# Patient Record
Sex: FEMALE | Race: WHITE | NOT HISPANIC OR LATINO | Employment: OTHER | URBAN - METROPOLITAN AREA
[De-identification: names, ages, dates, MRNs, and addresses within clinical notes are randomized per-mention and may not be internally consistent; named-entity substitution may affect disease eponyms.]

---

## 2021-01-26 ENCOUNTER — OFFICE VISIT - HEALTHEAST (OUTPATIENT)
Dept: FAMILY MEDICINE | Facility: CLINIC | Age: 86
End: 2021-01-26

## 2021-01-26 DIAGNOSIS — G89.29 CHRONIC RIGHT-SIDED LOW BACK PAIN WITH RIGHT-SIDED SCIATICA: ICD-10-CM

## 2021-01-26 DIAGNOSIS — M54.41 CHRONIC RIGHT-SIDED LOW BACK PAIN WITH RIGHT-SIDED SCIATICA: ICD-10-CM

## 2021-01-26 DIAGNOSIS — Z76.89 ENCOUNTER TO ESTABLISH CARE: ICD-10-CM

## 2021-01-26 RX ORDER — BETAMETHASONE DIPROPIONATE 0.05 %
OINTMENT (GRAM) TOPICAL
Status: SHIPPED | COMMUNITY
Start: 2019-03-27 | End: 2023-03-26

## 2021-01-26 RX ORDER — MULTIPLE VITAMINS W/ MINERALS TAB 9MG-400MCG
1 TAB ORAL EVERY MORNING
Status: SHIPPED | COMMUNITY
Start: 2021-01-26

## 2021-01-26 RX ORDER — CALCIUM CARBONATE 260MG(650)
100 TABLET,CHEWABLE ORAL 3 TIMES DAILY PRN
Status: SHIPPED | COMMUNITY
Start: 2021-01-26

## 2021-01-26 ASSESSMENT — MIFFLIN-ST. JEOR: SCORE: 1062.22

## 2021-02-08 ENCOUNTER — AMBULATORY - HEALTHEAST (OUTPATIENT)
Dept: NURSING | Facility: CLINIC | Age: 86
End: 2021-02-08

## 2021-03-01 ENCOUNTER — AMBULATORY - HEALTHEAST (OUTPATIENT)
Dept: NURSING | Facility: CLINIC | Age: 86
End: 2021-03-01

## 2021-06-05 VITALS
SYSTOLIC BLOOD PRESSURE: 112 MMHG | BODY MASS INDEX: 26.37 KG/M2 | HEIGHT: 62 IN | WEIGHT: 143.3 LBS | HEART RATE: 70 BPM | DIASTOLIC BLOOD PRESSURE: 74 MMHG

## 2021-06-14 NOTE — PROGRESS NOTES
Assessment/Plan:     1. Encounter to establish care  Healthy female patient.  Recommend follow up this fall for her Annual Wellness Physical.     2. Chronic right-sided low back pain with right-sided sciatica  Exacerbated by recent move.  Patient is managing her symptoms well at this time.  If worsening, consider PT and/or Spine referral.         Subjective:     Ramila Liao is a 85 y.o. female who presents to establish care.  Patient recently moved to [place] from [place], [place].  She is  since 2013; moved to be closer to family.  Patient is living in an independent senior living facility.  She is completely independent and continues to drive.  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  I was able to access and review her previous records through [facility] Everywhere.     Patient denies any chronic health conditions.  She utilizes a steroid cream intermittently for atrophic vaginitis symptoms, but is otherwise not on any prescription medications.      History of total hysterectomy due to an ovarian cyst.    Patient does have intermittent right low back pain with radiating pain down her right leg.  Symptoms have been exacerbated by her recent move.  She tries to walk frequently and stay active.  Denies any numbness/tingling in the foot.  Previously has done PT.  An MRI in 2016 showed some lumbar spinal stenosis.    Up to date on her mammogram.  She has stopped getting colonoscopies.  No history of DEXA scan.  Immunizations are up to date.  She is looking forward to hopefully getting the COVID vaccination soon.       The following portions of the patient's history were reviewed and updated as appropriate: allergies, current medications, past family history, past medical history, past social history, past surgical history and problem list.    Review of Systems  A comprehensive review of systems was performed and was otherwise negative    Objective:     BP  "112/74   Pulse 70   Ht 5' 2.25\" (1.581 m)   Wt 143 lb 4.8 oz (65 kg)   BMI 26.00 kg/m      General Appearance: Alert, cooperative, no distress, appears stated age  Head: Normocephalic, without obvious abnormality, atraumatic    Susan Sutton, LONA    "

## 2021-06-29 ENCOUNTER — OFFICE VISIT - HEALTHEAST (OUTPATIENT)
Dept: FAMILY MEDICINE | Facility: CLINIC | Age: 86
End: 2021-06-29

## 2021-06-29 DIAGNOSIS — Z91.038 ALLERGY TO INSECT BITES: ICD-10-CM

## 2021-07-06 VITALS
DIASTOLIC BLOOD PRESSURE: 80 MMHG | SYSTOLIC BLOOD PRESSURE: 118 MMHG | WEIGHT: 138.6 LBS | HEART RATE: 74 BPM | BODY MASS INDEX: 25.15 KG/M2

## 2021-07-07 NOTE — PATIENT INSTRUCTIONS - HE
I placed a referral to the allergy specialist today.    Please call and make an appointment with Dr. Ramirez.  [phone]

## 2021-07-07 NOTE — PROGRESS NOTES
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  Told her that she could have a discussion with her allergist to better determine if she would benefit from carrying an EpiPen.  - Ambulatory referral to [facility]    Patient Instructions   I placed a referral to the allergy specialist today.    Please call and make an appointment with Dr. Ramirez.  [phone]        Prescription drug management  14 minutes spent on the date of the encounter doing chart review, history and exam, documentation and further activities per the note       BMI:   Estimated body mass index is 25.15 kg/m  as calculated from the following:    Height as of 1/26/21: 5' 2.25\" (1.581 m).    Weight as of this encounter: 138 lb 9.6 oz (62.9 kg).       Return in about 3 months (around 9/29/2021).    Audi Dave CNP  [facility]   Ramila Liao is 86 y.o. and presents today for the following health issues   HPI   Patient comes the clinic today for some insect bites that she experienced this past weekend, approximately 3 days ago.    She was on a pontoon boat when she thinks she was bitten by some horse flies on her bilateral elbows and right ankle.  She says immediately afterward, she developed some localized redness and swelling.  She applied some 1% hydrocortisone and took some diphenhydramine and says that her symptoms got better.  However, she had family members tell her that she may need an EpiPen due to her reaction to the insect bite.    Today, patient states that the swelling near the insect bites has gotten much better.    She does not recall any type of throat swelling, lip swelling, tongue swelling, or difficulty breathing since getting bit by the insects.    She has never had to use an EpiPen in the past.    She cannot remember the last " time she got stung by a bee.      Review of Systems  Negative      Objective    /80   Pulse 74   Wt 138 lb 9.6 oz (62.9 kg)   BMI 25.15 kg/m    Body mass index is 25.15 kg/m .  Physical Exam  Perhaps some very mild swelling the patient's right ankle region but no evidence of cellulitis.

## 2021-08-10 ENCOUNTER — OFFICE VISIT (OUTPATIENT)
Dept: ALLERGY | Facility: CLINIC | Age: 86
End: 2021-08-10
Payer: COMMERCIAL

## 2021-08-10 VITALS — HEIGHT: 62 IN | WEIGHT: 138.62 LBS | HEART RATE: 78 BPM | BODY MASS INDEX: 25.51 KG/M2 | OXYGEN SATURATION: 99 %
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
## 2021-08-10 DIAGNOSIS — S90.861D INSECT BITE OF RIGHT FOOT, SUBSEQUENT ENCOUNTER: ICD-10-CM

## 2021-08-10 DIAGNOSIS — L50.3 DERMATOGRAPHIA: Primary | ICD-10-CM

## 2021-08-10 PROCEDURE — 99203 OFFICE O/P NEW LOW 30 MIN: CPT | Performed by: ALLERGY & IMMUNOLOGY

## 2021-08-10 ASSESSMENT — MIFFLIN-ST. JEOR: SCORE: 1035.99

## 2021-08-10 NOTE — PATIENT INSTRUCTIONS
Dermatographia    Zyrtec (cetirizine) 10 mg prior to bug bite    Systemic reaction---more than skin, swelling, GI symptoms, breathing issues

## 2021-08-10 NOTE — LETTER
"    8/10/2021         RE: Ramila Liao  [address]  [facility] [address]        Dear Colleague,    Thank you for referring your patient, Ramila Liao, to the [facility]. Please see a copy of my visit note below.            Subjective       HPI chief complaint: Bug bite reaction    History of present illness: This is a pleasant 86-year-old woman who is here today to discuss a bug bite reaction.  [place]  She states she was bitten on her foot by either a fly or stung by a bee.  She is not sure.  She developed a large local swelling with redness around the swelling.  She then felt itchy all over her body.  She states she had no rash systemically.  No breathing difficulty.  No nasal congestion or gastrointestinal complaints.  No swelling of her lips or eyes.  She states that she used some antiitch cream and Benadryl which seems to help.  She states that she will become itchy systemically from other things.  She states that the child this would happen after she would go swimming.  It happened once when she was in [place] and he was some hand .  She states she seems to swell quite a bit with bug bites previously as well.  She is never had a systemic reaction to insect bites.  She does not take the allergy medication regularly.    Past medical history: Cholecystectomy, hysterectomy    Social history: She lives in an apartment, she is retired, non-smoker    Family history: Noncontributory, mother had what sounds like dermatographia        Review of Systems   Constitutional, HEENT, cardiovascular, pulmonary, gi and gu systems are negative, except as otherwise noted.      Objective    Pulse 78   Ht 1.581 m (5' 2.25\")   Wt 62.9 kg (138 lb 9.9 oz)   SpO2 99%   BMI 25.15 kg/m    Body mass index is 25.15 kg/m .  Physical Exam      Gen: Pleasant female not in acute distress  HEENT: Eyes no erythema of the bulbar or " palpebral conjunctiva, no edema.   Skin: No rashes or lesions  Psych: Alert and oriented times 3    Impression report and plan:  1.  Large local reaction to insect bite  2.  Dermatographia    Reaction does not sound like a systemic reaction.  She had no systemic rash, gastrointestinal complaints, swelling or breathing difficulty.  She does have what sounds like dermatographia which could have been triggered by her insect bite.  Recommended her trying Zyrtec 10 mg prior to insect exposure.  No testing is necessary for this sort of reaction.  Follow as needed.            Again, thank you for allowing me to participate in the care of your patient.        Sincerely,        Tamara TREVIZO MD

## 2021-08-10 NOTE — PROGRESS NOTES
"        Subjective       HPI chief complaint: Bug bite reaction    History of present illness: This is a pleasant 86-year-old woman who is here today to discuss a bug bite reaction.  [place]  She states she was bitten on her foot by either a fly or stung by a bee.  She is not sure.  She developed a large local swelling with redness around the swelling.  She then felt itchy all over her body.  She states she had no rash systemically.  No breathing difficulty.  No nasal congestion or gastrointestinal complaints.  No swelling of her lips or eyes.  She states that she used some antiitch cream and Benadryl which seems to help.  She states that she will become itchy systemically from other things.  She states that the child this would happen after she would go swimming.  It happened once when she was in [place] and he was some hand .  She states she seems to swell quite a bit with bug bites previously as well.  She is never had a systemic reaction to insect bites.  She does not take the allergy medication regularly.    Past medical history: Cholecystectomy, hysterectomy    Social history: She lives in an apartment, she is retired, non-smoker    Family history: Noncontributory, mother had what sounds like dermatographia        Review of Systems   Constitutional, HEENT, cardiovascular, pulmonary, gi and gu systems are negative, except as otherwise noted.      Objective    Pulse 78   Ht 1.581 m (5' 2.25\")   Wt 62.9 kg (138 lb 9.9 oz)   SpO2 99%   BMI 25.15 kg/m    Body mass index is 25.15 kg/m .  Physical Exam      Gen: Pleasant female not in acute distress  HEENT: Eyes no erythema of the bulbar or palpebral conjunctiva, no edema.   Skin: No rashes or lesions  Psych: Alert and oriented times 3    Impression report and plan:  1.  Large local reaction to insect bite  2.  Dermatographia    Reaction does not sound like a systemic reaction.  She had no systemic " rash, gastrointestinal complaints, swelling or breathing difficulty.  She does have what sounds like dermatographia which could have been triggered by her insect bite.  Recommended her trying Zyrtec 10 mg prior to insect exposure.  No testing is necessary for this sort of reaction.  Follow as needed.

## 2021-09-15 ENCOUNTER — LAB (OUTPATIENT)
Dept: LAB | Facility: CLINIC | Age: 86
End: 2021-09-15

## 2021-09-15 ENCOUNTER — VIRTUAL VISIT (OUTPATIENT)
Dept: FAMILY MEDICINE | Facility: CLINIC | Age: 86
End: 2021-09-15
Payer: COMMERCIAL

## 2021-09-15 DIAGNOSIS — R05.9 COUGH: ICD-10-CM

## 2021-09-15 DIAGNOSIS — R05.9 COUGH: Primary | ICD-10-CM

## 2021-09-15 PROCEDURE — 99213 OFFICE O/P EST LOW 20 MIN: CPT | Mod: 95 | Performed by: FAMILY MEDICINE

## 2021-09-15 PROCEDURE — U0005 INFEC AGEN DETEC AMPLI PROBE: HCPCS

## 2021-09-15 PROCEDURE — U0003 INFECTIOUS AGENT DETECTION BY NUCLEIC ACID (DNA OR RNA); SEVERE ACUTE RESPIRATORY SYNDROME CORONAVIRUS 2 (SARS-COV-2) (CORONAVIRUS DISEASE [COVID-19]), AMPLIFIED PROBE TECHNIQUE, MAKING USE OF HIGH THROUGHPUT TECHNOLOGIES AS DESCRIBED BY CMS-2020-01-R: HCPCS

## 2021-09-15 NOTE — PROGRESS NOTES
Kanwal is a 86 year old who is being evaluated via a billable telephone visit.      What phone number would you like to be contacted at? [phone]  How would you like to obtain your AVS? Mail a copy    Assessment & Plan     Cough  For now, supportive cares.  Practice self-observation and remain alert for feelings of feverish, cough, shortness of breath.  Take your temperature daily.  Self isolate in the home until result is made available and your symptoms are better.     - Symptomatic COVID-19 Virus (Coronavirus) by PCR    Phan Palomares MD  [facility]   Kanwal is a 86 year old who presents for the following health issues     HPI     Scratchy throat, excessive clearing of throat noted 4 days ago  runny nose and cough noted today  No fever. No sore throat  Fully vaccinated for covid19   No close contact with COVID19 but there were several residents in her residence who tested positive for COVID19    Review of Systems         Objective    Vitals - Patient Reported  Weight (Patient Reported): 64.9 kg (143 lb)  Temperature (Patient Reported): 98.4  F (36.9  C)      Vitals:  No vitals were obtained today due to virtual visit.    Physical Exam   Alert, oriented, NAD    Phone call duration: 11 minutes

## 2021-09-16 ENCOUNTER — NURSE TRIAGE (OUTPATIENT)
Dept: NURSING | Facility: CLINIC | Age: 86
End: 2021-09-16

## 2021-09-16 LAB — SARS-COV-2 RNA RESP QL NAA+PROBE: NEGATIVE

## 2021-09-16 NOTE — TELEPHONE ENCOUNTER

## 2022-01-03 ENCOUNTER — NURSE TRIAGE (OUTPATIENT)
Dept: NURSING | Facility: CLINIC | Age: 87
End: 2022-01-03
Payer: COMMERCIAL

## 2022-01-03 NOTE — TELEPHONE ENCOUNTER
Triage call:     Patient calling to report low back pain that has been going on for the past 6 weeks. Right buttock to right leg pain and thinks that could be related to sciatica, and rates pain at 4-5/10, interfering with sleep.  Has used ibuprofen, tylenol, heat, cold and reports little relief--especially worse when standing. She reports that she occasionally has a sharp pain (7/10) in the hip area that causes her to lose her balance. She denies falls. She reports that she attends an exercise class and will attend this afternoon to see if stretching will help improve.     According to the protocol, patient should be seen .  Care advice given. Patient verbalizes understanding and agrees with plan of care. Transferred to scheduling for appointment tomorrow with Susan Sutton.     Maria Isabel Burton RN   01/03/22 11:16 AM  [facility] Nurse Advisor    COVID 19 Nurse Triage Plan/Patient Instructions    Please be aware that novel coronavirus (COVID-19) may be circulating in the community. If you develop symptoms such as fever, cough, or SOB or if you have concerns about the presence of another infection including coronavirus (COVID-19), please contact your health care provider or visit [email or web address].     Disposition/Instructions    In-Person Visit with provider recommended. Reference Visit Selection Guide.    Thank you for taking steps to prevent the spread of this virus.  o Limit your contact with others.  o Wear a simple mask to cover your cough.  o Wash your hands well and often.    Resources    [facility]: About COVID-19: [email or web address]    CDC: What to Do If You're Sick: www.cdc.gov/coronavirus/2019-ncov/about/steps-when-sick.html    CDC: Ending Home Isolation: www.cdc.gov/coronavirus/2019-ncov/hcp/disposition-in-home-patients.html     CDC: Caring for Someone: www.cdc.gov/coronavirus/2019-ncov/if-you-are-sick/care-for-someone.html     [facility]: Interim Guidance for [facility]  Discharge to Home: [email or web address]/[email or web address]/hcp/hospdischarge.pdf    [facility] clinical trials (COVID-19 research studies): [email or web address].[facility]/umn-clinical-trials     Below are the COVID-19 hotlines at the [place] Department of Health ([facility]). Interpreters are available.   o For health questions: Call 382-073-2495 or 1-650.512.2869 (7 a.m. to 7 p.m.)  o For questions about schools and childcare: Call 097-129-0418 or 1-745.616.8558 (7 a.m. to 7 p.m.)                      Reason for Disposition    MODERATE back pain (e.g., interferes with normal activities) and present > 3 days    Additional Information    Negative: Passed out (i.e., fainted, collapsed and was not responding)    Negative: Shock suspected (e.g., cold/pale/clammy skin, too weak to stand, low BP, rapid pulse)    Negative: Sounds like a life-threatening emergency to the triager    Negative: Major injury to the back (e.g., MVA, fall > 10 feet or 3 meters, penetrating injury, etc.)    Negative: Pain in the upper back over the ribs (rib cage) that radiates (travels) into the chest    Negative: Pain in the upper back over the ribs (rib cage) and worsened by coughing (or clearly increases with breathing)    Negative: SEVERE back pain of sudden onset and age > 60    Negative: SEVERE abdominal pain (e.g., excruciating)    Negative: Abdominal pain and age > 60    Negative: Unable to urinate (or only a few drops) and bladder feels very full    Negative: Loss of bladder or bowel control (urine or bowel incontinence; wetting self, leaking stool) of new onset    Negative: Numbness (loss of sensation) in groin or rectal area    Negative: Pain radiates into groin, scrotum    Negative: Blood in urine (red, pink, or tea-colored)    Negative: Vomiting and pain over lower ribs of back (i.e., flank - kidney area)    Negative: Weakness of a leg or foot (e.g., unable to bear weight, dragging foot)    Negative: Patient sounds very sick or  weak to the triager    Negative: Fever > 100.4 F (38.0 C) and flank pain    Negative: Pain or burning with passing urine (urination)    Negative: SEVERE back pain (e.g., excruciating, unable to do any normal activities) and not improved after pain medicine and CARE ADVICE    Negative: Numbness in an arm or hand (i.e., loss of sensation) and upper back pain    Negative: Numbness in a leg or foot (i.e., loss of sensation)    Negative: High-risk adult (e.g., history of cancer, history of HIV, or history of IV drug abuse)    Negative: Painful rash with multiple small blisters grouped together (i.e., dermatomal distribution or 'band' or 'stripe')    Negative: Pain radiates into the thigh or further down the leg, and in both legs    Negative: Age > 50 and no history of prior similar back pain    Protocols used: BACK PAIN-A-[place]

## 2022-01-04 ENCOUNTER — OFFICE VISIT (OUTPATIENT)
Dept: FAMILY MEDICINE | Facility: CLINIC | Age: 87
End: 2022-01-04
Payer: COMMERCIAL

## 2022-01-04 ENCOUNTER — TELEPHONE (OUTPATIENT)
Dept: FAMILY MEDICINE | Facility: CLINIC | Age: 87
End: 2022-01-04

## 2022-01-04 VITALS
SYSTOLIC BLOOD PRESSURE: 110 MMHG | DIASTOLIC BLOOD PRESSURE: 62 MMHG | WEIGHT: 137.3 LBS | BODY MASS INDEX: 24.91 KG/M2 | HEART RATE: 76 BPM

## 2022-01-04 DIAGNOSIS — M54.41 CHRONIC RIGHT-SIDED LOW BACK PAIN WITH RIGHT-SIDED SCIATICA: Primary | ICD-10-CM

## 2022-01-04 DIAGNOSIS — G89.29 CHRONIC RIGHT-SIDED LOW BACK PAIN WITH RIGHT-SIDED SCIATICA: Primary | ICD-10-CM

## 2022-01-04 DIAGNOSIS — H61.23 BILATERAL IMPACTED CERUMEN: ICD-10-CM

## 2022-01-04 PROCEDURE — 69209 REMOVE IMPACTED EAR WAX UNI: CPT | Mod: 50 | Performed by: NURSE PRACTITIONER

## 2022-01-04 PROCEDURE — 99214 OFFICE O/P EST MOD 30 MIN: CPT | Mod: 25 | Performed by: NURSE PRACTITIONER

## 2022-01-04 NOTE — PROGRESS NOTES
"  Assessment & Plan     Chronic right-sided low back pain with right-sided sciatica  Symptoms improved with ibuprofen.  Due to chronicity of symptoms, we did do some x-rays today including her lumbar spine and bilateral hips.  I personally reviewed the x-rays, and vertebral spacing seems to be well-maintained.  I do not see any significant arthritis of the hips.  Final radiology report pending.  At this point, will refer patient for physical therapy.  She may continue ibuprofen twice a day for the next couple of days, but I would not use this long-term.  If symptoms become more persistent, consider spine referral.  - Physical Therapy Referral  - XR Lumbar Spine 2/3 Views  - XR Hip Bilateral 2 Views Each    Bilateral impacted cerumen  Bilateral ear canals irrigated per provider assistant.  Patient tolerated well and cerumen was completely cleared.   - CA REMOVAL IMPACTED CERUMEN IRRIGATION/LVG UNILAT           BMI:   Estimated body mass index is 24.91 kg/m  as calculated from the following:    Height as of 8/10/21: 1.581 m (5' 2.25\").    Weight as of this encounter: 62.3 kg (137 lb 4.8 oz).       Return in about 2 months (around 3/4/2022) for Routine preventive, with me, in person.    Susan Sutton NP  [facility]    Erickson Schofield is a 86 year old who presents with complaints of back pain.  This is not new.  Symptoms restarted over the weekend.  There was no fall or specific trigger.  She reports pain to her right low back/buttocks area that radiates down her right leg.  Denies any new numbness/tingling in her feet or lower extremity weakness.  No episodes of incontinence.  She took 400 mg of ibuprofen last evening, which seems to have helped quite a bit.  She is feeling much better today.  A chart review reveals that she had a lumbar x-ray in 2016, which showed some mild spinal stenosis.    Patient is also requesting her ears be flushed out today.    Review of Systems "   Pertinent items in HPI      Objective    /62   Pulse 76   Wt 62.3 kg (137 lb 4.8 oz)   BMI 24.91 kg/m    Body mass index is 24.91 kg/m .  Physical Exam   GENERAL: healthy, alert and no distress  HENT: unable to visualize bilateral TMs secondary to cerumen  MS: Lumbar spine is straight and nontender.  Tenderness palpated to the right SI joint and buttocks area.  Gait and coordination normal.  Lower extremity strength is grossly normal bilaterally.  SLR negative bilaterally.

## 2022-01-04 NOTE — PATIENT INSTRUCTIONS
Continue Ibuprofen twice daily with food.    I will call you with your xray results.    I placed a referral for physical therapy - you will receive a phone call to get this scheduled.

## 2022-01-04 NOTE — TELEPHONE ENCOUNTER
----- Message from Susan Sutton NP sent at 1/4/2022  5:09 PM CST -----  Please call patient.    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  No other significant findings.  Continue with the plan as discuss in clinic.    Susan Sutton NP

## 2022-04-18 RX ORDER — BETAMETHASONE VALERATE 1.2 MG/G
1 CREAM TOPICAL
COMMUNITY
End: 2022-04-19

## 2022-04-19 ENCOUNTER — OFFICE VISIT (OUTPATIENT)
Dept: FAMILY MEDICINE | Facility: CLINIC | Age: 87
End: 2022-04-19
Payer: COMMERCIAL

## 2022-04-19 VITALS
SYSTOLIC BLOOD PRESSURE: 116 MMHG | BODY MASS INDEX: 25.19 KG/M2 | WEIGHT: 136.9 LBS | HEIGHT: 62 IN | HEART RATE: 80 BPM | DIASTOLIC BLOOD PRESSURE: 76 MMHG

## 2022-04-19 DIAGNOSIS — R21 RASH: ICD-10-CM

## 2022-04-19 DIAGNOSIS — M77.41 METATARSALGIA OF RIGHT FOOT: ICD-10-CM

## 2022-04-19 DIAGNOSIS — Z00.00 ENCOUNTER FOR MEDICARE ANNUAL WELLNESS EXAM: Primary | ICD-10-CM

## 2022-04-19 DIAGNOSIS — Z13.220 SCREENING FOR LIPID DISORDERS: ICD-10-CM

## 2022-04-19 DIAGNOSIS — M54.41 CHRONIC RIGHT-SIDED LOW BACK PAIN WITH RIGHT-SIDED SCIATICA: ICD-10-CM

## 2022-04-19 DIAGNOSIS — G89.29 CHRONIC RIGHT-SIDED LOW BACK PAIN WITH RIGHT-SIDED SCIATICA: ICD-10-CM

## 2022-04-19 DIAGNOSIS — Z13.1 SCREENING FOR DIABETES MELLITUS: ICD-10-CM

## 2022-04-19 DIAGNOSIS — Z23 HIGH PRIORITY FOR 2019-NCOV VACCINE: ICD-10-CM

## 2022-04-19 PROCEDURE — 0054A COVID-19,PF,PFIZER (12+ YRS): CPT | Performed by: NURSE PRACTITIONER

## 2022-04-19 PROCEDURE — 99397 PER PM REEVAL EST PAT 65+ YR: CPT | Mod: 25 | Performed by: NURSE PRACTITIONER

## 2022-04-19 PROCEDURE — 91305 COVID-19,PF,PFIZER (12+ YRS): CPT | Performed by: NURSE PRACTITIONER

## 2022-04-19 ASSESSMENT — ACTIVITIES OF DAILY LIVING (ADL): CURRENT_FUNCTION: NO ASSISTANCE NEEDED

## 2022-04-19 NOTE — PROGRESS NOTES
"SUBJECTIVE:   Ramila Liao is a 86 year old female who presents for Preventive Visit.    Patient is doing well in her senior living facility.  Has been able to be more active with less COVID restrictions.  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    Continues to have right low back pain.  Better with walking and heat application.    Has pain on the bottom of her right foot, just proximal to the 3rd toe.  She has a metatarsal pad that was given to her years ago that is helpful.  She would like a new one.    Patient declines a DEXA scan.  Does not wish to continue mammogram screenings.     Gets a red, blistery, and itchy rash on different areas of her body.  Recently had one on her buttocks.  They are not painful.     Patient has been advised of split billing requirements and indicates understanding: Yes  Are you in the first 12 months of your Medicare coverage?  No    Healthy Habits:     In general, how would you rate your overall health?  Good    Frequency of exercise:  4-5 days/week    Do you usually eat at least 4 servings of fruit and vegetables a day, include whole grains    & fiber and avoid regularly eating high fat or \"junk\" foods?  No    Taking medications regularly:  Yes    Medication side effects:  Not applicable    Ability to successfully perform activities of daily living:  No assistance needed    Home Safety:  No safety concerns identified    Hearing Impairment:  Difficulty following a conversation in a noisy restaurant or crowded room, difficult to understand a speaker at a public meeting or [employer] service and difficulty understanding soft or whispered speech    In the past 6 months, have you been bothered by leaking of urine? Yes    In general, how would you rate your overall mental or emotional health?  Good      PHQ-2 Total Score: 0    Additional concerns today:  Yes    Do you feel safe in your environment? Yes    Have you ever done Advance Care Planning? (For example, a Health " "Directive, POLST, or a discussion with a medical provider or your loved ones about your wishes): Yes, advance care planning is on file.       Fall risk  Fallen 2 or more times in the past year?: No  Any fall with injury in the past year?: No    Cognitive Screening   1) Repeat 3 items (Leader, Season, Table)    2) Clock draw: NORMAL  3) 3 item recall: Recalls 2 objects   Results: NORMAL clock, 1-2 items recalled: COGNITIVE IMPAIRMENT LESS LIKELY        Reviewed and updated as needed this visit by clinical staff   Tobacco  Allergies  Meds  Problems  Med Hx  Surg Hx  Fam Hx            Reviewed and updated as needed this visit by Provider   Tobacco  Allergies  Meds  Problems  Med Hx  Surg Hx  Fam Hx           Social History     Tobacco Use     Smoking status: Never Smoker     Smokeless tobacco: Never Used   Substance Use Topics     Alcohol use: Yes       Alcohol Use 4/19/2022   Prescreen: >3 drinks/day or >7 drinks/week? Not Applicable       Current providers sharing in care for this patient include:  Patient Care Team:  Susan Sutton NP as PCP - General (Family Practice)  Susan Sutton NP as Assigned PCP  Tamara Ramirez MD as Assigned Allergy Provider    The following health maintenance items are reviewed in Epic and correct as of today:  Health Maintenance Due   Topic Date Due     DEXA  Never done       Mammogram Screening - Patient over age 75, has elected to discontinue screenings. Mammogram Screening - Mammography discussed and declined  Pertinent mammograms are reviewed under the imaging tab.    Review of Systems  Pertinent items in HPI    OBJECTIVE:   /76   Pulse 80   Ht 1.575 m (5' 2\")   Wt 62.1 kg (136 lb 14.4 oz)   BMI 25.04 kg/m   Estimated body mass index is 25.04 kg/m  as calculated from the following:    Height as of this encounter: 1.575 m (5' 2\").    Weight as of this encounter: 62.1 kg (136 lb 14.4 oz).  Physical Exam  GENERAL: healthy, alert and no distress  EYES: Eyes " "grossly normal to inspection, PERRL and conjunctivae and sclerae normal  HENT: ear canals and TM's normal, nose and mouth without ulcers or lesions  NECK: no adenopathy, no asymmetry, masses, or scars and thyroid normal to palpation  RESP: lungs clear to auscultation - no rales, rhonchi or wheezes  CV: regular rate and rhythm, normal S1 S2, no S3 or S4, no murmur, click or rub, no peripheral edema  ABDOMEN: soft, nontender, no hepatosplenomegaly, no masses and bowel sounds normal  MS: no gross musculoskeletal defects noted, no edema  SKIN: no suspicious lesions or rashes  NEURO: Normal strength and tone, mentation intact and speech normal  PSYCH: mentation appears normal, affect normal/bright      ASSESSMENT / PLAN:   Ramila was seen today for physical and imm/inj.    Diagnoses and all orders for this visit:    Encounter for Medicare annual wellness exam    High priority for 2019-nCoV vaccine  -     COVID-19,PF,PFIZER (12+ Yrs GRAY LABEL)    Screening for diabetes mellitus  -     Comprehensive metabolic panel (BMP + Alb, Alk Phos, ALT, AST, Total. Bili, TP); Future    Screening for lipid disorders  -     Lipid Profile (Chol, Trig, HDL, LDL calc); Future    Rash  Intermittent blistery rash.  Appears like Shingles, but I would expect more pain.  Continue to monitor.  She can trial her steroid cream on this next time, as it is pruritic.     Chronic right-sided low back pain with right-sided sciatica  Patient will continue to stay active.  She declines a PT or spine referral at this time.    Metatarsalgia of right foot  Recommend getting a new metatarsal pad off of [employer].  She will have a family member help with this.  Declines a podiatry referral.        COUNSELING:  Reviewed preventive health counseling, as reflected in patient instructions    Estimated body mass index is 25.04 kg/m  as calculated from the following:    Height as of this encounter: 1.575 m (5' 2\").    Weight as of this encounter: 62.1 kg (136 lb " 14.4 oz).        She reports that she has never smoked. She has never used smokeless tobacco.      Appropriate preventive services were discussed with this patient, including applicable screening as appropriate for cardiovascular disease, diabetes, osteopenia/osteoporosis, and glaucoma.  As appropriate for age/gender, discussed screening for colorectal cancer, prostate cancer, breast cancer, and cervical cancer. Checklist reviewing preventive services available has been given to the patient.    Reviewed patients plan of care and provided an AVS. The Basic Care Plan (routine screening as documented in Health Maintenance) for Ramila meets the Care Plan requirement. This Care Plan has been established and reviewed with the Patient.      Susan Sutton NP  [facility]    Identified Health Risks:

## 2022-04-19 NOTE — PATIENT INSTRUCTIONS
Can order a metatarsal pad off of [employer]    Corn pad between big toe on the left foot    Patient Education   Personalized Prevention Plan  You are due for the preventive services outlined below.  Your care team is available to assist you in scheduling these services.  If you have already completed any of these items, please share that information with your care team to update in your medical record.  Health Maintenance Due   Topic Date Due    Osteoporosis Screening  Never done    FALL RISK ASSESSMENT  Never done       Understanding USDA MyPlate  The USDA has guidelines to help you make healthy food choices. These are called MyPlate. MyPlate shows the food groups that make up healthy meals using the image of a place setting. Before you eat, think about the healthiest choices for what to put on your plate or in your cup or bowl. To learn more about building a healthy plate, visit www.choosemyplate.gov.    The food groups  Fruits. Any fruit or 100% fruit juice counts as part of the Fruit Group. Fruits may be fresh, canned, frozen, or dried, and may be whole, cut-up, or pureed. Make 1/2 of your plate fruits and vegetables.  Vegetables. Any vegetable or 100% vegetable juice counts as a member of the Vegetable Group. Vegetables may be fresh, frozen, canned, or dried. They can be served raw or cooked and may be whole, cut-up, or mashed. Make 1/2 of your plate fruits and vegetables.  Grains. All foods made from grains are part of the Grains Group. These include wheat, rice, oats, cornmeal, and barley. Grains are often used to make foods such as bread, pasta, oatmeal, cereal, tortillas, and grits. Grains should be no more than 1/4 of your plate. At least half of your grains should be whole grains.  Protein. This group includes meat, poultry, seafood, beans and peas, eggs, processed soy products (such as tofu), nuts (including nut butters), and seeds. Make protein choices no more than 1/4 of your plate. Meat and poultry  choices should be lean or low fat.  Dairy. The Dairy Group includes all fluid milk products and foods made from milk that contain calcium, such as yogurt and cheese. (Foods that have little calcium, such as cream, butter, and cream cheese, are not part of this group.) Most dairy choices should be low-fat or fat-free.  Oils. Oils aren't a food group, but they do contain essential nutrients. However it's important to watch your intake of oils. These are fats that are liquid at room temperature. They include canola, corn, olive, soybean, vegetable, and sunflower oil. Foods that are mainly oil include mayonnaise, certain salad dressings, and soft margarines. You likely already get your daily oil allowance from the foods you eat.  Things to limit  Eating healthy also means limiting these things in your diet:     Salt (sodium). Many processed foods have a lot of sodium. To keep sodium intake down, eat fresh vegetables, meats, poultry, and seafood when possible. Purchase low-sodium, reduced-sodium, or no-salt-added food products at the store. And don't add salt to your meals at home. Instead, season them with herbs and spices such as dill, oregano, cumin, and paprika. Or try adding flavor with lemon or lime zest and juice.  Saturated fat. Saturated fats are most often found in animal products such as beef, pork, and chicken. They are often solid at room temperature, such as butter. To reduce your saturated fat intake, choose leaner cuts of meat and poultry. And try healthier cooking methods such as grilling, broiling, roasting, or baking. For a simple lower-fat swap, use plain nonfat yogurt instead of mayonnaise when making potato salad or macaroni salad.  Added sugars. These are sugars added to foods. They are in foods such as ice cream, candy, soda, fruit drinks, sports drinks, energy drinks, cookies, pastries, jams, and syrups. Cut down on added sugars by sharing sweet treats with a family member or friend. You can also  choose fruit for dessert, and drink water or other unsweetened beverages.     [employer] last reviewed this educational content on 6/1/2020 2000-2021 [employer]. All rights reserved. This information is not intended as a substitute for professional medical care. Always follow your healthcare professional's instructions.          Signs of Hearing Loss      Hearing much better with one ear can be a sign of hearing loss.   Hearing loss is a problem shared by many people. In fact, it is one of the most common health problems, particularly as people age. Most people age 65 and older have some hearing loss. By age 80, almost everyone does. Hearing loss often occurs slowly over the years. So you may not realize your hearing has gotten worse.  Have your hearing checked  Call your healthcare provider if you:  Have to strain to hear normal conversation  Have to watch other people s faces very carefully to follow what they re saying  Need to ask people to repeat what they ve said  Often misunderstand what people are saying  Turn the volume of the television or radio up so high that others complain  Feel that people are mumbling when they re talking to you  Find that the effort to hear leaves you feeling tired and irritated  Notice, when using the phone, that you hear better with one ear than the other  [place] last reviewed this educational content on 1/1/2020 2000-2021 [employer]. All rights reserved. This information is not intended as a substitute for professional medical care. Always follow your healthcare professional's instructions.          Urinary Incontinence, Female (Adult)   Urinary incontinence means loss of bladder control. This problem affects many women, especially as they get older. If you have incontinence, you may be embarrassed to ask for help. But know that this problem can be treated.   Types of Incontinence  There are different types of incontinence. Two of the main  types are described here. You can have more than one type.   Stress incontinence. With this type, urine leaks when pressure (stress) is put on the bladder. This may happen when you cough, sneeze, or laugh. Stress incontinence most often occurs because the pelvic floor muscles that support the bladder and urethra are weak. This can happen after pregnancy and vaginal childbirth or a hysterectomy. It can also be due to excess body weight or hormone changes.  Urge incontinence (also called overactive bladder). With this type, a sudden urge to urinate is felt often. This may happen even though there may not be much urine in the bladder. The need to urinate often during the night is common. Urge incontinence most often occurs because of bladder spasms. This may be due to bladder irritation or infection. Damage to bladder nerves or pelvic muscles, constipation, and certain medicines can also lead to urge incontinence.  Treatment depends on the cause. Further evaluation is needed to find the type you have. This will likely include an exam and certain tests. Based on the results, you and your healthcare provider can then plan treatment. Until a diagnosis is made, the home care tips below can help ease symptoms.   Home care  Do pelvic floor muscle exercises, if they are prescribed. The pelvic floor muscles help support the bladder and urethra. Many women find that their symptoms improve when doing special exercises that strengthen these muscles. To do the exercises, contract the muscles you would use to stop your stream of urine. But do this when you re not urinating. Hold for 10 seconds, then relax. Repeat 10 to 20 times in a row, at least 3 times a day. Your healthcare provider may give you other instructions for how to do the exercises and how often.  Keep a bladder diary. This helps track how often and how much you urinate over a set period of time. Bring this diary with you to your next visit with the provider. The  information can help your provider learn more about your bladder problem.  Lose weight, if advised to by your provider. Extra weight puts pressure on the bladder. Your provider can help you create a weight-loss plan that s right for you. This may include exercising more and making certain diet changes.  Don't have foods and drinks that may irritate the bladder. These can include alcohol and caffeinated drinks.  Quit smoking. Smoking and other tobacco use can lead to a long-term (chronic) cough that strains the pelvic floor muscles. Smoking may also damage the bladder and urethra. Talk with your provider about treatments or methods you can use to quit smoking.  If drinking large amounts of fluid makes you have symptoms, you may be advised to limit your fluid intake. You may also be advised to drink most of your fluids during the day and to limit fluids at night.  If you re worried about urine leakage or accidents, you may wear absorbent pads to catch urine. Change the pads often. This helps reduce discomfort. It may also reduce the risk of skin or bladder infections.    Follow-up care  Follow up with your healthcare provider, or as directed. It may take some to find the right treatment for your problem. But healthy lifestyle changes can be made right away. These include such things as exercising on a regular basis, eating a healthy diet, losing weight (if needed), and quitting smoking. Your treatment plan may include special therapies or medicines. Certain procedures or surgery may also be options. Talk about any questions you have with your provider.   When to seek medical advice  Call the healthcare provider right away if any of these occur:  Fever of 100.4 F (38 C) or higher, or as directed by your provider  Bladder pain or fullness  Belly swelling  Nausea or vomiting  Back pain  Weakness, dizziness, or fainting  [place] last reviewed this educational content on 1/1/2020 2000-2021 [employer].  All rights reserved. This information is not intended as a substitute for professional medical care. Always follow your healthcare professional's instructions.

## 2022-04-21 ENCOUNTER — LAB (OUTPATIENT)
Dept: LAB | Facility: CLINIC | Age: 87
End: 2022-04-21
Payer: COMMERCIAL

## 2022-04-21 DIAGNOSIS — Z13.1 SCREENING FOR DIABETES MELLITUS: ICD-10-CM

## 2022-04-21 DIAGNOSIS — Z13.220 SCREENING FOR LIPID DISORDERS: ICD-10-CM

## 2022-04-21 LAB
ALBUMIN SERPL-MCNC: 3.6 G/DL (ref 3.5–5)
ALP SERPL-CCNC: 77 U/L (ref 45–120)
ALT SERPL W P-5'-P-CCNC: 12 U/L (ref 0–45)
ANION GAP SERPL CALCULATED.3IONS-SCNC: 10 MMOL/L (ref 5–18)
AST SERPL W P-5'-P-CCNC: 19 U/L (ref 0–40)
BILIRUB SERPL-MCNC: 0.6 MG/DL (ref 0–1)
BUN SERPL-MCNC: 15 MG/DL (ref 8–28)
CALCIUM SERPL-MCNC: 9.1 MG/DL (ref 8.5–10.5)
CHLORIDE BLD-SCNC: 106 MMOL/L (ref 98–107)
CHOLEST SERPL-MCNC: 218 MG/DL
CO2 SERPL-SCNC: 25 MMOL/L (ref 22–31)
CREAT SERPL-MCNC: 0.83 MG/DL (ref 0.6–1.1)
FASTING STATUS PATIENT QL REPORTED: YES
GFR SERPL CREATININE-BSD FRML MDRD: 69 ML/MIN/1.73M2
GLUCOSE BLD-MCNC: 104 MG/DL (ref 70–125)
HDLC SERPL-MCNC: 69 MG/DL
LDLC SERPL CALC-MCNC: 127 MG/DL
POTASSIUM BLD-SCNC: 4.3 MMOL/L (ref 3.5–5)
PROT SERPL-MCNC: 7.2 G/DL (ref 6–8)
SODIUM SERPL-SCNC: 141 MMOL/L (ref 136–145)
TRIGL SERPL-MCNC: 112 MG/DL

## 2022-04-21 PROCEDURE — 80053 COMPREHEN METABOLIC PANEL: CPT

## 2022-04-21 PROCEDURE — 36415 COLL VENOUS BLD VENIPUNCTURE: CPT

## 2022-04-21 PROCEDURE — 80061 LIPID PANEL: CPT

## 2022-05-23 ENCOUNTER — TRANSFERRED RECORDS (OUTPATIENT)
Dept: HEALTH INFORMATION MANAGEMENT | Facility: CLINIC | Age: 87
End: 2022-05-23
Payer: COMMERCIAL

## 2022-05-24 ENCOUNTER — TELEPHONE (OUTPATIENT)
Dept: FAMILY MEDICINE | Facility: CLINIC | Age: 87
End: 2022-05-24

## 2022-05-24 NOTE — TELEPHONE ENCOUNTER
Symptoms    Describe your symptoms: Has back pain-seeing PT for this and now has soreness in the upper back on the left side. Physical therapist told patient that there is spot on the upper left back that needs work on. Patient states that she didn't sleep last night due to pain.     Any pain: Yes    Have you been seen for this:  Yes for back pain, but not for upper back     Home remedies tried: Tylenol    Requested Pharmacy: [facility]    Please advise, patient would like a call back.     Okay to leave a detailed message? Yes at Cell number on file:    Telephone Information:   Mobile [phone]

## 2022-05-24 NOTE — TELEPHONE ENCOUNTER
Please call patient.    I would recommend trialing some Tylenol for arthritis or Aleve for the pain. She can get this over-the-counter.  Can also use some hot or cool compresses.    Susan Sutton NP

## 2022-05-25 NOTE — TELEPHONE ENCOUNTER
SPOKE WITH PATIENT, SHE STATES USING COOL AND HOT COMPRESSES AND REGULAR TYLENOL, SHE  ALSO STATES BEING ALLERGIC TO ADVIL. PATIENT WILL TRY TYLENOL FOR ARTHRITIS AND CONTINUE THE COMPRESSES AND PHYSICAL THERAPY. SHE ALSO FEELS THE PT IS WORKING AND TODAY WOKE UP FEELING BETTER THAN YESTERDAY. NO FURTHER QUESTIONS AT THE MOMENT.

## 2022-10-24 ENCOUNTER — HOSPITAL ENCOUNTER (EMERGENCY)
Facility: CLINIC | Age: 87
Discharge: HOME | End: 2022-10-24
Attending: FAMILY MEDICINE | Admitting: FAMILY MEDICINE
Payer: COMMERCIAL

## 2022-10-24 VITALS
HEART RATE: 97 BPM | RESPIRATION RATE: 18 BRPM | DIASTOLIC BLOOD PRESSURE: 91 MMHG | OXYGEN SATURATION: 100 % | WEIGHT: 140 LBS | SYSTOLIC BLOOD PRESSURE: 197 MMHG | BODY MASS INDEX: 25.61 KG/M2 | TEMPERATURE: 96.9 F

## 2022-10-24 DIAGNOSIS — M54.16 LUMBAR RADICULOPATHY: ICD-10-CM

## 2022-10-24 PROCEDURE — 96372 THER/PROPH/DIAG INJ SC/IM: CPT | Performed by: FAMILY MEDICINE

## 2022-10-24 PROCEDURE — 99284 EMERGENCY DEPT VISIT MOD MDM: CPT

## 2022-10-24 PROCEDURE — 250N000011 HC RX IP 250 OP 636: Performed by: FAMILY MEDICINE

## 2022-10-24 RX ORDER — KETOROLAC TROMETHAMINE 15 MG/ML
15 INJECTION, SOLUTION INTRAMUSCULAR; INTRAVENOUS ONCE
Status: COMPLETED | OUTPATIENT
Start: 2022-10-24 | End: 2022-10-24

## 2022-10-24 RX ORDER — DEXAMETHASONE SODIUM PHOSPHATE 10 MG/ML
6 INJECTION, SOLUTION INTRAMUSCULAR; INTRAVENOUS ONCE
Status: COMPLETED | OUTPATIENT
Start: 2022-10-24 | End: 2022-10-24

## 2022-10-24 RX ORDER — HYDROCODONE BITARTRATE AND ACETAMINOPHEN 5; 325 MG/1; MG/1
1 TABLET ORAL EVERY 6 HOURS PRN
Qty: 8 TABLET | Refills: 0 | Status: SHIPPED | OUTPATIENT
Start: 2022-10-24 | End: 2022-10-27

## 2022-10-24 RX ADMIN — DEXAMETHASONE SODIUM PHOSPHATE 6 MG: 10 INJECTION, SOLUTION INTRAMUSCULAR; INTRAVENOUS at 19:50

## 2022-10-24 RX ADMIN — KETOROLAC TROMETHAMINE 15 MG: 15 INJECTION, SOLUTION INTRAMUSCULAR; INTRAVENOUS at 19:54

## 2022-10-24 ASSESSMENT — ENCOUNTER SYMPTOMS
BACK PAIN: 1
MYALGIAS: 1
NUMBNESS: 0

## 2022-10-24 NOTE — ED TRIAGE NOTES
Chronic back pain that has been worsening. Has been going to PT. Would possibly like an increase in pain meds.     Triage Assessment     Row Name 10/24/22 [id number]       Triage Assessment (Adult)    Airway WDL WDL       Respiratory WDL    Respiratory WDL WDL       Skin Circulation/Temperature WDL    Skin Circulation/Temperature WDL WDL       Cardiac WDL    Cardiac WDL WDL       Peripheral/Neurovascular WDL    Peripheral Neurovascular WDL WDL       Cognitive/Neuro/Behavioral WDL    Cognitive/Neuro/Behavioral WDL WDL

## 2022-10-25 NOTE — ED PROVIDER NOTES
EMERGENCY DEPARTMENT ENCOUNTER      NAME: Ramila Liao  AGE: 87 year old female  YOB: 1935  MRN: [id number]  EVALUATION DATE & TIME: 10/24/2022  7:16 PM    PCP: Susan Sutton    ED PROVIDER: Faustino Lugo M.D.    Chief Complaint   Patient presents with     Back Pain       FINAL IMPRESSION:  1. Lumbar radiculopathy        ED COURSE & MEDICAL DECISION MAKING:    Pertinent Labs & Imaging studies personally reviewed and interpreted by me. (See chart for details)  7:26 PM Patient seen and examined, prior records reviewed.  Differential diagnosis includes but not limited to lumbar strain, lumbar sprain, vertebral fracture, compression fracture, cauda equina syndrome, epidural abscess, spondylolisthesis, discitis, osteomyelitis, aortic dissection, aortic aneurysm.  Patient presents with right-sided low back pain rating of the right leg, no weakness, numbness of the right leg.  Has been taking only small doses of Tylenol and ibuprofen for this.  Discussed adequate dosing of Tylenol and ibuprofen, but will be given Decadron in the emergency department and Norco to use sparingly at home.  She is stable for discharge.  No bowel or bladder dysfunction, no lower extremity weakness to suggest acute emergent neurologic condition.  We discussed the plan for discharge and the patient is agreeable. Reviewed supportive cares, symptomatic treatment, outpatient follow up, and reasons to return to the Emergency Department. Patient to be discharged by ED RN.     At the conclusion of the encounter I discussed the results of all of the tests and the disposition. The questions were answered. The patient or family acknowledged understanding and was agreeable with the care plan.     Medical Decision Making    Supplemental history from: Family Member    External Record(s) Reviewed: Inpatient Record, Outpatient Record and Outside ED Record    Differential Diagnosis: See MDM charting for differential considered.     I  performed an independent interpretation of the: N/A    Discussed with radiology regarding test interpretation: N/A    Discussion of management with another provider: N/A    The following testing was considered but ultimately not selected: CT Imaging and MRI Imaging    I considered prescription management with: Symptomatic Management    The patient's care impacted: None    Consideration of Admission/Observation: Did not consider admission    Care significantly affected by Social Determinants of Health including: N/A    PROCEDURES:   Procedures    MEDICATIONS GIVEN IN THE EMERGENCY:  Medications   ketorolac (TORADOL) injection 15 mg (15 mg Intramuscular Given 10/24/22 1954)   dexamethasone PF (DECADRON) injection 6 mg (6 mg Intramuscular Given 10/24/22 1950)       NEW PRESCRIPTIONS STARTED AT TODAY'S ER VISIT  New Prescriptions    HYDROCODONE-ACETAMINOPHEN (NORCO) 5-325 MG TABLET    Take 1 tablet by mouth every 6 hours as needed for severe pain       =================================================================    HPI    Patient information was obtained from: Patient       Ramila Liao is a 87 year old female with a pertinent history of chronic lower back pain who presents to this ED via walk in for evaluation of lower back pain.    The patient has been having chronic lower back pain that has been worsening for the past few days. She says the pain moves down from her right lower back to her right buttock and down her right leg. She says the pain isn't bad during the day and only gets worse at night. She says the pain is so bad at night she is unable to sleep and has been only getting 2-3 hours of sleep for the past few days. She also notes the pain is worse when laying flat. She's been seeing a physical therapist who informed her to present to the ED for further evaluation. She took 2 tylenol (500 MG) yesterday at 7 PM and took 2 tylenol (500 MG) along with 2 ibuprofen (200 MG) at 2 AM for the pain. She  currently lives at a senior living in [facility].     Patient denies any type of numbness or urinary symptoms. Patient also denies any falls or injuries. Patient does not take any daily medications. 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    REVIEW OF SYSTEMS   Review of Systems   Genitourinary: Negative.    Musculoskeletal: Positive for back pain (Chronic lower back) and myalgias (Right buttock and leg).   Neurological: Negative for numbness.   All other systems reviewed and are negative.     All other systems reviewed and negative    PAST MEDICAL HISTORY:  History reviewed. No pertinent past medical history.    PAST SURGICAL HISTORY:  Past Surgical History:   Procedure Laterality Date     CATARACT EXTRACTION, BILATERAL       CHOLECYSTECTOMY       HYSTERECTOMY         CURRENT MEDICATIONS:    No current facility-administered medications for this encounter.     Current Outpatient Medications   Medication     HYDROcodone-acetaminophen (NORCO) 5-325 MG tablet     aspirin 81 MG EC tablet     betamethasone dipropionate (DIPROLENE) 0.05 % ointment     magnesium citrate 100 mg Tab     multivitamin with minerals (THERA-M) 9 mg iron-400 mcg Tab tablet       ALLERGIES:  Allergies   Allergen Reactions     Naproxen Hives     Tolerates ibuprofen       FAMILY HISTORY:  Family History   Problem Relation Age of Onset     Coronary Artery Disease Mother      Coronary Artery Disease Father        SOCIAL HISTORY:   Social History     Socioeconomic History     Marital status:    Tobacco Use     Smoking status: Never     Smokeless tobacco: Never   Substance and Sexual Activity     Alcohol use: Yes     Drug use: Never     Sexual activity: Not Currently     Partners: Male       VITALS:  BP (!) 197/91   Pulse 97   Temp 96.9  F (36.1  C)   Resp 18   Wt 63.5 kg (140 lb)   SpO2 100%   BMI 25.61 kg/m      PHYSICAL  EXAM:  Physical Exam  Vitals and nursing note reviewed.   Constitutional:       Appearance: Normal appearance.   HENT:      Head: Normocephalic and atraumatic.      Right Ear: External ear normal.      Left Ear: External ear normal.      Nose: Nose normal.      Mouth/Throat:      Mouth: Mucous membranes are moist.   Eyes:      Extraocular Movements: Extraocular movements intact.      Conjunctiva/sclera: Conjunctivae normal.      Pupils: Pupils are equal, round, and reactive to light.   Cardiovascular:      Rate and Rhythm: Normal rate and regular rhythm.   Pulmonary:      Effort: Pulmonary effort is normal.      Breath sounds: Normal breath sounds. No wheezing or rales.   Abdominal:      General: Abdomen is flat. There is no distension.      Palpations: Abdomen is soft.      Tenderness: There is no abdominal tenderness. There is no guarding.   Musculoskeletal:         General: Normal range of motion.      Cervical back: Normal range of motion and neck supple.      Right lower leg: No edema.      Left lower leg: No edema.      Comments: Right buttock tenderness.    Lymphadenopathy:      Cervical: No cervical adenopathy.   Skin:     General: Skin is warm and dry.   Neurological:      General: No focal deficit present.      Mental Status: She is alert and oriented to person, place, and time. Mental status is at baseline.      Comments: No gross focal neurologic deficits   Psychiatric:         Mood and Affect: Mood normal.         Behavior: Behavior normal.         Thought Content: Thought content normal.          I, Mariia Bailey, am serving as a scribe to document services personally performed by Dr. Lugo based on my observation and the provider's statements to me. I, Faustino Lugo MD attest that Mariia Bailey is acting in a scribe capacity, has observed my performance of the services and has documented them in accordance with my direction.    Faustino Lugo M.D.  Emergency  Medicine  [facility] EMERGENCY ROOM  [address] [id number] [phone]  Dept: [phone]     Faustino Lugo MD  10/25/22 0045

## 2022-10-25 NOTE — ED NOTES
Here for lower back pain, in the buttocks area that radiates down the right leg, happens more frequently at night around 0200. Has been trying aleve at home and tylenol with no relief. Would like a stronger pain medication if possible   Right now pain is minimal according to patient   Able to ambulate with no difficulties, denies loss of bowel and bladder function

## 2022-10-25 NOTE — DISCHARGE INSTRUCTIONS
You may take Tylenol 500 mg 2 tablets every 6 hours and may alternate with or take ibuprofen 200 mg 2 tablets every 6 hours take Norco as needed for more severe pain

## 2022-10-27 ENCOUNTER — OFFICE VISIT (OUTPATIENT)
Dept: INTERNAL MEDICINE | Facility: CLINIC | Age: 87
End: 2022-10-27
Payer: COMMERCIAL

## 2022-10-27 VITALS
SYSTOLIC BLOOD PRESSURE: 146 MMHG | WEIGHT: 136 LBS | DIASTOLIC BLOOD PRESSURE: 86 MMHG | OXYGEN SATURATION: 98 % | BODY MASS INDEX: 25.03 KG/M2 | HEIGHT: 62 IN | HEART RATE: 81 BPM

## 2022-10-27 DIAGNOSIS — M54.41 ACUTE RIGHT-SIDED LOW BACK PAIN WITH RIGHT-SIDED SCIATICA: Primary | ICD-10-CM

## 2022-10-27 PROCEDURE — 99204 OFFICE O/P NEW MOD 45 MIN: CPT | Performed by: INTERNAL MEDICINE

## 2022-10-27 RX ORDER — METHYLPREDNISOLONE 4 MG
TABLET, DOSE PACK ORAL
Qty: 21 TABLET | Refills: 0 | Status: SHIPPED | OUTPATIENT
Start: 2022-10-27 | End: 2022-11-03

## 2022-10-27 RX ORDER — GABAPENTIN 100 MG/1
100 CAPSULE ORAL 3 TIMES DAILY
Qty: 90 CAPSULE | Refills: 0 | Status: SHIPPED | OUTPATIENT
Start: 2022-10-27 | End: 2022-11-01

## 2022-10-27 RX ORDER — TIZANIDINE 2 MG/1
2 TABLET ORAL 3 TIMES DAILY
Qty: 30 TABLET | Refills: 1 | Status: SHIPPED | OUTPATIENT
Start: 2022-10-27 | End: 2022-11-03

## 2022-10-27 ASSESSMENT — ENCOUNTER SYMPTOMS: BACK PAIN: 1

## 2022-10-27 NOTE — PROGRESS NOTES
Assessment & Plan     Acute right-sided low back pain with right-sided sciatica  86 yo female with right sided low back pain for about 2 months, that initially improved with the PT, but now worse again. Pain radiates to right leg, to the ankle level. Pain during the day is not that bad, but during the night is unbearable. She was in the ER few days ago and Ketorolac and Decadron helped a lot for 24h. They gave her Vicodin and she took 3 pills yesterday, but the pain during the night was severe.She says the pain is so bad at night she is unable to sleep and has been only getting 2-3 hours of sleep for the past few days. She also notes the pain is worse when laying flat. She tried heat, cold, OTC creams , gels, Tyleno, Advil.She currently lives at a senior living in [facility].     Xray lumbar spine 1/4/22:  IMPRESSION: Exaggerated lumbar lordosis measures 60 degrees. Lumbar vertebral body heights are maintained. Grade 1 anterolisthesis of L5 on S1. Mild degenerative facet changes at L4-L5 and L5-S1. Minor lumbar spondylitic changes. Mild rightward convex   curvature of the thoracolumbar junction. Surgical clips in the right upper quadrant of the abdomen. Mild degenerative changes of the bilateral sacroiliac and hip joints.    She will schedule MRI and visit with Spine clinic. She had follow-up with her PCP next week.  Patient Instructions   For back pain:  - to schedule MRI spine and visit with Spine clinic    1. Medrolpak per instructions.  2. Gabapentin 100 mg 3x/day  3. Ibuprofen 400 mg every 8h as needed  4. Tylenol 500 mg every 8 h as needed  5.  You can take hydrocodone and muscle relaxant tizanidine at the bedtime      - MR Lumbar Spine w/o Contrast; Future  - Spine  Referral; Future  - methylPREDNISolone (MEDROL DOSEPAK) 4 MG tablet therapy pack; Follow Package Directions  - gabapentin (NEURONTIN) 100 MG capsule; Take 1 capsule (100 mg) by mouth 3 times daily  - tiZANidine (ZANAFLEX) 2 MG tablet;  "Take 1 tablet (2 mg) by mouth 3 times daily    956}      Return in about 1 week (around 11/3/2022) for Follow up.    Iliana Mccray MD  [facility]VA Schofield is a 87 year old, presenting for the following health issues:  Back Pain (lower)      Back Pain     History of Present Illness       Reason for visit:  Pain    She eats 4 or more servings of fruits and vegetables daily.She consumes 0 sweetened beverage(s) daily.She exercises with enough effort to increase her heart rate 9 or less minutes per day.  She exercises with enough effort to increase her heart rate 3 or less days per week.   She is taking medications regularly.             Review of Systems   Musculoskeletal: Positive for back pain.            Objective    BP (!) 146/86 (BP Location: Right arm, Patient Position: Sitting, Cuff Size: Adult Regular)   Pulse 81   Ht 1.575 m (5' 2\")   Wt 61.7 kg (136 lb)   SpO2 98%   BMI 24.87 kg/m    Body mass index is 24.87 kg/m .  Physical Exam   Constitutional:  oriented to person, place, and time, appears well-nourished. No distress.   HENT:   Head: Normocephalic.   Eyes: Conjunctivae are normal.   Neck: Normal range of motion. Neck supple.   Pulmonary/Chest: Effort normal    Abdominal: Soft.   Musculoskeletal: Decreased range of motion and muscle spasm in the lower back. Straight leg test negative. Gait normal. DTRs normal.  Neurological: alert and oriented to person, place, and time.   Skin: Skin is warm.   Psychiatric: normal mood and affect.                    "

## 2022-10-27 NOTE — PATIENT INSTRUCTIONS
For back pain:  - to schedule MRI spine and visit with Spine clinic    Shanna per instructions.  Gabapentin 100 mg 3x/day  Ibuprofen 400 mg every 8h as needed  Tylenol 500 mg every 8 h as needed   You can take hydrocodone and muscle relaxant tizanidine at the bedtime

## 2022-10-31 NOTE — PROGRESS NOTES
ASSESSMENT: Ramila Liao is a 87 year old female with past medical history significant for hyperlipidemia who presents today for new patient evaluation of chronic low back pain with a 2-month history of worsening pain radiating into the right lower extremity.  Patient is scheduled for an MRI lumbar spine November 7, 2022.  An x-ray of the lumbar spine from January 2022 showed grade 1 spondylolisthesis L5-S1 she also had an x-ray of both hips January 2022 which showed degenerative change of both hips and mild degenerative change of both SI joints.  Pain is most consistent with right L5 radiculitis.  Suspect that there right L5 nerve root impingement.  Pain is been refractory to conservative treatment including physical therapy and medical management including NSAIDs, Tylenol, gabapentin.  She is requiring opiates.  She is neurologically intact on exam.    PLAN:  A shared decision making model was used.  The patient's values and choices were respected.  The following represents what was discussed and decided upon by the physician assistant and the patient.      1.  DIAGNOSTIC TESTS:    -Reviewed the x-ray lumbar spine.  - I reviewed the x-ray of bilateral hips.  - We will await the results of the MRI lumbar spine which is scheduled for November 7, 2022.    2.  PHYSICAL THERAPY: Patient is currently in physical therapy at [facility].  She first underwent physical therapy last winter for this pain.  She attended 3-4 sessions.  She continue doing home exercises.  She returned to physical therapy 6 weeks ago.  She continues to do her home exercises but since her pain has been getting progressively worse she does not tolerate some.    3.  MEDICATIONS:  - I refilled the patient's tramadol 50 mg every 8 hours as needed, #15 with no refills.  She was given 10 tabs on the first.  She has 7 tabs remaining.  I will not provide this medication long-term.  I will not provide telephone refills.  I checked the  [place] prescription monitoring database.  She is deemed to be low risk.  Risks reviewed.  - I recommended that she take Tylenol 500 g along with the tramadol.  - I refilled the patient's tizanidine 2 mg 3 times daily as needed.  - Patient will continue gabapentin 200 mg 3 times daily.  This is helpful.  We could titrate her dose higher if needed.    4.  INTERVENTIONS: We will await the results of the patient's MRI lumbar spine, but I anticipate offering a right L5-S1 transforaminal epidural steroid injection.  Her pain follows a right L5 pattern.  She has tried and failed conservative treatment including physical therapy and medical management.    5.  PATIENT EDUCATION: Patient is in agreement the above plan.  All questions were answered.      6.  FOLLOW-UP:   A nurse to call the patient with the results of her MRI lumbar spine.  At that time I will likely offer a right L5-S1 transforaminal epidural steroid injection.  If she has questions or concerns in the meantime, she should not hesitate to call.       SUBJECTIVE:  Ramila Liao  Is a 87 year old female who presents today in consultation at the quest of Dr. Mccray For new patient evaluation of low back pain  With radiation into the right lower extremity.  Patient reports that she was told that she had degeneration in her back 12 to 15 years ago.  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  She thinks this contributed to developing her low back pain.  She started having more significant back pain almost 1 year ago.  She is are going to physical therapy.  Unfortunately, pain became even more severe about 2 months ago with new pain radiating into the right lower extremity.  Pain has been so severe that she went to the emergency department October 24, 2022.  She saw her primary care provider and an MRI lumbar spine is scheduled for November 7.  She was referred to our clinic for further evaluation and treatment.      Patient  complains of Right-sided low back pain.  Pain radiates into the right buttock, on the right lateral thigh, into the lateral calf to the ankle.  She has intermittent tingling in the same distribution as her pain.  Denies weakness.  Pain is worse at night.  She cannot sleep because of the pain.  It usually wakes her after about 2 hours.  Bending and reaching and gray chi makes her pain worse.  Lumbar extension exercises also make her pain worse.  Applying heat and topical pain medications helped to alleviate her pain.  She reports some urinary leaking at night when she cannot make it to the bathroom in time.  Denies loss of bowel control.  Denies recent fevers, chills, sweats.    As mentioned above, patient has been going to physical therapy.  She goes to [facility].  She went to 3-4 sessions last winter.  She continue doing her home exercises.  She started physical therapy again 6 weeks ago when her pain got worse.  Since her pain has been more severe she cannot tolerate some of her home exercises then she put physical therapy on hold.  She does not go to chiropractor.  She had 1 injection in 2016 but it sounds like this may have just been morphine in the emergency department.  She is never had spine surgery.  She takes gabapentin 200 mg 3 times daily which is helpful.  She takes tizanidine 2 mg 3 times daily.  She is not sure if this helps.  She takes tramadol 50 mg twice daily but she states this wears off after only about 2 hours.  She also uses ibuprofen as needed which is somewhat helpful.    Current Outpatient Medications   Medication     aspirin 81 MG EC tablet     betamethasone dipropionate (DIPROLENE) 0.05 % ointment     gabapentin (NEURONTIN) 100 MG capsule     magnesium citrate 100 mg Tab     methylPREDNISolone (MEDROL DOSEPAK) 4 MG tablet therapy pack     multivitamin with minerals (THERA-M) 9 mg iron-400 mcg Tab tablet     tiZANidine (ZANAFLEX) 2 MG tablet         Allergies   Allergen Reactions      Naproxen Hives     Tolerates ibuprofen           Patient Active Problem List   Diagnosis     Allergic rhinitis     Contact dermatitis and other eczema, due to unspecified cause     Low back pain     Mixed hyperlipidemia       Past Surgical History:   Procedure Laterality Date     CATARACT EXTRACTION, BILATERAL       CHOLECYSTECTOMY       HYSTERECTOMY         Family History   Problem Relation Age of Onset     Coronary Artery Disease Mother      Coronary Artery Disease Father        Social history: Patient is .  She lives alone in a senior apartment.  She denies tobacco, alcohol, illicit drug use.      ROS: Positive for headache, changes in vision, feet/leg swelling, constipation, muscle pain, sciatica, imbalance, dizziness, excessive tiredness.  Specifically negative for bowel/bladder dysfunction, fevers,chills, appetite changes, unexplained weight loss.   Otherwise 13 systems reviewed are negative.  Please see the patient's intake questionnaire from today for details.      OBJECTIVE:  PHYSICAL EXAMINATION:    CONSTITUTIONAL:  Vital signs as above.  No acute distress.  The patient is well nourished and well groomed.  PSYCHIATRIC:  The patient is awake, alert, oriented to person, place, time and answering questions appropriately with clear speech.    HEENT: Normocephalic, atraumatic.  Sclera clear.  Neck is supple.  SKIN:  Skin over the face, bilateral lower extremities, and posterior torso is clean, dry, intact without rashes.    GAIT:  Gait is mildly antalgic, favoring the right.  The patient is able to heel and toe walk without significant difficulty.    STANDING EXAMINATION: Tender to palpation right sacroiliac joint.  Lumbar flexion intact.  Lumbar extension mildly restricted with increased pain.  MUSCLE STRENGTH:  The patient has 5/5 strength for the bilateral hip flexors, knee flexors/extensors, ankle dorsiflexors/plantar flexors, great toe extensors, ankle evertors/invertors.  NEUROLOGICAL:  2/4  patellar, and achilles reflexes bilaterally.  Negative Babinski's bilaterally.  No ankle clonus bilaterally.  Subjective diminished sensation toes of both feet.  VASCULAR:  2/4 dorsalis pedis pulses bilaterally.  Bilateral lower extremities are warm.  There is no pitting edema of the bilateral lower extremities.  ABDOMINAL:  Soft, non-distended, non-tender throughout all quadrants.  No pulsatile mass palpated in the left lower quadrant.  LYMPH NODES:  No palpable or tender inguinal lymph nodes.  MUSCULOSKELETAL: Straight leg raise is positive on the right, negative on the left.    RESULTS:    I reviewed the x-ray lumbar spine from [facility] dated January 4, 2022.  This shows grade 1 spondylolisthesis L5-S1.  There is mild facet arthropathy L4-5 and L5-S1.  Mild rightward convex curvature thoracolumbar junction.    I reviewed the x-ray bilateral hips dated January 4, 2022.  This shows degenerative change of both hips and mild degenerative change of both SI joints.

## 2022-11-01 ENCOUNTER — OFFICE VISIT (OUTPATIENT)
Dept: FAMILY MEDICINE | Facility: CLINIC | Age: 87
End: 2022-11-01
Payer: COMMERCIAL

## 2022-11-01 VITALS
HEART RATE: 80 BPM | BODY MASS INDEX: 25.06 KG/M2 | SYSTOLIC BLOOD PRESSURE: 124 MMHG | WEIGHT: 137 LBS | DIASTOLIC BLOOD PRESSURE: 80 MMHG

## 2022-11-01 DIAGNOSIS — M54.41 ACUTE RIGHT-SIDED LOW BACK PAIN WITH RIGHT-SIDED SCIATICA: Primary | ICD-10-CM

## 2022-11-01 PROCEDURE — 99213 OFFICE O/P EST LOW 20 MIN: CPT | Performed by: NURSE PRACTITIONER

## 2022-11-01 RX ORDER — GABAPENTIN 100 MG/1
200 CAPSULE ORAL 3 TIMES DAILY
Qty: 90 CAPSULE | Refills: 0 | COMMUNITY
Start: 2022-11-01 | End: 2022-11-17

## 2022-11-01 RX ORDER — TRAMADOL HYDROCHLORIDE 50 MG/1
50 TABLET ORAL EVERY 8 HOURS PRN
Qty: 10 TABLET | Refills: 0 | Status: SHIPPED | OUTPATIENT
Start: 2022-11-01 | End: 2022-11-04

## 2022-11-01 RX ORDER — HYDROCODONE BITARTRATE AND ACETAMINOPHEN 5; 325 MG/1; MG/1
1 TABLET ORAL EVERY 6 HOURS PRN
COMMUNITY
End: 2022-11-03

## 2022-11-01 ASSESSMENT — ENCOUNTER SYMPTOMS: BACK PAIN: 1

## 2022-11-01 NOTE — PROGRESS NOTES
Assessment & Plan     Acute right-sided low back pain with right-sided sciatica  No new red flag symptoms.  I agree with MRI and spine referral.  Continue Gabapentin and Tizanidine.  Can advance the dose of Gabapentin.  I did prescribe a small amount of Tramadol to use for severe pain.  - gabapentin (NEURONTIN) 100 MG capsule  Dispense: 90 capsule; Refill: 0  - traMADol (ULTRAM) 50 MG tablet  Dispense: 10 tablet; Refill: 0        Return in about 1 day (around 11/2/2022), or spine.    Susan Sutton NP  [facility]    Erickson Schofield is a 87 year old who presents for follow-up.  Patient was initially seen in the ER on 10/24 with low back pain.  She followed up.  She primarily has right low back pain that radiates and into her foot.  Are much more severe at night and she is getting very little sleep.  She is most uncomfortable with laying down, and tends to walk around a lot with the pain.  She has been doing physical therapy exercises as she had previously been seeing a physical therapist.  At her follow-up visit, patient was started on gabapentin, tizanidine, and a Medrol Dosepak.  The Medrol Dosepak and gabapentin does seem to be helpful.  She is scheduled for an MRI and a follow-up with spine tomorrow.    Review of Systems   Musculoskeletal: Positive for back pain.            Objective    /80   Pulse 80   Wt 62.1 kg (137 lb)   BMI 25.06 kg/m    Body mass index is 25.06 kg/m .  Physical Exam   GENERAL: alert and no distress

## 2022-11-03 ENCOUNTER — OFFICE VISIT (OUTPATIENT)
Dept: PHYSICAL MEDICINE AND REHAB | Facility: CLINIC | Age: 87
End: 2022-11-03
Payer: COMMERCIAL

## 2022-11-03 VITALS
WEIGHT: 136.4 LBS | SYSTOLIC BLOOD PRESSURE: 132 MMHG | HEART RATE: 85 BPM | BODY MASS INDEX: 25.1 KG/M2 | DIASTOLIC BLOOD PRESSURE: 59 MMHG | HEIGHT: 62 IN

## 2022-11-03 DIAGNOSIS — M43.16 SPONDYLOLISTHESIS OF LUMBAR REGION: ICD-10-CM

## 2022-11-03 DIAGNOSIS — M54.16 LUMBAR RADICULAR PAIN: Primary | ICD-10-CM

## 2022-11-03 PROCEDURE — 99204 OFFICE O/P NEW MOD 45 MIN: CPT | Performed by: PHYSICIAN ASSISTANT

## 2022-11-03 RX ORDER — ACETAMINOPHEN 500 MG
500-1000 TABLET ORAL PRN
COMMUNITY
End: 2023-03-26

## 2022-11-03 RX ORDER — TRAMADOL HYDROCHLORIDE 50 MG/1
50 TABLET ORAL EVERY 8 HOURS PRN
Qty: 15 TABLET | Refills: 0 | Status: SHIPPED | OUTPATIENT
Start: 2022-11-03 | End: 2022-11-08

## 2022-11-03 RX ORDER — TIZANIDINE 2 MG/1
2 TABLET ORAL 3 TIMES DAILY
Qty: 30 TABLET | Refills: 1 | Status: SHIPPED | OUTPATIENT
Start: 2022-11-03 | End: 2022-11-17

## 2022-11-03 RX ORDER — IBUPROFEN 200 MG
400-600 TABLET ORAL EVERY 6 HOURS PRN
Status: ON HOLD | COMMUNITY
End: 2023-03-28

## 2022-11-03 ASSESSMENT — PAIN SCALES - GENERAL: PAINLEVEL: MODERATE PAIN (4)

## 2022-11-03 NOTE — LETTER
11/3/2022         RE: Ramila Liao  [address]  [facility] [address]        Dear Colleague,    Thank you for referring your patient, Ramila Liao, to the [facility]. Please see a copy of my visit note below.    ASSESSMENT: Ramila Liao is a 87 year old female with past medical history significant for hyperlipidemia who presents today for new patient evaluation of chronic low back pain with a 2-month history of worsening pain radiating into the right lower extremity.  Patient is scheduled for an MRI lumbar spine November 7, 2022.  An x-ray of the lumbar spine from January 2022 showed grade 1 spondylolisthesis L5-S1 she also had an x-ray of both hips January 2022 which showed degenerative change of both hips and mild degenerative change of both SI joints.  Pain is most consistent with right L5 radiculitis.  Suspect that there right L5 nerve root impingement.  Pain is been refractory to conservative treatment including physical therapy and medical management including NSAIDs, Tylenol, gabapentin.  She is requiring opiates.  She is neurologically intact on exam.    PLAN:  A shared decision making model was used.  The patient's values and choices were respected.  The following represents what was discussed and decided upon by the physician assistant and the patient.      1.  DIAGNOSTIC TESTS:    -Reviewed the x-ray lumbar spine.  - I reviewed the x-ray of bilateral hips.  - We will await the results of the MRI lumbar spine which is scheduled for November 7, 2022.    2.  PHYSICAL THERAPY: Patient is currently in physical therapy at [facility].  She first underwent physical therapy last winter for this pain.  She attended 3-4 sessions.  She continue doing home exercises.  She returned to physical therapy 6 weeks ago.  She continues to do her home exercises but since her pain has been getting progressively worse she does not tolerate some.    3.   MEDICATIONS:  - I refilled the patient's tramadol 50 mg every 8 hours as needed, #15 with no refills.  She was given 10 tabs on the first.  She has 7 tabs remaining.  I will not provide this medication long-term.  I will not provide telephone refills.  I checked the [place] prescription monitoring database.  She is deemed to be low risk.  Risks reviewed.  - I recommended that she take Tylenol 500 g along with the tramadol.  - I refilled the patient's tizanidine 2 mg 3 times daily as needed.  - Patient will continue gabapentin 200 mg 3 times daily.  This is helpful.  We could titrate her dose higher if needed.    4.  INTERVENTIONS: We will await the results of the patient's MRI lumbar spine, but I anticipate offering a right L5-S1 transforaminal epidural steroid injection.  Her pain follows a right L5 pattern.  She has tried and failed conservative treatment including physical therapy and medical management.    5.  PATIENT EDUCATION: Patient is in agreement the above plan.  All questions were answered.      6.  FOLLOW-UP:   A nurse to call the patient with the results of her MRI lumbar spine.  At that time I will likely offer a right L5-S1 transforaminal epidural steroid injection.  If she has questions or concerns in the meantime, she should not hesitate to call.       SUBJECTIVE:  Ramila Liao  Is a 87 year old female who presents today in consultation at the quest of Dr. Mccray For new patient evaluation of low back pain  With radiation into the right lower extremity.  Patient reports that she was told that she had degeneration in her back 12 to 15 years ago.  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  She thinks this contributed to developing her low back pain.  She started having more significant back pain almost 1 year ago.  She is are going to physical therapy.  Unfortunately, pain became even more severe about 2 months ago with new pain radiating into the right lower  extremity.  Pain has been so severe that she went to the emergency department October 24, 2022.  She saw her primary care provider and an MRI lumbar spine is scheduled for November 7.  She was referred to our clinic for further evaluation and treatment.      Patient complains of Right-sided low back pain.  Pain radiates into the right buttock, on the right lateral thigh, into the lateral calf to the ankle.  She has intermittent tingling in the same distribution as her pain.  Denies weakness.  Pain is worse at night.  She cannot sleep because of the pain.  It usually wakes her after about 2 hours.  Bending and reaching and gray chi makes her pain worse.  Lumbar extension exercises also make her pain worse.  Applying heat and topical pain medications helped to alleviate her pain.  She reports some urinary leaking at night when she cannot make it to the bathroom in time.  Denies loss of bowel control.  Denies recent fevers, chills, sweats.    As mentioned above, patient has been going to physical therapy.  She goes to [facility].  She went to 3-4 sessions last winter.  She continue doing her home exercises.  She started physical therapy again 6 weeks ago when her pain got worse.  Since her pain has been more severe she cannot tolerate some of her home exercises then she put physical therapy on hold.  She does not go to chiropractor.  She had 1 injection in 2016 but it sounds like this may have just been morphine in the emergency department.  She is never had spine surgery.  She takes gabapentin 200 mg 3 times daily which is helpful.  She takes tizanidine 2 mg 3 times daily.  She is not sure if this helps.  She takes tramadol 50 mg twice daily but she states this wears off after only about 2 hours.  She also uses ibuprofen as needed which is somewhat helpful.    Current Outpatient Medications   Medication     aspirin 81 MG EC tablet     betamethasone dipropionate (DIPROLENE) 0.05 % ointment     gabapentin  (NEURONTIN) 100 MG capsule     magnesium citrate 100 mg Tab     methylPREDNISolone (MEDROL DOSEPAK) 4 MG tablet therapy pack     multivitamin with minerals (THERA-M) 9 mg iron-400 mcg Tab tablet     tiZANidine (ZANAFLEX) 2 MG tablet         Allergies   Allergen Reactions     Naproxen Hives     Tolerates ibuprofen           Patient Active Problem List   Diagnosis     Allergic rhinitis     Contact dermatitis and other eczema, due to unspecified cause     Low back pain     Mixed hyperlipidemia       Past Surgical History:   Procedure Laterality Date     CATARACT EXTRACTION, BILATERAL       CHOLECYSTECTOMY       HYSTERECTOMY         Family History   Problem Relation Age of Onset     Coronary Artery Disease Mother      Coronary Artery Disease Father        Social history: Patient is .  She lives alone in a senior apartment.  She denies tobacco, alcohol, illicit drug use.      ROS: Positive for headache, changes in vision, feet/leg swelling, constipation, muscle pain, sciatica, imbalance, dizziness, excessive tiredness.  Specifically negative for bowel/bladder dysfunction, fevers,chills, appetite changes, unexplained weight loss.   Otherwise 13 systems reviewed are negative.  Please see the patient's intake questionnaire from today for details.      OBJECTIVE:  PHYSICAL EXAMINATION:    CONSTITUTIONAL:  Vital signs as above.  No acute distress.  The patient is well nourished and well groomed.  PSYCHIATRIC:  The patient is awake, alert, oriented to person, place, time and answering questions appropriately with clear speech.    HEENT: Normocephalic, atraumatic.  Sclera clear.  Neck is supple.  SKIN:  Skin over the face, bilateral lower extremities, and posterior torso is clean, dry, intact without rashes.    GAIT:  Gait is mildly antalgic, favoring the right.  The patient is able to heel and toe walk without significant difficulty.    STANDING EXAMINATION: Tender to palpation right sacroiliac joint.  Lumbar flexion  intact.  Lumbar extension mildly restricted with increased pain.  MUSCLE STRENGTH:  The patient has 5/5 strength for the bilateral hip flexors, knee flexors/extensors, ankle dorsiflexors/plantar flexors, great toe extensors, ankle evertors/invertors.  NEUROLOGICAL:  2/4 patellar, and achilles reflexes bilaterally.  Negative Babinski's bilaterally.  No ankle clonus bilaterally.  Subjective diminished sensation toes of both feet.  VASCULAR:  2/4 dorsalis pedis pulses bilaterally.  Bilateral lower extremities are warm.  There is no pitting edema of the bilateral lower extremities.  ABDOMINAL:  Soft, non-distended, non-tender throughout all quadrants.  No pulsatile mass palpated in the left lower quadrant.  LYMPH NODES:  No palpable or tender inguinal lymph nodes.  MUSCULOSKELETAL: Straight leg raise is positive on the right, negative on the left.    RESULTS:    I reviewed the x-ray lumbar spine from [facility] dated January 4, 2022.  This shows grade 1 spondylolisthesis L5-S1.  There is mild facet arthropathy L4-5 and L5-S1.  Mild rightward convex curvature thoracolumbar junction.    I reviewed the x-ray bilateral hips dated January 4, 2022.  This shows degenerative change of both hips and mild degenerative change of both SI joints.        Again, thank you for allowing me to participate in the care of your patient.        Sincerely,        Tiffanie Quan PA-C

## 2022-11-03 NOTE — PATIENT INSTRUCTIONS
I believe you have a pinched nerve in your lower back causing the pain that goes down your leg.  We will be able to see this on your MRI scan.  Lets tentatively plan on moving forward with a steroid injection, pending those MRI results.  My nurse will call you as soon as I have reviewed the MRI.    Tramadol was prescribed today. Please lock this medication up when you are not taking it. Do not share this medication with other people. Do not increase the dose without permission from your physician. Do not drink alcohol while you take this medication as this can lead to death. Do not take other pain medications without approval from your physician or this can also lead to death. If you need a refill of this medication, you must come in to clinic by appointment. Please call if you have any questions on how to take this medication.    Try taking tylenol (acetaminophen) 500 mg at the same time as the tramadol to see if that is more effective.

## 2022-11-07 ENCOUNTER — HOSPITAL ENCOUNTER (OUTPATIENT)
Dept: MRI IMAGING | Facility: CLINIC | Age: 87
Discharge: HOME | End: 2022-11-07
Attending: INTERNAL MEDICINE | Admitting: INTERNAL MEDICINE
Payer: COMMERCIAL

## 2022-11-07 DIAGNOSIS — M54.41 ACUTE RIGHT-SIDED LOW BACK PAIN WITH RIGHT-SIDED SCIATICA: ICD-10-CM

## 2022-11-07 PROCEDURE — 72148 MRI LUMBAR SPINE W/O DYE: CPT

## 2022-11-09 ENCOUNTER — TELEPHONE (OUTPATIENT)
Dept: FAMILY MEDICINE | Facility: CLINIC | Age: 87
End: 2022-11-09

## 2022-11-09 NOTE — TELEPHONE ENCOUNTER
11-9-22  Test Results    Who ordered the test:  lakesha    Type of test: MRI    Date of test:  11-7-22    Where was the test performed:  [facility]    What are your questions/concerns?:  Pt called stated she had a MRI on 11-7-22 & would like her results     Could we send this information to you in [facility] or would you prefer to receive a phone call?:   Patient would prefer a phone call   Okay to leave a detailed message?: Yes at Home number on file [phone] (home)

## 2022-11-10 ENCOUNTER — RADIOLOGY INJECTION OFFICE VISIT (OUTPATIENT)
Dept: PHYSICAL MEDICINE AND REHAB | Facility: CLINIC | Age: 87
End: 2022-11-10
Payer: COMMERCIAL

## 2022-11-10 VITALS
SYSTOLIC BLOOD PRESSURE: 152 MMHG | OXYGEN SATURATION: 97 % | RESPIRATION RATE: 16 BRPM | HEART RATE: 87 BPM | DIASTOLIC BLOOD PRESSURE: 78 MMHG | TEMPERATURE: 98.6 F

## 2022-11-10 DIAGNOSIS — M54.16 LUMBAR RADICULAR PAIN: ICD-10-CM

## 2022-11-10 PROCEDURE — 64483 NJX AA&/STRD TFRM EPI L/S 1: CPT | Mod: RT | Performed by: PHYSICAL MEDICINE & REHABILITATION

## 2022-11-10 RX ORDER — METHYLPREDNISOLONE ACETATE 80 MG/ML
INJECTION, SUSPENSION INTRA-ARTICULAR; INTRALESIONAL; INTRAMUSCULAR; SOFT TISSUE
Status: COMPLETED | OUTPATIENT
Start: 2022-11-10 | End: 2022-11-10

## 2022-11-10 RX ORDER — LIDOCAINE HYDROCHLORIDE 10 MG/ML
INJECTION, SOLUTION EPIDURAL; INFILTRATION; INTRACAUDAL; PERINEURAL
Status: COMPLETED | OUTPATIENT
Start: 2022-11-10 | End: 2022-11-10

## 2022-11-10 RX ADMIN — LIDOCAINE HYDROCHLORIDE 2 ML: 10 INJECTION, SOLUTION EPIDURAL; INFILTRATION; INTRACAUDAL; PERINEURAL at 13:36

## 2022-11-10 RX ADMIN — METHYLPREDNISOLONE ACETATE 80 MG: 80 INJECTION, SUSPENSION INTRA-ARTICULAR; INTRALESIONAL; INTRAMUSCULAR; SOFT TISSUE at 13:36

## 2022-11-10 ASSESSMENT — PAIN SCALES - GENERAL
PAINLEVEL: SEVERE PAIN (7)
PAINLEVEL: MODERATE PAIN (4)

## 2022-11-10 NOTE — PATIENT INSTRUCTIONS
Follow-up visit with PRIMITIVO Roper in 2 weeks to discuss injection outcome and determine care plan going forward.       DISCHARGE INSTRUCTIONS    During office hours (8:00 a.m.- 4:00 p.m.) questions or concerns may be answered  by calling Spine Center Navigation Nurses at  [phone].  Messages received after hours will be returned the following business day.      In the case of an emergency, please dial 911 or seek assistance at the nearest Emergency Room/Urgent Care facility.     All Patients:    You may experience an increase in your symptoms for the first 2 days (It may take anywhere between 2 days- 2 weeks for the steroid to have maximum effect).    You may use ice on the injection site, as frequently as 20 minutes each hour if needed.    You may take your pain medicine.    You may continue taking your regular medication after your injection. If you have had a Medial Branch Block you may resume pain medication once your pain diary is completed.    You may shower. No swimming, tub bath or hot tub for 48 hours.  You may remove your bandaid/bandage as soon as you are home.    You may resume light activities, as tolerated.    Resume your usual diet as tolerated.    It is strongly advised that you do not drive for 1-3 hours post injection.    If you have had oral sedation:  Do not drive for 8 hours post injection.      If you have had IV sedation:  Do not drive for 24 hours post injection.  Do not operate hazardous machinery or make important personal/business decisions for 24 hours.      POSSIBLE STEROID SIDE EFFECTS (If steroid/cortisone was used for your procedure)    -If you experience these symptoms, it should only last for a short period    Swelling of the legs              Skin redness (flushing)     Mouth (oral) irritation   Blood sugar (glucose) levels            Sweats                    Mood changes  Headache  Sleeplessness  Weakened immune system for up to 14 days, which could increase the risk of  jeffry the COVID-19 virus and/or experiencing more severe symptoms of the disease, if exposed.  Decreased effectiveness of the flu vaccine if given within 2 weeks of the steroid.         POSSIBLE PROCEDURE SIDE EFFECTS  -Call the Spine Center if you are concerned  Increased Pain           Increased numbness/tingling      Nausea/Vomiting          Bruising/bleeding at site      Redness or swelling                                              Difficulty walking      Weakness           Fever greater than 100.5    *In the event of a severe headache after an epidural steroid injection that is relieved by lying down, please call the [facility] to speak with a clinical staff member*

## 2022-11-11 NOTE — TELEPHONE ENCOUNTER
Call to patient.    She had an injection yesterday and pain has improved.  Not having the radicular pain anymore.    Reviewed her MRI with her.  She will also review with follow up appointment with spine.    Susan Sutton NP

## 2022-11-14 ENCOUNTER — NURSE TRIAGE (OUTPATIENT)
Dept: NURSING | Facility: CLINIC | Age: 87
End: 2022-11-14

## 2022-11-14 DIAGNOSIS — M54.16 LUMBAR RADICULAR PAIN: Primary | ICD-10-CM

## 2022-11-14 DIAGNOSIS — M54.41 ACUTE RIGHT-SIDED LOW BACK PAIN WITH RIGHT-SIDED SCIATICA: ICD-10-CM

## 2022-11-14 RX ORDER — GABAPENTIN 300 MG/1
300 CAPSULE ORAL 3 TIMES DAILY
Qty: 90 CAPSULE | Refills: 1 | Status: SHIPPED | OUTPATIENT
Start: 2022-11-14 | End: 2023-01-12

## 2022-11-14 RX ORDER — GABAPENTIN 100 MG/1
200 CAPSULE ORAL 3 TIMES DAILY
Qty: 180 CAPSULE | Refills: 1 | Status: CANCELLED | OUTPATIENT
Start: 2022-11-14

## 2022-11-14 NOTE — PROGRESS NOTES
Assessment:   Ramila Liao is a 87 year old y.o. female with past medical history significant for hyperlipidemia who presents today for follow-up regarding chronic low back pain with a 2.5-month history of worsening pain radiating to the right lower extremity.  Pain is severe at night.  My review of an MRI lumbar spine shows grade 1 spondylolisthesis at L5-S1 where there is moderate bilateral foraminal stenosis.  There is also some lateral recess stenosis on the right at L4-5 and a broad-based right paracentral disc bulge at L3-4 which contacts the right L4 nerve root in the lateral recess.Patient is status post a right L5-S1 transforaminal epidural steroid injection November 10, 2022 (7 days ago) which has not provided any relief of her pain thus far.  Pain follows a right L5 pattern.  She has weakness in the right great toe extensors.  - Patient may also need an EMG right lower extremity.       Plan:     A shared decision making plan was used.  The patient's values and choices were respected.  The following represents what was discussed and decided upon by the physician assistant and the patient.      1.  DIAGNOSTIC TESTS:    -I reviewed the MRI lumbar spine.  - If pain persist I would recommend lumbar spine flexion-extension x-rays.    2.  PHYSICAL THERAPY: Patient has had physical therapy through [facility] for this pain.  She will continue doing home exercises.    3.  MEDICATIONS:    -Patient has been taking tramadol 50 mg during the night but it is not providing adequate relief of her pain.  - Patient previously tried hydrocodone and it did not provide adequate relief of her pain.  - I prescribed oxycodone/acetaminophen 5/325 mg 1 tab twice daily as needed for pain, #16 with no refills.  I checked the [place] prescription monitoring database.  She is deemed to be low risk.  Risks reviewed.  I would not provide this medication long-term.  I will not provide telephone refills.  -Patient will  continue gabapentin 300 mg 3 times daily.  I did not titrate her dose higher because she is experiencing some lower extremity edema, worse than her baseline edema.  -I refilled the patient's tizanidine 2 mg 3 times daily.  -Patient can continue Tylenol as needed.    4.  INTERVENTIONS:    -No additional interventions were ordered.  Hopefully the right L5-S1 transforaminal epidural steroid injection will kick in and start giving her some relief.  - If it does not, we could try a right L4-5 transforaminal epidural steroid injection.    5.  PATIENT EDUCATION:    - If she fails to improve with conservative treatment I recommend a referral to neurosurgery.    6.  FOLLOW-UP: Patient will follow up with me in 8 days.  If he has questions or concerns in the meantime, she should not hesitate to call.    Subjective:     Ramila Liao is a 87 year old female who presents today for follow-up regarding chronic low back pain and a 2.5-month history of right leg pain.  Patient is following up after a right L5-S1 transforaminal epidural steroid injection November 10, 2022 (7 days ago).  Patient reports no improvement in her pain.  She continues to have severe pain at night.  She feels that she needs a stronger pain reliever medication than tramadol.  This is not providing adequate pain relief.      Patient complains of pain which begins in the right buttock.  Pain radiates down the lateral thigh, into the lateral calf, into the dorsal foot.  She has chronic numbness and tingling in both feet for years but feels that the numbness and tingling is worse in the right foot.  She has some loss of bladder control at night when she cannot make it to the bathroom in time and starts leaking on the way.  Denies loss of bowel control.  Denies fevers.  She rates her pain today as a 2 out of 10.  At its best it is a 1 out of 10.  At its worst it is a 10 out of 10.  Pain is worse at night.  She is much better during the day.  Denies groin  pain.    Patient to physical therapy through [facility] for this pain.  She does her home exercises.  She takes gabapentin 300 mg 3 times daily, tizanidine 2 mg 3 times daily as needed (she recently ran out).  She takes Tylenol and ibuprofen as needed.  She takes tramadol at night.  She does not feel that her pain is under adequate control.    Review of Systems:  Positive for numbness/tingling, loss of bladder control, pain much worse at night.  Negative for weakness, loss of bowel control, inability to urinate, headache, trip/stumble/falls, difficulty swallowing, difficulty with hand skills, fevers, unintentional weight loss.     Objective:   CONSTITUTIONAL:  Vital signs as above.  No acute distress.  The patient is well nourished and well groomed.    PSYCHIATRIC:  The patient is awake, alert, oriented to person, place and time.  The patient is answering questions appropriately with clear speech.  Normal affect.  HEENT: Normocephalic, atraumatic.  Sclera clear.    SKIN:  Skin over the face, posterior torso, bilateral upper and lower extremities is clean, dry, intact without rashes.  VASCULAR: Bilateral lower extremity edema, slightly worse on the right than the left.  MUSCULOSKELETAL:  Gait is mildly antalgic, favoring the right.   The patient has 4/5 strength right EHL, otherwise 5/5 strength for the bilateral hip flexors, knee flexors/extensors, ankle dorsiflexors/plantar flexors.  Straight leg raise positive on the right, negative on the left.  No tenderness palpation bilateral groin.  Internal and external rotation of both hips is mildly restricted.  Patient does have some increased buttock pain at end right hip external rotation.  NEUROLOGICAL: 1-2+ patellar, achilles reflexes which are symmetric bilaterally.  No ankle clonus bilaterally.  Sensation to light touch is intact in the bilateral L4, L5, and S1 dermatomes.       RESULTS: I reviewed the MRI lumbar spine from [facility] dated November 7, 2022.   At L5-S1 there is degenerative spondylolisthesis related to severe facet arthropathy.  There is moderate bilateral foraminal stenosis at this level.  At L4-5 there is mild to moderate right foraminal stenosis.  At L3-4 there is mild lateral recess stenosis related to a broad-based right paracentral disc protrusion which contacts the right L4 nerve root in the lateral recess.  There is also mild foraminal stenosis.

## 2022-11-14 NOTE — TELEPHONE ENCOUNTER
Phone call to patient to discuss increase in dosing for patient on her Gabapentin. She will finish her 100 mg capsules today and begin the Gabapentin 300 mg tomorrow, taking 1 TID. Again encouraged pt to alternate 2 Extra Strength Tylenol tablets with 400-600 mg Ibuprofen with food every 8 hours. She will take the Tramadol at HS as this is when her pain is the worst.   Offered her a follow up later this week for [facility] to re-evaluate and discuss pain management further. Patient amenable. Transferred to scheduling to make appointment.

## 2022-11-14 NOTE — TELEPHONE ENCOUNTER
Recommend increasing gabapentin to 300 mg tid. New prescription sent to pharmacy.  I am happy to see the patient back any time to re-evaluate.

## 2022-11-14 NOTE — TELEPHONE ENCOUNTER
Nurse Triage SBAR    Is this a 2nd Level Triage? YES, LICENSED PRACTITIONER REVIEW IS REQUIRED    Situation: severe pain radiating from buttocks and shoots down to right leg    Background: Pt states this has been going on for 4-5 weeks,  Had a steroid injection on 11/10.  Pt states pain improved on Friday 11/11.  Severe pain started last night 11/13    Assessment:   Pain radiating from bottom, radiates down to her right leg  Right foot is swollen  Slept for only 2 hours last night due to severe pain  Both feet are numb  Has taken her pain medications - gabapentin, Tramadol and ibuprofen, which seems to slightly help for 20 mins-1 hr; enough to let her nap    Moving around a lot helps with the pain, using a cane to walk. Pt uses heat and cold packs      Protocol Recommended Disposition:   See in Office Today, See More Appropriate Protocol    Recommendation:       Routed to provider and transferred call to [facility] - (Yady SANCHEZ)    Does the patient meet one of the following criteria for ADS visit consideration? 16+ years old, with an [facility] PCP     TIP  Providers, please consider if this condition is appropriate for management at one of our Acute and Diagnostic Services sites.     If patient is a good candidate, please use dotphrase <dot>triageresponse and select Refer to ADS to document.      Wendy Mares RN/[facility] Nurse Advisor        Reason for Disposition    Back pain radiating (shooting) into leg(s)    SEVERE back pain (e.g., excruciating, unable to do any normal activities) and not improved after pain medicine and CARE ADVICE    Pain radiates into the thigh or further down the leg, and in both legs    Additional Information    Negative: Looks like a broken bone or dislocated joint (e.g., crooked or deformed)    Negative: Sounds like a life-threatening emergency to the triager    Negative: Passed out (i.e., fainted, collapsed and was not responding)    Negative: Shock suspected (e.g., cold/pale/clammy  skin, too weak to stand, low BP, rapid pulse)    Negative: Sounds like a life-threatening emergency to the triager    Negative: Major injury to the back (e.g., MVA, fall > 10 feet or 3 meters, penetrating injury, etc.)    Negative: Pain in the upper back over the ribs (rib cage) that radiates (travels) into the chest    Negative: Pain in the upper back over the ribs (rib cage) and worsened by coughing (or clearly increases with breathing)    Negative: Back pain during pregnancy    Negative: SEVERE back pain of sudden onset and age > 60 years    Negative: SEVERE abdominal pain (e.g., excruciating)    Negative: Abdominal pain and age > 60 years    Negative: Unable to urinate (or only a few drops) and bladder feels very full    Negative: Loss of bladder or bowel control (urine or bowel incontinence; wetting self, leaking stool) of new-onset    Negative: Numbness (loss of sensation) in groin or rectal area    Negative: Pain radiates into groin, scrotum    Negative: Blood in urine (red, pink, or tea-colored)    Negative: Vomiting and pain over lower ribs of back (i.e., flank - kidney area)    Negative: Weakness of a leg or foot (e.g., unable to bear weight, dragging foot)    Negative: Patient sounds very sick or weak to the triager    Negative: Fever > 100.4 F (38.0 C) and flank pain    Negative: Pain or burning with passing urine (urination)    Protocols used: LEG PAIN-A-OH, BACK PAIN-A-OH

## 2022-11-14 NOTE — TELEPHONE ENCOUNTER
[facility] Call Center    Phone Message    May a detailed message be left on voicemail: yes     Reason for Call: Other: Kanwal had an injection last Thursday 11/10 and now her right has been in a lot of pain that she has not slept well all weekend. She would talk to someone on the team please call her [phone].     Action Taken: Other: [facility] Spine    Travel Screening: Not Applicable

## 2022-11-14 NOTE — TELEPHONE ENCOUNTER
"Call transferred in from triage nurse Wendy. Patient reports she is not noting back pain; \"It has never really been back pain. It is the sciatica. The first 2 nights after injection it was OK, but the last 2 nights have been horrible. Each night it gets worse. It is to the point it is almost intolerable.\" Rates pain 8/10 at worst (night time) and may get to 5/10 during day when up and moving around.\"     Does not feel the medications are working. Taking Gabapentin 200 mg TID; needs refill on this as she has 2 doses left. Reports she is taking 2 ES Tylenol with 2 IBU and then will take 1 ES Tylenol when she does take a Tramadol. Discussed with her alternating the ES Tylenol with the IBU doses. Stated understanding. She is taking the Tizanidine TID as prescribed.     Explained PSP will be notified of the update/concern. Patient will be called if any further recommendations. Stated understanding.    "

## 2022-11-17 ENCOUNTER — OFFICE VISIT (OUTPATIENT)
Dept: PHYSICAL MEDICINE AND REHAB | Facility: CLINIC | Age: 87
End: 2022-11-17
Payer: COMMERCIAL

## 2022-11-17 VITALS
HEIGHT: 62 IN | DIASTOLIC BLOOD PRESSURE: 88 MMHG | WEIGHT: 136 LBS | HEART RATE: 84 BPM | SYSTOLIC BLOOD PRESSURE: 187 MMHG | BODY MASS INDEX: 25.03 KG/M2

## 2022-11-17 DIAGNOSIS — M54.16 LUMBAR RADICULAR PAIN: ICD-10-CM

## 2022-11-17 DIAGNOSIS — M43.16 SPONDYLOLISTHESIS OF LUMBAR REGION: Primary | ICD-10-CM

## 2022-11-17 PROCEDURE — 99214 OFFICE O/P EST MOD 30 MIN: CPT | Performed by: PHYSICIAN ASSISTANT

## 2022-11-17 RX ORDER — OXYCODONE AND ACETAMINOPHEN 5; 325 MG/1; MG/1
1 TABLET ORAL 2 TIMES DAILY PRN
Qty: 16 TABLET | Refills: 0 | Status: SHIPPED | OUTPATIENT
Start: 2022-11-17 | End: 2022-11-20

## 2022-11-17 RX ORDER — TIZANIDINE 2 MG/1
2 TABLET ORAL 3 TIMES DAILY
Qty: 30 TABLET | Refills: 1 | Status: SHIPPED | OUTPATIENT
Start: 2022-11-17 | End: 2022-12-07

## 2022-11-17 ASSESSMENT — PAIN SCALES - GENERAL: PAINLEVEL: MILD PAIN (2)

## 2022-11-17 NOTE — PATIENT INSTRUCTIONS
Oxycodone/acetaminophen was prescribed today. Please lock this medication up when you are not taking it. Do not share this medication with other people. Do not increase the dose without permission from your physician. Do not drink alcohol while you take this medication as this can lead to death. Do not take other pain medications without approval from your physician or this can also lead to death. If you need a refill of this medication, you must come in to clinic by appointment. Please call if you have any questions on how to take this medication.

## 2022-11-17 NOTE — LETTER
11/17/2022         RE: Ramila Liao  [address]  [place] [facility] [address]        Dear Colleague,    Thank you for referring your patient, Ramila Liao, to the [facility]. Please see a copy of my visit note below.    Assessment:   Ramila Liao is a 87 year old y.o. female with past medical history significant for hyperlipidemia who presents today for follow-up regarding chronic low back pain with a 2.5-month history of worsening pain radiating to the right lower extremity.  Pain is severe at night.  My review of an MRI lumbar spine shows grade 1 spondylolisthesis at L5-S1 where there is moderate bilateral foraminal stenosis.  There is also some lateral recess stenosis on the right at L4-5 and a broad-based right paracentral disc bulge at L3-4 which contacts the right L4 nerve root in the lateral recess.Patient is status post a right L5-S1 transforaminal epidural steroid injection November 10, 2022 (7 days ago) which has not provided any relief of her pain thus far.  Pain follows a right L5 pattern.  She has weakness in the right great toe extensors.  - Patient may also need an EMG right lower extremity.       Plan:     A shared decision making plan was used.  The patient's values and choices were respected.  The following represents what was discussed and decided upon by the physician assistant and the patient.      1.  DIAGNOSTIC TESTS:    -I reviewed the MRI lumbar spine.  - If pain persist I would recommend lumbar spine flexion-extension x-rays.    2.  PHYSICAL THERAPY: Patient has had physical therapy through [facility] for this pain.  She will continue doing home exercises.    3.  MEDICATIONS:    -Patient has been taking tramadol 50 mg during the night but it is not providing adequate relief of her pain.  - Patient previously tried hydrocodone and it did not provide adequate relief of her pain.  - I prescribed oxycodone/acetaminophen 5/325 mg 1 tab  twice daily as needed for pain, #16 with no refills.  I checked the [place] prescription monitoring database.  She is deemed to be low risk.  Risks reviewed.  I would not provide this medication long-term.  I will not provide telephone refills.  -Patient will continue gabapentin 300 mg 3 times daily.  I did not titrate her dose higher because she is experiencing some lower extremity edema, worse than her baseline edema.  -I refilled the patient's tizanidine 2 mg 3 times daily.  -Patient can continue Tylenol as needed.    4.  INTERVENTIONS:    -No additional interventions were ordered.  Hopefully the right L5-S1 transforaminal epidural steroid injection will kick in and start giving her some relief.  - If it does not, we could try a right L4-5 transforaminal epidural steroid injection.    5.  PATIENT EDUCATION:    - If she fails to improve with conservative treatment I recommend a referral to neurosurgery.    6.  FOLLOW-UP: Patient will follow up with me in 8 days.  If he has questions or concerns in the meantime, she should not hesitate to call.    Subjective:     Ramila Liao is a 87 year old female who presents today for follow-up regarding chronic low back pain and a 2.5-month history of right leg pain.  Patient is following up after a right L5-S1 transforaminal epidural steroid injection November 10, 2022 (7 days ago).  Patient reports no improvement in her pain.  She continues to have severe pain at night.  She feels that she needs a stronger pain reliever medication than tramadol.  This is not providing adequate pain relief.      Patient complains of pain which begins in the right buttock.  Pain radiates down the lateral thigh, into the lateral calf, into the dorsal foot.  She has chronic numbness and tingling in both feet for years but feels that the numbness and tingling is worse in the right foot.  She has some loss of bladder control at night when she cannot make it to the bathroom in time and  starts leaking on the way.  Denies loss of bowel control.  Denies fevers.  She rates her pain today as a 2 out of 10.  At its best it is a 1 out of 10.  At its worst it is a 10 out of 10.  Pain is worse at night.  She is much better during the day.  Denies groin pain.    Patient to physical therapy through [facility] for this pain.  She does her home exercises.  She takes gabapentin 300 mg 3 times daily, tizanidine 2 mg 3 times daily as needed (she recently ran out).  She takes Tylenol and ibuprofen as needed.  She takes tramadol at night.  She does not feel that her pain is under adequate control.    Review of Systems:  Positive for numbness/tingling, loss of bladder control, pain much worse at night.  Negative for weakness, loss of bowel control, inability to urinate, headache, trip/stumble/falls, difficulty swallowing, difficulty with hand skills, fevers, unintentional weight loss.     Objective:   CONSTITUTIONAL:  Vital signs as above.  No acute distress.  The patient is well nourished and well groomed.    PSYCHIATRIC:  The patient is awake, alert, oriented to person, place and time.  The patient is answering questions appropriately with clear speech.  Normal affect.  HEENT: Normocephalic, atraumatic.  Sclera clear.    SKIN:  Skin over the face, posterior torso, bilateral upper and lower extremities is clean, dry, intact without rashes.  VASCULAR: Bilateral lower extremity edema, slightly worse on the right than the left.  MUSCULOSKELETAL:  Gait is mildly antalgic, favoring the right.   The patient has 4/5 strength right EHL, otherwise 5/5 strength for the bilateral hip flexors, knee flexors/extensors, ankle dorsiflexors/plantar flexors.  Straight leg raise positive on the right, negative on the left.  No tenderness palpation bilateral groin.  Internal and external rotation of both hips is mildly restricted.  Patient does have some increased buttock pain at end right hip external rotation.  NEUROLOGICAL:  1-2+ patellar, achilles reflexes which are symmetric bilaterally.  No ankle clonus bilaterally.  Sensation to light touch is intact in the bilateral L4, L5, and S1 dermatomes.       RESULTS: I reviewed the MRI lumbar spine from [facility] dated November 7, 2022.  At L5-S1 there is degenerative spondylolisthesis related to severe facet arthropathy.  There is moderate bilateral foraminal stenosis at this level.  At L4-5 there is mild to moderate right foraminal stenosis.  At L3-4 there is mild lateral recess stenosis related to a broad-based right paracentral disc protrusion which contacts the right L4 nerve root in the lateral recess.  There is also mild foraminal stenosis.          Again, thank you for allowing me to participate in the care of your patient.        Sincerely,        Tiffanie Quan PA-C

## 2022-11-18 ENCOUNTER — TELEPHONE (OUTPATIENT)
Dept: PHYSICAL MEDICINE AND REHAB | Facility: CLINIC | Age: 87
End: 2022-11-18

## 2022-11-18 NOTE — TELEPHONE ENCOUNTER
Patient was seen by Tiffanie Quan on 11/17/22, and was prescribed oxycodone (per patient). Patient is asking for a call from the care team regarding not getting relief from her pain with her prescription, and is hoping someone can call her before the end of the day today because of the weekend, to discuss options. Please call patient at [phone].    Thank you!

## 2022-11-18 NOTE — TELEPHONE ENCOUNTER
Call placed to pt to discuss. Pt reports she took 1 tablet of Percocet before bed at 9:40pm. She slept for 4 hours and then when she woke up her pain was 8/10 again. She then took a second tablet of Percocet and slept for another 1 1/2 hours, waking up at 8/10 pain again.   Pt took gabapentin 300 mg at 3:25am and  Tizanidine 2mg at 5:30am.     Pt discouraged that the medication isn't helping her through the night. She notes she does does really well during the day besides some daytime drowsiness, it is the nighttime that is causing her all of her issues.     Pt inquiring if Tiffanie has any other suggestions for her. Will update Tiffanie and will call pt back.     Pt scheduled for 2 week post-inj follow-up appt next week.

## 2022-11-18 NOTE — TELEPHONE ENCOUNTER
Call placed to pt with provider's response. Pt will try taking 600 mg gabapentin at bedtime. She will continue to utilize ibuprofen, tizanidine, Percocet. Pt satisfied with this plan. She will call if any questions/concerns.

## 2022-11-18 NOTE — TELEPHONE ENCOUNTER
I would recommend taking gabapentin 600 mg at bedtime and 300 mg during the day instead of 300 tid to see if this helps more with nighttime pain.  Continue percocet, tizanidine, ibuprofen, tylenol as well.

## 2022-11-18 NOTE — TELEPHONE ENCOUNTER
Can she take ibuprofen?  She could try adding 600 mg to her pain regimen at bedtime to see if that helps her sleep longer/better.

## 2022-11-23 NOTE — PROGRESS NOTES
Assessment:   Ramila Liao is a 87 year old y.o. female with past medical history significant for hyperlipidemia who presents today for follow-up regarding chronic low back pain and right lower extremity pain.  My review of an MRI lumbar spine shows grade 1 spondylolisthesis at L5-S1 where there is moderate bilateral foraminal stenosis.  There is also some lateral recess stenosis on the right at L4-5 and a broad-based right paracentral disc bulge at L3-4 which contacts the right L4 nerve root in the lateral recess.Patient is status post a right L5-S1 transforaminal epidural steroid injection November 10, 2022 which provided 60 to 70% relief of her pain.         Plan:     A shared decision making plan was used.  The patient's values and choices were respected.  The following represents what was discussed and decided upon by the physician assistant and the patient.      1.  DIAGNOSTIC TESTS:    -I reviewed the MRI lumbar spine.  - If pain persist I would recommend lumbar spine flexion-extension x-rays.  - Patient may also need an EMG right lower extremity.    2.  PHYSICAL THERAPY: Patient has had physical therapy through [facility] for this pain.  I entered a referral for additional physical therapy specifically to work on strengthening.  She will go to [facility].    3.  MEDICATIONS:    -Tramadol and hydrocodone did not provide adequate relief of her pain.  -Patient stopped taking oxycodone/acetaminophen 3 nights ago since pain improved.  She still has several tabs remaining.  No additional opiates were prescribed.  -Patient will continue gabapentin 300 g in the morning, 300 mg in the afternoon, and 600 mg at bedtime.  -Patient can continue tizanidine 2 mg 3 times daily.  -Patient can continue ibuprofen and Tylenol as needed.    4.  INTERVENTIONS:    -No additional interventions were ordered.  If pain returns, we can repeat the right L5-S1 transforaminal epidural steroid injection.      5.  PATIENT  EDUCATION: Patient is in agreement the above plan.  All questions were answered.    6.  FOLLOW-UP: I offered to follow-up with the patient in 1 to 2 months to monitor progress with physical therapy.  She declined.  She prefers to follow-up as needed.  If she has questions or concerns, she should not hesitate to call.    Subjective:     Ramila Liao is a 87 year old female who presents today for follow-up regarding chronic low back pain and a nearly 3 -month history of right leg pain.  Patient is following up after a right L5-S1 transforaminal epidural steroid injection November 10, 2022.  Patient reports 60 to 70% improvement in her pain.    Patient complains of pain which begins in the right buttock.  Pain radiates down the right lateral thigh and about MCC down the right lateral calf.  She states it is not going as far down as it did previously.  She denies any pain in the back itself.  Denies numbness or tingling (other than chronic intermittent numbness and tingling in the toes of both feet that she has had for years and attributes to neuropathy).  She still feels a weak sensation in the right leg and she uses a cane at times for stability.  She rates her pain today as a 3 out of 10.  At its worst it is a 4 out of 10.  At its best it is a 1-10.  Pain is worse with sleeping at night but she states that she has been sleeping much better the past few nights.  Pain is alleviated with taking ibuprofen.  She denies any new symptoms since she was last seen.  Patient previous had some right lower extremity swelling which is improving.    Patient to physical therapy through [facility] for this pain.  She does her home exercises.  She takes gabapentin 300 mg in the morning, 300 mg in the afternoon, and 6 and a milligrams at bedtime.  She takes tizanidine as needed.  She takes ibuprofen and Tylenol as needed.  These medications are all helpful.  She stopped using oxycodone several nights ago since pain  has improved.  She still has several tabs remaining.    Review of Systems:  Positive for weakness, wetting pants, pain much worse at night, trip/stumble/falls.  Negative for numbness/tingling, loss of bowel/bladder control, inability to urinate, headache, difficulty swallowing, difficulty with hand skills, fevers, unintentional weight loss.     Objective:   CONSTITUTIONAL:  Vital signs as above.  No acute distress.  The patient is well nourished and well groomed.    PSYCHIATRIC:  The patient is awake, alert, oriented to person, place and time.  The patient is answering questions appropriately with clear speech.  Normal affect.  HEENT: Normocephalic, atraumatic.  Sclera clear.    SKIN:  Skin over the face, posterior torso, bilateral upper and lower extremities is clean, dry, intact without rashes.  VASCULAR: Bilateral lower extremity edema, slightly worse on the right than the left.  MUSCULOSKELETAL:  Gait is mildly antalgic, favoring the right.   The patient has 5/5 strength bilateral hip flexors, knee flexors/extensors, ankle dorsi/plantar flexors, EHL.    NEUROLOGICAL:   No ankle clonus bilaterally.  Subjective diminished sensation right L5 dermatome.       RESULTS: I reviewed the MRI lumbar spine from [facility] dated November 7, 2022.  At L5-S1 there is degenerative spondylolisthesis related to severe facet arthropathy.  There is moderate bilateral foraminal stenosis at this level.  At L4-5 there is mild to moderate right foraminal stenosis.  At L3-4 there is mild lateral recess stenosis related to a broad-based right paracentral disc protrusion which contacts the right L4 nerve root in the lateral recess.  There is also mild foraminal stenosis.

## 2022-11-25 ENCOUNTER — OFFICE VISIT (OUTPATIENT)
Dept: PHYSICAL MEDICINE AND REHAB | Facility: CLINIC | Age: 87
End: 2022-11-25
Payer: COMMERCIAL

## 2022-11-25 VITALS
SYSTOLIC BLOOD PRESSURE: 152 MMHG | WEIGHT: 136 LBS | HEART RATE: 104 BPM | HEIGHT: 62 IN | DIASTOLIC BLOOD PRESSURE: 70 MMHG | BODY MASS INDEX: 25.03 KG/M2

## 2022-11-25 DIAGNOSIS — M43.16 SPONDYLOLISTHESIS OF LUMBAR REGION: Primary | ICD-10-CM

## 2022-11-25 DIAGNOSIS — M54.16 LUMBAR RADICULAR PAIN: ICD-10-CM

## 2022-11-25 PROCEDURE — 99213 OFFICE O/P EST LOW 20 MIN: CPT | Performed by: PHYSICIAN ASSISTANT

## 2022-11-25 ASSESSMENT — PAIN SCALES - GENERAL: PAINLEVEL: MILD PAIN (3)

## 2022-11-25 NOTE — PATIENT INSTRUCTIONS
An order for physicaltherapy has been provided today.  Someone will call you to schedule physical therapy or you can call [phone] to schedule physical therapy.  It will be very important for you to do your physical therapy exercises on aregular basis to decrease your pain and prevent future flares of pain.      I am so happy that you are feeling better!  If your pain returns we can repeat the injection(s).  You can have injections up to 4 times per year.    If your pain returns or if you have any other questions or concerns please send me a [employer] message or call our nurse line at [phone].

## 2022-11-25 NOTE — LETTER
11/25/2022         RE: Ramila Liao  [address]  [facility] [address]        Dear Colleague,    Thank you for referring your patient, Ramila Liao, to the [facility]. Please see a copy of my visit note below.    Assessment:   Ramila Liao is a 87 year old y.o. female with past medical history significant for hyperlipidemia who presents today for follow-up regarding chronic low back pain and right lower extremity pain.  My review of an MRI lumbar spine shows grade 1 spondylolisthesis at L5-S1 where there is moderate bilateral foraminal stenosis.  There is also some lateral recess stenosis on the right at L4-5 and a broad-based right paracentral disc bulge at L3-4 which contacts the right L4 nerve root in the lateral recess.Patient is status post a right L5-S1 transforaminal epidural steroid injection November 10, 2022 which provided 60 to 70% relief of her pain.         Plan:     A shared decision making plan was used.  The patient's values and choices were respected.  The following represents what was discussed and decided upon by the physician assistant and the patient.      1.  DIAGNOSTIC TESTS:    -I reviewed the MRI lumbar spine.  - If pain persist I would recommend lumbar spine flexion-extension x-rays.  - Patient may also need an EMG right lower extremity.    2.  PHYSICAL THERAPY: Patient has had physical therapy through [facility] for this pain.  I entered a referral for additional physical therapy specifically to work on strengthening.  She will go to [facility].    3.  MEDICATIONS:    -Tramadol and hydrocodone did not provide adequate relief of her pain.  -Patient stopped taking oxycodone/acetaminophen 3 nights ago since pain improved.  She still has several tabs remaining.  No additional opiates were prescribed.  -Patient will continue gabapentin 300 g in the morning, 300 mg in the afternoon, and 600 mg at bedtime.  -Patient can continue  tizanidine 2 mg 3 times daily.  -Patient can continue ibuprofen and Tylenol as needed.    4.  INTERVENTIONS:    -No additional interventions were ordered.  If pain returns, we can repeat the right L5-S1 transforaminal epidural steroid injection.      5.  PATIENT EDUCATION: Patient is in agreement the above plan.  All questions were answered.    6.  FOLLOW-UP: I offered to follow-up with the patient in 1 to 2 months to monitor progress with physical therapy.  She declined.  She prefers to follow-up as needed.  If she has questions or concerns, she should not hesitate to call.    Subjective:     Ramila Liao is a 87 year old female who presents today for follow-up regarding chronic low back pain and a nearly 3 -month history of right leg pain.  Patient is following up after a right L5-S1 transforaminal epidural steroid injection November 10, 2022.  Patient reports 60 to 70% improvement in her pain.    Patient complains of pain which begins in the right buttock.  Pain radiates down the right lateral thigh and about correction down the right lateral calf.  She states it is not going as far down as it did previously.  She denies any pain in the back itself.  Denies numbness or tingling (other than chronic intermittent numbness and tingling in the toes of both feet that she has had for years and attributes to neuropathy).  She still feels a weak sensation in the right leg and she uses a cane at times for stability.  She rates her pain today as a 3 out of 10.  At its worst it is a 4 out of 10.  At its best it is a 1-10.  Pain is worse with sleeping at night but she states that she has been sleeping much better the past few nights.  Pain is alleviated with taking ibuprofen.  She denies any new symptoms since she was last seen.  Patient previous had some right lower extremity swelling which is improving.    Patient to physical therapy through [facility] for this pain.  She does her home exercises.  She takes  gabapentin 300 mg in the morning, 300 mg in the afternoon, and 6 and a milligrams at bedtime.  She takes tizanidine as needed.  She takes ibuprofen and Tylenol as needed.  These medications are all helpful.  She stopped using oxycodone several nights ago since pain has improved.  She still has several tabs remaining.    Review of Systems:  Positive for weakness, wetting pants, pain much worse at night, trip/stumble/falls.  Negative for numbness/tingling, loss of bowel/bladder control, inability to urinate, headache, difficulty swallowing, difficulty with hand skills, fevers, unintentional weight loss.     Objective:   CONSTITUTIONAL:  Vital signs as above.  No acute distress.  The patient is well nourished and well groomed.    PSYCHIATRIC:  The patient is awake, alert, oriented to person, place and time.  The patient is answering questions appropriately with clear speech.  Normal affect.  HEENT: Normocephalic, atraumatic.  Sclera clear.    SKIN:  Skin over the face, posterior torso, bilateral upper and lower extremities is clean, dry, intact without rashes.  VASCULAR: Bilateral lower extremity edema, slightly worse on the right than the left.  MUSCULOSKELETAL:  Gait is mildly antalgic, favoring the right.   The patient has 5/5 strength bilateral hip flexors, knee flexors/extensors, ankle dorsi/plantar flexors, EHL.    NEUROLOGICAL:   No ankle clonus bilaterally.  Subjective diminished sensation right L5 dermatome.       RESULTS: I reviewed the MRI lumbar spine from [facility] dated November 7, 2022.  At L5-S1 there is degenerative spondylolisthesis related to severe facet arthropathy.  There is moderate bilateral foraminal stenosis at this level.  At L4-5 there is mild to moderate right foraminal stenosis.  At L3-4 there is mild lateral recess stenosis related to a broad-based right paracentral disc protrusion which contacts the right L4 nerve root in the lateral recess.  There is also mild foraminal  stenosis.          Again, thank you for allowing me to participate in the care of your patient.        Sincerely,        Tiffanie Quan PA-C

## 2022-11-28 ENCOUNTER — HOSPITAL ENCOUNTER (OUTPATIENT)
Dept: PHYSICAL THERAPY | Facility: REHABILITATION | Age: 87
Discharge: HOME | End: 2022-11-28
Attending: PHYSICIAN ASSISTANT
Payer: COMMERCIAL

## 2022-11-28 DIAGNOSIS — M54.16 LUMBAR RADICULAR PAIN: ICD-10-CM

## 2022-11-28 DIAGNOSIS — M43.16 SPONDYLOLISTHESIS OF LUMBAR REGION: ICD-10-CM

## 2022-11-28 PROCEDURE — 97161 PT EVAL LOW COMPLEX 20 MIN: CPT | Mod: GP

## 2022-11-28 PROCEDURE — 97110 THERAPEUTIC EXERCISES: CPT | Mod: GP

## 2022-11-28 NOTE — ADDENDUM NOTE
Encounter addended by: Grady Ramirez, PT on: 11/28/2022 11:29 AM   Actions taken: Flowsheet accepted

## 2022-11-28 NOTE — PROGRESS NOTES
[facility]    OUTPATIENT PHYSICAL THERAPY ORTHOPEDIC EVALUATION  PLAN OF TREATMENT FOR OUTPATIENT REHABILITATION  (COMPLETE FOR INITIAL CLAIMS ONLY)  Patient's Last Name, First Name, M.I.  YOB: 1935  Ramila Liao    Provider s Name:  [facility]   Medical Record No.  [id number]   Start of Care Date:  11/28/22   Onset Date:  08/17/22   Type:     _X__PT   ___OT   ___SLP Medical Diagnosis:  Lumbar radicular pain  Spondylolisthesis of lumbar region     PT Diagnosis:  Right-sided lumbar radicular pain with subsequent balance and gait impairments   Visits from SOC:  1      _________________________________________________________________________________  Plan of Treatment/Functional Goals:  balance training, manual therapy, neuromuscular re-education, ROM, strengthening, stretching, gait training     Cryotherapy, Electrical stimulation, Hot packs, TENS     Goals  Goal Identifier: RODRICK - Short Term  Goal Description: Kanwal will demonstrate improved function as evidenced by an improved (decreased) RODRICK score of less than 21%.  Target Date: 12/26/22    Goal Identifier: RODRICK - Long Term  Goal Description: Kanwal will demonstrate improved function as evidenced by an improved (decreased) RODRICK score of less than 5%.  Target Date: 02/06/23    Goal Identifier: Walking  Goal Description: Kanwal will demonstrate improved tolerance to functional mobility as evidenced by her ability to walk for up to 30 minutes with self-report pain no higher than 2/10.  Target Date: 02/06/23    Goal Identifier: Sleep  Goal Description: Kanwal will be able to sleep through the night with less than 1 hour of disturbance due to back/leg pain on at least 5/7 nights a week.  Target Date: 02/06/23    Goal Identifier: TUG  Goal Description: Will assess next visit.  Target Date: 02/06/23    Goal  "Identifier: [facility] [address] [facility]  Goal Description: Will assess next visit.  Target Date: 02/06/23                          Therapy Frequency:   (1 time/week to 1 time every other week)  Predicted Duration of Therapy Intervention:  10 weeks (about 8 visits total)    Grady Ramirez, PT                 I CERTIFY THE NEED FOR THESE SERVICES FURNISHED UNDER        THIS PLAN OF TREATMENT AND WHILE UNDER MY CARE     (Physician co-signature of this document indicates review and certification of the therapy plan).                     Certification Date From:  11/28/22   Certification Date To:  02/06/23    Referring Provider:  Tiffanie Quan PA-C    Initial Assessment        See Epic Evaluation Start of Care Date: 11/28/22 11/28/22 0800   General Information   Type of Visit Initial OP Ortho PT Evaluation   Start of Care Date 11/28/22   Referring Physician Tiffanie Quan PA-C   Orders Evaluate and Treat   Orders Comment prefers woodwinds - please add more strengthening exercises   Date of Order 11/25/22   Certification Required? Yes   Medical Diagnosis Lumbar radicular pain  Spondylolisthesis of lumbar region   Surgical/Medical history reviewed Yes   Presentation and Etiology   Pertinent history of current problem (include personal factors and/or comorbidities that impact the POC) Kanwal reports chronic low back pain due to \"deterioration\" reported by her previous doctor. About a year ago, she told her primary care provider that her back was bothering her a little more, and she was referred to PT at [facility]. They \"realigned\" her hips, and she felt better. She also did a few more visits to work on exercises, which she continued to do at home. A few months ago her back started hurt worse again, and she started to get right buttocks and leg pain. Her current symptoms consist of right buttocks pain with lateral thigh and lateral/anterior lower leg " pain into the foot. She eventually went to the ER due to high pain. She then followed up with the spine clinic and received an injection about two weeks ago. She reports that her symptoms have improved since then, but she still requires prescription pain medication to help. She also said that she thinks a 10+ year history of pain between the base of the first and second toes (sounds like Means's neuroma) and subsequent limping have contributed to her current symptoms. She reports bilateral numbness and tingling in both feet that she says has been there for many years. She reports minor bladder leakage at night since she can't get to the bathroom as quickly now. She denies any bowel changes. She currently lives in a senior living facility. There are no stairs into the building, and she takes the elevator up to her unit. She has a cane that she uses at night and more frequently when she leaves home.   Impairments A. Pain;D. Decreased ROM;E. Decreased flexibility;F. Decreased strength and endurance;G. Impaired balance;H. Impaired gait;K. Numbness;L. Tingling   Functional Limitations perform activities of daily living;perform desired leisure / sports activities   Symptom Location See history   How/Where did it occur   (See history)   Onset date of current episode/exacerbation 08/17/22   Chronicity Recurrent   Pain rating (0-10 point scale) Best (/10);Worst (/10);Other   Best (/10) 10+/10   Worst (/10) 0-1/10   Pain rating comment Current: 1-2/10   Pain quality A. Sharp   Frequency of pain/symptoms B. Intermittent   Pain/symptoms are: Worse during the night   Pain/symptoms exacerbated by G. Certain positions;C. Lifting;D. Carrying;I. Bending;B. Walking;J. ADL;K. Home tasks   Pain/symptoms eased by A. Sitting;C. Rest;E. Changing positions;K. Other;G. Heat;H. Cold;I. OTC medication(s)   Pain eased by comment Prescription medications, injection   Progression of symptoms since onset: Improved   Current / Previous  Interventions   Diagnostic Tests: MRI   MRI Results Results   MRI results IMPRESSION:  1.  Moderate spondylosis with interval development of degenerative anterior spondylolisthesis at L5-S1 with type I endplate change on the right at L5-S1.  2.  Mild right lateral recess stenosis at L5-S1 with moderate bilateral foraminal stenoses.  3.  Mild right lateral recess stenosis at L4-L5 with mild to moderate right foraminal stenosis.  4.  Mild lateral recess stenoses at L3-L4 with shallow disc protrusion contacting the right L4 nerve root within the lateral recess.   Current Level of Function   Living environment Other   Current equipment-Gait/Locomotion Standard cane   Fall Risk Screen   Fall screen completed by PT   Have you fallen 2 or more times in the past year? Yes   Have you fallen and had an injury in the past year? No   Is patient a fall risk? Yes   Fall screen comments Patient reports that about a week ago she was squatting to put her shoe on, started to lose her balance, and sat a few inches onto the floor without injury. Also about a week ago she got up during the night, stood up too quickly and her momentum took her one way, while her cane went the other way, and she fell from stooping level without any injury. She immediately got up and was fine without any issues.   Abuse Screen (yes response referral indicated)   Feels Unsafe at Home or Work/School no   Feels Threatened by Someone no   Does Anyone Try to Keep You From Having Contact with Others or Doing Things Outside Your Home? no   Physical Signs of Abuse Present no   Patient needs abuse support services and resources No   System Outcome Measures   Outcome Measures   (RODRICK: 50%)   Lumbar Spine/SI Objective Findings   Observation Slouched posture, forward head,, and rounded shoulders.   Integumentary Slight bruise behind right ear that she thinks might have come from one of her falls bumping her neck on the way down.   Gait/Locomotion Antalgic (favors  right)   Balance/Proprioception (Single Leg Stance) Bilateral: less than 1 second   Flexion ROM WNL   Extension ROM 75% to 99% (painful)   Right Side Bending ROM Distal thigh   Left Side Bending ROM Distal thigh (painful on right)   Repeated Extension-Standing ROM Increased pain   Repeated Flexion-Standing ROM No change in symptoms   Lumbar ROM Comment Bilateral rotation: 75% to 99% (painful to right)   Hip Flexion (L2) Strength Right: slightly limited compared to left. Left: 5/5.   Hip Abduction Strength Right: slightly limited compared to left. Left: 5/5.   Hip Adduction Strength Bilateral: 5/5   Knee Flexion Strength Right: slightly limited compared to left. Left: 5/5.   Knee Extension (L3) Strength Bilateral: 5/5   Ankle Dorsiflexion (L4) Strength Bilateral: 5/5   Great Toe Extension (L5) Strength Bilateral: 5/5   Ankle Plantar Flexion (S1) Strength Bilateral: 5/5   Slump Test Positive on right   Sensation Testing Bilateral lower extremity dermatomes intact to light touch   Patellar Tendon Reflexes  Right: 2+. Left: 1+.   Achilles Tendon Reflexes Bilateral: 0.   Neurological Testing Comments Ankle clonus and Tabor's: negative bilaterally.   Planned Therapy Interventions   Planned Therapy Interventions balance training;manual therapy;neuromuscular re-education;ROM;strengthening;stretching;gait training   Planned Modality Interventions   Planned Modality Interventions Cryotherapy;Electrical stimulation;Hot packs;TENS   Clinical Impression   Criteria for Skilled Therapeutic Interventions Met yes, treatment indicated   PT Diagnosis Right-sided lumbar radicular pain with subsequent balance and gait impairments   Influenced by the following impairments Right hip and leg pain with occasional low back pain, impaired balance, and decreased strength and flexibility   Functional limitations due to impairments Walk, sleep, bend, lift, carry, and other ADLs   Clinical Presentation Stable/Uncomplicated   Clinical Decision  Making (Complexity) Low complexity   Therapy Frequency   (1 time/week to 1 time every other week)   Predicted Duration of Therapy Intervention (days/wks) 10 weeks (about 8 visits total)   Risk & Benefits of therapy have been explained Yes   Patient, Family & other staff in agreement with plan of care Yes   Clinical Impression Comments Kanwal is an 87-year-old female who presents to physical therapy with signs and symptoms consistent with referring diagnosis of lumbar radiculopathy, including right hip/leg and occasional low back pain, impaired balance, and decreased strength and flexibility. She also demonstrates positive neural tension testing. These impairments limit her ability to walk, sleep, bend, lift, carry, and perform other ADLs without pain or limitation. She will benefit from skilled therapy to address the listed impairments and limitations and improve her function.   Ortho Goal 1   Goal Identifier RODRICK - Short Term   Goal Description Kanwal will demonstrate improved function as evidenced by an improved (decreased) RODRICK score of less than 21%.   Goal Progress 50%   Target Date 12/26/22   Ortho Goal 2   Goal Identifier RODRICK - Long Term   Goal Description Kanwal will demonstrate improved function as evidenced by an improved (decreased) RODRICK score of less than 5%.   Goal Progress 50%   Target Date 02/06/23   Ortho Goal 3   Goal Identifier Walking   Goal Description Kanwal will demonstrate improved tolerance to functional mobility as evidenced by her ability to walk for up to 30 minutes with self-report pain no higher than 2/10.   Goal Progress Unable   Target Date 02/06/23   Ortho Goal 4   Goal Identifier Sleep   Goal Description Kanwal will be able to sleep through the night with less than 1 hour of disturbance due to back/leg pain on at least 5/7 nights a week.   Goal Progress Unable   Target Date 02/06/23   Ortho Goal 5   Goal Identifier TUG   Goal Description Will assess next visit.   Goal Progress Will  assess next visit.   Target Date 02/06/23   Ortho Goal 6   Goal Identifier 30 Second [facility]   Goal Description Will assess next visit.   Goal Progress Will assess next visit.   Target Date 02/06/23   Total Evaluation Time   PT Eval, Low Complexity Minutes (94388) 39   Therapy Certification   Certification date from 11/28/22   Certification date to 02/06/23   Medical Diagnosis Lumbar radicular pain  Spondylolisthesis of lumbar region

## 2022-12-06 ENCOUNTER — TELEPHONE (OUTPATIENT)
Dept: PHYSICAL MEDICINE AND REHAB | Facility: CLINIC | Age: 87
End: 2022-12-06

## 2022-12-06 DIAGNOSIS — M54.16 LUMBAR RADICULAR PAIN: ICD-10-CM

## 2022-12-06 NOTE — TELEPHONE ENCOUNTER
Hello,  I'm with ortho con.  Pt requesting a call back, sees Tiffanie Quan.  Pt would like to discuss option of another injection and get Gabriela opinion.  Thank you.

## 2022-12-07 ENCOUNTER — TELEPHONE (OUTPATIENT)
Dept: PHYSICAL THERAPY | Facility: REHABILITATION | Age: 87
End: 2022-12-07

## 2022-12-07 RX ORDER — TIZANIDINE 2 MG/1
TABLET ORAL
Qty: 90 TABLET | Refills: 1 | Status: SHIPPED | OUTPATIENT
Start: 2022-12-07 | End: 2023-02-06

## 2022-12-07 NOTE — TELEPHONE ENCOUNTER
Phone call to patient to inform her PSP would like an appointment with her to review everything that is occurring and talk about next steps. Stated understanding.     Unable to connect with  staff at this time. Message sent for them to call patient to arrange appointment. Patient given their direct number as well.

## 2022-12-07 NOTE — TELEPHONE ENCOUNTER
Tizanidine refilled.  Does patient feel like the relief that she had after her injection has already worn off?

## 2022-12-07 NOTE — TELEPHONE ENCOUNTER
"Phone call to patient to inform refill of Tizanidine has been sent in. She can take it up to 3 times a day as needed for her pain/spasms; she does not have to take it scheduled. Stated understanding and appreciation for follow up.     She does not feel the injection is wearing off, just that she has not seen any more improvement from it. Had reports 60-70% at her follow up appointment. \"After the shot I had increased pain for a good 5-6 days and the sheet said it could be a couple days. There are times where it can get to 5-7/10. During the day I hardly have any pain at all.\"     Does now report she has noted swelling of the right ankle and foot since the injection.   "

## 2022-12-07 NOTE — TELEPHONE ENCOUNTER
Phone call to patient to discuss. Symptoms she is experiencing. Patient reports she continues to have pain in her right buttock and down right leg that is bothersome to her during the night. Taking Tizanidine 2 mg at 830 PM and then her gabapentin at 930 PM. She takes 2 IBU and 2 ES Tylenol at  PM. She reports she is able to sleep until 2-3 AM when she is awakened by the pain. She will repeat her Tizanidine and Gabapentin doses then (1st ones of her day). Wondering if she should continue this and if another injection might be in the plan. ~Needs refill of Tizanidine as she is using 3 a day and one Rx last her 10 days.     Chart reviewed. Explained PSP had mentioned flex/ext x-rays and EMG if pain persisted. PSP is out of clinic today and back in the AM. She will get this update. Patient will be called with how PSP would like to proceed.     Reports she has started PT and is walking with a cane as her gait has been off forr 6-8 weeks now.

## 2022-12-08 ENCOUNTER — OFFICE VISIT (OUTPATIENT)
Dept: PHYSICAL MEDICINE AND REHAB | Facility: CLINIC | Age: 87
End: 2022-12-08
Payer: COMMERCIAL

## 2022-12-08 VITALS
OXYGEN SATURATION: 98 % | SYSTOLIC BLOOD PRESSURE: 136 MMHG | HEART RATE: 99 BPM | WEIGHT: 136 LBS | BODY MASS INDEX: 25.03 KG/M2 | HEIGHT: 62 IN | DIASTOLIC BLOOD PRESSURE: 76 MMHG

## 2022-12-08 DIAGNOSIS — M79.89 RIGHT LEG SWELLING: ICD-10-CM

## 2022-12-08 DIAGNOSIS — M54.16 LUMBAR RADICULAR PAIN: Primary | ICD-10-CM

## 2022-12-08 DIAGNOSIS — M43.16 SPONDYLOLISTHESIS OF LUMBAR REGION: ICD-10-CM

## 2022-12-08 PROCEDURE — 99214 OFFICE O/P EST MOD 30 MIN: CPT | Performed by: PHYSICIAN ASSISTANT

## 2022-12-08 ASSESSMENT — PAIN SCALES - GENERAL: PAINLEVEL: MILD PAIN (3)

## 2022-12-08 NOTE — PATIENT INSTRUCTIONS
I ordered an ultrasound of your right leg to see if there is a problem with the blood flow causing your swelling.  You will get a phone call to schedule or you can call them at the number below.  My nurse will call you with the results.    [facility] Scheduling    Please call [phone] to schedule your image(s) (select option#1).     Right L4/5, L5/S1 epidural steroid injections has been ordered today.      Please note that this injection uses cortisone.  The cortisone may somewhat weaken the immune system.  It is unknown how much the immune system is weakened.  It is unknown if it is weakened to the point that you may be more likely to get the COVID-19 virus, or if you do get the COVID-19 virus, if you would be sicker than you would have been if you had not had the cortisone injection.  If you do not wish to proceed with the injection, please let the nurse/physician know and do NOT schedule the injection.    Please note that since your immune system is weakened from the cortisone, having any vaccine/shot may be less effective if you have this vaccine within 2 weeks from your cortisone injection.  It is advised to wait 2 weeks after your cortisone injection to have any vaccine (or if you have a vaccine first, wait 2 weeks before you have the cortisone injection).    Please schedule this injection at least 1 week  from now to allow time for insurance prior authorization.  On the day of your injection, you cannot be sick or taking antibiotics.  If you become sick and are prescribed, please call the clinic so your injection can be rescheduled for once you have completed your antibiotics.  You will need to bring a  with you for your injection.   If you have any questions or concerns prior to your injection, please do not hesitate to call the nurse navigation line at [phone] or contact Tiffanie Quan through [employer].

## 2022-12-08 NOTE — PROGRESS NOTES
Assessment:   Ramila Liao is a 87 year old y.o. female with past medical history significant for hyperlipidemia who presents today for follow-up regarding chronic low back pain and right lower extremity pain.  My review of an MRI lumbar spine shows grade 1 spondylolisthesis at L5-S1 where there is moderate bilateral foraminal stenosis.  There is also some lateral recess stenosis on the right at L4-5 and a broad-based right paracentral disc bulge at L3-4 which contacts the right L4 nerve root in the lateral recess.Patient is status post a right L5-S1 transforaminal epidural steroid injection November 10, 2022 which provided 60 to 70% relief of her pain but that relief is already waning.  Symptoms follow a right L5 pattern.  -Patient also complains of increased right lower extremity swelling.  She has some lower extremity edema bilaterally but it is worse on the right.  We will check ultrasound.         Plan:     A shared decision making plan was used.  The patient's values and choices were respected.  The following represents what was discussed and decided upon by the physician assistant and the patient.      1.  DIAGNOSTIC TESTS:    -I reviewed the MRI lumbar spine.  - I ordered an ultrasound right lower extremity due to swelling.    2.  PHYSICAL THERAPY: Patient is currently in physical therapy.  She has had 1 session so far.  Encouraged to continue with physical therapy and the home exercises..    3.  MEDICATIONS:  - No changes are made to the patient's medications.  -Tramadol and hydrocodone did not provide adequate relief of her pain.  -Patient takes oxycodone rarely for severe pain.  -Patient will continue gabapentin 300 mg 3 times daily.  -Patient can continue tizanidine 2 mg 3 times daily as needed.  -Patient can continue ibuprofen and Tylenol as needed.    4.  INTERVENTIONS: I offer the patient a right L4-5, L5-S1 transforaminal epidural steroid injection.  I explained how this would be different than  the right L5-S1 transforaminal epidural steroid injection.  I am hoping by adding the L4-5 level she has more significant longer lasting relief of her pain.  She does have lateral recess stenosis on the right at L4-5 and foraminal stenosis on the right at L5-S1.  Patient indicated she would like to proceed and an order was placed.  - If this does not help, we could consider a piriformis muscle injection.  She does have some hypertonicity of the muscles in the right upper outer buttock.      5.  PATIENT EDUCATION: Patient is in agreement the above plan.  All questions were answered.    6.  FOLLOW-UP: A nurse to call the patient with the results of her ultrasound.  I will see the patient back in clinic 2 weeks after her right L4-5, L5-S1 transforaminal epidural steroid injections.  If she has questions or concerns in the meantime, she should not hesitate to call.    Subjective:     Ramila Liao is a 87 year old female who presents today for follow-up regarding chronic low back pain right lower extremity pain.  I last saw the patient November 25, 2022.  Patient reported 60 to 70% improvement in her pain at that visit following a right L5-S1 transforaminal epidural steroid injection performed November 10, 2022.  Unfortunate, patient feels that that relief is already waning.  She is having significant pain at night.  She is also having increased swelling in the right leg.    Patient complains of pain which begins in the right buttock.  Pain radiates down the right lateral thigh and down the right lateral calf to the ankle.  She states that with longer walking she also feels pain in the low back.  She has numbness and tingling in the toes of both feet which is chronic and stable.  She states her right leg feels weak.  She has to be very careful when she is turning so that she does not lose her balance.  She has to walk with a cane.  She states that her right foot/ankle swelling is getting worse.  She has swelling in  both legs at baseline but the right is much worse.  Her pain is worse at night.  She has difficulty sleeping because of the pain.  Patient reports a small amount of leaking urine when she first gets up to use the bathroom after sleeping.  She states that sometimes she cannot make it to the bathroom in time and has a small amount of urine leak so she wears a pad at night.  Denies any incontinence during the day.  Denies loss of bowel control.  She rates her pain today as a 3 out of 10.  At its worst it is a 6-7 out of 10.  At its best it is a 1 out of 10.  Pain is worse at night.  Pain is alleviated with walking, applying cold or warm pack.    Patient previously went to physical therapy at [place].  She is now in physical therapy through [facility].  She does her home exercises.  She takes gabapentin 300 mg 3 times daily.  She takes tizanidine as needed.  She takes ibuprofen and Tylenol as needed.  These medications are all helpful.  She uses oxycodone sparingly when pain is very severe.    Review of Systems:  Positive for numbness/tingling, weakness, wetting pants, pain much worse at night.  Negative for loss of bowel/bladder control, inability to urinate, headache, trip/stumble/falls, difficulty swallowing, difficulty with hand skills, fevers, unintentional weight loss.     Objective:   CONSTITUTIONAL:  Vital signs as above.  No acute distress.  The patient is well nourished and well groomed.    PSYCHIATRIC:  The patient is awake, alert, oriented to person, place and time.  The patient is answering questions appropriately with clear speech.  Normal affect.  HEENT: Normocephalic, atraumatic.  Sclera clear.    SKIN:  Skin over the face, posterior torso, bilateral upper and lower extremities is clean, dry, intact without rashes.  VASCULAR: Bilateral lower extremity edema, worse on the right than the left.  MUSCULOSKELETAL:  Gait is  antalgic, favoring the right.   The patient has 5/5 strength bilateral hip  flexors, knee flexors/extensors, ankle dorsi/plantar flexors, EHL.    NEUROLOGICAL: 2+ patellar and 1+ Achilles reflexes bilaterally.  Negative Babinski's bilaterally.  No ankle clonus bilaterally.  Diminished sensation toes of bilateral feet.     RESULTS: I reviewed the MRI lumbar spine from [facility] dated November 7, 2022.  At L5-S1 there is degenerative spondylolisthesis related to severe facet arthropathy.  There is moderate bilateral foraminal stenosis at this level.  At L4-5 there is mild to moderate right foraminal stenosis.  At L3-4 there is mild lateral recess stenosis related to a broad-based right paracentral disc protrusion which contacts the right L4 nerve root in the lateral recess.  There is also mild foraminal stenosis.

## 2022-12-08 NOTE — LETTER
12/8/2022         RE: Ramila Liao  [address]  [place] [id number]        Dear Colleague,    Thank you for referring your patient, Ramila Liao, to the [facility]. Please see a copy of my visit note below.    Assessment:   Ramila Liao is a 87 year old y.o. female with past medical history significant for hyperlipidemia who presents today for follow-up regarding chronic low back pain and right lower extremity pain.  My review of an MRI lumbar spine shows grade 1 spondylolisthesis at L5-S1 where there is moderate bilateral foraminal stenosis.  There is also some lateral recess stenosis on the right at L4-5 and a broad-based right paracentral disc bulge at L3-4 which contacts the right L4 nerve root in the lateral recess.Patient is status post a right L5-S1 transforaminal epidural steroid injection November 10, 2022 which provided 60 to 70% relief of her pain but that relief is already waning.  Symptoms follow a right L5 pattern.  -Patient also complains of increased right lower extremity swelling.  She has some lower extremity edema bilaterally but it is worse on the right.  We will check ultrasound.         Plan:     A shared decision making plan was used.  The patient's values and choices were respected.  The following represents what was discussed and decided upon by the physician assistant and the patient.      1.  DIAGNOSTIC TESTS:    -I reviewed the MRI lumbar spine.  - I ordered an ultrasound right lower extremity due to swelling.    2.  PHYSICAL THERAPY: Patient is currently in physical therapy.  She has had 1 session so far.  Encouraged to continue with physical therapy and the home exercises..    3.  MEDICATIONS:  - No changes are made to the patient's medications.  -Tramadol and hydrocodone did not provide adequate relief of her pain.  -Patient takes oxycodone rarely for severe pain.  -Patient will continue gabapentin 300 mg 3 times  daily.  -Patient can continue tizanidine 2 mg 3 times daily as needed.  -Patient can continue ibuprofen and Tylenol as needed.    4.  INTERVENTIONS: I offer the patient a right L4-5, L5-S1 transforaminal epidural steroid injection.  I explained how this would be different than the right L5-S1 transforaminal epidural steroid injection.  I am hoping by adding the L4-5 level she has more significant longer lasting relief of her pain.  She does have lateral recess stenosis on the right at L4-5 and foraminal stenosis on the right at L5-S1.  Patient indicated she would like to proceed and an order was placed.  - If this does not help, we could consider a piriformis muscle injection.  She does have some hypertonicity of the muscles in the right upper outer buttock.      5.  PATIENT EDUCATION: Patient is in agreement the above plan.  All questions were answered.    6.  FOLLOW-UP: A nurse to call the patient with the results of her ultrasound.  I will see the patient back in clinic 2 weeks after her right L4-5, L5-S1 transforaminal epidural steroid injections.  If she has questions or concerns in the meantime, she should not hesitate to call.    Subjective:     Ramila Liao is a 87 year old female who presents today for follow-up regarding chronic low back pain right lower extremity pain.  I last saw the patient November 25, 2022.  Patient reported 60 to 70% improvement in her pain at that visit following a right L5-S1 transforaminal epidural steroid injection performed November 10, 2022.  Unfortunate, patient feels that that relief is already waning.  She is having significant pain at night.  She is also having increased swelling in the right leg.    Patient complains of pain which begins in the right buttock.  Pain radiates down the right lateral thigh and down the right lateral calf to the ankle.  She states that with longer walking she also feels pain in the low back.  She has numbness and tingling in the toes of  both feet which is chronic and stable.  She states her right leg feels weak.  She has to be very careful when she is turning so that she does not lose her balance.  She has to walk with a cane.  She states that her right foot/ankle swelling is getting worse.  She has swelling in both legs at baseline but the right is much worse.  Her pain is worse at night.  She has difficulty sleeping because of the pain.  Patient reports a small amount of leaking urine when she first gets up to use the bathroom after sleeping.  She states that sometimes she cannot make it to the bathroom in time and has a small amount of urine leak so she wears a pad at night.  Denies any incontinence during the day.  Denies loss of bowel control.  She rates her pain today as a 3 out of 10.  At its worst it is a 6-7 out of 10.  At its best it is a 1 out of 10.  Pain is worse at night.  Pain is alleviated with walking, applying cold or warm pack.    Patient previously went to physical therapy at [place].  She is now in physical therapy through [facility].  She does her home exercises.  She takes gabapentin 300 mg 3 times daily.  She takes tizanidine as needed.  She takes ibuprofen and Tylenol as needed.  These medications are all helpful.  She uses oxycodone sparingly when pain is very severe.    Review of Systems:  Positive for numbness/tingling, weakness, wetting pants, pain much worse at night.  Negative for loss of bowel/bladder control, inability to urinate, headache, trip/stumble/falls, difficulty swallowing, difficulty with hand skills, fevers, unintentional weight loss.     Objective:   CONSTITUTIONAL:  Vital signs as above.  No acute distress.  The patient is well nourished and well groomed.    PSYCHIATRIC:  The patient is awake, alert, oriented to person, place and time.  The patient is answering questions appropriately with clear speech.  Normal affect.  HEENT: Normocephalic, atraumatic.  Sclera clear.    SKIN:  Skin over the face,  posterior torso, bilateral upper and lower extremities is clean, dry, intact without rashes.  VASCULAR: Bilateral lower extremity edema, worse on the right than the left.  MUSCULOSKELETAL:  Gait is  antalgic, favoring the right.   The patient has 5/5 strength bilateral hip flexors, knee flexors/extensors, ankle dorsi/plantar flexors, EHL.    NEUROLOGICAL: 2+ patellar and 1+ Achilles reflexes bilaterally.  Negative Babinski's bilaterally.  No ankle clonus bilaterally.  Diminished sensation toes of bilateral feet.     RESULTS: I reviewed the MRI lumbar spine from [facility] dated November 7, 2022.  At L5-S1 there is degenerative spondylolisthesis related to severe facet arthropathy.  There is moderate bilateral foraminal stenosis at this level.  At L4-5 there is mild to moderate right foraminal stenosis.  At L3-4 there is mild lateral recess stenosis related to a broad-based right paracentral disc protrusion which contacts the right L4 nerve root in the lateral recess.  There is also mild foraminal stenosis.          Again, thank you for allowing me to participate in the care of your patient.        Sincerely,        Tiffanie Quan PA-C

## 2022-12-09 ENCOUNTER — TELEPHONE (OUTPATIENT)
Dept: PHYSICAL MEDICINE AND REHAB | Facility: CLINIC | Age: 87
End: 2022-12-09

## 2022-12-09 ENCOUNTER — HOSPITAL ENCOUNTER (OUTPATIENT)
Dept: ULTRASOUND IMAGING | Facility: HOSPITAL | Age: 87
Discharge: HOME | End: 2022-12-09
Attending: PHYSICIAN ASSISTANT | Admitting: PHYSICIAN ASSISTANT
Payer: COMMERCIAL

## 2022-12-09 DIAGNOSIS — M79.89 RIGHT LEG SWELLING: ICD-10-CM

## 2022-12-09 PROCEDURE — 93971 EXTREMITY STUDY: CPT | Mod: RT

## 2022-12-09 NOTE — TELEPHONE ENCOUNTER
----- Message from Tiffanie Quan PA-C sent at 12/9/2022  1:58 PM CST -----  Please call the patient and let her know that her ultrasound was negative for DVT (blood clot)

## 2022-12-12 ENCOUNTER — HOSPITAL ENCOUNTER (OUTPATIENT)
Dept: PHYSICAL THERAPY | Facility: REHABILITATION | Age: 87
Discharge: HOME | End: 2022-12-12
Payer: COMMERCIAL

## 2022-12-12 DIAGNOSIS — M43.16 SPONDYLOLISTHESIS OF LUMBAR REGION: ICD-10-CM

## 2022-12-12 DIAGNOSIS — M54.16 LUMBAR RADICULAR PAIN: Primary | ICD-10-CM

## 2022-12-12 PROCEDURE — 97750 PHYSICAL PERFORMANCE TEST: CPT | Mod: GP

## 2022-12-12 PROCEDURE — 97110 THERAPEUTIC EXERCISES: CPT | Mod: GP

## 2022-12-12 NOTE — ADDENDUM NOTE
Encounter addended by: Grady Ramirez, PT on: 12/12/2022 10:39 AM   Actions taken: Flowsheet accepted

## 2022-12-13 ENCOUNTER — RADIOLOGY INJECTION OFFICE VISIT (OUTPATIENT)
Dept: PHYSICAL MEDICINE AND REHAB | Facility: CLINIC | Age: 87
End: 2022-12-13
Attending: PHYSICIAN ASSISTANT
Payer: COMMERCIAL

## 2022-12-13 VITALS
TEMPERATURE: 97.7 F | DIASTOLIC BLOOD PRESSURE: 82 MMHG | HEART RATE: 112 BPM | OXYGEN SATURATION: 98 % | SYSTOLIC BLOOD PRESSURE: 142 MMHG | RESPIRATION RATE: 16 BRPM

## 2022-12-13 DIAGNOSIS — M54.16 LUMBAR RADICULAR PAIN: ICD-10-CM

## 2022-12-13 PROCEDURE — 64484 NJX AA&/STRD TFRM EPI L/S EA: CPT | Mod: RT | Performed by: PAIN MEDICINE

## 2022-12-13 PROCEDURE — 64483 NJX AA&/STRD TFRM EPI L/S 1: CPT | Mod: RT | Performed by: PAIN MEDICINE

## 2022-12-13 RX ORDER — DEXAMETHASONE SODIUM PHOSPHATE 10 MG/ML
INJECTION, SOLUTION INTRAMUSCULAR; INTRAVENOUS
Status: COMPLETED | OUTPATIENT
Start: 2022-12-13 | End: 2022-12-13

## 2022-12-13 RX ORDER — LIDOCAINE HYDROCHLORIDE 10 MG/ML
INJECTION, SOLUTION EPIDURAL; INFILTRATION; INTRACAUDAL; PERINEURAL
Status: COMPLETED | OUTPATIENT
Start: 2022-12-13 | End: 2022-12-13

## 2022-12-13 RX ADMIN — LIDOCAINE HYDROCHLORIDE 4 ML: 10 INJECTION, SOLUTION EPIDURAL; INFILTRATION; INTRACAUDAL; PERINEURAL at 14:26

## 2022-12-13 RX ADMIN — DEXAMETHASONE SODIUM PHOSPHATE 20 MG: 10 INJECTION, SOLUTION INTRAMUSCULAR; INTRAVENOUS at 14:27

## 2022-12-13 ASSESSMENT — PAIN SCALES - GENERAL
PAINLEVEL: MODERATE PAIN (5)
PAINLEVEL: MILD PAIN (3)

## 2022-12-13 NOTE — PATIENT INSTRUCTIONS
DISCHARGE INSTRUCTIONS    During office hours (8:00 a.m.- 4:00 p.m.) questions or concerns may be answered  by calling [facility] Navigation Nurses at  [phone].  Messages received after hours will be returned the following business day.      In the case of an emergency, please dial 911 or seek assistance at the nearest Emergency Room/Urgent Care facility.     All Patients:    You may experience an increase in your symptoms for the first 2 days (It may take anywhere between 2 days- 2 weeks for the steroid to have maximum effect).    You may use ice on the injection site, as frequently as 20 minutes each hour if needed.    You may take your pain medicine.    You may continue taking your regular medication after your injection. If you have had a Medial Branch Block you may resume pain medication once your pain diary is completed.    You may shower. No swimming, tub bath or hot tub for 48 hours.  You may remove your bandaid/bandage as soon as you are home.    You may resume light activities, as tolerated.    Resume your usual diet as tolerated.    It is strongly advised that you do not drive for 1-3 hours post injection.    If you have had oral sedation:  Do not drive for 8 hours post injection.      If you have had IV sedation:  Do not drive for 24 hours post injection.  Do not operate hazardous machinery or make important personal/business decisions for 24 hours.      POSSIBLE STEROID SIDE EFFECTS (If steroid/cortisone was used for your procedure)    -If you experience these symptoms, it should only last for a short period    Swelling of the legs              Skin redness (flushing)     Mouth (oral) irritation   Blood sugar (glucose) levels            Sweats                    Mood changes  Headache  Sleeplessness  Weakened immune system for up to 14 days, which could increase the risk of jeffry the COVID-19 virus and/or experiencing more severe symptoms of the disease, if exposed.  Decreased  effectiveness of the flu vaccine if given within 2 weeks of the steroid.         POSSIBLE PROCEDURE SIDE EFFECTS  -Call the Spine Center if you are concerned  Increased Pain           Increased numbness/tingling      Nausea/Vomiting          Bruising/bleeding at site      Redness or swelling                                              Difficulty walking      Weakness           Fever greater than 100.5    *In the event of a severe headache after an epidural steroid injection that is relieved by lying down, please call the [facility] to speak with a clinical staff member*

## 2022-12-16 ENCOUNTER — TELEPHONE (OUTPATIENT)
Dept: PHYSICAL MEDICINE AND REHAB | Facility: CLINIC | Age: 87
End: 2022-12-16

## 2022-12-16 NOTE — TELEPHONE ENCOUNTER
Call placed to pt with provider's response. Pt stated understanding and felt this was a good plan for her through the weekend. Confirmed appt on Tuesday for med refill. If she is doing significantly better on Monday and does not feel she needs appointment on Tuesday she will call to cancel and just keep her 1/2 appt.

## 2022-12-16 NOTE — TELEPHONE ENCOUNTER
"PSP:  Tiffanie LANGFORD  Last clinic visit:  12/8/22 OV; 12/13/22 Inj  Reason for call: Medication management  Clinical information:  Call received from pt. Pt inquiring if there is anything else she can try for medication management until she has her appointment with Tiffanie next week on 12/20. Pt had Right L4-5, L5-S1 TFESIs on 12/13. She reports the 1st day she was doing very well and then everything came back. She does note today she is \"slightly better\" but she is nervous about pain control going into the weekend. She has been trying to do her exercises as well  Pt states she has been taking 1/2 tablet Percocet for her severe pain, Tizanidine 2 mg 3 times daily, Tylenol, and gabapentin 300 mg 3 times daily.   She reports she has 1 1/2 tablets of Percocet left. She is aware that appointment is needed for refill of this. \"What about something like Tramadol that's not as strong?\" Explained that this would be a controlled substance as well and would need appt.   Advice given to patient: Informed pt that will update Tiffanie to see if any further medication recommendations at this time. She will be called with a response.   Provider to address: Medication management        "

## 2022-12-16 NOTE — TELEPHONE ENCOUNTER
We can try increasing gabapentin to 600 mg tid.  She  Can also add ibuprofen up to 600 mg tid prn.

## 2022-12-16 NOTE — PROGRESS NOTES
Ramila Liao is a 87 year old female who is being evaluated via a billable video visit.      How would you like to obtain your AVS? MyChart  Will anyone else be joining your video visit? No      Video Start Time: 9:50 AM  Assessment:   Ramila Liao is a 87 year old y.o. female with past medical history significant for hyperlipidemia who presents today for follow-up regarding chronic low back pain and right lower extremity pain.  My review of an MRI lumbar spine shows grade 1 spondylolisthesis at L5-S1 where there is moderate bilateral foraminal stenosis.  There is also some lateral recess stenosis on the right at L4-5 and a broad-based right paracentral disc bulge at L3-4 which contacts the right L4 nerve root in the lateral recess.Patient is status post right L4-5 and L5-S1 transforaminal epidural steroid injections December 13, 2022 (1 week ago) which has not provided any relief of her pain.  She continues to have severe pain at night that interrupts her sleep.  Think she may be more symptomatic from piriformis syndrome.  She did have hypertonicity right piriformis muscle on her last exam.  She states her physical therapist has been massaging her upper buttock muscles which has been somewhat helpful.           Plan:     A shared decision making plan was used.  The patient's values and choices were respected.  The following represents what was discussed and decided upon by the physician assistant and the patient.      1.  DIAGNOSTIC TESTS:    -I reviewed the MRI lumbar spine.  - I reviewed the ultrasound right lower extremity which was negative for DVT.    2.  PHYSICAL THERAPY: Patient is currently in physical therapy.  She has had 3 session so far.  Encouraged to continue with physical therapy and the home exercises..    3.  MEDICATIONS:  -Tramadol and hydrocodone did not provide adequate relief of her pain.  -I prescribed oxycodone/acetaminophen 5/325 mg 1 tab at bedtime as needed, #14 with no  refills.  Risks reviewed.  I reviewed the [place] prescription monitoring database.  I will not provide this medication long-term.  I will not provide telephone refills.  -Patient will continue gabapentin 600 mg 3 times daily.  -Patient can continue tizanidine 2 mg 3 times daily as needed.  -Patient can continue ibuprofen and Tylenol as needed.    4.  INTERVENTIONS: I recommended a right piriformis muscle trigger point injection under ultrasound guidance.  Patient indicated she would like to proceed and an order was placed.      5.  PATIENT EDUCATION: Patient is in agreement the above plan.  All questions were answered.  - If the piriformis muscle trigger point injection does not help, I recommended referral to neurosurgery.    6.  FOLLOW-UP: Patient can follow-up with me January 2 as scheduled in case she needs a medication refill.  Otherwise, she will follow-up with me 2 weeks after her right piriformis muscle trigger point injection.  If she has questions or concerns, she should not hesitate to call.    Subjective:     Ramila Liao is a 87 year old female who presents today for follow-up regarding chronic low back pain right lower extremity pain.  Patient is status post right L4-5, L5-S1 transforaminal epidural steroid injections December 13, 2022 (1 week ago).  Patient reports no improvement in her pain.  She continues to have severe pain at night.      Patient complains of of pain which begins in the right upper buttock.  Pain radiates down the lateral thigh into the lateral calf.  Pain is severe at night.  She only sleeps for about 3 hours before the pain wakes her up.  She has to sleep on the couch because of the pain.  She has numbness and tingling in her right foot.  She feels weak in the right leg.  She continues to have right lower extremity swelling.  Her physical therapist recommended compression stockings which she plans to purchase.  She rates her pain today as a 2 out of 10.  At night it is a  9 out of 10.  At its best it is a 2 out of 10.  She states during the day her pain is manageable but at night it is severe.  She denies any new symptoms since she was last seen.    Patient previously went to physical therapy at [place].  She is now in physical therapy through [facility].  She has had 3 session so far.  She does her home exercises.  She takes oxycodone/acetaminophen one half tab at bedtime as needed which is helpful.  She takes gabapentin 600 mg 3 times daily and tizanidine as needed which are somewhat helpful.    Review of Systems:  Positive for numbness/tingling, weakness, pain much worse at night.  Negative for loss of bowel/bladder control, inability to urinate, headache, trip/stumble/falls, difficulty swallowing, difficulty with hand skills, fevers, unintentional weight loss.     Objective:   CONSTITUTIONAL:  Vital signs as above.  No acute distress.  The patient is well nourished and well groomed.    PSYCHIATRIC:  The patient is awake, alert, oriented to person, place and time.  The patient is answering questions appropriately with clear speech.  Normal affect.  HEENT: Normocephalic, atraumatic.  Sclera clear.    SKIN: Exposed skin is clean, dry, intact without rashes.  MUSCULOSKELETAL: Patient is observed sitting comfortably.     RESULTS: I reviewed the MRI lumbar spine from [facility] dated November 7, 2022.  At L5-S1 there is degenerative spondylolisthesis related to severe facet arthropathy.  There is moderate bilateral foraminal stenosis at this level.  At L4-5 there is mild to moderate right foraminal stenosis.  At L3-4 there is mild lateral recess stenosis related to a broad-based right paracentral disc protrusion which contacts the right L4 nerve root in the lateral recess.  There is also mild foraminal stenosis.        Video-Visit Details    Type of service:  Video Visit    Video End Time (time video stopped): 9:50 AM  Originating Location (pt. Location): Home  Provider location:  [facility] [address] [place] [address]    Distant Location (provider location):  [facility] [place]     Platform used for Video Visit: Yohan

## 2022-12-19 ENCOUNTER — HOSPITAL ENCOUNTER (OUTPATIENT)
Dept: PHYSICAL THERAPY | Facility: REHABILITATION | Age: 87
Discharge: HOME | End: 2022-12-19
Payer: COMMERCIAL

## 2022-12-19 DIAGNOSIS — M54.16 LUMBAR RADICULAR PAIN: Primary | ICD-10-CM

## 2022-12-19 DIAGNOSIS — M43.16 SPONDYLOLISTHESIS OF LUMBAR REGION: ICD-10-CM

## 2022-12-19 PROCEDURE — 97140 MANUAL THERAPY 1/> REGIONS: CPT | Mod: GP

## 2022-12-19 PROCEDURE — 97110 THERAPEUTIC EXERCISES: CPT | Mod: GP

## 2022-12-20 ENCOUNTER — TELEPHONE (OUTPATIENT)
Dept: PHYSICAL MEDICINE AND REHAB | Facility: CLINIC | Age: 87
End: 2022-12-20

## 2022-12-20 ENCOUNTER — VIRTUAL VISIT (OUTPATIENT)
Dept: PHYSICAL MEDICINE AND REHAB | Facility: CLINIC | Age: 87
End: 2022-12-20
Payer: COMMERCIAL

## 2022-12-20 DIAGNOSIS — G57.01 PIRIFORMIS SYNDROME, RIGHT: Primary | ICD-10-CM

## 2022-12-20 DIAGNOSIS — M79.18 MYALGIA, OTHER SITE: ICD-10-CM

## 2022-12-20 DIAGNOSIS — M54.16 LUMBAR RADICULAR PAIN: ICD-10-CM

## 2022-12-20 DIAGNOSIS — M43.16 SPONDYLOLISTHESIS OF LUMBAR REGION: ICD-10-CM

## 2022-12-20 PROCEDURE — 99214 OFFICE O/P EST MOD 30 MIN: CPT | Mod: 95 | Performed by: PHYSICIAN ASSISTANT

## 2022-12-20 RX ORDER — OXYCODONE AND ACETAMINOPHEN 5; 325 MG/1; MG/1
1 TABLET ORAL
Qty: 14 TABLET | Refills: 0 | Status: SHIPPED | OUTPATIENT
Start: 2022-12-20 | End: 2023-01-02

## 2022-12-20 NOTE — LETTER
12/20/2022         RE: Ramila Liao  [address]  [facility] [address]        Dear Colleague,    Thank you for referring your patient, Ramila Liao, to the [facility]. Please see a copy of my visit note below.    Ramila Liao is a 87 year old female who is being evaluated via a billable video visit.      How would you like to obtain your AVS? MyChart  Will anyone else be joining your video visit? No      Video Start Time: 9:50 AM  Assessment:   Ramila Liao is a 87 year old y.o. female with past medical history significant for hyperlipidemia who presents today for follow-up regarding chronic low back pain and right lower extremity pain.  My review of an MRI lumbar spine shows grade 1 spondylolisthesis at L5-S1 where there is moderate bilateral foraminal stenosis.  There is also some lateral recess stenosis on the right at L4-5 and a broad-based right paracentral disc bulge at L3-4 which contacts the right L4 nerve root in the lateral recess.Patient is status post right L4-5 and L5-S1 transforaminal epidural steroid injections December 13, 2022 (1 week ago) which has not provided any relief of her pain.  She continues to have severe pain at night that interrupts her sleep.  Think she may be more symptomatic from piriformis syndrome.  She did have hypertonicity right piriformis muscle on her last exam.  She states her physical therapist has been massaging her upper buttock muscles which has been somewhat helpful.           Plan:     A shared decision making plan was used.  The patient's values and choices were respected.  The following represents what was discussed and decided upon by the physician assistant and the patient.      1.  DIAGNOSTIC TESTS:    -I reviewed the MRI lumbar spine.  - I reviewed the ultrasound right lower extremity which was negative for DVT.    2.  PHYSICAL THERAPY: Patient is currently in physical therapy.  She has had 3 session  so far.  Encouraged to continue with physical therapy and the home exercises..    3.  MEDICATIONS:  -Tramadol and hydrocodone did not provide adequate relief of her pain.  -I prescribed oxycodone/acetaminophen 5/325 mg 1 tab at bedtime as needed, #14 with no refills.  Risks reviewed.  I reviewed the [place] prescription monitoring database.  I will not provide this medication long-term.  I will not provide telephone refills.  -Patient will continue gabapentin 600 mg 3 times daily.  -Patient can continue tizanidine 2 mg 3 times daily as needed.  -Patient can continue ibuprofen and Tylenol as needed.    4.  INTERVENTIONS: I recommended a right piriformis muscle trigger point injection under ultrasound guidance.  Patient indicated she would like to proceed and an order was placed.      5.  PATIENT EDUCATION: Patient is in agreement the above plan.  All questions were answered.  - If the piriformis muscle trigger point injection does not help, I recommended referral to neurosurgery.    6.  FOLLOW-UP: Patient can follow-up with me January 2 as scheduled in case she needs a medication refill.  Otherwise, she will follow-up with me 2 weeks after her right piriformis muscle trigger point injection.  If she has questions or concerns, she should not hesitate to call.    Subjective:     Ramila Liao is a 87 year old female who presents today for follow-up regarding chronic low back pain right lower extremity pain.  Patient is status post right L4-5, L5-S1 transforaminal epidural steroid injections December 13, 2022 (1 week ago).  Patient reports no improvement in her pain.  She continues to have severe pain at night.      Patient complains of of pain which begins in the right upper buttock.  Pain radiates down the lateral thigh into the lateral calf.  Pain is severe at night.  She only sleeps for about 3 hours before the pain wakes her up.  She has to sleep on the couch because of the pain.  She has numbness and  tingling in her right foot.  She feels weak in the right leg.  She continues to have right lower extremity swelling.  Her physical therapist recommended compression stockings which she plans to purchase.  She rates her pain today as a 2 out of 10.  At night it is a 9 out of 10.  At its best it is a 2 out of 10.  She states during the day her pain is manageable but at night it is severe.  She denies any new symptoms since she was last seen.    Patient previously went to physical therapy at [place].  She is now in physical therapy through [facility].  She has had 3 session so far.  She does her home exercises.  She takes oxycodone/acetaminophen one half tab at bedtime as needed which is helpful.  She takes gabapentin 600 mg 3 times daily and tizanidine as needed which are somewhat helpful.    Review of Systems:  Positive for numbness/tingling, weakness, pain much worse at night.  Negative for loss of bowel/bladder control, inability to urinate, headache, trip/stumble/falls, difficulty swallowing, difficulty with hand skills, fevers, unintentional weight loss.     Objective:   CONSTITUTIONAL:  Vital signs as above.  No acute distress.  The patient is well nourished and well groomed.    PSYCHIATRIC:  The patient is awake, alert, oriented to person, place and time.  The patient is answering questions appropriately with clear speech.  Normal affect.  HEENT: Normocephalic, atraumatic.  Sclera clear.    SKIN: Exposed skin is clean, dry, intact without rashes.  MUSCULOSKELETAL: Patient is observed sitting comfortably.     RESULTS: I reviewed the MRI lumbar spine from [facility] dated November 7, 2022.  At L5-S1 there is degenerative spondylolisthesis related to severe facet arthropathy.  There is moderate bilateral foraminal stenosis at this level.  At L4-5 there is mild to moderate right foraminal stenosis.  At L3-4 there is mild lateral recess stenosis related to a broad-based right paracentral disc protrusion which  contacts the right L4 nerve root in the lateral recess.  There is also mild foraminal stenosis.        Video-Visit Details    Type of service:  Video Visit    Video End Time (time video stopped): 9:50 AM  Originating Location (pt. Location): Home  Provider location: [facility] [address] [facility] [address] [place] [address]    Distant Location (provider location):  [facility]     Platform used for Video Visit: Doximity        Again, thank you for allowing me to participate in the care of your patient.        Sincerely,        Tiffanie Quan PA-C

## 2022-12-20 NOTE — TELEPHONE ENCOUNTER
[facility] Call Center    Phone Message    May a detailed message be left on voicemail: no     Reason for Call: Other: Patient having trouble bringing up video appt-would like c/b to help with this

## 2022-12-20 NOTE — PATIENT INSTRUCTIONS
A right piriformis muscle trigger point injection been ordered today.      Please note that this injection uses cortisone.  The cortisone may somewhat weaken the immune system.  It is unknown how much the immune system is weakened.  It is unknown if it is weakened to the point that you may be more likely to get the COVID-19 virus, or if you do get the COVID-19 virus, if you would be sicker than you would have been if you had not had the cortisone injection.  If you do not wish to proceed with the injection, please let the nurse/physician know and do NOT schedule the injection.    Please note that since your immune system is weakened from the cortisone, having any vaccine/shot may be less effective if you have this vaccine within 2 weeks from your cortisone injection.  It is advised to wait 2 weeks after your cortisone injection to have any vaccine (or if you have a vaccine first, wait 2 weeks before you have the cortisone injection).    Please schedule this injection at least 1 week  from now to allow time for insurance prior authorization.  On the day of your injection, you cannot be sick or taking antibiotics.  If you become sick and are prescribed, please call the clinic so your injection can be rescheduled for once you have completed your antibiotics.  You will need to bring a  with you for your injection.   If you have any questions or concerns prior to your injection, please do not hesitate to call the nurse navigation line at [phone] or contact Tiffanie Quan through [employer].    Oxycodone/acetaminophen was prescribed today. Please lock this medication up when you are not taking it. Do not share this medication with other people. Do not increase the dose without permission from your physician. Do not drink alcohol while you take this medication as this can lead to death. Do not take other pain medications without approval from your physician or this can also lead to death. If you need a refill of this  medication, you must come in to clinic by appointment. Please call if you have any questions on how to take this medication.

## 2022-12-26 ENCOUNTER — HOSPITAL ENCOUNTER (OUTPATIENT)
Dept: PHYSICAL THERAPY | Facility: REHABILITATION | Age: 87
Discharge: HOME | End: 2022-12-26
Payer: COMMERCIAL

## 2022-12-26 DIAGNOSIS — M54.16 LUMBAR RADICULAR PAIN: Primary | ICD-10-CM

## 2022-12-26 PROCEDURE — 97110 THERAPEUTIC EXERCISES: CPT | Mod: GP

## 2022-12-26 PROCEDURE — 97116 GAIT TRAINING THERAPY: CPT | Mod: GP

## 2022-12-26 PROCEDURE — 97140 MANUAL THERAPY 1/> REGIONS: CPT | Mod: GP

## 2022-12-29 NOTE — PROGRESS NOTES
Assessment:   Ramila Liao is a 87 year old y.o. female with past medical history significant for hyperlipidemia who presents today for follow-up regarding chronic low back pain and right lower extremity pain.  My review of an MRI lumbar spine shows grade 1 spondylolisthesis at L5-S1 where there is moderate bilateral foraminal stenosis.  There is also some lateral recess stenosis on the right at L4-5 and a broad-based right paracentral disc bulge at L3-4 which contacts the right L4 nerve root in the lateral recess.  -Patient is status post right L4-5 and L5-S1 transforaminal epidural steroid injections December 13, 2022 did not not provided any relief of her pain.             Plan:     A shared decision making plan was used.  The patient's values and choices were respected.  The following represents what was discussed and decided upon by the physician assistant and the patient.      1.  DIAGNOSTIC TESTS:    -I reviewed the MRI lumbar spine.  - I reviewed the ultrasound right lower extremity which was negative for DVT.  - If the right piriformis muscle trigger point injection does not help, would recommend an MRI pelvis.    2.  PHYSICAL THERAPY: Patient is currently in physical therapy.  She has had 5 session so far.  Encouraged to continue with physical therapy and the home exercises.  -Physical therapy has recommended a 4 wheeled walker.  I entered the order for this today.    3.  MEDICATIONS:  -Tramadol and hydrocodone did not provide adequate relief of her pain.  -I refilled oxycodone/acetaminophen 5/325 mg 1 tab at bedtime as needed, #14 with no refills.  Risks reviewed.  I reviewed the [place] prescription monitoring database.  I will not provide this medication long-term.  I will not provide telephone refills.  -Patient has been taking gabapentin 300 mg 3 times daily.  She does not know if this is doing anything for her pain.  I am concerned it is contributing to worsening lower extremity edema.  I  recommended that for now she stopped taking this medication during the day.  Ideally she would also stop taking medication at night as well but that is when her pain is most severe so she is going to try continuing it at night for now.  If she has relief with a right piriformis muscle trigger point injection I recommend she discontinue the gabapentin completely.  -Patient can continue tizanidine 2 mg 3 times daily as needed.  -Patient could continue ibuprofen and Tylenol as needed.  She does not feel that the over-the-counter pain relievers have been very helpful.    4.  INTERVENTIONS: Patient is going to proceed with right piriformis muscle trigger point injection under ultrasound guidance at this afternoon.      5.  PATIENT EDUCATION: Patient is in agreement the above plan.  All questions were answered.  -I recommended that the patient follow-up with her primary care provider regarding her lower extremity edema as well, to rule out other potential causes for leg swelling.    6.  FOLLOW-UP: Patient will follow up with me in 2 weeks.  If she has questions or concerns in the meantime, she should not hesitate to call.  Subjective:     Ramila Liao is a 87 year old female who presents today for follow-up regarding chronic low back pain right lower extremity pain.  Patient is status post right L4-5, L5-S1 transforaminal epidural steroid injections December 13, 2022.  These injections did not provide any relief of her pain.    Patient complains of chronic bilateral low back pain.  She states her back is stiff when she first gets up.  However, the pain that she is most concerned about begins in the right buttock, radiates down the right lateral thigh, down the right lateral calf to the right lateral ankle.  She denies numbness, tingling, weakness.  She is concerned about edema in the bilateral lower extremities.  She has some edema at baseline but feels it is getting worse, especially on the right.  She tried  wearing compression socks but they made her toes feel uncomfortable so she stopped using them.  She rates her pain today as a 4 out of 10.  At its worst it is an 8 of 10.  At its best it is a 3 out of 10.  Pain is worse at night when she is laying down and trying to sleep.  She has to sleep on the couch with her buttock against the back of the couch for relief.  She states her pain is manageable during the day.    Patient previously went to physical therapy at [place].  She is now in physical therapy through [facility].  She has had 5 session so far.  She does her home exercises.  She takes oxycodone/acetaminophen one half to 1 tab at bedtime as needed which is helpful.  She takes gabapentin 300 mg 3 times daily but she is not sure if this is helping.  She takes tizanidine as needed which is helpful.    Review of Systems:  Negative for numbness/tingling, weakness, loss of bowel/bladder control, inability to urinate, headache, pain much worse at night, trip/middle/falls, difficulty swallowing, difficulty with hand skills, fevers, unintentional weight loss.     Objective:   CONSTITUTIONAL:  Vital signs as above.  No acute distress.  The patient is well nourished and well groomed.    PSYCHIATRIC:  The patient is awake, alert, oriented to person, place and time.  The patient is answering questions appropriately with clear speech.  Normal affect.  HEENT: Normocephalic, atraumatic.  Sclera clear.    SKIN:  Skin over the face, posterior torso, bilateral upper and lower extremities is clean, dry, intact without rashes.  VASCULAR: Bilateral lower extremity edema, worse on the right than the left.  MUSCULOSKELETAL:  Gait is  antalgic, favoring the right.    She uses a cane for assistance. The patient has 5/5 strength bilateral hip flexors, knee flexors/extensors, ankle dorsi/plantar flexors, EHL.    NEUROLOGICAL: 2+ patellar and 1+ Achilles reflexes bilaterally.  Negative Babinski's bilaterally.  No ankle clonus  bilaterally.  Diminished sensation toes of bilateral feet.     RESULTS: I reviewed the MRI lumbar spine from [facility] dated November 7, 2022.  At L5-S1 there is degenerative spondylolisthesis related to severe facet arthropathy.  There is moderate bilateral foraminal stenosis at this level.  At L4-5 there is mild to moderate right foraminal stenosis.  At L3-4 there is mild lateral recess stenosis related to a broad-based right paracentral disc protrusion which contacts the right L4 nerve root in the lateral recess.  There is also mild foraminal stenosis.

## 2023-01-02 ENCOUNTER — RADIOLOGY INJECTION OFFICE VISIT (OUTPATIENT)
Dept: PHYSICAL MEDICINE AND REHAB | Facility: CLINIC | Age: 88
End: 2023-01-02
Attending: PHYSICIAN ASSISTANT
Payer: COMMERCIAL

## 2023-01-02 ENCOUNTER — HOSPITAL ENCOUNTER (OUTPATIENT)
Dept: PHYSICAL THERAPY | Facility: REHABILITATION | Age: 88
Discharge: HOME | End: 2023-01-02
Payer: COMMERCIAL

## 2023-01-02 ENCOUNTER — OFFICE VISIT (OUTPATIENT)
Dept: PHYSICAL MEDICINE AND REHAB | Facility: CLINIC | Age: 88
End: 2023-01-02
Payer: COMMERCIAL

## 2023-01-02 VITALS — DIASTOLIC BLOOD PRESSURE: 89 MMHG | HEART RATE: 95 BPM | TEMPERATURE: 98 F | SYSTOLIC BLOOD PRESSURE: 180 MMHG

## 2023-01-02 VITALS — TEMPERATURE: 97.9 F

## 2023-01-02 DIAGNOSIS — M79.18 MYALGIA, OTHER SITE: ICD-10-CM

## 2023-01-02 DIAGNOSIS — R26.89 IMPAIRED GAIT AND MOBILITY: ICD-10-CM

## 2023-01-02 DIAGNOSIS — M43.16 SPONDYLOLISTHESIS OF LUMBAR REGION: ICD-10-CM

## 2023-01-02 DIAGNOSIS — M54.16 LUMBAR RADICULAR PAIN: Primary | ICD-10-CM

## 2023-01-02 DIAGNOSIS — G57.01 PIRIFORMIS SYNDROME, RIGHT: ICD-10-CM

## 2023-01-02 DIAGNOSIS — G57.01 PIRIFORMIS SYNDROME, RIGHT: Primary | ICD-10-CM

## 2023-01-02 PROCEDURE — 97535 SELF CARE MNGMENT TRAINING: CPT | Mod: GP

## 2023-01-02 PROCEDURE — 20552 NJX 1/MLT TRIGGER POINT 1/2: CPT | Performed by: PAIN MEDICINE

## 2023-01-02 PROCEDURE — 99214 OFFICE O/P EST MOD 30 MIN: CPT | Performed by: PHYSICIAN ASSISTANT

## 2023-01-02 PROCEDURE — 97110 THERAPEUTIC EXERCISES: CPT | Mod: GP

## 2023-01-02 PROCEDURE — 76942 ECHO GUIDE FOR BIOPSY: CPT | Performed by: PAIN MEDICINE

## 2023-01-02 RX ORDER — LIDOCAINE HYDROCHLORIDE 10 MG/ML
INJECTION, SOLUTION EPIDURAL; INFILTRATION; INTRACAUDAL; PERINEURAL
Status: COMPLETED | OUTPATIENT
Start: 2023-01-02 | End: 2023-01-02

## 2023-01-02 RX ORDER — METHYLPREDNISOLONE ACETATE 40 MG/ML
INJECTION, SUSPENSION INTRA-ARTICULAR; INTRALESIONAL; INTRAMUSCULAR; SOFT TISSUE
Status: COMPLETED | OUTPATIENT
Start: 2023-01-02 | End: 2023-01-02

## 2023-01-02 RX ORDER — OXYCODONE AND ACETAMINOPHEN 5; 325 MG/1; MG/1
1 TABLET ORAL
Qty: 14 TABLET | Refills: 0 | Status: SHIPPED | OUTPATIENT
Start: 2023-01-02 | End: 2023-02-14

## 2023-01-02 RX ADMIN — METHYLPREDNISOLONE ACETATE 20 MG: 40 INJECTION, SUSPENSION INTRA-ARTICULAR; INTRALESIONAL; INTRAMUSCULAR; SOFT TISSUE at 13:10

## 2023-01-02 RX ADMIN — LIDOCAINE HYDROCHLORIDE 2 ML: 10 INJECTION, SOLUTION EPIDURAL; INFILTRATION; INTRACAUDAL; PERINEURAL at 13:09

## 2023-01-02 ASSESSMENT — PAIN SCALES - GENERAL
PAINLEVEL: MODERATE PAIN (4)
PAINLEVEL: MILD PAIN (3)

## 2023-01-02 NOTE — PATIENT INSTRUCTIONS
Stop gabapentin during the day.    You can try stopping it at bedtime as well as long as pain is under control.    Oxycodone/acetaminophen was prescribed today. Please lock this medication up when you are not taking it. Do not share this medication with other people. Do not increase the dose without permission from your physician. Do not drink alcohol while you take this medication as this can lead to death. Do not take other pain medications without approval from your physician or this can also lead to death. If you need a refill of this medication, you must come in to clinic by appointment. Please call if you have any questions on how to take this medication.

## 2023-01-02 NOTE — LETTER
1/2/2023         RE: Ramila Liao  [address]  [facility] [address]        Dear Colleague,    Thank you for referring your patient, Ramila Liao, to the [facility]. Please see a copy of my visit note below.      Assessment:   Ramila Liao is a 87 year old y.o. female with past medical history significant for hyperlipidemia who presents today for follow-up regarding chronic low back pain and right lower extremity pain.  My review of an MRI lumbar spine shows grade 1 spondylolisthesis at L5-S1 where there is moderate bilateral foraminal stenosis.  There is also some lateral recess stenosis on the right at L4-5 and a broad-based right paracentral disc bulge at L3-4 which contacts the right L4 nerve root in the lateral recess.  -Patient is status post right L4-5 and L5-S1 transforaminal epidural steroid injections December 13, 2022 did not not provided any relief of her pain.             Plan:     A shared decision making plan was used.  The patient's values and choices were respected.  The following represents what was discussed and decided upon by the physician assistant and the patient.      1.  DIAGNOSTIC TESTS:    -I reviewed the MRI lumbar spine.  - I reviewed the ultrasound right lower extremity which was negative for DVT.  - If the right piriformis muscle trigger point injection does not help, would recommend an MRI pelvis.    2.  PHYSICAL THERAPY: Patient is currently in physical therapy.  She has had 5 session so far.  Encouraged to continue with physical therapy and the home exercises.  -Physical therapy has recommended a 4 wheeled walker.  I entered the order for this today.    3.  MEDICATIONS:  -Tramadol and hydrocodone did not provide adequate relief of her pain.  -I refilled oxycodone/acetaminophen 5/325 mg 1 tab at bedtime as needed, #14 with no refills.  Risks reviewed.  I reviewed the [place] prescription monitoring database.  I will not  provide this medication long-term.  I will not provide telephone refills.  -Patient has been taking gabapentin 300 mg 3 times daily.  She does not know if this is doing anything for her pain.  I am concerned it is contributing to worsening lower extremity edema.  I recommended that for now she stopped taking this medication during the day.  Ideally she would also stop taking medication at night as well but that is when her pain is most severe so she is going to try continuing it at night for now.  If she has relief with a right piriformis muscle trigger point injection I recommend she discontinue the gabapentin completely.  -Patient can continue tizanidine 2 mg 3 times daily as needed.  -Patient could continue ibuprofen and Tylenol as needed.  She does not feel that the over-the-counter pain relievers have been very helpful.    4.  INTERVENTIONS: Patient is going to proceed with right piriformis muscle trigger point injection under ultrasound guidance at this afternoon.      5.  PATIENT EDUCATION: Patient is in agreement the above plan.  All questions were answered.    6.  FOLLOW-UP: Patient will follow up with me in 2 weeks.  If she has questions or concerns in the meantime, she should not hesitate to call.  Subjective:     Ramila Liao is a 87 year old female who presents today for follow-up regarding chronic low back pain right lower extremity pain.  Patient is status post right L4-5, L5-S1 transforaminal epidural steroid injections December 13, 2022.  These injections did not provide any relief of her pain.    Patient complains of chronic bilateral low back pain.  She states her back is stiff when she first gets up.  However, the pain that she is most concerned about begins in the right buttock, radiates down the right lateral thigh, down the right lateral calf to the right lateral ankle.  She denies numbness, tingling, weakness.  She is concerned about edema in the bilateral lower extremities.  She has  some edema at baseline but feels it is getting worse, especially on the right.  She tried wearing compression socks but they made her toes feel uncomfortable so she stopped using them.  She rates her pain today as a 4 out of 10.  At its worst it is an 8 of 10.  At its best it is a 3 out of 10.  Pain is worse at night when she is laying down and trying to sleep.  She has to sleep on the couch with her buttock against the back of the couch for relief.  She states her pain is manageable during the day.    Patient previously went to physical therapy at [place].  She is now in physical therapy through [facility].  She has had 5 session so far.  She does her home exercises.  She takes oxycodone/acetaminophen one half to 1 tab at bedtime as needed which is helpful.  She takes gabapentin 300 mg 3 times daily but she is not sure if this is helping.  She takes tizanidine as needed which is helpful.    Review of Systems:  Negative for numbness/tingling, weakness, loss of bowel/bladder control, inability to urinate, headache, pain much worse at night, trip/middle/falls, difficulty swallowing, difficulty with hand skills, fevers, unintentional weight loss.     Objective:   CONSTITUTIONAL:  Vital signs as above.  No acute distress.  The patient is well nourished and well groomed.    PSYCHIATRIC:  The patient is awake, alert, oriented to person, place and time.  The patient is answering questions appropriately with clear speech.  Normal affect.  HEENT: Normocephalic, atraumatic.  Sclera clear.    SKIN:  Skin over the face, posterior torso, bilateral upper and lower extremities is clean, dry, intact without rashes.  VASCULAR: Bilateral lower extremity edema, worse on the right than the left.  MUSCULOSKELETAL:  Gait is  antalgic, favoring the right.    She uses a cane for assistance. The patient has 5/5 strength bilateral hip flexors, knee flexors/extensors, ankle dorsi/plantar flexors, EHL.    NEUROLOGICAL: 2+ patellar and 1+  Achilles reflexes bilaterally.  Negative Babinski's bilaterally.  No ankle clonus bilaterally.  Diminished sensation toes of bilateral feet.     RESULTS: I reviewed the MRI lumbar spine from [facility] dated November 7, 2022.  At L5-S1 there is degenerative spondylolisthesis related to severe facet arthropathy.  There is moderate bilateral foraminal stenosis at this level.  At L4-5 there is mild to moderate right foraminal stenosis.  At L3-4 there is mild lateral recess stenosis related to a broad-based right paracentral disc protrusion which contacts the right L4 nerve root in the lateral recess.  There is also mild foraminal stenosis.            Again, thank you for allowing me to participate in the care of your patient.        Sincerely,        Tiffanie Quan PA-C

## 2023-01-02 NOTE — PATIENT INSTRUCTIONS
DISCHARGE INSTRUCTIONS    During office hours (8:00 a.m.- 4:00 p.m.) questions or concerns may be answered  by calling [facility] Navigation Nurses at  [phone].  Messages received after hours will be returned the following business day.      In the case of an emergency, please dial 911 or seek assistance at the nearest Emergency Room/Urgent Care facility.     All Patients:    You may experience an increase in your symptoms for the first 2 days (It may take anywhere between 2 days- 2 weeks for the steroid to have maximum effect).    You may use ice on the injection site, as frequently as 20 minutes each hour if needed.    You may take your pain medicine.    You may continue taking your regular medication after your injection. If you have had a Medial Branch Block you may resume pain medication once your pain diary is completed.    You may shower. No swimming, tub bath or hot tub for 48 hours.  You may remove your bandaid/bandage as soon as you are home.    You may resume light activities, as tolerated.    Resume your usual diet as tolerated.    POSSIBLE STEROID SIDE EFFECTS (If steroid/cortisone was used for your procedure)    -If you experience these symptoms, it should only last for a short period    Swelling of the legs              Skin redness (flushing)     Mouth (oral) irritation   Blood sugar (glucose) levels            Sweats                    Mood changes  Headache  Sleeplessness  Weakened immune system for up to 14 days, which could increase the risk of jeffry the COVID-19 virus and/or experiencing more severe symptoms of the disease, if exposed.  Decreased effectiveness of the flu vaccine if given within 2 weeks of the steroid.         POSSIBLE PROCEDURE SIDE EFFECTS  -Call the Spine Center if you are concerned  Increased Pain           Increased numbness/tingling      Nausea/Vomiting          Bruising/bleeding at site      Redness or swelling                                               Difficulty walking      Weakness           Fever greater than 100.5    *In the event of a severe headache after an epidural steroid injection that is relieved by lying down, please call the [facility] to speak with a clinical staff member*

## 2023-01-09 ENCOUNTER — NURSE TRIAGE (OUTPATIENT)
Dept: NURSING | Facility: CLINIC | Age: 88
End: 2023-01-09

## 2023-01-09 DIAGNOSIS — M79.18 RIGHT BUTTOCK PAIN: Primary | ICD-10-CM

## 2023-01-09 NOTE — TELEPHONE ENCOUNTER
"Nurse Triage SBAR    Situation:   -Swollen foot    Background:   -Patient calling, It is okay to leave a detailed message at this number.   -She had hip injections last week    Assessment:   -She has right leg pain that starts in her hip pain since October (was seen in the ED)  -She has foot swelling for the past month  -Now has bilateral swelling, but the right is still worse (all swelling is below the knee)  -Both feet are the same color and same temp  -Hip dose not hurt as much during the day, the pain worse at night - this affects her sleeping  -The pain medications were not helping so she is not taking them - she thinks she has \"too much in he system\"  -She decreased how often she taking her gabapentin as she was told it increased the swelling(only has take a few time)  -She thinks her BP elevated due to the stress of calling in (her call got disconnected), she dose not check her BP at home and dose not have a cuff    -She feels like she cant wait for her appointment (in one week) due to the pain/swelling and she thinks she may need surgery    Recommendation:   Go To ED/UC Now (Or To Office With PCP Approval)    Care team: Please call patient back at [phone] and advise, are you able to see her in clinic or dose she need to go to UC? Do you have any other recommendations? If possible pleas provide patient the phone number to your specialty  Clinic.     ABDULLAHI SANTIAGO RN on 1/9/2023 at 11:50 AM    Reason for Disposition    Thigh or calf pain and only 1 side and present > 1 hour    Additional Information    Negative: Sounds like a life-threatening emergency to the triager    Negative: Chest pain    Negative: Small area of swelling and followed an insect bite to the area    Negative: Followed a knee injury    Negative: Ankle or foot injury    Negative: Pregnant with leg swelling or edema    Negative: Difficulty breathing at rest    Negative: Entire foot is cool or blue in comparison to other side    " Negative: SEVERE swelling (e.g., swelling extends above knee, entire leg is swollen, weeping fluid)    Protocols used: LEG SWELLING AND EDEMA-A-OH

## 2023-01-11 ENCOUNTER — HOSPITAL ENCOUNTER (OUTPATIENT)
Dept: MRI IMAGING | Facility: HOSPITAL | Age: 88
Discharge: HOME | End: 2023-01-11
Attending: PHYSICIAN ASSISTANT | Admitting: PHYSICIAN ASSISTANT
Payer: COMMERCIAL

## 2023-01-11 DIAGNOSIS — M79.18 RIGHT BUTTOCK PAIN: ICD-10-CM

## 2023-01-11 PROCEDURE — 72195 MRI PELVIS W/O DYE: CPT

## 2023-01-12 ENCOUNTER — TELEPHONE (OUTPATIENT)
Dept: PHYSICAL MEDICINE AND REHAB | Facility: CLINIC | Age: 88
End: 2023-01-12

## 2023-01-12 DIAGNOSIS — M54.16 LUMBAR RADICULAR PAIN: ICD-10-CM

## 2023-01-12 RX ORDER — GABAPENTIN 300 MG/1
300 CAPSULE ORAL AT BEDTIME
Qty: 30 CAPSULE | Refills: 1 | Status: SHIPPED | OUTPATIENT
Start: 2023-01-12 | End: 2023-02-06

## 2023-01-12 NOTE — TELEPHONE ENCOUNTER
Pt would like to know what Susan Sutton recommends for spine pain.  Pt reports to have no relief in R hip/buttocks.  Sharp pain when trying to reach for things above.  Wakes her during the night.    Please call pt back to advise.  Ph: [phone]

## 2023-01-12 NOTE — TELEPHONE ENCOUNTER
Phone call to patient to explain [facility] has filled the Gabapentin, but it is to only be used at [facility]. Stated understanding and appreciation for call back.

## 2023-01-12 NOTE — TELEPHONE ENCOUNTER
----- Message from Tiffanie Quan PA-C sent at 1/12/2023  9:32 AM CST -----  Please call this patient and let her know that the MRI pelvis was normal.  If she is still having significant pain I think she should see neurosurgery.  I also recommend she follow-up with her primary care provider to rule out additional potential causes for lower extremity swelling.

## 2023-01-12 NOTE — TELEPHONE ENCOUNTER
I would recommend using it at bedtime only since that is when pain is worse and I do want to be cautious not to cause more swelling.

## 2023-01-12 NOTE — TELEPHONE ENCOUNTER
Patient had returned call to [facility]. Normal pelvis MRI results given. Explained [facility] wanting her to be seen by [facility] for the continued issues she is experiencing. Contact information provided.      Also explained that [facility] would like her to follow up with her PCP to rule out additional potential causes for lower extremity swelling she continues to have. Stated understanding.

## 2023-01-12 NOTE — TELEPHONE ENCOUNTER
"PSP:  Tiffanie Quan PA-C   Last clinic visit:  1/9/23  Reason for call: Would like to go back on the Gabapentin; needs Rx sent in  Clinical information:  States she had stopped it as [facility] felt it was the cause of the leg/ankle swelling. \"It hasn't changed that at all since stopping. I took 1 the other day and another one last night. I am completely out now.\" States she had been taking 1 capsule TID up to when she was told to stop.   Advice given to patient: [facility] will be notified of refill request. Pharmacy verified  Provider to address: Gabapentin refill prepped    "

## 2023-01-13 ENCOUNTER — NURSE TRIAGE (OUTPATIENT)
Dept: FAMILY MEDICINE | Facility: CLINIC | Age: 88
End: 2023-01-13

## 2023-01-13 NOTE — TELEPHONE ENCOUNTER
S-(situation):   Pt reports they have bilateral ankle and anterior foot pain and swelling.     B-(background):   *Onset: 10/2022. Pt reports their swelling and pain was initially in the right anterior foot and ankle.  *More recently, pt now has swelling and pain in their left anterior foot. Pt is unable to determine exactly when their left foot symptoms started.    A-(assessment):   Swelling is more pronounced in the right foot versus the left foot  When pt is sleeping in bed and they get up, their feet hurt worse after being elevated in bed than during the day when BLE are dependent. Pt reports the overnight pain to be severe, 10/10.   Pt uses a walker to ambulate.   Pain is mild during the day  Tingling in bilateral toes  Anterior feet are pink. Pt reports they have a hard time finding socks that fit comfortably  Denies streaking redness or warmth on feet or ankles  Afebrile    R-(recommendations):   Pt should be seen within 2 weeks. Care advice given. Answered questions. Assisted in scheduling pt for appt with PCP at the [facility] location. Address provided.      Dee Mo RN    Reason for Disposition    Ankle swelling is a chronic symptom (recurrent or ongoing AND present > 4 weeks)    Additional Information    Negative: Chest pain    Negative: Followed an ankle injury    Negative: Followed a bee sting and has localized swelling (e.g., small area of puffy or swollen skin)    Negative: Followed an insect bite and has localized swelling (e.g., small area of puffy or swollen skin)    Negative: Ankle pain is main symptom    Negative: Swelling of both ankles (i.e., pedal edema)    Negative: Swelling of calf or leg is main symptom    Negative: Difficulty breathing    Negative: Entire foot is cool or blue in comparison to other side    Negative: Ankle pain and fever    Negative: Ankle redness and fever    Negative: Patient sounds very sick or weak to the triager    Negative: SEVERE pain (e.g.,  excruciating, unable to walk) and not improved after 2 hours of pain medicine    Negative: Redness and painful when touched and no fever    Negative: Red area or streak > 2 inches (or 5 cm)    Negative: Thigh or calf pain and only 1 side and present > 1 hour    Negative: Thigh, calf, or ankle swelling and only 1 side    Negative: Thigh, calf, or ankle swelling and bilateral and 1 side is more swollen    Negative: SEVERE swelling (e.g., can't move swollen ankle at all)    Negative: Looks like a boil, infected sore, deep ulcer or other infected rash (spreading redness, pus)    Negative: MILD to MODERATE ankle swelling (e.g., can't move joint normally, can't do usual activities) (Exceptions: Itchy, localized swelling; swelling is chronic.)    Negative: Patient wants to be seen    Protocols used: ANKLE SWELLING-A-OH

## 2023-01-13 NOTE — TELEPHONE ENCOUNTER
Symptoms    Describe your symptoms: swollen ankles/foot    Any pain: Yes    How long have you been having symptoms: Unknown     Have you been seen for this:  Yes    Appointment offered?: No, Pt is wondering if she can just talk to PCP about what is going on? Pt will schedule an appointment if PCP wants her to come in. Pt is just wondering what kind of cream she can put on her ankle/foot to help with swelling. Pt states she is waiting to get in to see neurosurgery.     Triage offered?: No, I transferred to Nurse pool to review.    Home remedies tried: Unknown    Preferred Pharmacy:     [id number] [address]  AT [address] [place] [address]  Phone: [phone] Fax: [phone]      Could we send this information to you in [employer] or would you prefer to receive a phone call?:   Patient would prefer a phone call   Okay to leave a detailed message?: Yes at Home number on file [phone] (home)    Fina Fontaine

## 2023-01-16 ENCOUNTER — HOSPITAL ENCOUNTER (OUTPATIENT)
Dept: PHYSICAL THERAPY | Facility: REHABILITATION | Age: 88
Discharge: HOME | End: 2023-01-16
Payer: COMMERCIAL

## 2023-01-16 DIAGNOSIS — M54.16 LUMBAR RADICULAR PAIN: Primary | ICD-10-CM

## 2023-01-16 PROCEDURE — 97116 GAIT TRAINING THERAPY: CPT | Mod: GP

## 2023-01-16 PROCEDURE — 97750 PHYSICAL PERFORMANCE TEST: CPT | Mod: GP

## 2023-01-16 NOTE — PROGRESS NOTES
Assessment:   Ramila Liao is a 87 year old y.o. female with past medical history significant for hyperlipidemia who presents today for follow-up regarding pain which begins in the right buttock and radiates down the right lower extremity.  My review of an MRI lumbar spine shows grade 1 spondylolisthesis at L5-S1 where there is moderate bilateral foraminal stenosis.  There is also some lateral recess stenosis on the right at L4-5 and a broad-based right paracentral disc bulge at L3-4 which contacts the right L4 nerve root in the lateral recess.  MRI pelvis was unremarkable.  - Patient is status post a right L5-S1 transforaminal epidural steroid injection November 10, 2022 which provided 60 to 70% relief of his pain but relief was already waning by 4 weeks after the procedure.  -Patient is status post right L4-5 and L5-S1 transforaminal epidural steroid injections December 13, 2022 did not not provide any relief of her pain.  - Patient then underwent a right piriformis muscle trigger point injection January 2, 2023 which did not provide any relief of her pain.  -Etiology of her pain is still somewhat unclear.  She did have short-term improvement in her pain following the right L5-S1 transforaminal epidural steroid injection.  Question if she may be symptomatic from right hip osteoarthritis.  An x-ray of the pelvis and bilateral hips shows some degenerative change of the hip.  It is unclear how her lower extremity edema is related to her ongoing pain.  She has a history of lower extremity edema at baseline but right lower extremity edema has been much worse since since pain began.  - On exam patient reported a sensory deficit toes of both feet.  Strength and reflexes were intact.              Plan:     A shared decision making plan was used.  The patient's values and choices were respected.  The following represents what was discussed and decided upon by the physician assistant and the patient.      1.   DIAGNOSTIC TESTS:    -I reviewed the MRI lumbar spine.  - I reviewed the ultrasound right lower extremity which was negative for DVT.  - I reviewed the MRI pelvis.  - I reviewed the x-ray bilateral hips.  - No further diagnostic tests were ordered.  - We could consider a right lower extremity EMG.    2.  PHYSICAL THERAPY: Patient is currently in physical therapy.  She has had 6 session so far.  Encouraged to continue with physical therapy and the home exercises.    3.  MEDICATIONS:  -Tramadol and hydrocodone did not provide adequate relief of her pain.  -Patient states that she has taken oxycodone occasionally but does not feel it helps with her pain.  I recommended she stop taking it.  -Patient is taking gabapentin 300 mg at bedtime.  I did tell the patient that I am concerned this could be contributing to her lower extremity swelling.  She states it is very helpful for her pain.  She will continue using it at bedtime for now.  -Patient can continue tizanidine 2 mg 3 times daily as needed.  -Patient could continue ibuprofen and Tylenol as needed.      4.  INTERVENTIONS: No additional interventions were ordered.  - If spine surgery is not recommended we could consider a diagnostic right hip joint injection.      5.  REFERRALS: Patient is scheduled to see neurosurgery tomorrow, January 18.    6.  FOLLOW-UP: We will await recommendations from neurosurgery.  Patient will follow up with me as needed.  Subjective:     Ramila Liao is a 87 year old female who presents today for follow-up regarding chronic low back pain right lower extremity pain.  Patient is following up after a right piriformis muscle trigger point injection January 2, 2023.  Patient reports no improvement in her pain.    Patient complains of pain which begins in the right buttock.  Pain radiates down the lateral thigh into the lateral calf.  She feels significant pain about the right ankle.  She has numbness and tingling in toes of both feet which  she attributes to neuropathy.  She states her right leg feels weaker than the left.  She is walking better since she got a 4 wheeled walker.  She continues to have right greater than left lower extremity edema.  She rates her pain today as a 3 out of 10.  At its worst it is an 8 out of 10.  At its best it is a 2 out of 10.  Pain is worse at night.  Pain is much better during the day.    Patient previously went to physical therapy at [facility].  She is now in physical therapy through [facility].  She has had 6 session so far.  She does her home exercises.  She takes gabapentin 300 mg at bedtime which is very helpful.  She takes Tylenol and ibuprofen as needed during the day which is somewhat helpful.  She takes tizanidine 2 mg 3 times daily which is helpful.  She has taken oxycodone occasionally but does not feel it gets helpful..    Review of Systems:  Positive for numbness/tingling, weakness, pain much worse at night.  Negative for loss of bowel/bladder control, inability to urinate, headache, trip/stumble/falls, difficulty swallowing, difficulty with hand skills, fevers, unintentional weight loss.     Objective:   CONSTITUTIONAL:  Vital signs as above.  No acute distress.  The patient is well nourished and well groomed.    PSYCHIATRIC:  The patient is awake, alert, oriented to person, place and time.  The patient is answering questions appropriately with clear speech.  Normal affect.  HEENT: Normocephalic, atraumatic.  Sclera clear.    SKIN:  Skin over the face, posterior torso, bilateral upper and lower extremities is clean, dry, intact without rashes.  VASCULAR: Bilateral lower extremity edema, worse on the right than the left.  MUSCULOSKELETAL:  Gait is  antalgic, favoring the right.   The patient has 5/5 strength bilateral hip flexors, knee flexors/extensors, ankle dorsi/plantar flexors, EHL.    Internal and external rotation of the right hip are within normal limits.  Patient does report increased right  buttock pain at end external rotation of the right hip.  Tender to palpation over the right piriformis muscle.  No significant tenderness palpation bilateral lower lumbar paraspinous muscle.  NEUROLOGICAL: 2+ patellar and 1+ Achilles reflexes bilaterally.  Negative Babinski's bilaterally.  No ankle clonus bilaterally.  Diminished sensation toes of bilateral feet.     RESULTS: I reviewed the MRI lumbar spine from [facility] dated November 7, 2022.  At L5-S1 there is degenerative spondylolisthesis related to severe facet arthropathy.  There is moderate bilateral foraminal stenosis at this level.  At L4-5 there is mild to moderate right foraminal stenosis.  At L3-4 there is mild lateral recess stenosis related to a broad-based right paracentral disc protrusion which contacts the right L4 nerve root in the lateral recess.  There is also mild foraminal stenosis.    EXAM: XR HIP BILATERAL 2 VIEWS EACH  LOCATION: [facility]  DATE/TIME: 1/4/2022 10:59 AM     INDICATION: Right low back/hip pain.  COMPARISON: None.                                                                      IMPRESSION:   Degenerative change of both hip joints. No evidence for fracture or dislocation. Mild degenerative change of both SI joints.     EXAM: MR PELVIS MUSCULAR TISSUE W/O CONTRAST  LOCATION: [facility]  DATE/TIME: 1/11/2023 8:08 PM     INDICATION: right buttock and leg pain with swelling  COMPARISON: 1/4/2022 radiographs.  TECHNIQUE: Unenhanced.     FINDINGS:      HIPS:   -No high-grade chondromalacia, joint effusions or para labral cysts.     MUSCLES AND TENDONS:  -Gluteal: No tendon tear or tendinopathy. No trochanteric bursitis.    -Proximal hamstring: No tendon tear or tendinopathy.   -Iliopsoas: No tendon tear or tendinopathy. No bursitis.     BONES:   -No fracture or contusion.   -No osseous lesion. No evidence for avascular necrosis.     SOFT TISSUES:   -Normal muscle bulk. No acute  muscular injury.     INTRAPELVIC CONTENTS:   -Visualized portions are normal.                                                                      IMPRESSION:  1.  Unremarkable MRI of the pelvis.

## 2023-01-16 NOTE — PROGRESS NOTES
Beginning/End Dates of Progress Note Reporting Period:  11/28/22 to 1/16/23    Progress Toward Goals:   Progress this reporting period: Kanwal demonstrates signficantly increased safety and independence during gait today with 4WW, however, she still benefits from frequent cueing for improved safety and technique. Therapy today focused on proper and safe transitions and gait with assistive device. She has improved (decreased) her RODRICK score to 42%. She also demonstrates improved lumbar AROM    Client Self (Subjective) Report for Progress Note Reporting Period: Kanwal reports that her pain has been okay some nights, but other nights she still gets significant pain. She said she is unsure if her recent piriformis injection has helped. She said that she feels much safer walking now that she has a walker.    Outcome Measures (Most Recent Score):    RODRICK: 42%    Objective Measurements:   Objective Measure: Lumbar Extension AROM  Details: 75% to 99% (increased pain)  Objective Measure: Lumbar Rotation AROM  Details: Bilateral: 75% to 99%.  Objective Measure: Hip Abduction/Flexion Strength  Details: Right hip flexion: slightly limited compared to left. Left hip flexion and bilateral hip abduction: 5/5.  Objective Measure: Knee Flexion Strength  Details: Right: slightly limited compared to left. Left: 5/5.  Objective Measure: Lower Extremity Swelling  Details: Not measured today  Objective Measure: TUG/30 Second STS  Details: 23 seconds with 4WW/9 reps

## 2023-01-17 ENCOUNTER — OFFICE VISIT (OUTPATIENT)
Dept: PHYSICAL MEDICINE AND REHAB | Facility: CLINIC | Age: 88
End: 2023-01-17
Payer: COMMERCIAL

## 2023-01-17 VITALS
BODY MASS INDEX: 24.87 KG/M2 | SYSTOLIC BLOOD PRESSURE: 151 MMHG | DIASTOLIC BLOOD PRESSURE: 68 MMHG | HEART RATE: 83 BPM | HEIGHT: 62 IN

## 2023-01-17 DIAGNOSIS — M54.16 LUMBAR RADICULAR PAIN: Primary | ICD-10-CM

## 2023-01-17 DIAGNOSIS — M43.16 SPONDYLOLISTHESIS OF LUMBAR REGION: ICD-10-CM

## 2023-01-17 PROCEDURE — 99214 OFFICE O/P EST MOD 30 MIN: CPT | Performed by: PHYSICIAN ASSISTANT

## 2023-01-17 ASSESSMENT — PAIN SCALES - GENERAL: PAINLEVEL: MILD PAIN (3)

## 2023-01-17 NOTE — LETTER
1/17/2023         RE: Ramila Liao  [address]  [place] [address]        Dear Colleague,    Thank you for referring your patient, Ramila Liao, to the [facility]UR[facility]ERY. Please see a copy of my visit note below.      Assessment:   Ramila Liao is a 87 year old y.o. female with past medical history significant for hyperlipidemia who presents today for follow-up regarding pain which begins in the right buttock and radiates down the right lower extremity.  My review of an MRI lumbar spine shows grade 1 spondylolisthesis at L5-S1 where there is moderate bilateral foraminal stenosis.  There is also some lateral recess stenosis on the right at L4-5 and a broad-based right paracentral disc bulge at L3-4 which contacts the right L4 nerve root in the lateral recess.  MRI pelvis was unremarkable.  - Patient is status post a right L5-S1 transforaminal epidural steroid injection November 10, 2022 which provided 60 to 70% relief of his pain but relief was already waning by 4 weeks after the procedure.  -Patient is status post right L4-5 and L5-S1 transforaminal epidural steroid injections December 13, 2022 did not not provide any relief of her pain.  - Patient then underwent a right piriformis muscle trigger point injection January 2, 2023 which did not provide any relief of her pain.  -Etiology of her pain is still somewhat unclear.  She did have short-term improvement in her pain following the right L5-S1 transforaminal epidural steroid injection.  Question if she may be symptomatic from right hip osteoarthritis.  An x-ray of the pelvis and bilateral hips shows some degenerative change of the hip.  It is unclear how her lower extremity edema is related to her ongoing pain.  She has a history of lower extremity edema at baseline but right lower extremity edema has been much worse since since pain began.  - On exam patient reported a sensory deficit toes of both feet.   Strength and reflexes were intact.              Plan:     A shared decision making plan was used.  The patient's values and choices were respected.  The following represents what was discussed and decided upon by the physician assistant and the patient.      1.  DIAGNOSTIC TESTS:    -I reviewed the MRI lumbar spine.  - I reviewed the ultrasound right lower extremity which was negative for DVT.  - I reviewed the MRI pelvis.  - I reviewed the x-ray bilateral hips.  - No further diagnostic tests were ordered.  - We could consider a right lower extremity EMG.    2.  PHYSICAL THERAPY: Patient is currently in physical therapy.  She has had 6 session so far.  Encouraged to continue with physical therapy and the home exercises.    3.  MEDICATIONS:  -Tramadol and hydrocodone did not provide adequate relief of her pain.  -Patient states that she has taken oxycodone occasionally but does not feel it helps with her pain.  I recommended she stop taking it.  -Patient is taking gabapentin 300 mg at bedtime.  I did tell the patient that I am concerned this could be contributing to her lower extremity swelling.  She states it is very helpful for her pain.  She will continue using it at bedtime for now.  -Patient can continue tizanidine 2 mg 3 times daily as needed.  -Patient could continue ibuprofen and Tylenol as needed.      4.  INTERVENTIONS: No additional interventions were ordered.  - If spine surgery is not recommended we could consider a diagnostic right hip joint injection.      5.  REFERRALS: Patient is scheduled to see neurosurgery tomorrow, January 18.    6.  FOLLOW-UP: We will await recommendations from neurosurgery.  Patient will follow up with me as needed.  Subjective:     Ramila Liao is a 87 year old female who presents today for follow-up regarding chronic low back pain right lower extremity pain.  Patient is following up after a right piriformis muscle trigger point injection January 2, 2023.  Patient  reports no improvement in her pain.    Patient complains of pain which begins in the right buttock.  Pain radiates down the lateral thigh into the lateral calf.  She feels significant pain about the right ankle.  She has numbness and tingling in toes of both feet which she attributes to neuropathy.  She states her right leg feels weaker than the left.  She is walking better since she got a 4 wheeled walker.  She continues to have right greater than left lower extremity edema.  She rates her pain today as a 3 out of 10.  At its worst it is an 8 out of 10.  At its best it is a 2 out of 10.  Pain is worse at night.  Pain is much better during the day.    Patient previously went to physical therapy at [facility].  She is now in physical therapy through [facility].  She has had 6 session so far.  She does her home exercises.  She takes gabapentin 300 mg at bedtime which is very helpful.  She takes Tylenol and ibuprofen as needed during the day which is somewhat helpful.  She takes tizanidine 2 mg 3 times daily which is helpful.  She has taken oxycodone occasionally but does not feel it gets helpful..    Review of Systems:  Positive for numbness/tingling, weakness, pain much worse at night.  Negative for loss of bowel/bladder control, inability to urinate, headache, trip/stumble/falls, difficulty swallowing, difficulty with hand skills, fevers, unintentional weight loss.     Objective:   CONSTITUTIONAL:  Vital signs as above.  No acute distress.  The patient is well nourished and well groomed.    PSYCHIATRIC:  The patient is awake, alert, oriented to person, place and time.  The patient is answering questions appropriately with clear speech.  Normal affect.  HEENT: Normocephalic, atraumatic.  Sclera clear.    SKIN:  Skin over the face, posterior torso, bilateral upper and lower extremities is clean, dry, intact without rashes.  VASCULAR: Bilateral lower extremity edema, worse on the right than the  left.  MUSCULOSKELETAL:  Gait is  antalgic, favoring the right.   The patient has 5/5 strength bilateral hip flexors, knee flexors/extensors, ankle dorsi/plantar flexors, EHL.    Internal and external rotation of the right hip are within normal limits.  Patient does report increased right buttock pain at end external rotation of the right hip.  Tender to palpation over the right piriformis muscle.  No significant tenderness palpation bilateral lower lumbar paraspinous muscle.  NEUROLOGICAL: 2+ patellar and 1+ Achilles reflexes bilaterally.  Negative Babinski's bilaterally.  No ankle clonus bilaterally.  Diminished sensation toes of bilateral feet.     RESULTS: I reviewed the MRI lumbar spine from [facility] dated November 7, 2022.  At L5-S1 there is degenerative spondylolisthesis related to severe facet arthropathy.  There is moderate bilateral foraminal stenosis at this level.  At L4-5 there is mild to moderate right foraminal stenosis.  At L3-4 there is mild lateral recess stenosis related to a broad-based right paracentral disc protrusion which contacts the right L4 nerve root in the lateral recess.  There is also mild foraminal stenosis.    EXAM: XR HIP BILATERAL 2 VIEWS EACH  LOCATION: [facility]  DATE/TIME: 1/4/2022 10:59 AM     INDICATION: Right low back/hip pain.  COMPARISON: None.                                                                      IMPRESSION:   Degenerative change of both hip joints. No evidence for fracture or dislocation. Mild degenerative change of both SI joints.     EXAM: MR PELVIS MUSCULAR TISSUE W/O CONTRAST  LOCATION: [facility]  DATE/TIME: 1/11/2023 8:08 PM     INDICATION: right buttock and leg pain with swelling  COMPARISON: 1/4/2022 radiographs.  TECHNIQUE: Unenhanced.     FINDINGS:      HIPS:   -No high-grade chondromalacia, joint effusions or para labral cysts.     MUSCLES AND TENDONS:  -Gluteal: No tendon tear or tendinopathy.  No trochanteric bursitis.    -Proximal hamstring: No tendon tear or tendinopathy.   -Iliopsoas: No tendon tear or tendinopathy. No bursitis.     BONES:   -No fracture or contusion.   -No osseous lesion. No evidence for avascular necrosis.     SOFT TISSUES:   -Normal muscle bulk. No acute muscular injury.     INTRAPELVIC CONTENTS:   -Visualized portions are normal.                                                                      IMPRESSION:  1.  Unremarkable MRI of the pelvis.        Again, thank you for allowing me to participate in the care of your patient.        Sincerely,        Tiffanie Quan PA-C

## 2023-01-17 NOTE — PATIENT INSTRUCTIONS
We will await recommendations from neurosurgery.    We will also await recommendations from your regular doctor about your swelling.    Stop oxycodone since it is not helping with your pain.    You can continue to use gabapentin at bedtime.  I am somewhat concerned that this could be contributing to your swelling but if it is helping a lot with your pain then you can continue taking it.    Continue tizanidine as needed.    Continue Tylenol as needed.

## 2023-01-18 ENCOUNTER — HOSPITAL ENCOUNTER (OUTPATIENT)
Dept: CT IMAGING | Facility: HOSPITAL | Age: 88
Discharge: HOME | End: 2023-01-18
Attending: SURGERY
Payer: COMMERCIAL

## 2023-01-18 ENCOUNTER — HOSPITAL ENCOUNTER (OUTPATIENT)
Dept: GENERAL RADIOLOGY | Facility: HOSPITAL | Age: 88
Discharge: HOME | End: 2023-01-18
Attending: SURGERY
Payer: COMMERCIAL

## 2023-01-18 ENCOUNTER — OFFICE VISIT (OUTPATIENT)
Dept: NEUROSURGERY | Facility: CLINIC | Age: 88
End: 2023-01-18
Payer: COMMERCIAL

## 2023-01-18 VITALS
SYSTOLIC BLOOD PRESSURE: 185 MMHG | BODY MASS INDEX: 25.03 KG/M2 | DIASTOLIC BLOOD PRESSURE: 102 MMHG | HEART RATE: 106 BPM | HEIGHT: 62 IN | WEIGHT: 136 LBS | OXYGEN SATURATION: 97 %

## 2023-01-18 DIAGNOSIS — R52 PAIN: ICD-10-CM

## 2023-01-18 DIAGNOSIS — R52 PAIN: Primary | ICD-10-CM

## 2023-01-18 DIAGNOSIS — M54.16 LUMBAR RADICULOPATHY: ICD-10-CM

## 2023-01-18 PROCEDURE — 99203 OFFICE O/P NEW LOW 30 MIN: CPT | Performed by: SURGERY

## 2023-01-18 PROCEDURE — 73610 X-RAY EXAM OF ANKLE: CPT | Mod: RT

## 2023-01-18 PROCEDURE — 72131 CT LUMBAR SPINE W/O DYE: CPT

## 2023-01-18 PROCEDURE — 72114 X-RAY EXAM L-S SPINE BENDING: CPT

## 2023-01-18 NOTE — PATIENT INSTRUCTIONS
EMG OF RIGHT LEG   X-RAY OF LUMBAR AND X-RAY OF ANKLE   CT OF LUMBAR   FOLLOW UP WITH DR VIVEROS TO DISCUSS RESULTS.

## 2023-01-18 NOTE — PROGRESS NOTES
The patient is an 87-year-old female.  She is here with her son.  She complains of right leg pain.  The pain starts in her right hip and goes down to the right ankle.  On MRI of the pelvis her right hip looks okay.  We are going to get an x-ray of her right ankle.  On MRI she has listhesis at L5 on S1 with a narrow foramen at L5-S1 on the right.  I would like to get a CT to look at the pars of L5.  I would like to get flexion-extension views.  I would like to get an EMG to look at the right L5 nerve root.  She and her son expressed interest in some minimally invasive foraminotomies.  I did show the pictures to Dr. Pena.  He said he would be willing to get involved.  Patient is going to come back for follow-up.  Total time 30 minutes, more than 50% spent counseling and/or coordinating care.

## 2023-01-18 NOTE — LETTER
1/18/2023         RE: Ramila Liao  [address]  [place] [facility] [address]        Dear Colleague,    Thank you for referring your patient, Ramila Liao, to the [facility] SPINE AND NEUROSURGERY. Please see a copy of my visit note below.    The patient is an 87-year-old female.  She is here with her son.  She complains of right leg pain.  The pain starts in her right hip and goes down to the right ankle.  On MRI of the pelvis her right hip looks okay.  We are going to get an x-ray of her right ankle.  On MRI she has listhesis at L5 on S1 with a narrow foramen at L5-S1 on the right.  I would like to get a CT to look at the pars of L5.  I would like to get flexion-extension views.  I would like to get an EMG to look at the right L5 nerve root.  She and her son expressed interest in some minimally invasive foraminotomies.  I did show the pictures to Dr. Pena.  He said he would be willing to get involved.  Patient is going to come back for follow-up.  Total time 30 minutes, more than 50% spent counseling and/or coordinating care.      Again, thank you for allowing me to participate in the care of your patient.        Sincerely,        Deni Velarde MD

## 2023-01-18 NOTE — NURSING NOTE
Neurosurgery consultation was requested by: Susan Sutton   Pain: minor back pain   Radicular Pain is present: right hip and pain down lateral side of right leg   Lhermitte sign: no   Motor complaints: weakness in both legs   Sensory complaints: denies numbness   Gait and balance issues: yes, used to use a cane but now using a walker   Bowel or bladder issues: no   Duration of SX is: few months   The symptoms are worse with: mornings   The symptoms are better with: interchangeable   Injury: denies   Severity is: mild - moderate   Patient has tried the following conservative measures: she has had a few lumbar injections and one hip injection that gave her short term mild relief.   RODRICK score is: 58%  MER Goodwin

## 2023-01-20 ENCOUNTER — OFFICE VISIT (OUTPATIENT)
Dept: FAMILY MEDICINE | Facility: CLINIC | Age: 88
End: 2023-01-20
Payer: COMMERCIAL

## 2023-01-20 VITALS
HEIGHT: 62 IN | WEIGHT: 134.9 LBS | OXYGEN SATURATION: 100 % | TEMPERATURE: 97.7 F | BODY MASS INDEX: 24.82 KG/M2 | HEART RATE: 85 BPM | SYSTOLIC BLOOD PRESSURE: 138 MMHG | DIASTOLIC BLOOD PRESSURE: 72 MMHG

## 2023-01-20 DIAGNOSIS — M79.89 PAIN AND SWELLING OF LOWER EXTREMITY, UNSPECIFIED LATERALITY: Primary | ICD-10-CM

## 2023-01-20 DIAGNOSIS — M79.606 PAIN AND SWELLING OF LOWER EXTREMITY, UNSPECIFIED LATERALITY: Primary | ICD-10-CM

## 2023-01-20 LAB
ALBUMIN SERPL BCG-MCNC: 4.3 G/DL (ref 3.5–5.2)
ALP SERPL-CCNC: 62 U/L (ref 35–104)
ALT SERPL W P-5'-P-CCNC: 24 U/L (ref 10–35)
ANION GAP SERPL CALCULATED.3IONS-SCNC: 10 MMOL/L (ref 7–15)
AST SERPL W P-5'-P-CCNC: 28 U/L (ref 10–35)
BILIRUB SERPL-MCNC: 0.2 MG/DL
BUN SERPL-MCNC: 17.9 MG/DL (ref 8–23)
CALCIUM SERPL-MCNC: 9.6 MG/DL (ref 8.8–10.2)
CHLORIDE SERPL-SCNC: 106 MMOL/L (ref 98–107)
CREAT SERPL-MCNC: 0.78 MG/DL (ref 0.51–0.95)
DEPRECATED HCO3 PLAS-SCNC: 26 MMOL/L (ref 22–29)
ERYTHROCYTE [DISTWIDTH] IN BLOOD BY AUTOMATED COUNT: 13.1 % (ref 10–15)
GFR SERPL CREATININE-BSD FRML MDRD: 74 ML/MIN/1.73M2
GLUCOSE SERPL-MCNC: 75 MG/DL (ref 70–99)
HCT VFR BLD AUTO: 39.4 % (ref 35–47)
HGB BLD-MCNC: 13 G/DL (ref 11.7–15.7)
MCH RBC QN AUTO: 31 PG (ref 26.5–33)
MCHC RBC AUTO-ENTMCNC: 33 G/DL (ref 31.5–36.5)
MCV RBC AUTO: 94 FL (ref 78–100)
NT-PROBNP SERPL-MCNC: 489 PG/ML (ref 0–1800)
PLATELET # BLD AUTO: 185 10E3/UL (ref 150–450)
POTASSIUM SERPL-SCNC: 4.3 MMOL/L (ref 3.4–5.3)
PROT SERPL-MCNC: 6.9 G/DL (ref 6.4–8.3)
RBC # BLD AUTO: 4.19 10E6/UL (ref 3.8–5.2)
SODIUM SERPL-SCNC: 142 MMOL/L (ref 136–145)
WBC # BLD AUTO: 6.7 10E3/UL (ref 4–11)

## 2023-01-20 PROCEDURE — 85027 COMPLETE CBC AUTOMATED: CPT | Performed by: NURSE PRACTITIONER

## 2023-01-20 PROCEDURE — 83880 ASSAY OF NATRIURETIC PEPTIDE: CPT | Performed by: NURSE PRACTITIONER

## 2023-01-20 PROCEDURE — 80053 COMPREHEN METABOLIC PANEL: CPT | Performed by: NURSE PRACTITIONER

## 2023-01-20 PROCEDURE — 36415 COLL VENOUS BLD VENIPUNCTURE: CPT | Performed by: NURSE PRACTITIONER

## 2023-01-20 PROCEDURE — 99213 OFFICE O/P EST LOW 20 MIN: CPT | Performed by: NURSE PRACTITIONER

## 2023-01-20 RX ORDER — HYDROCHLOROTHIAZIDE 12.5 MG/1
12.5 TABLET ORAL DAILY
Qty: 60 TABLET | Refills: 0 | Status: SHIPPED | OUTPATIENT
Start: 2023-01-20 | End: 2023-03-03

## 2023-01-20 NOTE — PROGRESS NOTES
Assessment & Plan     Pain and swelling of lower extremity, unspecified laterality  Patient with bilateral foot/ankle swelling, R>L.  Complicated by back injury with right sided radiculopathy.  Currently undergoing workup with spine and neurosurgery.  No systemic symptoms to suggest cardiac involvement, but will check a BNP today.  Order placed for compression stockings and will refer to vascular medicine for recommendations.  Discussed the use of a diuretic in possibly reducing the edema, and patient would like to try this.  Her recent Bps have been slightly elevated.  Gabapentin can cause swelling, but she has been on this prior to symptoms starting.  Patient and family agree with plan of care and will let me know if there are any issues prior to follow up.     - Compression Sleeve/Stocking Order for DME - ONLY FOR DME  - Vascular Medicine Referral  - BNP-N terminal pro  - hydrochlorothiazide (HYDRODIURIL) 12.5 MG tablet  Dispense: 60 tablet; Refill: 0  - CBC with platelets  - Comprehensive metabolic panel (BMP + Alb, Alk Phos, ALT, AST, Total. Bili, TP)        Return in about 3 weeks (around 2/10/2023).    Susan Sutton NP  [facility] SHELBIE Schofield is a 87 year old accompanied by her son and daughter-in-law who presents with complaints of lower extremity swelling.  Patient has been working closely with spine and neurosurgery due to some chronic low back pain with right-sided radiculopathy.  She had an injection, which seemed to help her pain for short period of time.  She develops swelling in her feet and ankles about 6 weeks ago.  It seems to be worse in her right foot and ankle.  History of chronic neuropathy, but this seems to be a little bit worse.  She recently had a consult with neurosurgery for her back pain and is scheduled for EMG testing to better direct her treatment.  Patient denies any weight gain, chest pain, or shortness of breath.  She has tried some  "compression stockings that she got over-the-counter, but these feel rather tight.  She uses anti-inflammatories, but very infrequently.  She is on gabapentin, but has been for a number of years.  Undergoing physical therapy for her low back and hip pain.  She had an x-ray of her foot and ankle completed earlier this week, which showed some soft tissue swelling.  Patient denies any injury.  Ultrasound of her right leg in December was negative for any thrombosis.      Review of Systems   Pertinent items in HPI      Objective    /72 (BP Location: Left arm, Patient Position: Sitting, Cuff Size: Adult Regular)   Pulse 85   Temp 97.7  F (36.5  C) (Oral)   Ht 1.575 m (5' 2\")   Wt 61.2 kg (134 lb 14.4 oz)   SpO2 100%   BMI 24.67 kg/m    Body mass index is 24.67 kg/m .  Physical Exam   GENERAL: healthy, alert and no distress  NECK: no adenopathy, no asymmetry, masses, or scars and thyroid normal to palpation  RESP: lungs clear to auscultation - no rales, rhonchi or wheezes  CV: regular rate and rhythm, normal S1 S2, no S3 or S4, no murmur, click or rub.  MS: 2+ nonpitting edema to right ankle and foot. No redness, open wound. Slightly tender. Edema does not extend to the calf. 1+ nonpitting edema to left ankle and foot. Pedal pulses diminished secondary to swelling.             "

## 2023-01-23 ENCOUNTER — HOSPITAL ENCOUNTER (OUTPATIENT)
Dept: PHYSICAL THERAPY | Facility: REHABILITATION | Age: 88
Discharge: HOME | End: 2023-01-23
Payer: COMMERCIAL

## 2023-01-23 DIAGNOSIS — M54.16 LUMBAR RADICULAR PAIN: Primary | ICD-10-CM

## 2023-01-23 DIAGNOSIS — M79.89 LEG SWELLING: Primary | ICD-10-CM

## 2023-01-23 PROCEDURE — 97116 GAIT TRAINING THERAPY: CPT | Mod: GP

## 2023-01-23 PROCEDURE — 97110 THERAPEUTIC EXERCISES: CPT | Mod: GP

## 2023-01-25 DIAGNOSIS — M54.16 LUMBAR RADICULOPATHY: Primary | ICD-10-CM

## 2023-01-30 ENCOUNTER — TELEPHONE (OUTPATIENT)
Dept: FAMILY MEDICINE | Facility: CLINIC | Age: 88
End: 2023-01-30
Payer: COMMERCIAL

## 2023-01-30 NOTE — TELEPHONE ENCOUNTER
General Call    Contacts       Type Contact Phone/Fax    01/30/2023 11:41 AM CST Phone (Incoming) Kanwal Liao (Self) [phone] (H)        Reason for Call:  Medication questions    What are your questions or concerns: Pt called and stated she started a new medication last week and it has been making her dizzy. Pt states she wanted PCP to know that she stopped taking the medication. Please call Pt to discuss her questions/concerns.     hydrochlorothiazide (HYDRODIURIL) 12.5 MG tablet    Date of last appointment with provider: 01/20/2023    Could we send this information to you in [facility] or would you prefer to receive a phone call?:   Patient would prefer a phone call   Okay to leave a detailed message?: Yes at Home number on file [phone] (home)    Fina Fontaine

## 2023-01-31 NOTE — TELEPHONE ENCOUNTER
Please call patient.    Thanks for letter me know. She can try and take 1/2 tablet if she would like, as it may help with her swelling.  However, if the half dose makes her dizzy, then I would stop.    Susan Sutton NP

## 2023-01-31 NOTE — TELEPHONE ENCOUNTER
Patient is wondering if there's any other suggestions for her swelling? States she is waiting for her compression socks that should be delivered any day now.

## 2023-01-31 NOTE — TELEPHONE ENCOUNTER
Nothing else I would recommend right now.  You can try 1/2 tab of the hydrochlorothiazide.  Otherwise, it looks like she is scheduled with vascular tomorrow and can discuss further with them.    Susan Sutton NP

## 2023-01-31 NOTE — PROGRESS NOTES
[facility] Consult Note    Date of Service: January 31, 2023     Requesting Provider: Susan Sutton CNP    Chief Complaint: BLE swelling    History of Present Illness: Ramila Liao is being seen at [facility] today regarding BLE swelling. They arrive to the clinic today alone. The patient reports that she developed swelling October 2022 when she developed sciatica; she has been working with neurosurgery on this and has received x3 injections which work for 3 days and then no longer help. Her sleep has been impacted by this right buttock/back pain which radiates down the right leg; she is only getting 3 hours per night; then she has to walk around all night; she tries to sleep on the couch; at times she falls asleep with the legs down dependent. No wounds; no weeping. Reports pain of 3/10 in the right leg; might be stemming from her back; currently using apap for pain. Has used compression stockings as compression in the past, is currently using nothing for compression; just ordered new stockings from [facility]. Denies any fevers, chills, or generalized ill feeling. Denies history of cancer. Sleeps in a bed/recliner with legs elevated. Pt no longer has their uterus and ovaries, they deny any abnormal vaginal bleeding. Denies history of DVT, Joint Replacement, Cellulitis and Vein Procedures.  I personally reviewed outside imaging, lab work, and progress noted through Care Everywhere and outside records. She had venous insufficiency ultrasound done today on both legs this demonstrated incompetence however the diameter of the vessels is too small to proceed with closure. Recently started on hydrochlorothiazide; however she stopped taking as this was making her dizzy. She was able to walk to the exam room with a walker. Never smoker. No diabetes.       Review of Systems:   Constitutional:  Negative   EENTM: positive for glasses;  negative [place]  GI:  negative for nausea/vomiting;    negative for constipation   negative diarrhea;   negative for fecal incontinence  negative weight loss  :   negative dysuria,  negative incontinence  MS: patient is ambulatory;  does use assistive devices  Cardiac: negative  Respiratory:  negative SOB  Endocrine:  negative  diabetes  Psych: negative  depression/anxiety    Past Medical History:   Past Medical History:   Diagnosis Date     Allergic rhinitis 8/2/2005     Contact dermatitis and other eczema, due to unspecified cause 7/30/2001     Low back pain 8/2/2016     Mixed hyperlipidemia 8/9/2006        Surgical History:   Past Surgical History:   Procedure Laterality Date     CATARACT EXTRACTION, BILATERAL       CHOLECYSTECTOMY       HYSTERECTOMY          Medications:   Current Outpatient Medications   Medication Sig     acetaminophen (TYLENOL) 500 MG tablet Take 500-1,000 mg by mouth as needed for mild pain     aspirin 81 MG EC tablet [ASPIRIN 81 MG EC TABLET] Take 81 mg by mouth.     betamethasone dipropionate (DIPROLENE) 0.05 % ointment [BETAMETHASONE DIPROPIONATE (DIPROLENE) 0.05 % OINTMENT] Apply topically.     gabapentin (NEURONTIN) 300 MG capsule Take 1 capsule (300 mg) by mouth At Bedtime     ibuprofen (ADVIL/MOTRIN) 200 MG tablet Take 200 mg by mouth as needed for pain     magnesium citrate 100 mg Tab Take 250 mg by mouth     multivitamin with minerals (THERA-M) 9 mg iron-400 mcg Tab tablet [MULTIVITAMIN WITH MINERALS (THERA-M) 9 MG IRON-400 MCG TAB TABLET] Take 1 tablet by mouth daily.     tiZANidine (ZANAFLEX) 2 MG tablet Take 1 tablet 3 times a day as needed for pain/spasms.     hydrochlorothiazide (HYDRODIURIL) 12.5 MG tablet Take 1 tablet (12.5 mg) by mouth daily (Patient not taking: Reported on 2/1/2023)     oxyCODONE-acetaminophen (PERCOCET) 5-325 MG tablet Take 1 tablet by mouth nightly as needed for pain (Patient not taking: Reported on 1/20/2023)     No current facility-administered medications for this visit.       Allergies:   Allergies    Allergen Reactions     Naproxen Hives     Tolerates ibuprofen       Family history:   Family History   Problem Relation Age of Onset     Coronary Artery Disease Mother      Coronary Artery Disease Father         Social History:   Social History     Socioeconomic History     Marital status:      Spouse name: Not on file     Number of children: Not on file     Years of education: Not on file     Highest education level: Not on file   Occupational History     Not on file   Tobacco Use     Smoking status: Never     Smokeless tobacco: Never   Substance and Sexual Activity     Alcohol use: Yes     Drug use: Never     Sexual activity: Not Currently     Partners: Male   Other Topics Concern     Not on file   Social History Narrative     Not on file     Social Determinants of Health     Financial Resource Strain: Not on file   Food Insecurity: Not on file   Transportation Needs: Not on file   Physical Activity: Not on file   Stress: Not on file   Social Connections: Not on file   Intimate Partner Violence: Not on file   Housing Stability: Not on file        Physical Exam  Vitals: BP (!) 142/80   Pulse 68   Temp 99  F (37.2  C)   Resp 16   Wt 138 lb (62.6 kg)   BMI 25.24 kg/m    Weight is 138 lbs 0 oz          Body mass index is 25.24 kg/m .  General: This is a 87 year old female who appears their reported age, not in acute distress  Head: normocephalic, Atraumatic; wearing glasses; non-icteric sclera; no exudate; mild hearing loss   Respiratory: Clear throughout all lung fields; unlabored breathing; no cough   Cardiac: Regular, Rate and Rhythm, no murmurs appreciated   Skin: Uniformly warm and dry  Psych: Alert and oriented x4; calm and cooperative throughout exam  Abdomen: Normal bowel sounds. Soft, symmetric, no guarding or rigidity, nontender with palpation.  No organomegaly or masses palpated.   Lymphatics: No palpable nodes to bilateral groin   Extremities: BLE with +2 pitting edema; hair growth to the  toes; nails well trimmed; webbing intact; skin well moisturized; skin intact; no erythema; no pain with palpation;  strength testing revealed 4/4 to BLEs.    Sensation: Intact to pinprick and light touch throughout lower extremities bilaterally     Peripheral Vascular: normal dorsalis pedis, posterior tibial pulses to bilateral , using a handheld doppler these were strong; easily found and biphasic in nature.  Good capillary refill. No unusual venous distention. Positive for spider veins, telangiectasias, hemosiderin deposition or hyperpigmentation and fibrosis or scarring       Circumferential volume measures:        Circumferential Measures 2/1/2023   Right just above MTP 24   Right Ankle 27   Right Widest Calf 35.5   Left - just above MTP 23.6   Left Ankle 26.3   Left Widest Calf 35.2   Left Knee to Ankle 32       Ulceration(s)/Wound(s):          Laboratory studies:     I personally reviewed the following lab results today and those on care everywhere, if indicated     No results found for: CRP   No results found for: SED   Last Renal Panel:  Sodium   Date Value Ref Range Status   01/20/2023 142 136 - 145 mmol/L Final     Potassium   Date Value Ref Range Status   01/20/2023 4.3 3.4 - 5.3 mmol/L Final   04/21/2022 4.3 3.5 - 5.0 mmol/L Final     Chloride   Date Value Ref Range Status   01/20/2023 106 98 - 107 mmol/L Final   04/21/2022 106 98 - 107 mmol/L Final     Carbon Dioxide (CO2)   Date Value Ref Range Status   01/20/2023 26 22 - 29 mmol/L Final   04/21/2022 25 22 - 31 mmol/L Final     Anion Gap   Date Value Ref Range Status   01/20/2023 10 7 - 15 mmol/L Final   04/21/2022 10 5 - 18 mmol/L Final     Glucose   Date Value Ref Range Status   01/20/2023 75 70 - 99 mg/dL Final   04/21/2022 104 70 - 125 mg/dL Final     Urea Nitrogen   Date Value Ref Range Status   01/20/2023 17.9 8.0 - 23.0 mg/dL Final   04/21/2022 15 8 - 28 mg/dL Final     Creatinine   Date Value Ref Range Status   01/20/2023 0.78 0.51 - 0.95 mg/dL  Final     GFR Estimate   Date Value Ref Range Status   01/20/2023 74 >60 mL/min/1.73m2 Final     Comment:     Effective December 21, 2021 eGFRcr in adults is calculated using the 2021 CKD-EPI creatinine equation which includes age and gender (Bull et al., NEJM, DOI: 10.1056/DDDWyg7098501)     Calcium   Date Value Ref Range Status   01/20/2023 9.6 8.8 - 10.2 mg/dL Final     Albumin   Date Value Ref Range Status   01/20/2023 4.3 3.5 - 5.2 g/dL Final   04/21/2022 3.6 3.5 - 5.0 g/dL Final      Lab Results   Component Value Date    WBC 6.7 01/20/2023     Lab Results   Component Value Date    RBC 4.19 01/20/2023     Lab Results   Component Value Date    HGB 13.0 01/20/2023     Lab Results   Component Value Date    HCT 39.4 01/20/2023     No components found for: MCT  Lab Results   Component Value Date    MCV 94 01/20/2023     Lab Results   Component Value Date    MCH 31.0 01/20/2023     Lab Results   Component Value Date    MCHC 33.0 01/20/2023     Lab Results   Component Value Date    RDW 13.1 01/20/2023     Lab Results   Component Value Date     01/20/2023      No results found for: A1C   No results found for: TSH   No results found for: VITDT       Impression:    Encounter Diagnoses   Name Primary?     Secondary lymphedema Yes     Pain and swelling of lower extremity, unspecified laterality      Venous (peripheral) insufficiency      Venous hypertension of both lower extremities      Varicose veins of both legs with edema      Acute right-sided low back pain with right-sided sciatica        Assessment/Plan:  1. Debridement: na    2. Treatment: wound treatment will include irrigation and dressings to promote autolytic debridement which will include: no wounds; no dressings needed.    If for some reason the patient is not able to get your dressing(s) changed as outlined above (due to illness, lack of supplies, lack of help) please do the following: remove old, soiled dressings; wash the wounds with saline; pat  dry; apply ABD pad or other absorbant pad and secure with rolled gauze; avoid tape directly on your skin; Patient instructed to call the clinic as soon as possible to let us know what the current issues are in receiving wound care.    3. Edema. We spoke at length regarding their swelling, potential causes, and treatment options. Answered all questions. Their swelling is likely multifactorial including but not limited to the following: right sided sciatica, dependency of the limbs for most hours of the day, reduced activity with reduced calf pump mechanism, venous hypertension and valvular incompetence. I would like to first reduce their swelling down using 2 layer compression wraps and then transition them into compression garments; such as compression stockings or velcro wraps. The patient should continue to elevate their legs periodically throughout the day. Continue to take their diuretics per their PMD recommendations.       If a 2 layer or 4 layer compression wrap is being used; these are safe to have on for ONLY 7 days. If for some reason the patient is not able to get the wrap(s) changed (due to illness; lack of supplies, lack of help, lack of transportation) please do the following: unwrap the old 2 or 4 layer compression wrap; avoid using scissors as you could cut your skin and cause wounds; use tubular compression when available. Call to reschedule your home care or clinic visit appointment as soon as possible. We will refer her to lymphedema therapy they are currently booking out until March. Her ultrasound demonstrated venous insufficency however the diameter of the vessels are too small to treat; could be from dehydration however due to age and no wounds I would still like to proceed conservatively with compression and therapy    4. Offloading: na    5. Nutrition: low sodium diet    6. Sciatica: continue to work with PT and NS    Patient to return to clinic in 1 week(s) for re-evaluation. They were  instructed to call the clinic sooner with any further questions or concerns. Answered all questions.          Roxanna Rodrigues NP   [facility]  [phone]      This note was electronically signed by Roxanna Rodrigues NP

## 2023-02-01 ENCOUNTER — OFFICE VISIT (OUTPATIENT)
Dept: VASCULAR SURGERY | Facility: CLINIC | Age: 88
End: 2023-02-01
Attending: NURSE PRACTITIONER
Payer: COMMERCIAL

## 2023-02-01 ENCOUNTER — ANCILLARY PROCEDURE (OUTPATIENT)
Dept: VASCULAR ULTRASOUND | Facility: CLINIC | Age: 88
End: 2023-02-01
Attending: NURSE PRACTITIONER
Payer: COMMERCIAL

## 2023-02-01 VITALS
TEMPERATURE: 99 F | BODY MASS INDEX: 25.24 KG/M2 | HEART RATE: 68 BPM | RESPIRATION RATE: 16 BRPM | SYSTOLIC BLOOD PRESSURE: 142 MMHG | WEIGHT: 138 LBS | DIASTOLIC BLOOD PRESSURE: 80 MMHG

## 2023-02-01 DIAGNOSIS — I87.303 VENOUS HYPERTENSION OF BOTH LOWER EXTREMITIES: ICD-10-CM

## 2023-02-01 DIAGNOSIS — I89.0 SECONDARY LYMPHEDEMA: Primary | ICD-10-CM

## 2023-02-01 DIAGNOSIS — M79.89 PAIN AND SWELLING OF LOWER EXTREMITY, UNSPECIFIED LATERALITY: ICD-10-CM

## 2023-02-01 DIAGNOSIS — M79.606 PAIN AND SWELLING OF LOWER EXTREMITY, UNSPECIFIED LATERALITY: ICD-10-CM

## 2023-02-01 DIAGNOSIS — M54.41 ACUTE RIGHT-SIDED LOW BACK PAIN WITH RIGHT-SIDED SCIATICA: ICD-10-CM

## 2023-02-01 DIAGNOSIS — I83.893 VARICOSE VEINS OF BOTH LEGS WITH EDEMA: ICD-10-CM

## 2023-02-01 DIAGNOSIS — M79.89 LEG SWELLING: ICD-10-CM

## 2023-02-01 DIAGNOSIS — I87.2 VENOUS (PERIPHERAL) INSUFFICIENCY: ICD-10-CM

## 2023-02-01 PROCEDURE — 99203 OFFICE O/P NEW LOW 30 MIN: CPT | Performed by: NURSE PRACTITIONER

## 2023-02-01 PROCEDURE — 93970 EXTREMITY STUDY: CPT | Mod: 26 | Performed by: SURGERY

## 2023-02-01 PROCEDURE — 93970 EXTREMITY STUDY: CPT

## 2023-02-01 PROCEDURE — G0463 HOSPITAL OUTPT CLINIC VISIT: HCPCS | Mod: 25 | Performed by: NURSE PRACTITIONER

## 2023-02-01 PROCEDURE — 99211 OFF/OP EST MAY X REQ PHY/QHP: CPT | Mod: 25 | Performed by: NURSE PRACTITIONER

## 2023-02-01 ASSESSMENT — PAIN SCALES - GENERAL: PAINLEVEL: MODERATE PAIN (4)

## 2023-02-01 NOTE — PATIENT INSTRUCTIONS
We will be referring you lymphedema therapy to work on the swelling    We will be wrapping the legs with 2 layer compression wraps today; change these 1-2 times per week at the clinic    When the swelling is down we will transition you into your compression stockings    Bring your stockings with to each appt and we will transition you when you are ready into your stockings      Elevate your legs 40 minutes 4 times a day    Low sodium diet 2.5-4 grams per day    Your ultrasound demonstrated valves in your veins are not working however the vein diameter is too small to treat with a procedure called an ablation    Continue to wear your compression stockings or velcro wraps every day; put them on first thing in the morning and remove at bedtime    Replace your compression stockings every 3-4 months; these garments will lose their elasticity and become ineffective    Replace velcro wraps every 1-2 years    Elevate your legs periodically throughout the day, 30-60 minutes 1-3 times per day    Apply lotion to your legs 1-2 times per day; some good name brands are Cetaphil, Sarna, Aveeno, VaniCream, CeraVe    Continue to walk and exercise    If you are taking a diuretic continue to do so at the direction of your primary care provider    Make an appointment at the vascular clinic again if you have worsening swelling, need a prescription for new compression garments; and/or develop new wounds

## 2023-02-03 DIAGNOSIS — M54.16 LUMBAR RADICULAR PAIN: ICD-10-CM

## 2023-02-06 ENCOUNTER — OFFICE VISIT (OUTPATIENT)
Dept: VASCULAR SURGERY | Facility: CLINIC | Age: 88
End: 2023-02-06
Attending: NURSE PRACTITIONER
Payer: COMMERCIAL

## 2023-02-06 VITALS
DIASTOLIC BLOOD PRESSURE: 68 MMHG | BODY MASS INDEX: 24.6 KG/M2 | HEART RATE: 91 BPM | RESPIRATION RATE: 16 BRPM | SYSTOLIC BLOOD PRESSURE: 116 MMHG | TEMPERATURE: 98.2 F | OXYGEN SATURATION: 97 % | WEIGHT: 134.5 LBS

## 2023-02-06 DIAGNOSIS — I87.303 VENOUS HYPERTENSION OF BOTH LOWER EXTREMITIES: ICD-10-CM

## 2023-02-06 DIAGNOSIS — I87.2 VENOUS (PERIPHERAL) INSUFFICIENCY: ICD-10-CM

## 2023-02-06 DIAGNOSIS — I83.893 VARICOSE VEINS OF BOTH LEGS WITH EDEMA: ICD-10-CM

## 2023-02-06 DIAGNOSIS — I89.0 SECONDARY LYMPHEDEMA: Primary | ICD-10-CM

## 2023-02-06 PROCEDURE — 99213 OFFICE O/P EST LOW 20 MIN: CPT | Performed by: NURSE PRACTITIONER

## 2023-02-06 PROCEDURE — G0463 HOSPITAL OUTPT CLINIC VISIT: HCPCS | Performed by: NURSE PRACTITIONER

## 2023-02-06 RX ORDER — GABAPENTIN 300 MG/1
CAPSULE ORAL
Qty: 60 CAPSULE | Refills: 1 | Status: SHIPPED | OUTPATIENT
Start: 2023-02-06 | End: 2023-02-24

## 2023-02-06 RX ORDER — TIZANIDINE 2 MG/1
TABLET ORAL
Qty: 90 TABLET | Refills: 1 | Status: ON HOLD | OUTPATIENT
Start: 2023-02-06 | End: 2023-03-28

## 2023-02-06 ASSESSMENT — PAIN SCALES - GENERAL: PAINLEVEL: NO PAIN (0)

## 2023-02-06 NOTE — PROGRESS NOTES
Follow up Vascular Visit       Date of Service:02/06/23      Chief Complaint: BLE swelling      Pt returns to [facility] with regards to their BLE swelling.  They arrive today with daughter in law. They are currently reporting no wounds. They are using 2 layers bilaterally for compression. They are feeling well today. Denies fevers, chills. No shortness of breath. She purchased compression stockings and brought these with today. Noted to be down 4 pounds today; did not note increase in urination. Continues to have issues with right sided sciatica; she is working with NS on this; scheduled for EMG next week; noted that she has to walk on her right forefoot due to pain. Sleep disturbance due to pain.     Allergies:   Allergies   Allergen Reactions     Naproxen Hives     Tolerates ibuprofen       Medications:   Current Outpatient Medications:      acetaminophen (TYLENOL) 500 MG tablet, Take 500-1,000 mg by mouth as needed for mild pain, Disp: , Rfl:      aspirin 81 MG EC tablet, [ASPIRIN 81 MG EC TABLET] Take 81 mg by mouth., Disp: , Rfl:      betamethasone dipropionate (DIPROLENE) 0.05 % ointment, [BETAMETHASONE DIPROPIONATE (DIPROLENE) 0.05 % OINTMENT] Apply topically., Disp: , Rfl:      gabapentin (NEURONTIN) 300 MG capsule, Take 1 capsule (300 mg) by mouth At Bedtime, Disp: 30 capsule, Rfl: 1     ibuprofen (ADVIL/MOTRIN) 200 MG tablet, Take 200 mg by mouth as needed for pain, Disp: , Rfl:      magnesium citrate 100 mg Tab, Take 250 mg by mouth, Disp: , Rfl:      multivitamin with minerals (THERA-M) 9 mg iron-400 mcg Tab tablet, [MULTIVITAMIN WITH MINERALS (THERA-M) 9 MG IRON-400 MCG TAB TABLET] Take 1 tablet by mouth daily., Disp: , Rfl:      tiZANidine (ZANAFLEX) 2 MG tablet, Take 1 tablet 3 times a day as needed for pain/spasms., Disp: 90 tablet, Rfl: 1     hydrochlorothiazide (HYDRODIURIL) 12.5 MG tablet, Take 1 tablet (12.5 mg) by mouth daily (Patient not taking: Reported on  2/1/2023), Disp: 60 tablet, Rfl: 0     oxyCODONE-acetaminophen (PERCOCET) 5-325 MG tablet, Take 1 tablet by mouth nightly as needed for pain (Patient not taking: Reported on 1/20/2023), Disp: 14 tablet, Rfl: 0    History:   Past Medical History:   Diagnosis Date     Allergic rhinitis 8/2/2005     Contact dermatitis and other eczema, due to unspecified cause 7/30/2001     Low back pain 8/2/2016     Mixed hyperlipidemia 8/9/2006       Physical Exam:    /68   Pulse 91   Temp 98.2  F (36.8  C)   Resp 16   Wt 134 lb 8 oz (61 kg)   SpO2 97%   BMI 24.60 kg/m      General:  Patient presents to clinic in no apparent distress.  Head: normocephalic atraumatic  Psychiatric:  Alert and oriented x3.   Respiratory: unlabored breathing; no cough  Integumentary:  Skin is uniformly warm, dry and pink.    Extremities: swelling is down; continues to have worsening swelling in the feet and ankles; right worse than left              Circumferential volume measures:      Circumferential Measures 2/1/2023 2/6/2023   Right just above MTP 24 22.2   Right Ankle 27 24.9   Right Widest Calf 35.5 35   Right Knee to Ankle 32 -   Left - just above MTP 23.6 22.1   Left Ankle 26.3 23.5   Left Widest Calf 35.2 33.2   Left Knee to Ankle 32 -       Labs:    I personally reviewed the following lab results today and those on care everywhere    No results found for: CRP   No results found for: SED   Last Renal Panel:  Sodium   Date Value Ref Range Status   01/20/2023 142 136 - 145 mmol/L Final     Potassium   Date Value Ref Range Status   01/20/2023 4.3 3.4 - 5.3 mmol/L Final   04/21/2022 4.3 3.5 - 5.0 mmol/L Final     Chloride   Date Value Ref Range Status   01/20/2023 106 98 - 107 mmol/L Final   04/21/2022 106 98 - 107 mmol/L Final     Carbon Dioxide (CO2)   Date Value Ref Range Status   01/20/2023 26 22 - 29 mmol/L Final   04/21/2022 25 22 - 31 mmol/L Final     Anion Gap   Date Value Ref Range Status   01/20/2023 10 7 - 15 mmol/L Final    04/21/2022 10 5 - 18 mmol/L Final     Glucose   Date Value Ref Range Status   01/20/2023 75 70 - 99 mg/dL Final   04/21/2022 104 70 - 125 mg/dL Final     Urea Nitrogen   Date Value Ref Range Status   01/20/2023 17.9 8.0 - 23.0 mg/dL Final   04/21/2022 15 8 - 28 mg/dL Final     Creatinine   Date Value Ref Range Status   01/20/2023 0.78 0.51 - 0.95 mg/dL Final     GFR Estimate   Date Value Ref Range Status   01/20/2023 74 >60 mL/min/1.73m2 Final     Comment:     Effective December 21, 2021 eGFRcr in adults is calculated using the 2021 CKD-EPI creatinine equation which includes age and gender (Bull et al., NEJ, DOI: 10.1056/VPRZfw0091754)     Calcium   Date Value Ref Range Status   01/20/2023 9.6 8.8 - 10.2 mg/dL Final     Albumin   Date Value Ref Range Status   01/20/2023 4.3 3.5 - 5.2 g/dL Final   04/21/2022 3.6 3.5 - 5.0 g/dL Final      Lab Results   Component Value Date    WBC 6.7 01/20/2023     Lab Results   Component Value Date    RBC 4.19 01/20/2023     Lab Results   Component Value Date    HGB 13.0 01/20/2023     Lab Results   Component Value Date    HCT 39.4 01/20/2023     No components found for: MCT  Lab Results   Component Value Date    MCV 94 01/20/2023     Lab Results   Component Value Date    MCH 31.0 01/20/2023     Lab Results   Component Value Date    MCHC 33.0 01/20/2023     Lab Results   Component Value Date    RDW 13.1 01/20/2023     Lab Results   Component Value Date     01/20/2023      No results found for: A1C   No results found for: TSH   No results found for: VITDT                Impression:  Encounter Diagnoses   Name Primary?     Secondary lymphedema Yes     Venous (peripheral) insufficiency      Venous hypertension of both lower extremities      Varicose veins of both legs with edema                        Are any of these wounds new today: no    Assessment/Plan:          1. Debridement: na     2.  Wound treatment: wound treatment will include irrigation and dressings to promote  autolytic debridement which will include:none          3. Edema: will continue to reduce the swelling down with the 2 layers bilaterally; will add high density foam to the right malleoli and bilateral tops of the feet. Pt bought 15-20mHG compression stockings explained that this most likely will not be adequate for her condition but she would most likely be able to get this on and off independently but her swelling would worsen again over time; we discussed velcro wraps instead and she will consider this option as well. . The compression wraps were applied today in clinic.     If a 2 layer or 4 layer compression wrap is being used; these are safe to have on for ONLY 7 days. If for some reason the patient is not able to get the wrap(s) changed (due to illness; lack of supplies, lack of help, lack of transportation) please do the following: unwrap the old 2 or 4 layer compression wrap; avoid using scissors as you could cut your skin and cause wounds; use tubular compression when available. Call to reschedule your home care or clinic visit appointment as soon as possible.  Stable            4. Nutrition: low sodium diet           5. Offloading: na     Patient will follow up with me in 1 weeks for reevaluation. They were instructed to call the clinic sooner with any signs or symptoms of infection or any further questions/concerns. Answered all questions.          Roxanna Rodrigues DNP, RN, CNP, CWOCN, CFCN, CLT  [facility]   [phone]        This note was electronically signed by Roxanna Rodrigues NP

## 2023-02-06 NOTE — TELEPHONE ENCOUNTER
"PSP: Tiffanie Quan PA-C   Last clinic visit: 1/17/23  Reason for call: Refills for Tizanidine and Gabapentin  Clinical information: Reports she has 2 capsules of Gabapentin left. Wondering about taking more often than just at HS. \"It is not the Gabapentin causing my feet swelling. It is because my veins are small. I have been getting my legs wrapped and it is helping. I was just there this morning. Want to try Gabapentin twice a day.\" Takes Tizanidine TID.   Advice given to patient: PSP will be informed of update and request to increase Gabapentin. Pharmacy verified.   Provider to address: Rx prepped for review; did do for BID    Patient scheduled for EMG on 2/13/23.         "

## 2023-02-06 NOTE — PATIENT INSTRUCTIONS
We will be wrapping the legs with 2 layer compression wraps today; with additional padding on the right malleolli (ankle bones) and tops of the feet using high density foam change these 1-2 times per week at the clinic    When the swelling is down we will transition you into your compression stockings or velcro wraps    Bring your stockings with to each appt and we will transition you when you are ready into your stockings or size you for velcro wraps      Elevate your legs 40 minutes 4 times a day    Low sodium diet 2.5-4 grams per day    Your ultrasound demonstrated valves in your veins are not working however the vein diameter is too small to treat with a procedure called an ablation    Continue to wear your compression stockings or velcro wraps every day; put them on first thing in the morning and remove at bedtime    Replace your compression stockings every 3-4 months; these garments will lose their elasticity and become ineffective    Replace velcro wraps every 1-2 years    Elevate your legs periodically throughout the day, 30-60 minutes 1-3 times per day    Apply lotion to your legs 1-2 times per day; some good name brands are Cetaphil, Sarna, Aveeno, VaniCream, CeraVe    Continue to walk and exercise    If you are taking a diuretic continue to do so at the direction of your primary care provider    Make an appointment at the vascular clinic again if you have worsening swelling, need a prescription for new compression garments; and/or develop new wounds

## 2023-02-09 ENCOUNTER — ALLIED HEALTH/NURSE VISIT (OUTPATIENT)
Dept: VASCULAR SURGERY | Facility: CLINIC | Age: 88
End: 2023-02-09
Attending: NURSE PRACTITIONER
Payer: COMMERCIAL

## 2023-02-09 VITALS
TEMPERATURE: 97.8 F | HEART RATE: 88 BPM | DIASTOLIC BLOOD PRESSURE: 78 MMHG | SYSTOLIC BLOOD PRESSURE: 124 MMHG | RESPIRATION RATE: 18 BRPM

## 2023-02-09 DIAGNOSIS — I89.0 SECONDARY LYMPHEDEMA: Primary | ICD-10-CM

## 2023-02-09 DIAGNOSIS — I87.2 VENOUS (PERIPHERAL) INSUFFICIENCY: ICD-10-CM

## 2023-02-09 PROCEDURE — 29581 APPL MULTLAYER CMPRN SYS LEG: CPT

## 2023-02-09 ASSESSMENT — PAIN SCALES - GENERAL: PAINLEVEL: MILD PAIN (2)

## 2023-02-09 NOTE — PROGRESS NOTES
"[facility]  -  Nurse Visit        Date of Service:  2023     Requesting Provider: SANDIE Rodrigues    Diagnosis:     ICD-10-CM    1. Secondary lymphedema  I89.0       2. Venous (peripheral) insufficiency  I87.2           Chief Complaint: Ramila is being seen today at [facility] [place] for her swelling dressing change. Reports pain of 2.  Denies any fevers, chills, or generalized ill feeling.     Dressing on Arrival: 2-layer C/D/I, Pt is currently using 2-layer for compression and did tolerate well. No open wounds noted. Patient thinks legs look \"better\" today but still has some swelling on dorsal foot and around right ankle area. Will apply high density foam per orders. ;Plan is to switch to compression stockings or velcro wraps once measurements stabilize. Follow up with Roxanna Rodrigues CNP on 23.             Vital Signs: /78   Pulse 88   Temp 97.8  F (36.6  C) (Temporal)   Resp 18     Assessment:      General:  Patient presents to clinic in no apparent distress.   Psychiatric:  Alert and oriented x3.   Lower extremity:  edema is present.    Integumentary:  Skin is WNL    Circumferential volume measures:  Circumferential Measures 2023   Right just above MTP 24 22.2 21.4   Right Ankle 27 24.9 22.6   Right Widest Calf 35.5 35 32.4   Right Knee to Ankle 32 - -   Left - just above MTP 23.6 22.1 21.8   Left Ankle 26.3 23.5 22.4   Left Widest Calf 35.2 33.2 32.4   Left Knee to Ankle 32 - -     No wounds present      Plan:         1. Patient will return on 23 for follow up with Roxanna Rodrigues CNP            2. As listed below, treatment provided included application of multi-layer compression therapy.             Cleansed with: soap and water    Protected skin with: Remedy skin repair lotion    No wounds  Foam applied to dorsal foot and around lateral and medial malleous    Compression Applied to the right le-Layer " Compression  Compression Applied to the left le-Layer Compression    2-Layer [place]: I Applied the inner foam layer with the foot dorsiflexed and started atthe base of the fifth metatarsal head. I left the bottom of the heel exposed, and proceed by winding the foam up the leg using minimal overlap to just below the fibular head. I then applied the compression layer with the foot dorsiflexed and startingat the base of the fifth metatarsal head. I applied at full stretch and proceeded up the leg using 50% overlap. The bottom of the heel is covered with the compression layer up to the end at the fibular head just below the back of the knee and levelwith the top edge of the foam layer.  I gently pressed and conformed the entire surface of the system to ensurethat the two layers are firmly bound together      Offloading used: Walker    Trial Products: No    Provider notified regarding concerns: No    Treatment Changes: No    Tolerated Dressing Change:  Yes    Taught Regarding: compression, layered compression wraps - maximum wear time of 7 days, elevation, offloading and compliance    Educational Barriers: No barriers      Charlotte Mcelroy RN, WTA-C, [facility]

## 2023-02-09 NOTE — PATIENT INSTRUCTIONS
- Please call us if your compression wraps fall more than 1-2 inches below the bend of the knee. Remove the wraps if you experience any shortness of breathe or notice your toes turning blue/purple and then give us a call. Call if they are too painful. Call if they get wet. If it is a weekend and the wraps fall down, are too painful, or get wet take the wraps off and put on another form of compression. Compression such as velcro wraps, compression stockings, short stretch bandages, or tubular compression. Apply one of these until you can be seen in clinic. Please call us if you have any questions [phone].    - Treatment:  Layered Compression Bandaging (2-layer)    What is it?  The layered compression bandaging has a layer of absorbent material that will soak up drainage.     Why we do it.   This is done to treat swelling, wounds, or both.  This will in turn help circulation and healing.    How to care for your bandages.  The wraps need to be kept dry. If  the wraps become wet, remove them and call the clinic to have another wrap applied.    What to expect.  It is common for the wraps to be uncomfortable at the beginning. The first two days are usually the hardest; then they will become more comfortable.       Elevating your legs will help the discomfort. Try to elevate your legs as much as possible.    If rest and elevation does not help your discomfort, call your provider.  If your provider is not available you can remove the wrap and leave a message for further instructions.    - Swelling and Compression Therapy    Swelling in the legs can be caused by many reasons. No matter what the reason, treatment usually includes some type of compression. This may be done with a support sock, dressing, ace wrap, or layered wraps.     It is important to treat the swelling for many reasons. If the swelling is not treated you may develop blisters that can lead to ulcerations. This is caused when extra fluid goes into tissue  causing damage and blocking blood flow to the tissue.     It is important that you wear your compression every day, including days that you will be seen in clinic.     Compression is often the most important part of treating leg wounds. Without controlling the swelling it is often not possible to heal wounds.     Going without compression for even brief periods of time can be damaging to your legs and your health.  Your compression should be put on first thing in the morning. Take the compression off at night only when instructed by your care provider to do so. Sometimes wearing compression 24 hours a day will be recommended.       If you are having difficulty wearing your compression it is important to notify your care provider so that other options may be reviewed.    Thank you for choosing [facility]. Please call us if you have any questions [phone].

## 2023-02-13 ENCOUNTER — OFFICE VISIT (OUTPATIENT)
Dept: PHYSICAL MEDICINE AND REHAB | Facility: CLINIC | Age: 88
End: 2023-02-13
Attending: SURGERY
Payer: COMMERCIAL

## 2023-02-13 ENCOUNTER — OFFICE VISIT (OUTPATIENT)
Dept: VASCULAR SURGERY | Facility: CLINIC | Age: 88
End: 2023-02-13
Attending: NURSE PRACTITIONER
Payer: COMMERCIAL

## 2023-02-13 VITALS
HEART RATE: 94 BPM | BODY MASS INDEX: 24.33 KG/M2 | WEIGHT: 133 LBS | RESPIRATION RATE: 12 BRPM | TEMPERATURE: 98.2 F | SYSTOLIC BLOOD PRESSURE: 118 MMHG | DIASTOLIC BLOOD PRESSURE: 74 MMHG | OXYGEN SATURATION: 97 %

## 2023-02-13 DIAGNOSIS — I87.2 VENOUS (PERIPHERAL) INSUFFICIENCY: ICD-10-CM

## 2023-02-13 DIAGNOSIS — M54.16 LUMBAR RADICULOPATHY: ICD-10-CM

## 2023-02-13 DIAGNOSIS — I83.893 VARICOSE VEINS OF BOTH LEGS WITH EDEMA: ICD-10-CM

## 2023-02-13 DIAGNOSIS — M79.606 PAIN AND SWELLING OF LOWER EXTREMITY, UNSPECIFIED LATERALITY: ICD-10-CM

## 2023-02-13 DIAGNOSIS — I87.303 VENOUS HYPERTENSION OF BOTH LOWER EXTREMITIES: ICD-10-CM

## 2023-02-13 DIAGNOSIS — I89.0 SECONDARY LYMPHEDEMA: Primary | ICD-10-CM

## 2023-02-13 DIAGNOSIS — M79.89 PAIN AND SWELLING OF LOWER EXTREMITY, UNSPECIFIED LATERALITY: ICD-10-CM

## 2023-02-13 PROCEDURE — 95886 MUSC TEST DONE W/N TEST COMP: CPT | Mod: RT | Performed by: PHYSICAL MEDICINE & REHABILITATION

## 2023-02-13 PROCEDURE — G0463 HOSPITAL OUTPT CLINIC VISIT: HCPCS | Performed by: NURSE PRACTITIONER

## 2023-02-13 PROCEDURE — 99213 OFFICE O/P EST LOW 20 MIN: CPT | Performed by: NURSE PRACTITIONER

## 2023-02-13 PROCEDURE — 95910 NRV CNDJ TEST 7-8 STUDIES: CPT | Performed by: PHYSICAL MEDICINE & REHABILITATION

## 2023-02-13 ASSESSMENT — PAIN SCALES - GENERAL: PAINLEVEL: NO PAIN (0)

## 2023-02-13 NOTE — PROGRESS NOTES
Follow up Vascular Visit       Date of Service:02/13/23      Chief Complaint: BLE swelling      Pt returns to [facility] with regards to their BLE swelling.  They arrive today with her son. They are currently reporting no wounds. They are using 2 layer bilaterally for compression. They are feeling well today. Denies fevers, chills. No shortness of breath.     Allergies:   Allergies   Allergen Reactions     Naproxen Hives     Tolerates ibuprofen       Medications:   Current Outpatient Medications:      acetaminophen (TYLENOL) 500 MG tablet, Take 500-1,000 mg by mouth as needed for mild pain, Disp: , Rfl:      aspirin 81 MG EC tablet, [ASPIRIN 81 MG EC TABLET] Take 81 mg by mouth., Disp: , Rfl:      betamethasone dipropionate (DIPROLENE) 0.05 % ointment, [BETAMETHASONE DIPROPIONATE (DIPROLENE) 0.05 % OINTMENT] Apply topically., Disp: , Rfl:      gabapentin (NEURONTIN) 300 MG capsule, Take 1 capsule (300 mg) by mouth twice a day., Disp: 60 capsule, Rfl: 1     ibuprofen (ADVIL/MOTRIN) 200 MG tablet, Take 200 mg by mouth as needed for pain, Disp: , Rfl:      magnesium citrate 100 mg Tab, Take 250 mg by mouth, Disp: , Rfl:      multivitamin with minerals (THERA-M) 9 mg iron-400 mcg Tab tablet, [MULTIVITAMIN WITH MINERALS (THERA-M) 9 MG IRON-400 MCG TAB TABLET] Take 1 tablet by mouth daily., Disp: , Rfl:      tiZANidine (ZANAFLEX) 2 MG tablet, TAKE 1 TABLET BY MOUTH THREE TIMES DAILY AS NEEDED FOR PAIN OR SPASMS, Disp: 90 tablet, Rfl: 1     hydrochlorothiazide (HYDRODIURIL) 12.5 MG tablet, Take 1 tablet (12.5 mg) by mouth daily (Patient not taking: Reported on 2/1/2023), Disp: 60 tablet, Rfl: 0     oxyCODONE-acetaminophen (PERCOCET) 5-325 MG tablet, Take 1 tablet by mouth nightly as needed for pain (Patient not taking: Reported on 1/20/2023), Disp: 14 tablet, Rfl: 0    History:   Past Medical History:   Diagnosis Date     Allergic rhinitis 8/2/2005     Contact dermatitis and other eczema, due to  unspecified cause 7/30/2001     Low back pain 8/2/2016     Mixed hyperlipidemia 8/9/2006       Physical Exam:    /74   Pulse 94   Temp 98.2  F (36.8  C)   Resp 12   Wt 133 lb (60.3 kg)   SpO2 97%   BMI 24.33 kg/m      General:  Patient presents to clinic in no apparent distress.  Head: normocephalic atraumatic  Psychiatric:  Alert and oriented x3.   Respiratory: unlabored breathing; no cough  Integumentary:  Skin is uniformly warm, dry and pink.    Extremities: swelling is down and stable; no wounds; no erythema; no pain with palpation              Circumferential volume measures:      Circumferential Measures 2/1/2023 2/6/2023 2/9/2023 2/13/2023   Right just above MTP 24 22.2 21.4 21.4   Right Ankle 27 24.9 22.6 21.7   Right Widest Calf 35.5 35 32.4 32.3   Right Knee to Ankle 32 - - -   Left - just above MTP 23.6 22.1 21.8 20.8   Left Ankle 26.3 23.5 22.4 21.8   Left Widest Calf 35.2 33.2 32.4 32.4   Left Knee to Ankle 32 - - -       Labs:    I personally reviewed the following lab results today and those on care everywhere    No results found for: CRP   No results found for: SED   Last Renal Panel:  Sodium   Date Value Ref Range Status   01/20/2023 142 136 - 145 mmol/L Final     Potassium   Date Value Ref Range Status   01/20/2023 4.3 3.4 - 5.3 mmol/L Final   04/21/2022 4.3 3.5 - 5.0 mmol/L Final     Chloride   Date Value Ref Range Status   01/20/2023 106 98 - 107 mmol/L Final   04/21/2022 106 98 - 107 mmol/L Final     Carbon Dioxide (CO2)   Date Value Ref Range Status   01/20/2023 26 22 - 29 mmol/L Final   04/21/2022 25 22 - 31 mmol/L Final     Anion Gap   Date Value Ref Range Status   01/20/2023 10 7 - 15 mmol/L Final   04/21/2022 10 5 - 18 mmol/L Final     Glucose   Date Value Ref Range Status   01/20/2023 75 70 - 99 mg/dL Final   04/21/2022 104 70 - 125 mg/dL Final     Urea Nitrogen   Date Value Ref Range Status   01/20/2023 17.9 8.0 - 23.0 mg/dL Final   04/21/2022 15 8 - 28 mg/dL Final      Creatinine   Date Value Ref Range Status   01/20/2023 0.78 0.51 - 0.95 mg/dL Final     GFR Estimate   Date Value Ref Range Status   01/20/2023 74 >60 mL/min/1.73m2 Final     Comment:     Effective December 21, 2021 eGFRcr in adults is calculated using the 2021 CKD-EPI creatinine equation which includes age and gender (Bull et al., NE, DOI: 10.1056/[id number])     Calcium   Date Value Ref Range Status   01/20/2023 9.6 8.8 - 10.2 mg/dL Final     Albumin   Date Value Ref Range Status   01/20/2023 4.3 3.5 - 5.2 g/dL Final   04/21/2022 3.6 3.5 - 5.0 g/dL Final      Lab Results   Component Value Date    WBC 6.7 01/20/2023     Lab Results   Component Value Date    RBC 4.19 01/20/2023     Lab Results   Component Value Date    HGB 13.0 01/20/2023     Lab Results   Component Value Date    HCT 39.4 01/20/2023     No components found for: MCT  Lab Results   Component Value Date    MCV 94 01/20/2023     Lab Results   Component Value Date    MCH 31.0 01/20/2023     Lab Results   Component Value Date    MCHC 33.0 01/20/2023     Lab Results   Component Value Date    RDW 13.1 01/20/2023     Lab Results   Component Value Date     01/20/2023      No results found for: A1C   No results found for: TSH   No results found for: VITDT                Impression:  Encounter Diagnoses   Name Primary?     Secondary lymphedema Yes     Venous (peripheral) insufficiency      Venous hypertension of both lower extremities      Varicose veins of both legs with edema      Pain and swelling of lower extremity, unspecified laterality                            Are any of these wounds new today: No; Location: na    Assessment/Plan:          1. Debridement: na       2.  Wound treatment: wound treatment will include irrigation and dressings to promote autolytic debridement which will include:no wounds; lotion daily.            3. Edema: her swelling is stable and down 11cm bilaterally overall; will transition her into compression  stockings; she does not tolerate closed toe; she purchased closed toe 15-20mmHG; provided information on donning devices; if she is not able to get these on/off independently; recommended that she make follow up for velcro fitting instead; encouraged elevation and low sodium diet. The compression wraps were applied today in clinic.     If a 2 layer or 4 layer compression wrap is being used; these are safe to have on for ONLY 7 days. If for some reason the patient is not able to get the wrap(s) changed (due to illness; lack of supplies, lack of help, lack of transportation) please do the following: unwrap the old 2 or 4 layer compression wrap; avoid using scissors as you could cut your skin and cause wounds; use tubular compression when available. Call to reschedule your home care or clinic visit appointment as soon as possible.  Stable            4. Nutrition: focus on low sodium diet and weight maintenance           5. Offloading: na     Patient will follow up with me PRN weeks for reevaluation. They were instructed to call the clinic sooner with any signs or symptoms of infection or any further questions/concerns. Answered all questions.          Roxanna Rodrigues DNP, RN, CNP, CWOCN, CFCN, CLT  [facility]   [phone]        This note was electronically signed by Roxanna Rodrigues NP

## 2023-02-13 NOTE — PATIENT INSTRUCTIONS
Compression Stocking Tricks for Application and Removal    Purchase ALPS Prosthetic Fitting Lotion  Apply pea-sized amount to each leg prior to donning stockings  [email or web address]          Donning Devices                      Garden Gloves               Continue to wear your compression stockings or velcro wraps every day; put them on first thing in the morning and remove at bedtime    Replace your compression stockings every 3-4 months; these garments will lose their elasticity and become ineffective    Replace velcro wraps every 1-2 years    Elevate your legs periodically throughout the day, 30-60 minutes 1-3 times per day    Apply lotion to your legs 1-2 times per day; some good name brands are Cetaphil, Sarna, Aveeno, VaniCream, CeraVe    Continue to walk and exercise    If you are taking a diuretic continue to do so at the direction of your primary care provider    Make an appointment at the vascular clinic again if you have worsening swelling, need a prescription for new compression garments; and/or develop new wounds

## 2023-02-13 NOTE — LETTER
2/13/2023         RE: Ramila Liao  [address]  [facility] [address]        Dear Colleague,    Thank you for referring your patient, Ramila Liao, to the [facility]. Please see a copy of my visit note below.    [facility]  [address] [place] [address] [address]  [place] [address]  Office: [phone] Fax: [phone]    Electromyography and Nerve Conduction Study Report        Indication: Patient presents at the request of Dr. Deni Velarde for a right lower extremity EMG.  She has low back pain with right lower extremity pain and paresthesias.  She does have paresthesias in the toes of both feet as well.  Symptoms are worse at night.  On exam she has normal sensation to light touch of the lower extremities, 1+ patellar and 0 Achilles reflexes bilaterally, normal muscle strength throughout the major muscle groups of the bilateral lower extremities.       Pt Exam Discussion (Communication Barriers):  Electromyography and nerve conduction testing, including associated discomfort, risks, benefits, and alternatives was discussed with the patient prior to the procedure.  No learning/ communication barriers; patient verbalized understanding of procedure.  Informed consent was obtained.           Pt Assessment:  Testing was successfully completed; patient tolerated testing well.       Blood Thinners: ASA Skin Temperature: Warmed 30.5                   EMG/NCS  results:     Nerve Conduction Studies  Motor Sites      Amplitude Segment CV Distal Latency F-Latency F-Estimate Temp Comment   Site (mV)  (m/s) (ms) ms ms  C    Right Fibular (EDB) Motor   Ankle 0.92 Ankle-EDB  3.4   24    Fib Head *NR Fib Head-Ankle *NR *NR   23.9    Knee *0.51 Knee-Ankle 45 11.9   23.9    Left Tibial (AH) Motor   Ankle 3.8 Ankle-AH  4.0   24    Knee *2.5 Knee-Ankle 47 12.8   24    Right Tibial (AH) Motor   Ankle 2.7 Ankle-AH  3.3   23.8    Knee *2.3 Knee-Ankle 48  11.6   23.8    Left Ulnar (ADM) Motor   Wrist 9.2   2.5   24.1    Bel Elbow 9.0 Bel Elbow-Wrist 57 5.9   24    Ab Elbow 9.3 Ab Elbow-Wrist 60 7.5   24      Sensory Sites      Amplitude CV Onset Lat Peak Lat Temp Comment   Site ( V) (m/s) (ms) (ms)  C    Left Radial Sensory   Forearm-Wrist 20 61 1.65 2.2 24.1    Left Sural Sensory   B-Ankle *NR *NR *NR *NR 24    Right Sural Sensory   B-Ankle *NR *NR *NR *NR 24.6        NCS Waveforms:    Motor                Sensory             Electromyography     Side Muscle Nerve Root Ins Act Fibs Psw Amp Dur Poly Recrt Int Pat Comment   Right AntTibialis Dp Br Fibular L4-5 *Incr *2+ *2+ Nml Nml 0 Nml Nml    Right Gastroc Tibial S1-2 Nml Nml Nml Nml Nml 0 Nml Nml    Right Fibularis Long Sup Br Fibular L5-S1 *Incr *1+ *1+ Nml Nml 0 Nml Nml    Right VastusLat Femoral L2-4 Nml Nml Nml Nml Nml 0 Nml Nml    Right TensorFascLat SupGluteal L4-5, S1 *Incr *1+ *1+ Nml Nml 0 Nml Nml    Right PostTibialis Tibial L5, S1 *Incr *1+ *1+ Nml Nml 0 Nml Nml    Right GluteusMax InfGluteal L5-S2 Nml Nml Nml Nml Nml 0 Nml Nml    Right Lumbo Parasp Low Rami L5-S1 Nml Nml Nml         Left AntTibialis Dp Br Fibular L4-5 Nml Nml Nml Nml Nml 0 Nml Nml          Comment NCS: Abnormal study  1.  Absent bilateral sural SNAPs  2.  Low amplitude bilateral tibial CMAP's and right peroneal CMAP with normal conduction velocities.  3.  Normal right radial SNAP and ulnar CMAP    Comment EMG: Abnormal study  1.  Increased insertional activity with positive waves and fibrillation potentials of the right tibialis anterior, tibialis posterior, peroneus longus and TFL.  Remainder right lower extremity needle EMG normal.  2.  Normal left tibialis anterior    Interpretation: Abnormal study: There is electrodiagnostic evidence of:    1.   Right L5 radiculopathy, active.      2.  There is electrodiagnostic evidence consistent with a length-dependent sensorimotor polyneuropathy, predominantly axonal.      3.  There is no  convincing electrodiagnostic evidence of  focal neuropathy in the right lower extremity.    The testing was completed in its entirety by the physician.      It was our pleasure caring for your patient today, if there any questions or concerns please do not hesitate to contact us.            Again, thank you for allowing me to participate in the care of your patient.        Sincerely,        Acosta Alvarado, DO

## 2023-02-13 NOTE — PROGRESS NOTES
[facility]  [address] [place] [address] [address]  [place] [address]  Office: [phone] Fax: [phone]    Electromyography and Nerve Conduction Study Report        Indication: Patient presents at the request of Dr. Deni Velarde for a right lower extremity EMG.  She has low back pain with right lower extremity pain and paresthesias.  She does have paresthesias in the toes of both feet as well.  Symptoms are worse at night.  On exam she has normal sensation to light touch of the lower extremities, 1+ patellar and 0 Achilles reflexes bilaterally, normal muscle strength throughout the major muscle groups of the bilateral lower extremities.       Pt Exam Discussion (Communication Barriers):  Electromyography and nerve conduction testing, including associated discomfort, risks, benefits, and alternatives was discussed with the patient prior to the procedure.  No learning/ communication barriers; patient verbalized understanding of procedure.  Informed consent was obtained.           Pt Assessment:  Testing was successfully completed; patient tolerated testing well.       Blood Thinners: ASA Skin Temperature: Warmed 30.5                   EMG/NCS  results:     Nerve Conduction Studies  Motor Sites      Amplitude Segment CV Distal Latency F-Latency F-Estimate Temp Comment   Site (mV)  (m/s) (ms) ms ms  C    Right Fibular (EDB) Motor   Ankle 0.92 Ankle-EDB  3.4   24    Fib Head *NR Fib Head-Ankle *NR *NR   23.9    Knee *0.51 Knee-Ankle 45 11.9   23.9    Left Tibial (AH) Motor   Ankle 3.8 Ankle-AH  4.0   24    Knee *2.5 Knee-Ankle 47 12.8   24    Right Tibial (AH) Motor   Ankle 2.7 Ankle-AH  3.3   23.8    Knee *2.3 Knee-Ankle 48 11.6   23.8    Left Ulnar (ADM) Motor   Wrist 9.2   2.5   24.1    Bel Elbow 9.0 Bel Elbow-Wrist 57 5.9   24    Ab Elbow 9.3 Ab Elbow-Wrist 60 7.5   24      Sensory Sites      Amplitude CV Onset Lat Peak Lat Temp Comment   Site ( V) (m/s) (ms) (ms)  C    Left Radial  Sensory   Forearm-Wrist 20 61 1.65 2.2 24.1    Left Sural Sensory   B-Ankle *NR *NR *NR *NR 24    Right Sural Sensory   B-Ankle *NR *NR *NR *NR 24.6        NCS Waveforms:    Motor                Sensory             Electromyography     Side Muscle Nerve Root Ins Act Fibs Psw Amp Dur Poly Recrt Int Pat Comment   Right AntTibialis Dp Br Fibular L4-5 *Incr *2+ *2+ Nml Nml 0 Nml Nml    Right Gastroc Tibial S1-2 Nml Nml Nml Nml Nml 0 Nml Nml    Right Fibularis Long Sup Br Fibular L5-S1 *Incr *1+ *1+ Nml Nml 0 Nml Nml    Right VastusLat Femoral L2-4 Nml Nml Nml Nml Nml 0 Nml Nml    Right TensorFascLat SupGluteal L4-5, S1 *Incr *1+ *1+ Nml Nml 0 Nml Nml    Right PostTibialis Tibial L5, S1 *Incr *1+ *1+ Nml Nml 0 Nml Nml    Right GluteusMax InfGluteal L5-S2 Nml Nml Nml Nml Nml 0 Nml Nml    Right Lumbo Parasp Low Rami L5-S1 Nml Nml Nml         Left AntTibialis Dp Br Fibular L4-5 Nml Nml Nml Nml Nml 0 Nml Nml          Comment NCS: Abnormal study  1.  Absent bilateral sural SNAPs  2.  Low amplitude bilateral tibial CMAP's and right peroneal CMAP with normal conduction velocities.  3.  Normal right radial SNAP and ulnar CMAP    Comment EMG: Abnormal study  1.  Increased insertional activity with positive waves and fibrillation potentials of the right tibialis anterior, tibialis posterior, peroneus longus and TFL.  Remainder right lower extremity needle EMG normal.  2.  Normal left tibialis anterior    Interpretation: Abnormal study: There is electrodiagnostic evidence of:    1.   Right L5 radiculopathy, active.      2.  There is electrodiagnostic evidence consistent with a length-dependent sensorimotor polyneuropathy, predominantly axonal.      3.  There is no convincing electrodiagnostic evidence of  focal neuropathy in the right lower extremity.    The testing was completed in its entirety by the physician.      It was our pleasure caring for your patient today, if there any questions or concerns please do not hesitate to  contact us.

## 2023-02-13 NOTE — PATIENT INSTRUCTIONS
Thank you for choosing the [facility] for your EMG testing.     The ordering provider will receive your final EMG results within the next few days.  Please follow up with your provider for the results and further treatment recommendations.  You may make your follow up appointment on your way out today. Please allow one week for results to be completed.

## 2023-02-14 ENCOUNTER — OFFICE VISIT (OUTPATIENT)
Dept: PHYSICAL MEDICINE AND REHAB | Facility: CLINIC | Age: 88
End: 2023-02-14
Payer: COMMERCIAL

## 2023-02-14 VITALS
SYSTOLIC BLOOD PRESSURE: 173 MMHG | WEIGHT: 133 LBS | BODY MASS INDEX: 24.48 KG/M2 | HEART RATE: 97 BPM | DIASTOLIC BLOOD PRESSURE: 84 MMHG | HEIGHT: 62 IN

## 2023-02-14 DIAGNOSIS — M54.16 LUMBAR RADICULOPATHY: Primary | ICD-10-CM

## 2023-02-14 DIAGNOSIS — M43.16 SPONDYLOLISTHESIS OF LUMBAR REGION: ICD-10-CM

## 2023-02-14 PROCEDURE — 99214 OFFICE O/P EST MOD 30 MIN: CPT | Performed by: PHYSICIAN ASSISTANT

## 2023-02-14 RX ORDER — OXYCODONE AND ACETAMINOPHEN 5; 325 MG/1; MG/1
.5-1 TABLET ORAL
Qty: 20 TABLET | Refills: 0 | Status: SHIPPED | OUTPATIENT
Start: 2023-02-14 | End: 2023-03-26

## 2023-02-14 ASSESSMENT — PAIN SCALES - GENERAL: PAINLEVEL: MILD PAIN (3)

## 2023-02-14 NOTE — PROGRESS NOTES
Assessment:   Ramila Liao is a 87 year old y.o. female with past medical history significant for hyperlipidemia who presents today for follow-up regarding pain which begins in the right buttock and radiates down the right lower extremity.  My review of an MRI lumbar spine shows grade 1 spondylolisthesis at L5-S1 where there is moderate bilateral foraminal stenosis.  There is also some lateral recess stenosis on the right at L4-5 and a broad-based right paracentral disc bulge at L3-4 which contacts the right L4 nerve root in the lateral recess.  MRI pelvis was unremarkable.  EMG right lower extremity shows active right L5 radiculopathy.  There is also peripheral polyneuropathy.  She returns today for refill of oxycodone/acetaminophen.  She takes 1/2-1 tab at night as needed which is helpful for sleep.  She does not take it daily.             Plan:     A shared decision making plan was used.  The patient's values and choices were respected.  The following represents what was discussed and decided upon by the physician assistant and the patient.      1.  DIAGNOSTIC TESTS:    -I reviewed the MRI lumbar spine.  - I reviewed the ultrasound right lower extremity which was negative for DVT.  - I reviewed the MRI pelvis.  - I reviewed the x-ray bilateral hips.  - I reviewed the EMG right lower extremity.  - I reviewed the flexion-extension lumbar spine x-rays which show 6 mm grade 1 spondylolisthesis L5-S1 without change in flexion or extension  - I reviewed the CT lumbar spine which shows 6.6 mm spondylolisthesis L5 on S1 with no pars defects.    2.  PHYSICAL THERAPY: Patient is currently in physical therapy.  She has had 7 session so far.  Encouraged to continue with physical therapy and the home exercises.    3.  MEDICATIONS:  -Tramadol and hydrocodone did not provide adequate relief of her pain.  -I refilled the patient's oxycodone/acetaminophen 5/325 mg 0.5-1 tab at bedtime as needed, #20 with no refills.  I  checked the [place] prescription monitoring database.  Patient is deemed to be low risk.  Risks reviewed.  I will not provide this medication long-term.  I will not provide telephone refills.  I did tell the patient that if she is not going to pursue surgery I would recommend a referral to a chronic pain clinic to create a plan for long-term medical management of her pain.    -Patient can continue gabapentin 300 mg twice daily.  -Patient can continue tizanidine 2 mg 3 times daily as needed.  -Patient could continue ibuprofen and Tylenol as needed.      4.  INTERVENTIONS: No additional interventions were ordered.      5.  PATIENT EDUCATION: Patient is in agreement the above plan.  All questions were answered.    6.  FOLLOW-UP:  - Patient scheduled to see Dr. Velarde February 22, 2023.  We will await his recommendations.  Patient indicated she is open to surgery.  - Patient to follow-up with me as needed.  I did tell the patient if she needs a refill of oxycodone/acetaminophen she needs to schedule a video or in person visit with me.  If she has any questions or concerns, she should not hesitate to call.    Subjective:     Ramila Liao is a 87 year old female who presents today for follow-up regarding chronic low back pain right lower extremity pain.  Patient notes some improvement in her pain since she was last seen.  She states the pain is not as severe as it has been.    Patient complains of pain which begins in the right buttock and lateral hip.  It radiates down the lateral thigh to the knee and sometimes hurts in the posterior knee.  She denies significant pain distal to the knee.  She is chronic numbness and tingling in both feet which is stable.  She has had swelling in the bilateral lower extremities but this is improving since she started wrapping her legs and wearing compression socks.  She is working with vascular medicine on this.  She rates her pain today as a 2 out of 10.  At its worst it is a 7  out of 10.  At its best it is a 1 out of 10.  Pain is worse at night.  Pain is also aggravated with reaching across her body.  Pain is alleviated with taking medications.  She denies any new symptoms since she was last seen.    Treatment to date:  -Patient previously went to physical therapy at [facility].  - She is now in physical therapy through [facility].  She has had 7 session so far.  She does her home exercises.  - right L5-S1 transforaminal epidural steroid injection November 10, 2022 which provided 60 to 70% relief of his pain but relief was already waning by 4 weeks after the procedure.  -right L4-5 and L5-S1 transforaminal epidural steroid injections December 13, 2022 did not not provide any relief of her pain.  - right piriformis muscle trigger point injection January 2, 2023 which did not provide any relief of her pain.  - She takes gabapentin 300 mg at bedtime which is very helpful.  - She takes Tylenol and ibuprofen as needed during the day which is somewhat helpful.  - She takes tizanidine 2 mg 3 times daily which is helpful.  - Patient takes oxycodone/acetaminophen 5/325 mg 0.5-1 tab at bedtime as needed.  She does not take this every day.  It is helpful for sleep.    Review of Systems:  Positive for numbness/tingling, weakness, pain much worse at night, unintentional weight loss.  Negative for loss of bowel/bladder control, inability to urinate, headache, trip/stumble/falls, difficulty swallowing, difficulty with hand skills, fevers.     Objective:   CONSTITUTIONAL:  Vital signs as above.  No acute distress.  The patient is well nourished and well groomed.    PSYCHIATRIC:  The patient is awake, alert, oriented to person, place and time.  The patient is answering questions appropriately with clear speech.  Normal affect.  HEENT: Normocephalic, atraumatic.  Sclera clear.    SKIN:  Skin over the face, posterior torso, bilateral upper and lower extremities is clean, dry, intact without  rashes.  VASCULAR: Bilateral lower extremity edema, worse on the right than the left.  MUSCULOSKELETAL:  Gait is  antalgic, favoring the right.  She walks with a 4 wheeled walker.  The patient has 5/5 strength bilateral hip flexors, knee flexors/extensors, ankle dorsi/plantar flexors, EHL.    NEUROLOGICAL: 2+ patellar and 1+ Achilles reflexes bilaterally.  Negative Babinski's bilaterally.  No ankle clonus bilaterally.  Diminished sensation toes of bilateral feet.     RESULTS: I reviewed the MRI lumbar spine from [facility] dated November 7, 2022.  At L5-S1 there is degenerative spondylolisthesis related to severe facet arthropathy.  There is moderate bilateral foraminal stenosis at this level.  At L4-5 there is mild to moderate right foraminal stenosis.  At L3-4 there is mild lateral recess stenosis related to a broad-based right paracentral disc protrusion which contacts the right L4 nerve root in the lateral recess.  There is also mild foraminal stenosis.    EMG right lower extremity February 13, 2023:  Interpretation: Abnormal study: There is electrodiagnostic evidence of:     1.   Right L5 radiculopathy, active.       2.  There is electrodiagnostic evidence consistent with a length-dependent sensorimotor polyneuropathy, predominantly axonal.       3.  There is no convincing electrodiagnostic evidence of  focal neuropathy in the right lower extremity.        EXAM: XR HIP BILATERAL 2 VIEWS EACH  LOCATION: [facility]  DATE/TIME: 1/4/2022 10:59 AM     INDICATION: Right low back/hip pain.  COMPARISON: None.                                                                      IMPRESSION:   Degenerative change of both hip joints. No evidence for fracture or dislocation. Mild degenerative change of both SI joints.     EXAM: MR PELVIS MUSCULAR TISSUE W/O CONTRAST  LOCATION: [facility]  DATE/TIME: 1/11/2023 8:08 PM     INDICATION: right buttock and leg pain with  swelling  COMPARISON: 1/4/2022 radiographs.  TECHNIQUE: Unenhanced.     FINDINGS:      HIPS:   -No high-grade chondromalacia, joint effusions or para labral cysts.     MUSCLES AND TENDONS:  -Gluteal: No tendon tear or tendinopathy. No trochanteric bursitis.    -Proximal hamstring: No tendon tear or tendinopathy.   -Iliopsoas: No tendon tear or tendinopathy. No bursitis.     BONES:   -No fracture or contusion.   -No osseous lesion. No evidence for avascular necrosis.     SOFT TISSUES:   -Normal muscle bulk. No acute muscular injury.     INTRAPELVIC CONTENTS:   -Visualized portions are normal.                                                                      IMPRESSION:  1.  Unremarkable MRI of the pelvis.    EXAM: CT LUMBAR SPINE W/O CONTRAST  LOCATION: [facility]  DATE/TIME: 1/18/2023 1:01 PM     INDICATION: Look for pars defect L5.  Right leg pain  COMPARISON: Lumbar spine MRI 11/07/2022.  TECHNIQUE: Routine CT Lumbar Spine without IV contrast. Multiplanar reformats. Dose reduction techniques were used.      FINDINGS:  VERTEBRA: 5 lumbar type vertebrae. 14 degrees of levocurvature from L3 to L5. 1 mm retrolisthesis from L2 to L3 to L4-L5. 6.6 mm degenerative spondylolisthesis of L5 on S1. Vertebral body heights are maintained. No pars defects. Mild degenerative changes   of sacroiliac joints.     CANAL/FORAMINA: Broad-based disc bulges from L2-L3 to L5-S1. Mild spinal canal narrowing at L4-L5. Mild left neuroforaminal narrowing at L3-L4. Mild right neuroforaminal narrowing at L4-L5.     PARASPINAL: The posterior paraspinal soft tissues are grossly within normal limits.                                                                      IMPRESSION:  1.  6.6 mm degenerative spondylolisthesis of L5 on S1.   2.  No pars defects.  3.  Mild spinal canal narrowing at L4-L5.  4.  Mild left neuroforaminal narrowing at L3-L4. Mild right neuroforaminal narrowing at L4-L5.  5.  14 degrees of  levocurvature from L3 to L5.     EXAM: XR LUMBAR SPINE W BENDING COMP G/E 6 VIEWS  LOCATION: [facility]  DATE/TIME: 1/18/2023 1:22 PM     INDICATION: Right leg pain  COMPARISON: 11/04/2022.  TECHNIQUE: CR Lumbar Spine.                                                                      IMPRESSION: Five lumbar-type vertebral bodies. Allowing for differences in technique, similar mild S-shaped rotatory dextroconvex thoracolumbar and levoconvex lower lumbar curvatures. No acute fracture or compression deformity. Similar 6 mm grade 1   anterolisthesis at L5-S1 without significant change on the flexion or extension views. No pars interarticularis defect. No interval spondylolisthesis. Redemonstration of Schmorl's node at the L3 superior endplate and lower thoracic levels. Similar   multilevel interbody degenerative change, worst and moderate at L5-S1 and, to a lesser extent, at L4-L5. Similar moderate L5-S1 facet arthrosis with suggestion of moderate bilateral foraminal narrowing, although oblique views are limited due to   suboptimal patient positioning. Moderate degenerative change at the bilateral sacroiliac and hip joints. Surgical clips within the upper abdomen.     Please refer to separately dictated CT of the lumbar spine for further details.

## 2023-02-14 NOTE — LETTER
2/14/2023         RE: Ramila Liao  [address]  [facility] [address]        Dear Colleague,    Thank you for referring your patient, Ramila Liao, to the [facility] SP[facility]ERY. Please see a copy of my visit note below.      Assessment:   Ramila Liao is a 87 year old y.o. female with past medical history significant for hyperlipidemia who presents today for follow-up regarding pain which begins in the right buttock and radiates down the right lower extremity.  My review of an MRI lumbar spine shows grade 1 spondylolisthesis at L5-S1 where there is moderate bilateral foraminal stenosis.  There is also some lateral recess stenosis on the right at L4-5 and a broad-based right paracentral disc bulge at L3-4 which contacts the right L4 nerve root in the lateral recess.  MRI pelvis was unremarkable.  EMG right lower extremity shows active right L5 radiculopathy.  There is also peripheral polyneuropathy.  She returns today for refill of oxycodone/acetaminophen.  She takes 1/2-1 tab at night as needed which is helpful for sleep.  She does not take it daily.             Plan:     A shared decision making plan was used.  The patient's values and choices were respected.  The following represents what was discussed and decided upon by the physician assistant and the patient.      1.  DIAGNOSTIC TESTS:    -I reviewed the MRI lumbar spine.  - I reviewed the ultrasound right lower extremity which was negative for DVT.  - I reviewed the MRI pelvis.  - I reviewed the x-ray bilateral hips.  - I reviewed the EMG right lower extremity.  - I reviewed the flexion-extension lumbar spine x-rays which show 6 mm grade 1 spondylolisthesis L5-S1 without change in flexion or extension  - I reviewed the CT lumbar spine which shows 6.6 mm spondylolisthesis L5 on S1 with no pars defects.    2.  PHYSICAL THERAPY: Patient is currently in physical therapy.  She has had 7 session so far.  Encouraged to  continue with physical therapy and the home exercises.    3.  MEDICATIONS:  -Tramadol and hydrocodone did not provide adequate relief of her pain.  -I refilled the patient's oxycodone/acetaminophen 5/325 mg 0.5-1 tab at bedtime as needed, #20 with no refills.  I checked the [place] prescription monitoring database.  Patient is deemed to be low risk.  Risks reviewed.  I will not provide this medication long-term.  I will not provide telephone refills.  I did tell the patient that if she is not going to pursue surgery I would recommend a referral to a chronic pain clinic to create a plan for long-term medical management of her pain.    -Patient can continue gabapentin 300 mg twice daily.  -Patient can continue tizanidine 2 mg 3 times daily as needed.  -Patient could continue ibuprofen and Tylenol as needed.      4.  INTERVENTIONS: No additional interventions were ordered.      5.  PATIENT EDUCATION: Patient is in agreement the above plan.  All questions were answered.    6.  FOLLOW-UP:  - Patient scheduled to see Dr. Velarde February 22, 2023.  We will await his recommendations.  Patient indicated she is open to surgery.  - Patient to follow-up with me as needed.  I did tell the patient if she needs a refill of oxycodone/acetaminophen she needs to schedule a video or in person visit with me.  If she has any questions or concerns, she should not hesitate to call.    Subjective:     Ramila Liao is a 87 year old female who presents today for follow-up regarding chronic low back pain right lower extremity pain.  Patient notes some improvement in her pain since she was last seen.  She states the pain is not as severe as it has been.    Patient complains of pain which begins in the right buttock and lateral hip.  It radiates down the lateral thigh to the knee and sometimes hurts in the posterior knee.  She denies significant pain distal to the knee.  She is chronic numbness and tingling in both feet which is  stable.  She has had swelling in the bilateral lower extremities but this is improving since she started wrapping her legs and wearing compression socks.  She is working with vascular medicine on this.  She rates her pain today as a 2 out of 10.  At its worst it is a 7 out of 10.  At its best it is a 1 out of 10.  Pain is worse at night.  Pain is also aggravated with reaching across her body.  Pain is alleviated with taking medications.  She denies any new symptoms since she was last seen.    Treatment to date:  -Patient previously went to physical therapy at [place].  - She is now in physical therapy through [facility].  She has had 7 session so far.  She does her home exercises.  - right L5-S1 transforaminal epidural steroid injection November 10, 2022 which provided 60 to 70% relief of his pain but relief was already waning by 4 weeks after the procedure.  -right L4-5 and L5-S1 transforaminal epidural steroid injections December 13, 2022 did not not provide any relief of her pain.  - right piriformis muscle trigger point injection January 2, 2023 which did not provide any relief of her pain.  - She takes gabapentin 300 mg at bedtime which is very helpful.  - She takes Tylenol and ibuprofen as needed during the day which is somewhat helpful.  - She takes tizanidine 2 mg 3 times daily which is helpful.  - Patient takes oxycodone/acetaminophen 5/325 mg 0.5-1 tab at bedtime as needed.  She does not take this every day.  It is helpful for sleep.    Review of Systems:  Positive for numbness/tingling, weakness, pain much worse at night, unintentional weight loss.  Negative for loss of bowel/bladder control, inability to urinate, headache, trip/stumble/falls, difficulty swallowing, difficulty with hand skills, fevers.     Objective:   CONSTITUTIONAL:  Vital signs as above.  No acute distress.  The patient is well nourished and well groomed.    PSYCHIATRIC:  The patient is awake, alert, oriented to person, place and  time.  The patient is answering questions appropriately with clear speech.  Normal affect.  HEENT: Normocephalic, atraumatic.  Sclera clear.    SKIN:  Skin over the face, posterior torso, bilateral upper and lower extremities is clean, dry, intact without rashes.  VASCULAR: Bilateral lower extremity edema, worse on the right than the left.  MUSCULOSKELETAL:  Gait is  antalgic, favoring the right.  She walks with a 4 wheeled walker.  The patient has 5/5 strength bilateral hip flexors, knee flexors/extensors, ankle dorsi/plantar flexors, EHL.    NEUROLOGICAL: 2+ patellar and 1+ Achilles reflexes bilaterally.  Negative Babinski's bilaterally.  No ankle clonus bilaterally.  Diminished sensation toes of bilateral feet.     RESULTS: I reviewed the MRI lumbar spine from [facility] dated November 7, 2022.  At L5-S1 there is degenerative spondylolisthesis related to severe facet arthropathy.  There is moderate bilateral foraminal stenosis at this level.  At L4-5 there is mild to moderate right foraminal stenosis.  At L3-4 there is mild lateral recess stenosis related to a broad-based right paracentral disc protrusion which contacts the right L4 nerve root in the lateral recess.  There is also mild foraminal stenosis.    EMG right lower extremity February 13, 2023:  Interpretation: Abnormal study: There is electrodiagnostic evidence of:     1.   Right L5 radiculopathy, active.       2.  There is electrodiagnostic evidence consistent with a length-dependent sensorimotor polyneuropathy, predominantly axonal.       3.  There is no convincing electrodiagnostic evidence of  focal neuropathy in the right lower extremity.        EXAM: XR HIP BILATERAL 2 VIEWS EACH  LOCATION: [facility] [place] [facility]  DATE/TIME: 1/4/2022 10:59 AM     INDICATION: Right low back/hip pain.  COMPARISON: None.                                                                      IMPRESSION:   Degenerative change of both hip joints. No evidence for  fracture or dislocation. Mild degenerative change of both SI joints.     EXAM: MR PELVIS MUSCULAR TISSUE W/O CONTRAST  LOCATION: [facility]  DATE/TIME: 1/11/2023 8:08 PM     INDICATION: right buttock and leg pain with swelling  COMPARISON: 1/4/2022 radiographs.  TECHNIQUE: Unenhanced.     FINDINGS:      HIPS:   -No high-grade chondromalacia, joint effusions or para labral cysts.     MUSCLES AND TENDONS:  -Gluteal: No tendon tear or tendinopathy. No trochanteric bursitis.    -Proximal hamstring: No tendon tear or tendinopathy.   -Iliopsoas: No tendon tear or tendinopathy. No bursitis.     BONES:   -No fracture or contusion.   -No osseous lesion. No evidence for avascular necrosis.     SOFT TISSUES:   -Normal muscle bulk. No acute muscular injury.     INTRAPELVIC CONTENTS:   -Visualized portions are normal.                                                                      IMPRESSION:  1.  Unremarkable MRI of the pelvis.    EXAM: CT LUMBAR SPINE W/O CONTRAST  LOCATION: [facility]  DATE/TIME: 1/18/2023 1:01 PM     INDICATION: Look for pars defect L5.  Right leg pain  COMPARISON: Lumbar spine MRI 11/07/2022.  TECHNIQUE: Routine CT Lumbar Spine without IV contrast. Multiplanar reformats. Dose reduction techniques were used.      FINDINGS:  VERTEBRA: 5 lumbar type vertebrae. 14 degrees of levocurvature from L3 to L5. 1 mm retrolisthesis from L2 to L3 to L4-L5. 6.6 mm degenerative spondylolisthesis of L5 on S1. Vertebral body heights are maintained. No pars defects. Mild degenerative changes   of sacroiliac joints.     CANAL/FORAMINA: Broad-based disc bulges from L2-L3 to L5-S1. Mild spinal canal narrowing at L4-L5. Mild left neuroforaminal narrowing at L3-L4. Mild right neuroforaminal narrowing at L4-L5.     PARASPINAL: The posterior paraspinal soft tissues are grossly within normal limits.                                                                       IMPRESSION:  1.  6.6 mm degenerative spondylolisthesis of L5 on S1.   2.  No pars defects.  3.  Mild spinal canal narrowing at L4-L5.  4.  Mild left neuroforaminal narrowing at L3-L4. Mild right neuroforaminal narrowing at L4-L5.  5.  14 degrees of levocurvature from L3 to L5.     EXAM: XR LUMBAR SPINE W BENDING COMP G/E 6 VIEWS  LOCATION: [facility]  DATE/TIME: 1/18/2023 1:22 PM     INDICATION: Right leg pain  COMPARISON: 11/04/2022.  TECHNIQUE: CR Lumbar Spine.                                                                      IMPRESSION: Five lumbar-type vertebral bodies. Allowing for differences in technique, similar mild S-shaped rotatory dextroconvex thoracolumbar and levoconvex lower lumbar curvatures. No acute fracture or compression deformity. Similar 6 mm grade 1   anterolisthesis at L5-S1 without significant change on the flexion or extension views. No pars interarticularis defect. No interval spondylolisthesis. Redemonstration of Schmorl's node at the L3 superior endplate and lower thoracic levels. Similar   multilevel interbody degenerative change, worst and moderate at L5-S1 and, to a lesser extent, at L4-L5. Similar moderate L5-S1 facet arthrosis with suggestion of moderate bilateral foraminal narrowing, although oblique views are limited due to   suboptimal patient positioning. Moderate degenerative change at the bilateral sacroiliac and hip joints. Surgical clips within the upper abdomen.     Please refer to separately dictated CT of the lumbar spine for further details.        Again, thank you for allowing me to participate in the care of your patient.        Sincerely,        Tiffanie Quan PA-C

## 2023-02-19 ENCOUNTER — APPOINTMENT (OUTPATIENT)
Dept: CT IMAGING | Facility: CLINIC | Age: 88
End: 2023-02-19
Attending: EMERGENCY MEDICINE
Payer: COMMERCIAL

## 2023-02-19 ENCOUNTER — HOSPITAL ENCOUNTER (EMERGENCY)
Facility: CLINIC | Age: 88
Discharge: HOME | End: 2023-02-19
Attending: EMERGENCY MEDICINE | Admitting: EMERGENCY MEDICINE
Payer: COMMERCIAL

## 2023-02-19 VITALS
WEIGHT: 133 LBS | HEART RATE: 101 BPM | DIASTOLIC BLOOD PRESSURE: 88 MMHG | OXYGEN SATURATION: 99 % | TEMPERATURE: 98.4 F | RESPIRATION RATE: 18 BRPM | BODY MASS INDEX: 24.48 KG/M2 | SYSTOLIC BLOOD PRESSURE: 184 MMHG | HEIGHT: 62 IN

## 2023-02-19 DIAGNOSIS — K59.00 CONSTIPATION, UNSPECIFIED CONSTIPATION TYPE: ICD-10-CM

## 2023-02-19 DIAGNOSIS — R10.9 FLANK PAIN: ICD-10-CM

## 2023-02-19 LAB
ALBUMIN UR-MCNC: NEGATIVE MG/DL
ANION GAP SERPL CALCULATED.3IONS-SCNC: 12 MMOL/L (ref 5–18)
APPEARANCE UR: CLEAR
BACTERIA #/AREA URNS HPF: ABNORMAL /HPF
BASOPHILS # BLD AUTO: 0 10E3/UL (ref 0–0.2)
BASOPHILS NFR BLD AUTO: 0 %
BILIRUB UR QL STRIP: NEGATIVE
BUN SERPL-MCNC: 14 MG/DL (ref 8–28)
C REACTIVE PROTEIN LHE: <0.1 MG/DL (ref 0–?)
CALCIUM SERPL-MCNC: 9.3 MG/DL (ref 8.5–10.5)
CHLORIDE BLD-SCNC: 104 MMOL/L (ref 98–107)
CO2 SERPL-SCNC: 23 MMOL/L (ref 22–31)
COLOR UR AUTO: COLORLESS
CREAT SERPL-MCNC: 0.78 MG/DL (ref 0.6–1.1)
EOSINOPHIL # BLD AUTO: 0.1 10E3/UL (ref 0–0.7)
EOSINOPHIL NFR BLD AUTO: 2 %
ERYTHROCYTE [DISTWIDTH] IN BLOOD BY AUTOMATED COUNT: 12.6 % (ref 10–15)
GFR SERPL CREATININE-BSD FRML MDRD: 74 ML/MIN/1.73M2
GLUCOSE BLD-MCNC: 118 MG/DL (ref 70–125)
GLUCOSE UR STRIP-MCNC: NEGATIVE MG/DL
HCT VFR BLD AUTO: 41.3 % (ref 35–47)
HGB BLD-MCNC: 13.7 G/DL (ref 11.7–15.7)
HGB UR QL STRIP: NEGATIVE
IMM GRANULOCYTES # BLD: 0 10E3/UL
IMM GRANULOCYTES NFR BLD: 0 %
KETONES UR STRIP-MCNC: NEGATIVE MG/DL
LEUKOCYTE ESTERASE UR QL STRIP: NEGATIVE
LYMPHOCYTES # BLD AUTO: 1.8 10E3/UL (ref 0.8–5.3)
LYMPHOCYTES NFR BLD AUTO: 26 %
MCH RBC QN AUTO: 30.6 PG (ref 26.5–33)
MCHC RBC AUTO-ENTMCNC: 33.2 G/DL (ref 31.5–36.5)
MCV RBC AUTO: 92 FL (ref 78–100)
MONOCYTES # BLD AUTO: 0.4 10E3/UL (ref 0–1.3)
MONOCYTES NFR BLD AUTO: 6 %
NEUTROPHILS # BLD AUTO: 4.5 10E3/UL (ref 1.6–8.3)
NEUTROPHILS NFR BLD AUTO: 66 %
NITRATE UR QL: NEGATIVE
NRBC # BLD AUTO: 0 10E3/UL
NRBC BLD AUTO-RTO: 0 /100
PH UR STRIP: 6.5 [PH] (ref 5–7)
PLATELET # BLD AUTO: 188 10E3/UL (ref 150–450)
POTASSIUM BLD-SCNC: 4 MMOL/L (ref 3.5–5)
RBC # BLD AUTO: 4.47 10E6/UL (ref 3.8–5.2)
RBC URINE: 1 /HPF
SODIUM SERPL-SCNC: 139 MMOL/L (ref 136–145)
SP GR UR STRIP: 1.01 (ref 1–1.03)
SQUAMOUS EPITHELIAL: <1 /HPF
UROBILINOGEN UR STRIP-MCNC: <2 MG/DL
WBC # BLD AUTO: 6.9 10E3/UL (ref 4–11)
WBC URINE: 1 /HPF

## 2023-02-19 PROCEDURE — 74176 CT ABD & PELVIS W/O CONTRAST: CPT

## 2023-02-19 PROCEDURE — 96374 THER/PROPH/DIAG INJ IV PUSH: CPT

## 2023-02-19 PROCEDURE — 250N000011 HC RX IP 250 OP 636: Performed by: EMERGENCY MEDICINE

## 2023-02-19 PROCEDURE — 81001 URINALYSIS AUTO W/SCOPE: CPT | Performed by: EMERGENCY MEDICINE

## 2023-02-19 PROCEDURE — 80048 BASIC METABOLIC PNL TOTAL CA: CPT | Performed by: EMERGENCY MEDICINE

## 2023-02-19 PROCEDURE — 86140 C-REACTIVE PROTEIN: CPT | Performed by: EMERGENCY MEDICINE

## 2023-02-19 PROCEDURE — 85004 AUTOMATED DIFF WBC COUNT: CPT | Performed by: EMERGENCY MEDICINE

## 2023-02-19 PROCEDURE — 36415 COLL VENOUS BLD VENIPUNCTURE: CPT | Performed by: EMERGENCY MEDICINE

## 2023-02-19 PROCEDURE — 96372 THER/PROPH/DIAG INJ SC/IM: CPT | Performed by: EMERGENCY MEDICINE

## 2023-02-19 PROCEDURE — 99285 EMERGENCY DEPT VISIT HI MDM: CPT | Mod: 25

## 2023-02-19 RX ORDER — KETOROLAC TROMETHAMINE 15 MG/ML
15 INJECTION, SOLUTION INTRAMUSCULAR; INTRAVENOUS ONCE
Status: COMPLETED | OUTPATIENT
Start: 2023-02-19 | End: 2023-02-19

## 2023-02-19 RX ORDER — POLYETHYLENE GLYCOL 3350 17 G/17G
POWDER, FOR SOLUTION ORAL
Qty: 714 G | Refills: 0 | Status: SHIPPED | OUTPATIENT
Start: 2023-02-19 | End: 2023-03-24

## 2023-02-19 RX ORDER — ORPHENADRINE CITRATE 30 MG/ML
60 INJECTION INTRAMUSCULAR; INTRAVENOUS ONCE
Status: COMPLETED | OUTPATIENT
Start: 2023-02-19 | End: 2023-02-19

## 2023-02-19 RX ADMIN — ORPHENADRINE CITRATE 60 MG: 30 INJECTION INTRAMUSCULAR; INTRAVENOUS at 09:20

## 2023-02-19 RX ADMIN — KETOROLAC TROMETHAMINE 15 MG: 15 INJECTION, SOLUTION INTRAMUSCULAR; INTRAVENOUS at 09:22

## 2023-02-19 ASSESSMENT — ENCOUNTER SYMPTOMS
CONSTIPATION: 1
FLANK PAIN: 1

## 2023-02-19 ASSESSMENT — ACTIVITIES OF DAILY LIVING (ADL): ADLS_ACUITY_SCORE: 35

## 2023-02-19 NOTE — DISCHARGE INSTRUCTIONS
As we discussed, the tests do not show a kidney infection, kidney stone, and we suspect that your pain is from pulled muscle in your back.  Keep using your tizanidine and gabapentin, use ice and heat on the area, and take ibuprofen.  If you need to take the oxycodone that you have keep in mind that this makes you more constipated and the CT today did show that you already have constipation that we are going to try and treat.

## 2023-02-19 NOTE — ED PROVIDER NOTES
EMERGENCY DEPARTMENT ENCOUNTER     NAME: Ramila Liao   AGE: 87 year old female   YOB: 1935   MRN: [id number]   EVALUATION DATE & TIME: 2/19/2023  8:39 AM   PCP: Susan Sutton     Chief Complaint   Patient presents with     Back Pain   :    FINAL IMPRESSION       1. Flank pain    2. Constipation, unspecified constipation type           ED COURSE & MEDICAL DECISION MAKING      Pertinent Labs & Imaging studies reviewed. (See chart for details)   87 year old female  presents to the Emergency Department for evaluation of left flank pain for 1 day. Initial Vitals Reviewed. Initial exam notable for generally well-appearing elderly female who is ambulatory with intact strength and sensation in extremities, but who does have a right leg radiculopathy and I can see that she is scheduled for surgery but this is already being managed as an outpatient.  Her new complaint is left-sided flank pain and she is tender in the upper paraspinal lumbosacral musculature and CVA.  No abdominal tenderness.  She had had some issues with constipation but I can also see in the chart that she has been on oxycodone which previously was not a chronic pain medication for her and this is likely contributory.  I did consider something like AAA though less likely, nephrolithiasis, pyelonephritis and did labs, urinalysis, CT of the abdomen pelvis which are negative for these.  I am most suspicious clinically that this represents a lumbosacral strain and discussed this with her and her son.  On the CT scan they can also see that she is still constipated, so I am going to prescribe a MiraLAX cleanout and have her continue her home regimen for lumbosacral strain.  She already has prescriptions for medications and does not require additional Rx meds for me, but I did give Toradol and Norflex here in the ED with improvement.  Her and family are comfortable with this plan at time of discharge.        8:53 AM I met with the patient to  "gather history and perform my exam. ED course and treatment discussed.      At the conclusion of the encounter I discussed the results of all of the tests and the disposition. The questions were answered. The patient or family acknowledged understanding and was agreeable with the care plan.         Medical Decision Making    History:    Supplemental history from: son    External Record(s) reviewed: clinic visit 2/14    Work Up:    Chart documentation includes differential considered and any EKGs or imaging independently interpreted by provider, where specified.    In additional to work up documented, I considered the following work up: Documented in chart, if applicable.    External consultation:    Discussion of management with another provider: Documented in chart, if applicable    Complicating factors:    Care impacted by chronic illness: Chronic Pain and Hyperlipidemia    Care affected by social determinants of health: N/A    Disposition considerations: Discharge. I prescribed additional prescription strength medication(s) as charted. I considered admission, but ultimately discharged patient with reassuring workup.        MEDICATIONS GIVEN IN THE EMERGENCY:   Medications - No data to display   NEW PRESCRIPTIONS STARTED AT TODAY'S ER VISIT   New Prescriptions    No medications on file     ================================================================   HISTORY OF PRESENT ILLNESS       Patient information was obtained from: Patient    Use of Intrepreter: N/A     Ramila Liao is a 87 year old female with history of low back pain and HLD who presents to this ED via walk-in with a chief complaint of back pain     The patient reports they have had left flank pain for a few days and thought it was related to constipation. The patient took something for constipation and \"completely cleaned out\" yesterday which resolved the pain. Last night, the left flank pain came back and the patient \"hardly slept\" due to the " pain. The patient has been taking tylenol, gabapentin, and muscle relaxers for the pain. The patient also reports some chronic leg swelling, but has not put on their compression socks today.    ================================================================    REVIEW OF SYSTEMS       Review of Systems   Cardiovascular: Positive for leg swelling (chronic).   Gastrointestinal: Positive for constipation.   Genitourinary: Positive for flank pain (left).         PAST HISTORY     PAST MEDICAL HISTORY:   Past Medical History:   Diagnosis Date     Allergic rhinitis 8/2/2005     Contact dermatitis and other eczema, due to unspecified cause 7/30/2001     Low back pain 8/2/2016     Mixed hyperlipidemia 8/9/2006      PAST SURGICAL HISTORY:   Past Surgical History:   Procedure Laterality Date     CATARACT EXTRACTION, BILATERAL       CHOLECYSTECTOMY       HYSTERECTOMY        CURRENT MEDICATIONS:   acetaminophen (TYLENOL) 500 MG tablet  aspirin 81 MG EC tablet  betamethasone dipropionate (DIPROLENE) 0.05 % ointment  gabapentin (NEURONTIN) 300 MG capsule  hydrochlorothiazide (HYDRODIURIL) 12.5 MG tablet  ibuprofen (ADVIL/MOTRIN) 200 MG tablet  magnesium citrate 100 mg Tab  multivitamin with minerals (THERA-M) 9 mg iron-400 mcg Tab tablet  oxyCODONE-acetaminophen (PERCOCET) 5-325 MG tablet  tiZANidine (ZANAFLEX) 2 MG tablet      ALLERGIES:   Allergies   Allergen Reactions     Naproxen Hives     Tolerates ibuprofen      FAMILY HISTORY:   Family History   Problem Relation Age of Onset     Coronary Artery Disease Mother      Coronary Artery Disease Father       SOCIAL HISTORY:   Social History     Socioeconomic History     Marital status:    Tobacco Use     Smoking status: Never     Smokeless tobacco: Never   Substance and Sexual Activity     Alcohol use: Yes     Drug use: Never     Sexual activity: Not Currently     Partners: Male        VITALS  Patient Vitals for the past 24 hrs:   BP Temp Temp src Pulse Resp SpO2 Height  "Weight   02/19/23 1000 (!) 193/87 -- -- 107 -- 99 % -- --   02/19/23 0841 (!) 186/97 98.4  F (36.9  C) Temporal 120 16 98 % 1.575 m (5' 2\") 60.3 kg (133 lb)        ================================================================    PHYSICAL EXAM     VITAL SIGNS: BP (!) 193/87   Pulse 107   Temp 98.4  F (36.9  C) (Temporal)   Resp 16   Ht 1.575 m (5' 2\")   Wt 60.3 kg (133 lb)   SpO2 99%   BMI 24.33 kg/m     Constitutional:  Awake, no acute distress   HENT:  Atraumatic, oropharynx without exudate or erythema, membranes moist  Lymph:  No adenopathy  Eyes: EOM intact, PERRL, no injection  Neck: Supple  Respiratory:  Clear to auscultation bilaterally, no wheezes or crackles   Cardiovascular:  Regular rate and rhythm, single S1 and S2   GI:  Soft, nontender, nondistended, no rebound or guarding   Musculoskeletal:  Moves all extremities, no deformities. Left CVA tenderness , 1+ edema bilateral lower extremities  Skin:  Warm, dry  Neurologic:  Alert and oriented x3, no focal deficits noted       ================================================================  LAB       All pertinent labs reviewed and interpreted.   Labs Ordered and Resulted from Time of ED Arrival to Time of ED Departure   ROUTINE UA WITH MICROSCOPIC REFLEX TO CULTURE - Abnormal       Result Value    Color Urine Colorless      Appearance Urine Clear      Glucose Urine Negative      Bilirubin Urine Negative      Ketones Urine Negative      Specific Gravity Urine 1.008      Blood Urine Negative      pH Urine 6.5      Protein Albumin Urine Negative      Urobilinogen Urine <2.0      Nitrite Urine Negative      Leukocyte Esterase Urine Negative      Bacteria Urine Few (*)     RBC Urine 1      WBC Urine 1      Squamous Epithelials Urine <1     BASIC METABOLIC PANEL - Normal    Sodium 139      Potassium 4.0      Chloride 104      Carbon Dioxide (CO2) 23      Anion Gap 12      Urea Nitrogen 14      Creatinine 0.78      Calcium 9.3      Glucose 118      GFR " Estimate 74     CRP INFLAMMATION - Normal    CRP <0.1     CBC WITH PLATELETS AND DIFFERENTIAL    WBC Count 6.9      RBC Count 4.47      Hemoglobin 13.7      Hematocrit 41.3      MCV 92      MCH 30.6      MCHC 33.2      RDW 12.6      Platelet Count 188      % Neutrophils 66      % Lymphocytes 26      % Monocytes 6      % Eosinophils 2      % Basophils 0      % Immature Granulocytes 0      NRBCs per 100 WBC 0      Absolute Neutrophils 4.5      Absolute Lymphocytes 1.8      Absolute Monocytes 0.4      Absolute Eosinophils 0.1      Absolute Basophils 0.0      Absolute Immature Granulocytes 0.0      Absolute NRBCs 0.0          ===============================================================  RADIOLOGY       Reviewed all pertinent imaging. Please see official radiology report.   CT Abdomen Pelvis w/o Contrast   Final Result   IMPRESSION:    1.  Mild volume retained feces, correlate for constipation. No obstruction, colitis, diverticulitis, or appendicitis. Line no obstructing renal or ureteral stones.   2.  Status post cholecystectomy.   3.  Mild atherosclerotic vascular calcifications.               ================================================================  EKG         I have independently reviewed and interpreted the EKG(s) documented above.     ================================================================  PROCEDURES         I, Jenni Mandel, am serving as a scribe to document services personally performed by Dr. Edgar based on my observation and the provider's statements to me. I, Lexis Edgar MD attest that Jenni Mandel is acting in a scribe capacity, has observed my performance of the services and has documented them in accordance with my direction.     Lexis Edgar M.D.   Emergency Medicine   [facility] EMERGENCY ROOM  [address] [place] [id number]  [phone]  Dept: [phone]      Lexis Edgar MD  02/19/23 1026

## 2023-02-19 NOTE — ED TRIAGE NOTES
"The patient presents to the ED with left lower back pain that began this week. She reports the pain began gradually. The patient denies any specific injury. She took some medications at home but is unsure which ones \"my daughter has the list\".       "

## 2023-02-21 ENCOUNTER — APPOINTMENT (OUTPATIENT)
Dept: CT IMAGING | Facility: CLINIC | Age: 88
End: 2023-02-21
Attending: EMERGENCY MEDICINE
Payer: COMMERCIAL

## 2023-02-21 ENCOUNTER — HOSPITAL ENCOUNTER (EMERGENCY)
Facility: CLINIC | Age: 88
Discharge: HOME | End: 2023-02-21
Attending: EMERGENCY MEDICINE | Admitting: EMERGENCY MEDICINE
Payer: COMMERCIAL

## 2023-02-21 VITALS
BODY MASS INDEX: 24.87 KG/M2 | OXYGEN SATURATION: 96 % | WEIGHT: 136 LBS | DIASTOLIC BLOOD PRESSURE: 84 MMHG | HEART RATE: 114 BPM | TEMPERATURE: 99.4 F | SYSTOLIC BLOOD PRESSURE: 176 MMHG | RESPIRATION RATE: 17 BRPM

## 2023-02-21 DIAGNOSIS — R10.9 LEFT FLANK PAIN: ICD-10-CM

## 2023-02-21 DIAGNOSIS — B02.9 HERPES ZOSTER WITHOUT COMPLICATION: ICD-10-CM

## 2023-02-21 LAB
ALBUMIN SERPL-MCNC: 4 G/DL (ref 3.5–5)
ALBUMIN UR-MCNC: NEGATIVE MG/DL
ALP SERPL-CCNC: 59 U/L (ref 45–120)
ALT SERPL W P-5'-P-CCNC: 22 U/L (ref 0–45)
ANION GAP SERPL CALCULATED.3IONS-SCNC: 11 MMOL/L (ref 5–18)
APPEARANCE UR: CLEAR
AST SERPL W P-5'-P-CCNC: 25 U/L (ref 0–40)
BASOPHILS # BLD AUTO: 0 10E3/UL (ref 0–0.2)
BASOPHILS NFR BLD AUTO: 1 %
BILIRUB DIRECT SERPL-MCNC: 0.2 MG/DL
BILIRUB SERPL-MCNC: 0.6 MG/DL (ref 0–1)
BILIRUB UR QL STRIP: NEGATIVE
BUN SERPL-MCNC: 17 MG/DL (ref 8–28)
C REACTIVE PROTEIN LHE: <0.1 MG/DL (ref 0–?)
CALCIUM SERPL-MCNC: 9.2 MG/DL (ref 8.5–10.5)
CHLORIDE BLD-SCNC: 105 MMOL/L (ref 98–107)
CO2 SERPL-SCNC: 25 MMOL/L (ref 22–31)
COLOR UR AUTO: COLORLESS
CREAT SERPL-MCNC: 0.79 MG/DL (ref 0.6–1.1)
EOSINOPHIL # BLD AUTO: 0.1 10E3/UL (ref 0–0.7)
EOSINOPHIL NFR BLD AUTO: 2 %
ERYTHROCYTE [DISTWIDTH] IN BLOOD BY AUTOMATED COUNT: 12.9 % (ref 10–15)
GFR SERPL CREATININE-BSD FRML MDRD: 73 ML/MIN/1.73M2
GLUCOSE BLD-MCNC: 113 MG/DL (ref 70–125)
GLUCOSE UR STRIP-MCNC: NEGATIVE MG/DL
HCT VFR BLD AUTO: 40.6 % (ref 35–47)
HGB BLD-MCNC: 13.3 G/DL (ref 11.7–15.7)
HGB UR QL STRIP: NEGATIVE
IMM GRANULOCYTES # BLD: 0 10E3/UL
IMM GRANULOCYTES NFR BLD: 0 %
KETONES UR STRIP-MCNC: NEGATIVE MG/DL
LEUKOCYTE ESTERASE UR QL STRIP: ABNORMAL
LIPASE SERPL-CCNC: 27 U/L (ref 0–52)
LYMPHOCYTES # BLD AUTO: 2 10E3/UL (ref 0.8–5.3)
LYMPHOCYTES NFR BLD AUTO: 33 %
MCH RBC QN AUTO: 31.1 PG (ref 26.5–33)
MCHC RBC AUTO-ENTMCNC: 32.8 G/DL (ref 31.5–36.5)
MCV RBC AUTO: 95 FL (ref 78–100)
MONOCYTES # BLD AUTO: 0.5 10E3/UL (ref 0–1.3)
MONOCYTES NFR BLD AUTO: 9 %
NEUTROPHILS # BLD AUTO: 3.4 10E3/UL (ref 1.6–8.3)
NEUTROPHILS NFR BLD AUTO: 55 %
NITRATE UR QL: NEGATIVE
NRBC # BLD AUTO: 0 10E3/UL
NRBC BLD AUTO-RTO: 0 /100
PH UR STRIP: 7 [PH] (ref 5–7)
PLATELET # BLD AUTO: 175 10E3/UL (ref 150–450)
POTASSIUM BLD-SCNC: 4.1 MMOL/L (ref 3.5–5)
PROT SERPL-MCNC: 7.1 G/DL (ref 6–8)
RBC # BLD AUTO: 4.28 10E6/UL (ref 3.8–5.2)
RBC URINE: <1 /HPF
SODIUM SERPL-SCNC: 141 MMOL/L (ref 136–145)
SP GR UR STRIP: 1.01 (ref 1–1.03)
SQUAMOUS EPITHELIAL: <1 /HPF
UROBILINOGEN UR STRIP-MCNC: <2 MG/DL
WBC # BLD AUTO: 6.1 10E3/UL (ref 4–11)
WBC URINE: 1 /HPF

## 2023-02-21 PROCEDURE — 80053 COMPREHEN METABOLIC PANEL: CPT | Performed by: EMERGENCY MEDICINE

## 2023-02-21 PROCEDURE — 81001 URINALYSIS AUTO W/SCOPE: CPT | Performed by: EMERGENCY MEDICINE

## 2023-02-21 PROCEDURE — 96372 THER/PROPH/DIAG INJ SC/IM: CPT | Performed by: EMERGENCY MEDICINE

## 2023-02-21 PROCEDURE — 36415 COLL VENOUS BLD VENIPUNCTURE: CPT | Performed by: EMERGENCY MEDICINE

## 2023-02-21 PROCEDURE — 96361 HYDRATE IV INFUSION ADD-ON: CPT

## 2023-02-21 PROCEDURE — 250N000013 HC RX MED GY IP 250 OP 250 PS 637: Performed by: EMERGENCY MEDICINE

## 2023-02-21 PROCEDURE — 85025 COMPLETE CBC W/AUTO DIFF WBC: CPT | Performed by: EMERGENCY MEDICINE

## 2023-02-21 PROCEDURE — 258N000003 HC RX IP 258 OP 636: Performed by: EMERGENCY MEDICINE

## 2023-02-21 PROCEDURE — 83690 ASSAY OF LIPASE: CPT | Performed by: EMERGENCY MEDICINE

## 2023-02-21 PROCEDURE — 96374 THER/PROPH/DIAG INJ IV PUSH: CPT | Mod: 59

## 2023-02-21 PROCEDURE — 99285 EMERGENCY DEPT VISIT HI MDM: CPT | Mod: 25

## 2023-02-21 PROCEDURE — 250N000011 HC RX IP 250 OP 636: Performed by: EMERGENCY MEDICINE

## 2023-02-21 PROCEDURE — 86140 C-REACTIVE PROTEIN: CPT | Performed by: EMERGENCY MEDICINE

## 2023-02-21 PROCEDURE — 74177 CT ABD & PELVIS W/CONTRAST: CPT

## 2023-02-21 PROCEDURE — 82248 BILIRUBIN DIRECT: CPT | Performed by: EMERGENCY MEDICINE

## 2023-02-21 RX ORDER — IOPAMIDOL 755 MG/ML
90 INJECTION, SOLUTION INTRAVASCULAR ONCE
Status: COMPLETED | OUTPATIENT
Start: 2023-02-21 | End: 2023-02-21

## 2023-02-21 RX ORDER — KETOROLAC TROMETHAMINE 15 MG/ML
15 INJECTION, SOLUTION INTRAMUSCULAR; INTRAVENOUS ONCE
Status: COMPLETED | OUTPATIENT
Start: 2023-02-21 | End: 2023-02-21

## 2023-02-21 RX ORDER — VALACYCLOVIR HYDROCHLORIDE 1 G/1
1000 TABLET, FILM COATED ORAL 2 TIMES DAILY
Qty: 14 TABLET | Refills: 0 | Status: SHIPPED | OUTPATIENT
Start: 2023-02-21 | End: 2023-03-02

## 2023-02-21 RX ORDER — LIDOCAINE 4 G/G
1 PATCH TOPICAL ONCE
Status: DISCONTINUED | OUTPATIENT
Start: 2023-02-21 | End: 2023-02-21 | Stop reason: HOSPADM

## 2023-02-21 RX ORDER — VALACYCLOVIR HYDROCHLORIDE 1 G/1
1000 TABLET, FILM COATED ORAL ONCE
Status: COMPLETED | OUTPATIENT
Start: 2023-02-21 | End: 2023-02-21

## 2023-02-21 RX ADMIN — KETOROLAC TROMETHAMINE 15 MG: 15 INJECTION, SOLUTION INTRAMUSCULAR; INTRAVENOUS at 10:26

## 2023-02-21 RX ADMIN — IOPAMIDOL 90 ML: 755 INJECTION, SOLUTION INTRAVENOUS at 10:44

## 2023-02-21 RX ADMIN — LIDOCAINE 1 PATCH: 246 PATCH TOPICAL at 12:24

## 2023-02-21 RX ADMIN — METHYLNALTREXONE BROMIDE 4 MG: 12 INJECTION, SOLUTION SUBCUTANEOUS at 10:32

## 2023-02-21 RX ADMIN — SODIUM CHLORIDE, POTASSIUM CHLORIDE, SODIUM LACTATE AND CALCIUM CHLORIDE 500 ML: 600; 310; 30; 20 INJECTION, SOLUTION INTRAVENOUS at 10:27

## 2023-02-21 RX ADMIN — VALACYCLOVIR 1000 MG: 1 TABLET, FILM COATED ORAL at 12:23

## 2023-02-21 ASSESSMENT — ENCOUNTER SYMPTOMS
ABDOMINAL PAIN: 0
BACK PAIN: 1
FLANK PAIN: 1
NAUSEA: 0
CONSTIPATION: 1
VOMITING: 0

## 2023-02-21 ASSESSMENT — ACTIVITIES OF DAILY LIVING (ADL): ADLS_ACUITY_SCORE: 35

## 2023-02-21 NOTE — DISCHARGE INSTRUCTIONS
Take the antiviral medication (Valtrex) for the viral skin rash.    As needed for pain control at home, take:  - over-the-counter ibuprofen 800mg by mouth every 8 hours (max dose 2400mg in 24 hours)  - over-the-counter acetaminophen 1g by mouth every 6 hours (max dose 4g in 24 hours)  - over-the-counter lidocaine patches to painful area (up to 12 hours on, then 12 hours off)  - previously-prescribed Percocet for breakthrough pain  - previously-prescribed gabapentin for nerve pain    Follow up with your Primary Care provider in 2 days for a recheck.    Return to the Emergency Department for any difficulty breathing, persistent vomiting, severe worsening, or any other concerns.

## 2023-02-21 NOTE — ED PROVIDER NOTES
EMERGENCY DEPARTMENT ENCOUNTER      NAME: Ramila Liao  AGE: 87 year old female  YOB: 1935  MRN: [id number]  EVALUATION DATE & TIME: 2/21/2023  9:32 AM    PCP: Susan Sutton    ED PROVIDER: Joel Calzada M.D.      Chief Complaint   Patient presents with     Back Pain         IMPRESSION  1. Left flank pain    2. Herpes zoster without complication        PLAN  - Valtrex for home  - NSAIDs, gabapentin, Lidoderm for pain  - close PCP follow up  - discharge to home    ED COURSE & MEDICAL DECISION MAKING    ED Course as of 02/21/23 1421   Tue Feb 21, 2023   0936 87yoF with history of hysterectomy, cholecystectomy, low back pain, HLD who was seen in the ED 2 days for left flank pain; urine & blood unremarkable and CT with constipation only; discharged home. Returns to the ED today for evaluation of return of left flank pain last night. Ongoing constipation; took Miralax with very little output. No nausea, vomiting; still passing gas. Denies any rash, fevers, sweats, chills, any other problems or complaints at this time.    SBP 170s on presentation. Calm on exam with mild left CVA tenderness, benign abdomen with no tenderness, clear lungs, normal work of breathing, normal neuro exam. Doubt acute aortic syndrome. Given age with recurrent pain; will repeat CT, blood, urine. Does intermittently take Percocet for pain and has known ongoing constipation; will thus give methylnaltrexone to target OIC as this may be playing large role in her ongoing pain. Patient comfortable with this plan; no further questions at this time.   1118 UA with no UTI or blood. Blood tests still reassuring with no TERESA, no electrolyte derangement, normal bilirubin/LFTs/lipase, no leukocytosis with negative CRP, no anemia.   1200 CT with no acute abnormality or explanatory pathology. On recheck, patient feeling somewhat improved; still some pain. Now has very faint rash to area; does have small excoriation; may be early zoster.  Does have superficial skin tenderness on exam now. Hard to ascribe pain to intraabdominal constipation with no abdominal pain or tenderness. Give 1st dose of Valtrex here now along with prescribed for home; Lidoderm patch given as well. Patient & son comfortable with discharge home at this time. Return precautions and need for PCP follow up discussed and understood. No further questions at the time of discharge.       --------------------------------------------------------------------------------   --------------------------------------------------------------------------------     10:07 AM I met with the patient for the initial interview and physical examination. Discussed plan for treatment and workup in the ED.  12:02 PM Rechecked and updated the patient. We discussed the plan for discharge and the patient is agreeable. Reviewed supportive cares, symptomatic treatment, outpatient follow up, and reasons to return to the Emergency Department. Patient to be discharged by ED RN.       This patient involved a high degree of complexity in medical decision making, as significant risks were present and assessed. Recent encounters & results in medical record reviewed by me.    Broad differential considered for this patient presenting, including but not limited to:  Zoster, ureteral stone, UTI, pyelo, sepsis, acute aortic syndrome, constipation, SBO, perforation, diverticulitis, intraabdominal abscess, referred intrathoracic etiology    I wore the following PPE during this patient encounter:  N95 mask, face shield w/ eye protection, gloves    See additional MDM below if interested.      MEDICATIONS GIVEN IN THE EMERGENCY DEPARTMENT  Medications   Lidocaine (LIDOCARE) 4 % Patch 1 patch (1 patch Transdermal Patch/Med Applied 2/21/23 7913)   lactated ringers BOLUS 500 mL (0 mLs Intravenous Stopped 2/21/23 1130)   ketorolac (TORADOL) injection 15 mg (15 mg Intravenous Given 2/21/23 5832)   methylnaltrexone (RELISTOR)  injection 4 mg (4 mg Subcutaneous Given 2/21/23 1032)   iopamidol (ISOVUE-370) solution 90 mL (90 mLs Intravenous Given 2/21/23 1044)   valACYclovir (VALTREX) tablet 1,000 mg (1,000 mg Oral Given 2/21/23 1223)       NEW PRESCRIPTIONS STARTED AT [facility]S ER VISIT  Discharge Medication List as of 2/21/2023 12:27 PM      START taking these medications    Details   valACYclovir (VALTREX) 1000 mg tablet Take 1 tablet (1,000 mg) by mouth 2 times daily for 7 days, Disp-14 tablet, R-0, E-Prescribe         CONTINUE these medications which have NOT CHANGED    Details   acetaminophen (TYLENOL) 500 MG tablet Take 500-1,000 mg by mouth as needed for mild pain, Historical      aspirin 81 MG EC tablet [ASPIRIN 81 MG EC TABLET] Take 81 mg by mouth., Historical      betamethasone dipropionate (DIPROLENE) 0.05 % ointment [BETAMETHASONE DIPROPIONATE (DIPROLENE) 0.05 % OINTMENT] Apply topically.Historical      gabapentin (NEURONTIN) 300 MG capsule Take 1 capsule (300 mg) by mouth twice a day., Disp-60 capsule, R-1, E-Prescribe      hydrochlorothiazide (HYDRODIURIL) 12.5 MG tablet Take 1 tablet (12.5 mg) by mouth daily, Disp-60 tablet, R-0, E-Prescribe      ibuprofen (ADVIL/MOTRIN) 200 MG tablet Take 200 mg by mouth as needed for pain, Historical      magnesium citrate 100 mg Tab Take 250 mg by mouth, Historical      multivitamin with minerals (THERA-M) 9 mg iron-400 mcg Tab tablet [MULTIVITAMIN WITH MINERALS (THERA-M) 9 MG IRON-400 MCG TAB TABLET] Take 1 tablet by mouth daily., Historical      oxyCODONE-acetaminophen (PERCOCET) 5-325 MG tablet Take 0.5-1 tablets by mouth nightly as needed for pain, Disp-20 tablet, R-0, E-Prescribe      polyethylene glycol (MIRALAX) 17 GM/Dose powder Take 34 g (2 capfuls) by mouth 2 times daily for 3 days, THEN 17 g (1 capful.) daily for 30 days., Disp-714 g, R-0, E-Prescribe      tiZANidine (ZANAFLEX) 2 MG tablet TAKE 1 TABLET BY MOUTH THREE TIMES DAILY AS NEEDED FOR PAIN OR SPASMS, Disp-90 tablet, R-1,  E-Prescribe                 =================================================================      HPI  Patient information was obtained from: Patient    Use of : N/A      Ramila Liao is a 87 year old female with a pertinent history of low back pain and HLD who presents to this ED via private vehicle for evaluation of back pain.    Per chart review, the patient was seen in [facility] Emergency Department on 2/19/23 presenting with left flank pain. The patient reported a few days of left flank pain with difficulty sleeping secondary to the pain. On exam, she had left CVA tenderness and tenderness of the upper paraspinal lumbosacral musculature. Ct remarkable for constipation. UA unremarkable. Patient was discharged to home with new prescription(s) for MiraLAX cleanout.    The patient reports her pain improved after leaving the emergency department 2 days ago, however, last night around 2330 the pain in her left lower back and left flank area returned.  She reports the pain is severe and feels deep in her back. She had difficulty sleeping secondary to pain. She has not noticed any rash over the left flank. She has been having some loose stools over the past couple days, but is feeling constipated. She has been passing gas. She has been taking MiraLAX multiple times per day for her constipation. She has been holding off on taking her oxycodone at night due to her constipation. She denies any abdominal pain, nausea, or vomiting.    She has an appointment with neurosurgery tomorrow for her right lower back pain.    REVIEW OF SYSTEMS   Review of Systems   Gastrointestinal: Positive for constipation. Negative for abdominal pain, nausea and vomiting.   Genitourinary: Positive for flank pain (left).   Musculoskeletal: Positive for back pain (left lower).   Skin: Negative for rash.   All other systems reviewed and are negative except as noted above in HPI.        --------------- MEDICAL HISTORY  ---------------  PAST MEDICAL HISTORY:  Reviewed by me.  Past Medical History:   Diagnosis Date     Allergic rhinitis 8/2/2005     Contact dermatitis and other eczema, due to unspecified cause 7/30/2001     Low back pain 8/2/2016     Mixed hyperlipidemia 8/9/2006     Patient Active Problem List   Diagnosis     Allergic rhinitis     Contact dermatitis and other eczema, due to unspecified cause     Low back pain     Mixed hyperlipidemia       PAST SURGICAL HISTORY:  Reviewed by me.  Past Surgical History:   Procedure Laterality Date     CATARACT EXTRACTION, BILATERAL       CHOLECYSTECTOMY       HYSTERECTOMY         CURRENT MEDICATIONS:    Reviewed by me.    Current Facility-Administered Medications:      Lidocaine (LIDOCARE) 4 % Patch 1 patch, 1 patch, Transdermal, Once, Joel Calzada MD, 1 patch at 02/21/23 1224    Current Outpatient Medications:      valACYclovir (VALTREX) 1000 mg tablet, Take 1 tablet (1,000 mg) by mouth 2 times daily for 7 days, Disp: 14 tablet, Rfl: 0     acetaminophen (TYLENOL) 500 MG tablet, Take 500-1,000 mg by mouth as needed for mild pain, Disp: , Rfl:      aspirin 81 MG EC tablet, [ASPIRIN 81 MG EC TABLET] Take 81 mg by mouth., Disp: , Rfl:      betamethasone dipropionate (DIPROLENE) 0.05 % ointment, [BETAMETHASONE DIPROPIONATE (DIPROLENE) 0.05 % OINTMENT] Apply topically., Disp: , Rfl:      gabapentin (NEURONTIN) 300 MG capsule, Take 1 capsule (300 mg) by mouth twice a day., Disp: 60 capsule, Rfl: 1     hydrochlorothiazide (HYDRODIURIL) 12.5 MG tablet, Take 1 tablet (12.5 mg) by mouth daily (Patient not taking: Reported on 2/1/2023), Disp: 60 tablet, Rfl: 0     ibuprofen (ADVIL/MOTRIN) 200 MG tablet, Take 200 mg by mouth as needed for pain, Disp: , Rfl:      magnesium citrate 100 mg Tab, Take 250 mg by mouth, Disp: , Rfl:      multivitamin with minerals (THERA-M) 9 mg iron-400 mcg Tab tablet, [MULTIVITAMIN WITH MINERALS (THERA-M) 9 MG IRON-400 MCG TAB TABLET] Take 1 tablet by mouth  daily., Disp: , Rfl:      oxyCODONE-acetaminophen (PERCOCET) 5-325 MG tablet, Take 0.5-1 tablets by mouth nightly as needed for pain, Disp: 20 tablet, Rfl: 0     polyethylene glycol (MIRALAX) 17 GM/Dose powder, Take 34 g (2 capfuls) by mouth 2 times daily for 3 days, THEN 17 g (1 capful.) daily for 30 days., Disp: 714 g, Rfl: 0     tiZANidine (ZANAFLEX) 2 MG tablet, TAKE 1 TABLET BY MOUTH THREE TIMES DAILY AS NEEDED FOR PAIN OR SPASMS, Disp: 90 tablet, Rfl: 1    ALLERGIES:  Reviewed by me.  Allergies   Allergen Reactions     Naproxen Hives     Tolerates ibuprofen       FAMILY HISTORY:  Reviewed by me.  Family History   Problem Relation Age of Onset     Coronary Artery Disease Mother      Coronary Artery Disease Father        SOCIAL HISTORY:   Reviewed by me.  Social History     Socioeconomic History     Marital status:    Tobacco Use     Smoking status: Never     Smokeless tobacco: Never   Substance and Sexual Activity     Alcohol use: Yes     Drug use: Never     Sexual activity: Not Currently     Partners: Male       --------------- PHYSICAL EXAM ---------------  Nursing notes and vitals reviewed by me.  VITALS:  Vitals:    02/21/23 0931   BP: (!) 176/84   Pulse: 114   Resp: 17   Temp: 99.4  F (37.4  C)   TempSrc: Temporal   SpO2: 96%   Weight: 61.7 kg (136 lb)       PHYSICAL EXAM:    General:  alert, interactive, no distress  Eyes:  conjunctivae clear, conjugate gaze  HENT:  atraumatic, nose with no rhinorrhea, oropharynx clear  Neck:  no meningismus  Cardiovascular:  HR 70's during exam, regular rhythm, no murmurs, brisk cap refill  Chest:  no chest wall tenderness  Pulmonary:  no stridor, normal phonation, normal work of breathing, clear lungs bilaterally  Abdomen:  soft, nondistended, nontender  :  moderate left CVA tenderness  Back:  no midline spinal tenderness  Musculoskeletal:  no pretibial edema, no calf tenderness. Gross ROM intact to joints of extremities with no obvious deformities.  Skin:   warm, dry, no rash  Neuro:  awake, alert, answers questions appropriately, follows commands, moves all limbs, 5/5 strength to all extremities with sensation to light touch intact, no tremor  Psych:  calm, normal affect      --------------- RESULTS ---------------  LAB:  Reviewed and interpreted by me.  Results for orders placed or performed during the hospital encounter of 02/21/23   CT Abdomen Pelvis w Contrast    Impression    IMPRESSION:     1.  No imaging explanation for persistent left back/flank pain.  2.  Normal-sized kidneys without obstruction. No urinary tract calculi.  3.  Unchanged spondylolisthesis of L5 on S1.  4.  Diffuse dilation of the intra and extrahepatic bile ducts status post cholecystectomy.   Basic metabolic panel   Result Value Ref Range    Sodium 141 136 - 145 mmol/L    Potassium 4.1 3.5 - 5.0 mmol/L    Chloride 105 98 - 107 mmol/L    Carbon Dioxide (CO2) 25 22 - 31 mmol/L    Anion Gap 11 5 - 18 mmol/L    Urea Nitrogen 17 8 - 28 mg/dL    Creatinine 0.79 0.60 - 1.10 mg/dL    Calcium 9.2 8.5 - 10.5 mg/dL    Glucose 113 70 - 125 mg/dL    GFR Estimate 73 >60 mL/min/1.73m2   Result Value Ref Range    Lipase 27 0 - 52 U/L   Hepatic function panel   Result Value Ref Range    Bilirubin Total 0.6 0.0 - 1.0 mg/dL    Bilirubin Direct 0.2 <=0.5 mg/dL    Protein Total 7.1 6.0 - 8.0 g/dL    Albumin 4.0 3.5 - 5.0 g/dL    Alkaline Phosphatase 59 45 - 120 U/L    AST 25 0 - 40 U/L    ALT 22 0 - 45 U/L   CRP inflammation   Result Value Ref Range    CRP <0.1 0.0 - <0.8 mg/dL   UA with Microscopic reflex to Culture    Specimen: Urine, Clean Catch   Result Value Ref Range    Color Urine Colorless Colorless, Straw, Light Yellow, Yellow    Appearance Urine Clear Clear    Glucose Urine Negative Negative mg/dL    Bilirubin Urine Negative Negative    Ketones Urine Negative Negative mg/dL    Specific Gravity Urine 1.007 1.001 - 1.030    Blood Urine Negative Negative    pH Urine 7.0 5.0 - 7.0    Protein Albumin Urine  Negative Negative mg/dL    Urobilinogen Urine <2.0 <2.0 mg/dL    Nitrite Urine Negative Negative    Leukocyte Esterase Urine 25 Paulina/uL (A) Negative    RBC Urine <1 <=2 /HPF    WBC Urine 1 <=5 /HPF    Squamous Epithelials Urine <1 <=1 /HPF   CBC with platelets and differential   Result Value Ref Range    WBC Count 6.1 4.0 - 11.0 10e3/uL    RBC Count 4.28 3.80 - 5.20 10e6/uL    Hemoglobin 13.3 11.7 - 15.7 g/dL    Hematocrit 40.6 35.0 - 47.0 %    MCV 95 78 - 100 fL    MCH 31.1 26.5 - 33.0 pg    MCHC 32.8 31.5 - 36.5 g/dL    RDW 12.9 10.0 - 15.0 %    Platelet Count 175 150 - 450 10e3/uL    % Neutrophils 55 %    % Lymphocytes 33 %    % Monocytes 9 %    % Eosinophils 2 %    % Basophils 1 %    % Immature Granulocytes 0 %    NRBCs per 100 WBC 0 <1 /100    Absolute Neutrophils 3.4 1.6 - 8.3 10e3/uL    Absolute Lymphocytes 2.0 0.8 - 5.3 10e3/uL    Absolute Monocytes 0.5 0.0 - 1.3 10e3/uL    Absolute Eosinophils 0.1 0.0 - 0.7 10e3/uL    Absolute Basophils 0.0 0.0 - 0.2 10e3/uL    Absolute Immature Granulocytes 0.0 <=0.4 10e3/uL    Absolute NRBCs 0.0 10e3/uL       RADIOLOGY:  Reviewed by me. Please see official radiology report.  Recent Results (from the past 24 hour(s))   CT Abdomen Pelvis w Contrast    Narrative    EXAM: CT ABDOMEN PELVIS W CONTRAST  LOCATION: [facility]  DATE/TIME: 2/21/2023 10:52 AM    INDICATION: left flank abdominal pain  COMPARISON: CT of the abdomen and pelvis 2/19/2023  TECHNIQUE: CT scan of the abdomen and pelvis was performed following injection of IV contrast. Multiplanar reformats were obtained. Dose reduction techniques were used.  CONTRAST: 90ml Isovue 370    FINDINGS:   LOWER CHEST: A few thin bands of atelectasis are present in both bases along the diaphragmatic pleura. No interstitial thickening, airspace opacity, or basal pleural fluid.    HEPATOBILIARY: Prior cholecystectomy. Diffuse dilation of intrahepatic and extrahepatic bile ducts. The common bile duct measures  12 mm at the level of the pancreas head. The distal CBD tapers towards the ampulla of Vater. No choledocholithiasis. No   hepatic parenchymal lesions. Smooth liver capsule.    PANCREAS: Pancreas divisum. No pancreas parenchymal lesions.    SPLEEN: Normal.    ADRENAL GLANDS: Normal.    KIDNEYS/BLADDER: Kidneys are normal in size. Symmetric parenchymal enhancement and normal cortex thickness. No urinary tract calculi. Urinary bladder is normal.    BOWEL: Stomach is decompressed. No dilated bowel or bowel wall thickening. No inflammatory stranding in the mesentery.    LYMPH NODES: Normal.    VASCULATURE: Normal caliber abdominal aorta and iliac arteries. Mild atheromatous plaque in the infrarenal abdominal aorta.    PELVIC ORGANS: Post hysterectomy. No pelvic mass or free fluid.    MUSCULOSKELETAL: 7 mm of anterolisthesis of L5 on S1 with unroofing of the disc. Small degenerative osteophytes of the lumbar spine. No focal vertebral compression deformities. No fractures of the lower ribs. No aggressive or destructive bone lesions.      Impression    IMPRESSION:     1.  No imaging explanation for persistent left back/flank pain.  2.  Normal-sized kidneys without obstruction. No urinary tract calculi.  3.  Unchanged spondylolisthesis of L5 on S1.  4.  Diffuse dilation of the intra and extrahepatic bile ducts status post cholecystectomy.           --------------- ADDITIONAL MDM ---------------  History:  - Supplemental history from:       -- see above charting, if applicable: patient  - External Record(s) reviewed:       -- see above charting, if applicable       -- Inpatient/outpatient record (most recent ED visit), prior labs (recent blood/urine testing), prior imaging (recet CT scan)    Workup:  - Chart documentation above includes differential considered and any EKGs or imaging independently interpreted by provider.  - In additional to work up documented, I considered the following work up:       -- see above charting,  if applicable    External Consultation:  - Discussion of management with another provider:       -- see above charting, if applicable    Complicating Factors:  - Care impacted by chronic illness:       -- see above MDM, past medical history, & problem list  - Care affected by social determinants of health:       -- see above social history    Disposition Considerations:  - Discharge       -- I considered admission given that the patient came to the Emergency Department, but ultimately discharged the patient per decision making above       -- I recommended the patient continue their current prescription strength medication(s) as charted above       -- I prescribed prescription strength medication(s) as charted above       -- I recommended over-the-counter medication(s) as charted above & in discharge instructions         I, Alphonse James, am serving as a scribe to document services personally performed by Dr. Joel Calzada based on my observation and the provider's statements to me. I, Joel Calzada MD attest that Alphonse James is acting in a scribe capacity, has observed my performance of the services and has documented them in accordance with my direction.      Joel Calzada MD  02/21/23  Emergency Medicine  [facility] EMERGENCY ROOM  [address] [facility] [address]  [phone]  Dept: [phone]     Joel Calzada MD  02/21/23 1427

## 2023-02-21 NOTE — ED TRIAGE NOTES
"Pt c/o left lower back pain, was here Sunday for same, states she had CT Xray and shots. Pt states \"they thought it was constipation.\" pt states still having pain took pain pills this morning, took muscle relaxer, gabapentin and Motrin this morning.  Denies n/v. Taking miralax for constipation and only small result with this.   Denies urinary Sx    "

## 2023-02-22 ENCOUNTER — OFFICE VISIT (OUTPATIENT)
Dept: NEUROSURGERY | Facility: CLINIC | Age: 88
End: 2023-02-22
Payer: COMMERCIAL

## 2023-02-22 VITALS
DIASTOLIC BLOOD PRESSURE: 69 MMHG | BODY MASS INDEX: 25.03 KG/M2 | HEIGHT: 62 IN | HEART RATE: 80 BPM | WEIGHT: 136 LBS | SYSTOLIC BLOOD PRESSURE: 146 MMHG | OXYGEN SATURATION: 96 %

## 2023-02-22 DIAGNOSIS — M48.061 LUMBAR FORAMINAL STENOSIS: Primary | ICD-10-CM

## 2023-02-22 PROCEDURE — 99213 OFFICE O/P EST LOW 20 MIN: CPT | Performed by: SURGERY

## 2023-02-22 NOTE — LETTER
2/22/2023         RE: Ramila Liao  [address]  [place] [address]        Dear Colleague,    Thank you for referring your patient, Ramila Liao, to the [facility]. Please see a copy of my visit note below.    The patient is an 87-year-old female.  She has right leg pain.  X-ray of her right ankle showed no fracture.  On lumbar CT she has 6.6 mm listhesis of L5 on S1.   No pars defect.  She is stable on flexion and extension.  EMG is positive for right L5 radiculopathy.  BMI is 24.  I talked to Dr. Pena last visit about doing a minimally invasive foraminotomy L5-S1 on the right.  The patient is going to return to see him to discuss minimally invasive foraminotomy L5-S1 on the right and hopefully schedule it to be done.  The patient and her family are satisfied with the plan.  Total time 15 minutes, more than 50% spent counseling and/or coordinating care.      Again, thank you for allowing me to participate in the care of your patient.        Sincerely,        Deni Velarde MD

## 2023-02-22 NOTE — PROGRESS NOTES
The patient is an 87-year-old female.  She has right leg pain.  X-ray of her right ankle showed no fracture.  On lumbar CT she has 6.6 mm listhesis of L5 on S1.   No pars defect.  She is stable on flexion and extension.  EMG is positive for right L5 radiculopathy.  BMI is 24.  I talked to Dr. Pena last visit about doing a minimally invasive foraminotomy L5-S1 on the right.  The patient is going to return to see him to discuss minimally invasive foraminotomy L5-S1 on the right and hopefully schedule it to be done.  The patient and her family are satisfied with the plan.  Total time 15 minutes, more than 50% spent counseling and/or coordinating care.

## 2023-02-23 ENCOUNTER — PATIENT OUTREACH (OUTPATIENT)
Dept: CARE COORDINATION | Facility: CLINIC | Age: 88
End: 2023-02-23
Payer: COMMERCIAL

## 2023-02-23 NOTE — LETTER
Ramila Liao  [address]   [place] [address]    Dear Ramila Liao,      I am a team member within the [facility] with  [facility]. I recently tried to reach you to ensure you were doing well following a recent visit within our health system. I also wanted to take this chance to introduce Clinic Care Coordination.     Below is a description of Clinic Care Coordination and how this team can further assist you:       The Clinic Care Coordination team is made up of a Registered Nurse, , Financial Resource Worker, and a Community Health Worker who understand and can help navigate the health care system. The goal of clinic care coordination is to help you manage your health, improve access to care, and achieve optimal health outcomes. They work alongside your provider to assist you in determining your health and social needs, obtain health care and community resources, and provide you with necessary information and education. Clinic Care Coordination can work with you through any barriers and develop a care plan that helps coordinate and strengthen the relationship between you and your care team.    If you wish to connect with the Clinic Care Coordination Team, please let your [facility] Primary Care Provider at [phone] or Clinic Care Team know and they can place a referral. The Clinic Care Coordination team will then reach out by phone to further support you.    We are focused on providing you with the highest-quality healthcare experience possible.    Sincerely,   Your care team with [facility]

## 2023-02-23 NOTE — PROGRESS NOTES
Clinic Care Coordination Contact  Community Health Worker Initial Outreach    CHW Initial Information Gathering:  Referral Source: ED Follow-Up  Preferred Hospital: [facility]  [phone]  Preferred Urgent Care: [facility] [phone]  No PCP office visit in Past Year: No       Patient accepts CC: No, the patient stated that at this time she is not needing support from CCC. Patient will be sent Care Coordination introduction letter for future reference.     YOSSI Gonzales  [phone]  [facility] Care Resource [facility]

## 2023-02-24 ENCOUNTER — VIRTUAL VISIT (OUTPATIENT)
Dept: FAMILY MEDICINE | Facility: CLINIC | Age: 88
End: 2023-02-24
Payer: COMMERCIAL

## 2023-02-24 DIAGNOSIS — M54.16 LUMBAR RADICULAR PAIN: ICD-10-CM

## 2023-02-24 DIAGNOSIS — B02.9 HERPES ZOSTER WITHOUT COMPLICATION: Primary | ICD-10-CM

## 2023-02-24 PROCEDURE — 99213 OFFICE O/P EST LOW 20 MIN: CPT | Mod: VID | Performed by: NURSE PRACTITIONER

## 2023-02-24 RX ORDER — GABAPENTIN 300 MG/1
300 CAPSULE ORAL 3 TIMES DAILY
Qty: 90 CAPSULE | Refills: 1 | Status: SHIPPED | OUTPATIENT
Start: 2023-02-24 | End: 2023-05-23

## 2023-02-24 NOTE — PROGRESS NOTES
Kanwal is a 87 year old who is being evaluated via a billable video visit.      How would you like to obtain your AVS? MyChart  If the video visit is dropped, the invitation should be resent by: Text to cell phone: [phone]  Will anyone else be joining your video visit? No      Assessment & Plan     Herpes zoster without complication  Continues to be uncomfortable.  Her chronic back pain with radiculopathy is also contributing.  Will have her increase her Gabapentin TID.  May continue Ibuprofen, lidocaine, and oxycodone.   - gabapentin (NEURONTIN) 300 MG capsule  Dispense: 90 capsule; Refill: 1    Lumbar radicular pain  Continues to have fairly severe right leg radiculopathy.  Consulting with provider next week for procedure.         No follow-ups on file.    Susan Sutton NP  [facility]    Erickson Schofield is a 87 year old who presents for follow-up.  Patient was in the ER 2 times this week due to some significant left flank pain.  On the second visit, she had developed a rash and was ultimately diagnosed with herpes zoster.  Patient was started on valacyclovir and prescribed lidocaine patches.  She continues to have a rash.  It does not seem like it is worsening at all.  Patient has some chronic low back and right leg pain and is working with neurosurgery regarding this.  She is anticipating having a procedure soon.  She is using oxycodone, gabapentin, and ibuprofen for this pain.  She finds that ibuprofen helps the most.  Sleep is very difficult secondary to pain.    Review of Systems   Pertinent items in HPI        Objective           Vitals:  No vitals were obtained today due to virtual visit.    Physical Exam   GENERAL: Healthy, alert and no distress  PSYCH: Mentation appears normal, affect normal/bright, judgement and insight intact, normal speech and appearance well-groomed.            Video-Visit Details    Type of service:  Video Visit   Video Start Time: 10:16  AM  Video End Time:10:30 am    Originating Location (pt. Location): Home  Distant Location (provider location):  On-site  Platform used for Video Visit: Lalo

## 2023-02-25 ENCOUNTER — HOSPITAL ENCOUNTER (EMERGENCY)
Facility: CLINIC | Age: 88
Discharge: HOME | End: 2023-02-25
Attending: EMERGENCY MEDICINE | Admitting: EMERGENCY MEDICINE
Payer: COMMERCIAL

## 2023-02-25 VITALS
WEIGHT: 136 LBS | RESPIRATION RATE: 17 BRPM | TEMPERATURE: 98.7 F | OXYGEN SATURATION: 96 % | DIASTOLIC BLOOD PRESSURE: 71 MMHG | HEART RATE: 102 BPM | SYSTOLIC BLOOD PRESSURE: 153 MMHG | BODY MASS INDEX: 24.87 KG/M2

## 2023-02-25 DIAGNOSIS — R10.13 EPIGASTRIC PAIN: ICD-10-CM

## 2023-02-25 DIAGNOSIS — R10.9 FLANK PAIN: ICD-10-CM

## 2023-02-25 LAB
ALBUMIN SERPL-MCNC: 4.2 G/DL (ref 3.5–5)
ALP SERPL-CCNC: 69 U/L (ref 45–120)
ALT SERPL W P-5'-P-CCNC: 19 U/L (ref 0–45)
ANION GAP SERPL CALCULATED.3IONS-SCNC: 12 MMOL/L (ref 5–18)
AST SERPL W P-5'-P-CCNC: 23 U/L (ref 0–40)
ATRIAL RATE - MUSE: 107 BPM
BASOPHILS # BLD AUTO: 0 10E3/UL (ref 0–0.2)
BASOPHILS NFR BLD AUTO: 1 %
BILIRUB DIRECT SERPL-MCNC: 0.2 MG/DL
BILIRUB SERPL-MCNC: 0.5 MG/DL (ref 0–1)
BUN SERPL-MCNC: 13 MG/DL (ref 8–28)
CALCIUM SERPL-MCNC: 9.3 MG/DL (ref 8.5–10.5)
CHLORIDE BLD-SCNC: 98 MMOL/L (ref 98–107)
CO2 SERPL-SCNC: 25 MMOL/L (ref 22–31)
CREAT SERPL-MCNC: 0.82 MG/DL (ref 0.6–1.1)
DIASTOLIC BLOOD PRESSURE - MUSE: NORMAL MMHG
EOSINOPHIL # BLD AUTO: 0.1 10E3/UL (ref 0–0.7)
EOSINOPHIL NFR BLD AUTO: 1 %
ERYTHROCYTE [DISTWIDTH] IN BLOOD BY AUTOMATED COUNT: 12.6 % (ref 10–15)
GFR SERPL CREATININE-BSD FRML MDRD: 70 ML/MIN/1.73M2
GLUCOSE BLD-MCNC: 141 MG/DL (ref 70–125)
HCT VFR BLD AUTO: 42.3 % (ref 35–47)
HGB BLD-MCNC: 13.8 G/DL (ref 11.7–15.7)
IMM GRANULOCYTES # BLD: 0.1 10E3/UL
IMM GRANULOCYTES NFR BLD: 1 %
INTERPRETATION ECG - MUSE: NORMAL
LIPASE SERPL-CCNC: 27 U/L (ref 0–52)
LYMPHOCYTES # BLD AUTO: 1.7 10E3/UL (ref 0.8–5.3)
LYMPHOCYTES NFR BLD AUTO: 20 %
MCH RBC QN AUTO: 30.4 PG (ref 26.5–33)
MCHC RBC AUTO-ENTMCNC: 32.6 G/DL (ref 31.5–36.5)
MCV RBC AUTO: 93 FL (ref 78–100)
MONOCYTES # BLD AUTO: 0.5 10E3/UL (ref 0–1.3)
MONOCYTES NFR BLD AUTO: 5 %
NEUTROPHILS # BLD AUTO: 6 10E3/UL (ref 1.6–8.3)
NEUTROPHILS NFR BLD AUTO: 72 %
NRBC # BLD AUTO: 0 10E3/UL
NRBC BLD AUTO-RTO: 0 /100
P AXIS - MUSE: 82 DEGREES
PLATELET # BLD AUTO: 206 10E3/UL (ref 150–450)
POTASSIUM BLD-SCNC: 3.5 MMOL/L (ref 3.5–5)
PR INTERVAL - MUSE: 130 MS
PROT SERPL-MCNC: 7.5 G/DL (ref 6–8)
QRS DURATION - MUSE: 88 MS
QT - MUSE: 362 MS
QTC - MUSE: 483 MS
R AXIS - MUSE: 72 DEGREES
RBC # BLD AUTO: 4.54 10E6/UL (ref 3.8–5.2)
SODIUM SERPL-SCNC: 135 MMOL/L (ref 136–145)
SYSTOLIC BLOOD PRESSURE - MUSE: NORMAL MMHG
T AXIS - MUSE: 88 DEGREES
TROPONIN I SERPL-MCNC: 0.02 NG/ML (ref 0–0.29)
TSH SERPL DL<=0.005 MIU/L-ACNC: 2.36 UIU/ML (ref 0.3–5)
VENTRICULAR RATE- MUSE: 107 BPM
WBC # BLD AUTO: 8.3 10E3/UL (ref 4–11)

## 2023-02-25 PROCEDURE — 99285 EMERGENCY DEPT VISIT HI MDM: CPT | Mod: 25

## 2023-02-25 PROCEDURE — 96374 THER/PROPH/DIAG INJ IV PUSH: CPT

## 2023-02-25 PROCEDURE — 36415 COLL VENOUS BLD VENIPUNCTURE: CPT | Performed by: EMERGENCY MEDICINE

## 2023-02-25 PROCEDURE — 83690 ASSAY OF LIPASE: CPT | Performed by: EMERGENCY MEDICINE

## 2023-02-25 PROCEDURE — 84484 ASSAY OF TROPONIN QUANT: CPT | Performed by: EMERGENCY MEDICINE

## 2023-02-25 PROCEDURE — 93005 ELECTROCARDIOGRAM TRACING: CPT | Performed by: EMERGENCY MEDICINE

## 2023-02-25 PROCEDURE — 85025 COMPLETE CBC W/AUTO DIFF WBC: CPT | Performed by: EMERGENCY MEDICINE

## 2023-02-25 PROCEDURE — 82248 BILIRUBIN DIRECT: CPT | Performed by: EMERGENCY MEDICINE

## 2023-02-25 PROCEDURE — 84443 ASSAY THYROID STIM HORMONE: CPT | Performed by: EMERGENCY MEDICINE

## 2023-02-25 PROCEDURE — 250N000011 HC RX IP 250 OP 636: Performed by: EMERGENCY MEDICINE

## 2023-02-25 RX ORDER — MORPHINE SULFATE 4 MG/ML
4 INJECTION, SOLUTION INTRAMUSCULAR; INTRAVENOUS ONCE
Status: COMPLETED | OUTPATIENT
Start: 2023-02-25 | End: 2023-02-25

## 2023-02-25 RX ORDER — HYDROCODONE BITARTRATE AND ACETAMINOPHEN 5; 325 MG/1; MG/1
1 TABLET ORAL EVERY 6 HOURS PRN
Qty: 15 TABLET | Refills: 0 | Status: SHIPPED | OUTPATIENT
Start: 2023-02-25 | End: 2023-02-28

## 2023-02-25 RX ADMIN — MORPHINE SULFATE 4 MG: 4 INJECTION, SOLUTION INTRAMUSCULAR; INTRAVENOUS at 08:16

## 2023-02-25 ASSESSMENT — ACTIVITIES OF DAILY LIVING (ADL): ADLS_ACUITY_SCORE: 35

## 2023-02-25 NOTE — ED PROVIDER NOTES
EMERGENCY DEPARTMENT ENCOUNTER      NAME: Ramila Liao  AGE: 87 year old female  YOB: 1935  MRN: [id number]  EVALUATION DATE & TIME: 2023  7:25 AM    PCP: Susan Sutton    ED PROVIDER: Thad Rausch D.O.      Chief Complaint   Patient presents with     Rash       FINAL IMPRESSION:  1. Flank pain    2. Epigastric pain        ED COURSE & MEDICAL DECISION MAKIN:36 AM I met with the patient to gather history and to perform my initial exam. I discussed the plan for care while in the Emergency Department.  9:12 AM I reassessed patient.         Pertinent Labs & Imaging studies reviewed. (See chart for details)  87 year old female presents to the Emergency Department for evaluation of left-sided flank pain.  Patient already had been diagnosed with shingles, and there is a faint rash in this area that may be representative shingles especially with her pain following a perfect pattern for shingles.  Patient is already been recently worked up for urolithiasis with negative imaging.  However she also had epigastric pain though minimal on my exam.  Therefore I did decide to perform labs and an EKG as a precaution primarily to rule out interabdominal pathology including pancreatitis.  She is already status post cholecystectomy making cholecystitis and cholelithiasis unlikely.  She had no chest pain or shortness of breath, therefore I did not believe a chest x-ray was indicated.  Patient's labs were largely unremarkable, including negative troponin, normal LFTs and normal lipase.  Her symptoms did improve with pain medication in the emergency department.  The patient's heart rate was mildly elevated, but looking at old medical records this does appear to be consistent with her recent typical heart rate, and I did not believe D-dimer or CT scan of the chest were indicated as there was no chest pain or shortness of breath to suggest PE or other acute process in the chest.  She was not febrile, and did  not have an elevated white blood cell count, nor other symptoms that were suggestive that this could represent a significant infectious process.  At this time, I did instruct the patient that she should follow-up with her primary care about this elevated heart rate, but did not believe it to represent an emergent process, especially with her recent clinical and emergency room visits that all show the same heart rate.  Patient was comfortable with this.  She will follow-up as an outpatient.  Return precautions were discussed.  We did discuss new pain medications, she was instructed to stop taking oxycodone and we will switch her to Norco to see if this works better for her.    Medical Decision Making    History:    Supplemental history from: Documented in chart, if applicable    External Record(s) reviewed: Documented in chart, if applicable.    Work Up:    Chart documentation includes differential considered and any EKGs or imaging independently interpreted by provider, where specified.    In additional to work up documented, I considered the following work up: Documented in chart, if applicable.    External consultation:    Discussion of management with another provider: Documented in chart, if applicable    Complicating factors:    Care impacted by chronic illness: Hyperlipidemia    Care affected by social determinants of health: N/A    Disposition considerations: Discharge. I prescribed additional prescription strength medication(s) as charted. N/A.        At the conclusion of the encounter I discussed the results of all of the tests and the disposition. The questions were answered. The patient or family acknowledged understanding and was agreeable with the care plan.      HPI    Patient information was obtained from: Patient    Use of : N/A       Ramila Liao is a 87 year old female with a pertinent history of HLD who presents via walk-in for evaluation of a rash, abdominal pain.    Patient  endorses a constant painful rash on her left flank which she says is too severe for her to sleep. She reports that the pain radiates to her abdomen. Patient is on valtrex, and has taken oxycodone without relief, saying that this made the pain worse. Patient also endorses epigastric pain, and a subjective fever.     Patient denies any allergies to medications. She reports having past cholecystectomy and hysterectomy. Patient denies tobacco and alcohol use.    Patient denies chest pain, nausea, and all other complaints at this time.    Previous Medical Records: Patient was seen in this ED on 2/21 for left flank pain. CT with no acute abnormality. Blood tests reassuring with no TERESA, no electrolyte derangement, normal bilirubin/LFTs/lipase, no leukocytosis with negative CRP, no anemia. Diagnosis herpes zoster without complication. Patient prescribed Valtrex and Lidoderm patch and discharged.  Patient had a virtual visit with [facility] on 2/24. Patient reported continued discomfort from left flank pain. PCP increased Gabapentin.    @[email or web address]@    REVIEW OF SYSTEMS  Constitutional:  Denies weight loss or weakness. Positive for subjective fever.  Eyes:  No pain, discharge, redness  HENT:  Denies sore throat, ear pain, congestion  Respiratory: No SOB, wheeze or cough  Cardiovascular:  No CP, palpitations  GI:  Denies nausea, vomiting, diarrhea. Positive for epigastric abdominal pain.  : Denies dysuria, hematuria  Musculoskeletal:  Denies any new muscle/joint pain, swelling or loss of function.  Skin:  Denies pallor. Positive for rash (left flank)  Neurologic:  Denies headache, focal weakness or sensory changes  Lymph: Denies swollen nodes    All other systems negative unless noted in HPI.    PAST MEDICAL HISTORY:  Past Medical History:   Diagnosis Date     Allergic rhinitis 8/2/2005     Contact dermatitis and other eczema, due to unspecified cause 7/30/2001     Low back pain 8/2/2016      Mixed hyperlipidemia 8/9/2006       PAST SURGICAL HISTORY:  Past Surgical History:   Procedure Laterality Date     CATARACT EXTRACTION, BILATERAL       CHOLECYSTECTOMY       HYSTERECTOMY           CURRENT MEDICATIONS:    No current facility-administered medications for this encounter.     Current Outpatient Medications   Medication     HYDROcodone-acetaminophen (NORCO) 5-325 MG tablet     acetaminophen (TYLENOL) 500 MG tablet     aspirin 81 MG EC tablet     betamethasone dipropionate (DIPROLENE) 0.05 % ointment     gabapentin (NEURONTIN) 300 MG capsule     hydrochlorothiazide (HYDRODIURIL) 12.5 MG tablet     ibuprofen (ADVIL/MOTRIN) 200 MG tablet     magnesium citrate 100 mg Tab     multivitamin with minerals (THERA-M) 9 mg iron-400 mcg Tab tablet     oxyCODONE-acetaminophen (PERCOCET) 5-325 MG tablet     polyethylene glycol (MIRALAX) 17 GM/Dose powder     tiZANidine (ZANAFLEX) 2 MG tablet     valACYclovir (VALTREX) 1000 mg tablet         ALLERGIES:  Allergies   Allergen Reactions     Naproxen Hives     Tolerates ibuprofen       FAMILY HISTORY:  Family History   Problem Relation Age of Onset     Coronary Artery Disease Mother      Coronary Artery Disease Father        SOCIAL HISTORY:  Social History     Socioeconomic History     Marital status:    Tobacco Use     Smoking status: Never     Smokeless tobacco: Never   Substance and Sexual Activity     Alcohol use: Yes     Drug use: Never     Sexual activity: Not Currently     Partners: Male       VITALS:  Patient Vitals for the past 24 hrs:   BP Temp Temp src Pulse Resp SpO2 Weight   02/25/23 1015 (!) 153/71 -- -- 102 17 96 % --   02/25/23 0932 -- -- -- 105 -- 97 % --   02/25/23 0902 (!) 176/86 -- -- 104 18 99 % --   02/25/23 0815 (!) 175/82 -- -- 105 17 98 % --   02/25/23 0800 (!) 167/82 -- -- 107 16 -- --   02/25/23 0722 (!) 142/82 98.7  F (37.1  C) Oral (!) 126 16 99 % 61.7 kg (136 lb)       PHYSICAL EXAM    VITAL SIGNS: BP (!) 153/71   Pulse 102   Temp  98.7  F (37.1  C) (Oral)   Resp 17   Wt 61.7 kg (136 lb)   SpO2 96%   BMI 24.87 kg/m      General Appearance: Well-appearing, well-nourished, no acute distress   Head:  Normocephalic, without obvious abnormality, atraumatic  Eyes:  PERRL, conjunctiva/corneas clear, EOM's intact,  ENT:  Lips, mucosa, and tongue normal, membranes are moist without pallor  Neck:  Normal ROM, symmetrical, trachea midline    Cardio:  Tachycardic, regular rhythm, no murmur, rub or gallop, 2+ pulses symmetric in all extremities  Pulm:  Clear to auscultation bilaterally, respirations unlabored,  Abdomen:  Soft, no rebound or guarding. Epigastric tenderness.  Musculoskeletal: Full ROM, no edema, no cyanosis, good ROM of major joints  Integument:  Warm, Dry.  Single erythematic lesion to left flank.  Neurologic:  Alert & oriented.  No focal deficits appreciated.  Ambulatory.  Psychiatric:  Affect normal, Judgment normal, Mood normal.      LABS  Results for orders placed or performed during the hospital encounter of 02/25/23 (from the past 24 hour(s))   CBC with platelets + differential    Narrative    The following orders were created for panel order CBC with platelets + differential.  Procedure                               Abnormality         Status                     ---------                               -----------         ------                     CBC with platelets and d...[[id number]]                      Final result                 Please view results for these tests on the individual orders.   Basic metabolic panel   Result Value Ref Range    Sodium 135 (L) 136 - 145 mmol/L    Potassium 3.5 3.5 - 5.0 mmol/L    Chloride 98 98 - 107 mmol/L    Carbon Dioxide (CO2) 25 22 - 31 mmol/L    Anion Gap 12 5 - 18 mmol/L    Urea Nitrogen 13 8 - 28 mg/dL    Creatinine 0.82 0.60 - 1.10 mg/dL    Calcium 9.3 8.5 - 10.5 mg/dL    Glucose 141 (H) 70 - 125 mg/dL    GFR Estimate 70 >60 mL/min/1.73m2   Hepatic function panel   Result Value Ref  Range    Bilirubin Total 0.5 0.0 - 1.0 mg/dL    Bilirubin Direct 0.2 <=0.5 mg/dL    Protein Total 7.5 6.0 - 8.0 g/dL    Albumin 4.2 3.5 - 5.0 g/dL    Alkaline Phosphatase 69 45 - 120 U/L    AST 23 0 - 40 U/L    ALT 19 0 - 45 U/L   Lipase   Result Value Ref Range    Lipase 27 0 - 52 U/L   Troponin I (now)   Result Value Ref Range    Troponin I 0.02 0.00 - 0.29 ng/mL   CBC with platelets and differential   Result Value Ref Range    WBC Count 8.3 4.0 - 11.0 10e3/uL    RBC Count 4.54 3.80 - 5.20 10e6/uL    Hemoglobin 13.8 11.7 - 15.7 g/dL    Hematocrit 42.3 35.0 - 47.0 %    MCV 93 78 - 100 fL    MCH 30.4 26.5 - 33.0 pg    MCHC 32.6 31.5 - 36.5 g/dL    RDW 12.6 10.0 - 15.0 %    Platelet Count 206 150 - 450 10e3/uL    % Neutrophils 72 %    % Lymphocytes 20 %    % Monocytes 5 %    % Eosinophils 1 %    % Basophils 1 %    % Immature Granulocytes 1 %    NRBCs per 100 WBC 0 <1 /100    Absolute Neutrophils 6.0 1.6 - 8.3 10e3/uL    Absolute Lymphocytes 1.7 0.8 - 5.3 10e3/uL    Absolute Monocytes 0.5 0.0 - 1.3 10e3/uL    Absolute Eosinophils 0.1 0.0 - 0.7 10e3/uL    Absolute Basophils 0.0 0.0 - 0.2 10e3/uL    Absolute Immature Granulocytes 0.1 <=0.4 10e3/uL    Absolute NRBCs 0.0 10e3/uL   TSH with free T4 reflex   Result Value Ref Range    TSH 2.36 0.30 - 5.00 uIU/mL   ECG 12-LEAD WITH MUSE (LHE)   Result Value Ref Range    Systolic Blood Pressure  mmHg    Diastolic Blood Pressure  mmHg    Ventricular Rate 107 BPM    Atrial Rate 107 BPM    NH Interval 130 ms    QRS Duration 88 ms     ms    QTc 483 ms    P Axis 82 degrees    R AXIS 72 degrees    T Axis 88 degrees    Interpretation ECG       Sinus tachycardia  Biatrial enlargement  Abnormal ECG  No previous ECGs available  Confirmed by PRIORITY READ, : ([id number]),  GM DE JESUS ([id number]) on 2/25/2023 8:13:42 AM           RADIOLOGY  No orders to display          EKG:    Rate: 107bpm  Rhythm: Sinus tachycardia  Axis: Normal  Interval: Normal  Conduction: Normal  QRS:  Normal  ST: Normal  T-wave: Normal  QT: Not prolonged  Comparison EKG: no previous available for comparison  Impression:  No acute ischemic change   I have independently reviewed and interpreted today's EKG, pending Cardiologist read        MEDICATIONS GIVEN IN THE EMERGENCY:  Medications   morphine (PF) injection 4 mg (4 mg Intravenous $Given 2/25/23 0816)       NEW PRESCRIPTIONS STARTED AT TODAY'S ER VISIT  Discharge Medication List as of 2/25/2023 10:17 AM      START taking these medications    Details   HYDROcodone-acetaminophen (NORCO) 5-325 MG tablet Take 1 tablet by mouth every 6 hours as needed for severe pain (7-10), Disp-15 tablet, R-0, Local Print              I, Logan Owens, am serving as a scribe to document services personally performed by Thad Rausch D.O., based on my observations and the provider's statements to me.  I, Thad Rausch D.O., attest that Logan Owens is acting in a scribe capacity, has observed my performance of the services and has documented them in accordance with my direction.     Thad Rausch D.O.  Emergency Medicine  [facility] EMERGENCY ROOM  [address] [facility] [address] [facility] [id number] [phone]  Dept: [phone]      Thad Rausch,   02/25/23 1558

## 2023-02-25 NOTE — ED TRIAGE NOTES
Pt being treated for shingles on her left flank and her pain is too great     Triage Assessment     Row Name 02/25/23 8326       Triage Assessment (Adult)    Airway WDL WDL       Respiratory WDL    Respiratory WDL WDL       Skin Circulation/Temperature WDL    Skin Circulation/Temperature WDL WDL       Cardiac WDL    Cardiac WDL WDL       Peripheral/Neurovascular WDL    Peripheral Neurovascular WDL WDL       Cognitive/Neuro/Behavioral WDL    Cognitive/Neuro/Behavioral WDL WDL

## 2023-02-27 ENCOUNTER — NURSE TRIAGE (OUTPATIENT)
Dept: NURSING | Facility: CLINIC | Age: 88
End: 2023-02-27
Payer: COMMERCIAL

## 2023-02-27 NOTE — TELEPHONE ENCOUNTER
"Nurse Triage SBAR    Is this a 2nd Level Triage? YES, LICENSED PRACTITIONER REVIEW IS REQUIRED  Yes, please call patient at [phone] (home)   Disposition to see in office today. Reviewed Walk In Care. Patient prefers message to provider.    Situation:     Patient calling reporting she was seen in the ED on 2/25/23 for flank pain/rash attributed to possible shingles. Reporting diarrhea x 1 week 5-7 stools per day.    Background:     Diagnosed with Shingles/Taking Valcyclovir  Norco  Gabapentin    Assessment:     Patient was seen in ED on 2/25/23 for left flank pain/diagnosed with shingles.   Faint blotchy rash on left flank.     Patient reporting the pain had resolved and returned again last night.    Rating pain \"1\" now on 0-10 pain scale in left flank.  Stating she has been having loose watery stools 5-7 per day x 1 week.  Stating she was taking medication to prevent constipation with the narcotic medication, that she has now stopped.  Taking fluids. Denies change in urine output.   Afebrile.   Denies nausea or vomiting.   Reporting \"tripping over walker today\" with fall. Denies hitting head. Denies known injury.  Stating \"I got right back up.\"     Patient has follow up scheduled for 3/2/23. Per triage to see in office today.      Protocol Recommended Disposition:   See in office today.    Recommendation:     Please advise on working into office today.  Reviewed signs and symptoms of dehydration.    Routed to provider    Does the patient meet one of the following criteria for ADS visit consideration? 16+ years old, with an [facility] PCP     TIP  Providers, please consider if this condition is appropriate for management at one of our Acute and Diagnostic Services sites.     If patient is a good candidate, please use dotphrase <dot>triageresponse and select Refer to ADS to document.  Reason for Disposition    Shingles rash already diagnosed and taking antiviral medication    MODERATE diarrhea (e.g., 4-6 times / day " more than normal) and present > 48 hours (2 days)    Additional Information    Negative: Difficult to awaken or acting confused (e.g., disoriented, slurred speech)    Negative: Sounds like a life-threatening emergency to the triager    Negative: Localized rash and doesn't match the SYMPTOMS of shingles    Negative: Back pain and doesn't match the SYMPTOMS of shingles    Negative: Shingles Vaccine (Recombinant Zoster Vaccine; RZV; Shingrix), questions about    Negative: Shingles rash of face and eye pain or blurred vision    Negative: Shingles rash on the eyelid or tip of the nose    Negative: Shingles rash and spots start appearing other places on body    Negative: Patient sounds very sick or weak to the triager    Negative: Shingles rash (matches SYMPTOMS) and weak immune system (e.g., HIV positive, cancer chemotherapy, chronic steroid treatment, splenectomy) and NOT taking antiviral medication    Negative: Shingles rash of face and facial weakness    Negative: Fever > 100.4 F  (38.0 C)    Negative: Shingles rash of face or ear and earache or ringing in the ear    Negative: SEVERE pain (e.g., excruciating)    Negative: Shingles rash (matches SYMPTOMS) and onset < 72 hours ago (3 days)    Negative: Looks infected (spreading redness, pus) and no fever    Negative: Patient wants to be seen    Negative: Shingles rash and onset > 72 hours ago    Negative: Shingles rash already diagnosed and weak immune system (e.g., HIV positive, cancer chemotherapy, chronic steroid treatment, splenectomy) and taking antiviral medication    Negative: Pain lasting > 1 month after rash disappears    Negative: Shock suspected (e.g., cold/pale/clammy skin, too weak to stand, low BP, rapid pulse)    Negative: Difficult to awaken or acting confused (e.g., disoriented, slurred speech)    Negative: Sounds like a life-threatening emergency to the triager    Negative: Vomiting also present and worse than the diarrhea    Negative: Blood in stool  and without diarrhea    Negative: SEVERE abdominal pain (e.g., excruciating) and present > 1 hour    Negative: SEVERE abdominal pain and age > 60 years    Negative: Bloody, black, or tarry bowel movements (Exception: chronic-unchanged black-grey bowel movements and is taking iron pills or Pepto-Bismol)    Negative: SEVERE diarrhea (e.g., 7 or more times / day more than normal) and age > 60 years    Negative: Constant abdominal pain lasting > 2 hours    Negative: Drinking very little and has signs of dehydration (e.g., no urine > 12 hours, very dry mouth, very lightheaded)    Negative: Patient sounds very sick or weak to the triager    Negative: SEVERE diarrhea (e.g., 7 or more times / day more than normal) and present > 24 hours (1 day)    Protocols used: SHINGLES (ZOSTER)-A-OH, DIARRHEA-A-OH

## 2023-02-28 ENCOUNTER — HOSPITAL ENCOUNTER (EMERGENCY)
Facility: CLINIC | Age: 88
Discharge: HOME | End: 2023-02-28
Attending: EMERGENCY MEDICINE | Admitting: EMERGENCY MEDICINE
Payer: COMMERCIAL

## 2023-02-28 ENCOUNTER — APPOINTMENT (OUTPATIENT)
Dept: CT IMAGING | Facility: CLINIC | Age: 88
End: 2023-02-28
Attending: EMERGENCY MEDICINE
Payer: COMMERCIAL

## 2023-02-28 ENCOUNTER — TELEPHONE (OUTPATIENT)
Dept: FAMILY MEDICINE | Facility: CLINIC | Age: 88
End: 2023-02-28

## 2023-02-28 VITALS
HEIGHT: 62 IN | DIASTOLIC BLOOD PRESSURE: 88 MMHG | RESPIRATION RATE: 25 BRPM | BODY MASS INDEX: 25.03 KG/M2 | WEIGHT: 136 LBS | SYSTOLIC BLOOD PRESSURE: 186 MMHG | HEART RATE: 102 BPM | TEMPERATURE: 99.7 F | OXYGEN SATURATION: 96 %

## 2023-02-28 DIAGNOSIS — R10.9 LEFT FLANK PAIN: ICD-10-CM

## 2023-02-28 LAB
ALBUMIN SERPL-MCNC: 4.2 G/DL (ref 3.5–5)
ALBUMIN UR-MCNC: 10 MG/DL
ALP SERPL-CCNC: 72 U/L (ref 45–120)
ALT SERPL W P-5'-P-CCNC: 31 U/L (ref 0–45)
ANION GAP SERPL CALCULATED.3IONS-SCNC: 11 MMOL/L (ref 5–18)
APPEARANCE UR: CLEAR
AST SERPL W P-5'-P-CCNC: 28 U/L (ref 0–40)
ATRIAL RATE - MUSE: 109 BPM
BASOPHILS # BLD AUTO: 0 10E3/UL (ref 0–0.2)
BASOPHILS NFR BLD AUTO: 1 %
BILIRUB DIRECT SERPL-MCNC: 0.1 MG/DL
BILIRUB SERPL-MCNC: 0.4 MG/DL (ref 0–1)
BILIRUB UR QL STRIP: NEGATIVE
BUN SERPL-MCNC: 22 MG/DL (ref 8–28)
C REACTIVE PROTEIN LHE: <0.1 MG/DL (ref 0–?)
CALCIUM SERPL-MCNC: 9.9 MG/DL (ref 8.5–10.5)
CHLORIDE BLD-SCNC: 104 MMOL/L (ref 98–107)
CK SERPL-CCNC: 101 U/L (ref 30–190)
CO2 SERPL-SCNC: 26 MMOL/L (ref 22–31)
COLOR UR AUTO: ABNORMAL
CREAT SERPL-MCNC: 0.95 MG/DL (ref 0.6–1.1)
DIASTOLIC BLOOD PRESSURE - MUSE: NORMAL MMHG
EOSINOPHIL # BLD AUTO: 0.1 10E3/UL (ref 0–0.7)
EOSINOPHIL NFR BLD AUTO: 2 %
ERYTHROCYTE [DISTWIDTH] IN BLOOD BY AUTOMATED COUNT: 12.9 % (ref 10–15)
GFR SERPL CREATININE-BSD FRML MDRD: 58 ML/MIN/1.73M2
GLUCOSE BLD-MCNC: 110 MG/DL (ref 70–125)
GLUCOSE UR STRIP-MCNC: NEGATIVE MG/DL
HCT VFR BLD AUTO: 44 % (ref 35–47)
HGB BLD-MCNC: 14.2 G/DL (ref 11.7–15.7)
HGB UR QL STRIP: NEGATIVE
IMM GRANULOCYTES # BLD: 0 10E3/UL
IMM GRANULOCYTES NFR BLD: 0 %
INTERPRETATION ECG - MUSE: NORMAL
KETONES UR STRIP-MCNC: NEGATIVE MG/DL
LEUKOCYTE ESTERASE UR QL STRIP: ABNORMAL
LIPASE SERPL-CCNC: 30 U/L (ref 0–52)
LYMPHOCYTES # BLD AUTO: 2 10E3/UL (ref 0.8–5.3)
LYMPHOCYTES NFR BLD AUTO: 30 %
MAGNESIUM SERPL-MCNC: 2.4 MG/DL (ref 1.8–2.6)
MCH RBC QN AUTO: 30.7 PG (ref 26.5–33)
MCHC RBC AUTO-ENTMCNC: 32.3 G/DL (ref 31.5–36.5)
MCV RBC AUTO: 95 FL (ref 78–100)
MONOCYTES # BLD AUTO: 0.6 10E3/UL (ref 0–1.3)
MONOCYTES NFR BLD AUTO: 8 %
MUCOUS THREADS #/AREA URNS LPF: PRESENT /LPF
NEUTROPHILS # BLD AUTO: 4.1 10E3/UL (ref 1.6–8.3)
NEUTROPHILS NFR BLD AUTO: 59 %
NITRATE UR QL: NEGATIVE
NRBC # BLD AUTO: 0 10E3/UL
NRBC BLD AUTO-RTO: 0 /100
P AXIS - MUSE: 72 DEGREES
PH UR STRIP: 6 [PH] (ref 5–7)
PLATELET # BLD AUTO: 214 10E3/UL (ref 150–450)
POTASSIUM BLD-SCNC: 4.2 MMOL/L (ref 3.5–5)
PR INTERVAL - MUSE: 120 MS
PROCALCITONIN SERPL-MCNC: 0.09 NG/ML (ref 0–0.49)
PROT SERPL-MCNC: 7.6 G/DL (ref 6–8)
QRS DURATION - MUSE: 90 MS
QT - MUSE: 346 MS
QTC - MUSE: 465 MS
R AXIS - MUSE: 90 DEGREES
RBC # BLD AUTO: 4.63 10E6/UL (ref 3.8–5.2)
RBC URINE: 2 /HPF
SODIUM SERPL-SCNC: 141 MMOL/L (ref 136–145)
SP GR UR STRIP: 1.01 (ref 1–1.03)
SQUAMOUS EPITHELIAL: 1 /HPF
SYSTOLIC BLOOD PRESSURE - MUSE: NORMAL MMHG
T AXIS - MUSE: 56 DEGREES
TRANSITIONAL EPI: <1 /HPF
TROPONIN I SERPL-MCNC: 0.03 NG/ML (ref 0–0.29)
TSH SERPL DL<=0.005 MIU/L-ACNC: 3.84 UIU/ML (ref 0.3–5)
UROBILINOGEN UR STRIP-MCNC: <2 MG/DL
VENTRICULAR RATE- MUSE: 109 BPM
WBC # BLD AUTO: 6.8 10E3/UL (ref 4–11)
WBC URINE: 4 /HPF

## 2023-02-28 PROCEDURE — 83735 ASSAY OF MAGNESIUM: CPT | Performed by: EMERGENCY MEDICINE

## 2023-02-28 PROCEDURE — 84443 ASSAY THYROID STIM HORMONE: CPT | Performed by: EMERGENCY MEDICINE

## 2023-02-28 PROCEDURE — 84484 ASSAY OF TROPONIN QUANT: CPT | Performed by: EMERGENCY MEDICINE

## 2023-02-28 PROCEDURE — 250N000011 HC RX IP 250 OP 636: Performed by: EMERGENCY MEDICINE

## 2023-02-28 PROCEDURE — 84145 PROCALCITONIN (PCT): CPT | Performed by: EMERGENCY MEDICINE

## 2023-02-28 PROCEDURE — 93005 ELECTROCARDIOGRAM TRACING: CPT | Performed by: EMERGENCY MEDICINE

## 2023-02-28 PROCEDURE — 99285 EMERGENCY DEPT VISIT HI MDM: CPT | Mod: 25

## 2023-02-28 PROCEDURE — 36415 COLL VENOUS BLD VENIPUNCTURE: CPT | Performed by: EMERGENCY MEDICINE

## 2023-02-28 PROCEDURE — 81001 URINALYSIS AUTO W/SCOPE: CPT | Performed by: EMERGENCY MEDICINE

## 2023-02-28 PROCEDURE — 82550 ASSAY OF CK (CPK): CPT | Performed by: EMERGENCY MEDICINE

## 2023-02-28 PROCEDURE — 82248 BILIRUBIN DIRECT: CPT | Performed by: EMERGENCY MEDICINE

## 2023-02-28 PROCEDURE — 258N000003 HC RX IP 258 OP 636: Performed by: EMERGENCY MEDICINE

## 2023-02-28 PROCEDURE — 96374 THER/PROPH/DIAG INJ IV PUSH: CPT | Mod: 59

## 2023-02-28 PROCEDURE — 83690 ASSAY OF LIPASE: CPT | Performed by: EMERGENCY MEDICINE

## 2023-02-28 PROCEDURE — 250N000013 HC RX MED GY IP 250 OP 250 PS 637: Performed by: EMERGENCY MEDICINE

## 2023-02-28 PROCEDURE — 96361 HYDRATE IV INFUSION ADD-ON: CPT

## 2023-02-28 PROCEDURE — 85025 COMPLETE CBC W/AUTO DIFF WBC: CPT | Performed by: EMERGENCY MEDICINE

## 2023-02-28 PROCEDURE — 71275 CT ANGIOGRAPHY CHEST: CPT

## 2023-02-28 PROCEDURE — 86140 C-REACTIVE PROTEIN: CPT | Performed by: EMERGENCY MEDICINE

## 2023-02-28 PROCEDURE — 74177 CT ABD & PELVIS W/CONTRAST: CPT

## 2023-02-28 PROCEDURE — 87086 URINE CULTURE/COLONY COUNT: CPT | Performed by: EMERGENCY MEDICINE

## 2023-02-28 RX ORDER — IBUPROFEN 600 MG/1
600 TABLET, FILM COATED ORAL ONCE
Status: COMPLETED | OUTPATIENT
Start: 2023-02-28 | End: 2023-02-28

## 2023-02-28 RX ORDER — MORPHINE SULFATE 4 MG/ML
4 INJECTION, SOLUTION INTRAMUSCULAR; INTRAVENOUS ONCE
Status: COMPLETED | OUTPATIENT
Start: 2023-02-28 | End: 2023-02-28

## 2023-02-28 RX ORDER — IOPAMIDOL 755 MG/ML
100 INJECTION, SOLUTION INTRAVASCULAR ONCE
Status: COMPLETED | OUTPATIENT
Start: 2023-02-28 | End: 2023-02-28

## 2023-02-28 RX ADMIN — SODIUM CHLORIDE, POTASSIUM CHLORIDE, SODIUM LACTATE AND CALCIUM CHLORIDE 1000 ML: 600; 310; 30; 20 INJECTION, SOLUTION INTRAVENOUS at 20:43

## 2023-02-28 RX ADMIN — IOPAMIDOL 75 ML: 755 INJECTION, SOLUTION INTRAVENOUS at 20:01

## 2023-02-28 RX ADMIN — IBUPROFEN 600 MG: 600 TABLET ORAL at 20:41

## 2023-02-28 RX ADMIN — MORPHINE SULFATE 4 MG: 4 INJECTION, SOLUTION INTRAMUSCULAR; INTRAVENOUS at 21:52

## 2023-02-28 ASSESSMENT — ENCOUNTER SYMPTOMS
NAUSEA: 0
VOMITING: 0
FEVER: 0
DIARRHEA: 0
ABDOMINAL PAIN: 0
FLANK PAIN: 1
SHORTNESS OF BREATH: 0

## 2023-02-28 ASSESSMENT — ACTIVITIES OF DAILY LIVING (ADL): ADLS_ACUITY_SCORE: 35

## 2023-02-28 NOTE — TELEPHONE ENCOUNTER
Please call patient.    It looks like she has been prescribed both Oxycodone and Hydrocodone.  Has the oxycodone not been helpful?  That would be a little stronger than the Hydrocodone.  She could also try and take 2 tablets the Hydrocodone at a time.    Susan Sutton, LONA

## 2023-02-28 NOTE — TELEPHONE ENCOUNTER
Please call.    I agree with stopping any Miralax or stool softeners for now.  She can try some imodium to see if that will slow down her diarrhea.  I recommend simple carbohydrates such as bananas, bread, rice.    Agree with scheduled office visit.    Susan Sutton NP

## 2023-02-28 NOTE — TELEPHONE ENCOUNTER
S-(situation): Patient is requesting something for Pain Relief.    B-(background):   Pain (L) side for 4 plus months. Thinks it may be shingles.  Has been to emergency 3 x in last week.  Patient fell yesterday morning.    A-(assessment):   Patient is taking her pain medication (Norco) and patient reports that it is not relieving pain.  Patient is taking gabapentin TID.    Patient states that yesterday she fell on (bottom) yesterday.  Patient lost balance. Patient states that head (may) have hit carpet. Denies any dizziness or blurred vision. Patient denies any symptoms of concussion or concerns with fall.    Patient reports that pain is worse at night.    R-(recommendations): Patient states that if unable to get pain relief, she will need to go back to hospital.

## 2023-02-28 NOTE — ED TRIAGE NOTES
The patient presents to the ED with left side/flank pain and right upper abdominal pain. The patient reports she is being treated for shingles currently but no lesions or rash are seen.

## 2023-02-28 NOTE — TELEPHONE ENCOUNTER
Called patient to advise of Susan Sutton's recommendations.  Patient states she has not had any loose stools for 2 days now, states she is taking Norco for back pain and feels that she may be getting constipated, advised Miralax once daily if needed. Patient has office visit scheduled with Neurosurgery tomorrow and with Dr. Pimentel on 3/2/2023. Patient will callback with any worsening symptoms of diarrhea.

## 2023-03-01 ENCOUNTER — OFFICE VISIT (OUTPATIENT)
Dept: NEUROSURGERY | Facility: CLINIC | Age: 88
End: 2023-03-01
Payer: COMMERCIAL

## 2023-03-01 VITALS
OXYGEN SATURATION: 96 % | WEIGHT: 136 LBS | HEART RATE: 86 BPM | SYSTOLIC BLOOD PRESSURE: 116 MMHG | DIASTOLIC BLOOD PRESSURE: 63 MMHG | HEIGHT: 62 IN | BODY MASS INDEX: 25.03 KG/M2

## 2023-03-01 DIAGNOSIS — M48.061 LUMBAR FORAMINAL STENOSIS: ICD-10-CM

## 2023-03-01 DIAGNOSIS — M53.88 OTHER SPECIFIED DORSOPATHIES, SACRAL AND SACROCOCCYGEAL REGION: ICD-10-CM

## 2023-03-01 PROCEDURE — 99215 OFFICE O/P EST HI 40 MIN: CPT | Performed by: NEUROLOGICAL SURGERY

## 2023-03-01 NOTE — DISCHARGE INSTRUCTIONS
As needed for pain control at home, take:  - over-the-counter ibuprofen 600mg by mouth every 6 hours (max dose 2400mg in 24 hours)  - previously-prescribed Norco for pain    Follow up with your Primary Care provider in 2 days for a recheck.    Return to the Emergency Department for any difficulty breathing, persistent vomiting, or any other concerns.

## 2023-03-01 NOTE — ED PROVIDER NOTES
EMERGENCY DEPARTMENT ENCOUNTER      NAME: Ramila Liao  AGE: 87 year old female  YOB: 1935  MRN: [id number]  EVALUATION DATE & TIME: 2/28/2023  7:21 PM    PCP: Susan Sutton    ED PROVIDER: Joel Calzada M.D.      Chief Complaint   Patient presents with     Flank Pain     Abdominal Pain         IMPRESSION  1. Left flank pain        PLAN  - NSAIDs, previously-prescribed Norco, Lidoderm, ice for home  - close PCP follow up  - discharge to home    ED COURSE & MEDICAL DECISION MAKING    ED Course as of 02/28/23 2241 Tue Feb 28, 2023   2150 87yoF with history of hysterectomy, cholecystectomy, low back pain, HLD here now in the ED for her 4th visit in 10 days for left flank pain. Negative CT abdomen/pelvis, UA, blood on prior visits. Thought given to early zoster last week and started on Valtrex; no convincing shingles rash materialized though. States that morphine during her most recent ED visit resolved her pain for about 24 hours; then returned. Intermittently taking prescribed Norco with only mild relief. Notes pain is worse at night when trying to lie flat in bed. Denies any abdominal pain, nausea, vomiting. No chest pain, shortness of breath, pleuritic or exertional symptoms.    BP 180s/90s on presentation with HR 120s and otherwise normal vitals. Focal left flank tenderness with no overlying skin changes on exam with no midline spinal tenderness, does have palpable spasm of left erector spinae musculature, no abdominal tenderness, no rib or chest wall tenderness, clear lungs, normal work of breathing, normal neuro exam. No suggestion of zoster. Workup broadened to include CT chest with ongoing left flank pain; no PE or explanatory pathology here. Mild inflammation to RUL but negative CRP/procal so doubt bacterial pneumonia; would not explain left flank pain. CT abdomen pelvis with no ureteral stone, splenic infarct, hydronephrosis, explanatory pathology. Biliary duct dilation s/p  cholecystectomy again noted but no RUQ pain/tenderness and normal bilirubin/LFTs/lipase on labs again her now; doubt this is contributing to pain. Labs otherwise with normal CK (doubt myositis or back muscle compartment syndrome), no TERESA, no glaring electrolyte abnormality, no anemia, no leukocytosis, normal TSH. EKG with no ischemic changes and troponin negative; doubt ACS or primary cardiac etiology. UA with no UTI; doubt infectious process. CT with no concerning bony findings to suggestion compression fracture.   2150 Patient with mild improvement after ibuprofen but wants morphine again now; son agreeable with this at bedside. I offered admission given recurrent ongoing pain but patient refuses this; does not want to stay in the hospital. Wants to go home. States she has clinic  follow up tomorrow she wants to keep. Would be obs admission for simple pain control here tonight.    Will give morphine here now and reassess. Patient & son comfortable with this plan; no further questions at this time.   2235 Patient feeling well after morphine. Wants to go home. Son agreeable with this as well. Patient able to tolerate PO and walk without difficulty. Return precautions and need for PCP follow up discussed and understood. No further questions at the time of discharge.       --------------------------------------------------------------------------------   --------------------------------------------------------------------------------     9:50 PM I met with the patient for the initial interview and physical examination. Discussed plan for treatment and workup in the ED.    10:41 PM  Rechecked on the patient who reports mild improvement after ibuprofen. She is comfortable going home and we discussed plans for discharge.     This patient involved a high degree of complexity in medical decision making, as significant risks were present and assessed. Recent encounters & results in medical record reviewed by me.      I wore  the following PPE during this patient encounter:  N95 mask, face shield w/ eye protection, gloves      See additional MDM below if interested.        MEDICATIONS GIVEN IN THE EMERGENCY DEPARTMENT  Medications   iopamidol (ISOVUE-370) solution 100 mL (75 mLs Intravenous $Given 2/28/23 2001)   ibuprofen (ADVIL/MOTRIN) tablet 600 mg (600 mg Oral $Given 2/28/23 2041)   lactated ringers BOLUS 1,000 mL (1,000 mLs Intravenous $New Bag 2/28/23 2043)   morphine (PF) injection 4 mg (4 mg Intravenous $Given 2/28/23 2152)       NEW PRESCRIPTIONS STARTED AT TODAY'S ER VISIT  Current Discharge Medication List      CONTINUE these medications which have NOT CHANGED    Details   acetaminophen (TYLENOL) 500 MG tablet Take 500-1,000 mg by mouth as needed for mild pain      aspirin 81 MG EC tablet [ASPIRIN 81 MG EC TABLET] Take 81 mg by mouth.      betamethasone dipropionate (DIPROLENE) 0.05 % ointment [BETAMETHASONE DIPROPIONATE (DIPROLENE) 0.05 % OINTMENT] Apply topically.      gabapentin (NEURONTIN) 300 MG capsule Take 1 capsule (300 mg) by mouth 3 times daily  Qty: 90 capsule, Refills: 1    Associated Diagnoses: Herpes zoster without complication      hydrochlorothiazide (HYDRODIURIL) 12.5 MG tablet Take 1 tablet (12.5 mg) by mouth daily  Qty: 60 tablet, Refills: 0    Associated Diagnoses: Pain and swelling of lower extremity, unspecified laterality      HYDROcodone-acetaminophen (NORCO) 5-325 MG tablet Take 1 tablet by mouth every 6 hours as needed for severe pain (7-10)  Qty: 15 tablet, Refills: 0      ibuprofen (ADVIL/MOTRIN) 200 MG tablet Take 200 mg by mouth as needed for pain      magnesium citrate 100 mg Tab Take 250 mg by mouth      multivitamin with minerals (THERA-M) 9 mg iron-400 mcg Tab tablet [MULTIVITAMIN WITH MINERALS (THERA-M) 9 MG IRON-400 MCG TAB TABLET] Take 1 tablet by mouth daily.      oxyCODONE-acetaminophen (PERCOCET) 5-325 MG tablet Take 0.5-1 tablets by mouth nightly as needed for pain  Qty: 20 tablet,  Refills: 0      polyethylene glycol (MIRALAX) 17 GM/Dose powder Take 34 g (2 capfuls) by mouth 2 times daily for 3 days, THEN 17 g (1 capful.) daily for 30 days.  Qty: 714 g, Refills: 0      tiZANidine (ZANAFLEX) 2 MG tablet TAKE 1 TABLET BY MOUTH THREE TIMES DAILY AS NEEDED FOR PAIN OR SPASMS  Qty: 90 tablet, Refills: 1    Associated Diagnoses: Lumbar radicular pain      valACYclovir (VALTREX) 1000 mg tablet Take 1 tablet (1,000 mg) by mouth 2 times daily for 7 days  Qty: 14 tablet, Refills: 0                 =================================================================      HPI  Patient information was obtained from: Patient     Use of : N/A         Ramila Liao is a 87 year old female with a pertinent history of hysterectomy, cholecystectomy, low back pain, HLD who presents to this ED via walk in accompanied by son for evaluation of flank pain and abdominal pain.     The patient presents with left side flank pain for a couple of days. Today is the patients 4th visit in 10 days for left flank pain. Negative CT abdomen/pelvis, UA, blood on prior visits. Thought given to early zoster last week and started on Valtrex; no convincing shingles rash materialized though. States that morphine during her most recent ED visit resolved her pain for about 24 hours; then returned. Intermittently taking prescribed Norco with only mild relief. Notes pain is worse at night when trying to lie flat in bed. Denies any abdominal pain, nausea, vomiting. No chest pain, shortness of breath, pleuritic or exertional symptoms.      REVIEW OF SYSTEMS   Review of Systems   Constitutional: Negative for fever.   Respiratory: Negative for shortness of breath.    Cardiovascular: Negative for chest pain.   Gastrointestinal: Negative for abdominal pain (right lower), diarrhea, nausea and vomiting.   Genitourinary: Positive for flank pain (left, worse at night).   All other systems reviewed and are negative.    All other systems  reviewed and are negative except as noted above in HPI.          --------------- MEDICAL HISTORY ---------------  PAST MEDICAL HISTORY:  Reviewed by me.  Past Medical History:   Diagnosis Date     Allergic rhinitis 8/2/2005     Contact dermatitis and other eczema, due to unspecified cause 7/30/2001     Low back pain 8/2/2016     Mixed hyperlipidemia 8/9/2006     Patient Active Problem List   Diagnosis     Allergic rhinitis     Contact dermatitis and other eczema, due to unspecified cause     Low back pain     Mixed hyperlipidemia       PAST SURGICAL HISTORY:  Reviewed by me.  Past Surgical History:   Procedure Laterality Date     CATARACT EXTRACTION, BILATERAL       CHOLECYSTECTOMY       HYSTERECTOMY         CURRENT MEDICATIONS:    Reviewed by me.  No current facility-administered medications for this encounter.    Current Outpatient Medications:      acetaminophen (TYLENOL) 500 MG tablet, Take 500-1,000 mg by mouth as needed for mild pain, Disp: , Rfl:      aspirin 81 MG EC tablet, [ASPIRIN 81 MG EC TABLET] Take 81 mg by mouth., Disp: , Rfl:      betamethasone dipropionate (DIPROLENE) 0.05 % ointment, [BETAMETHASONE DIPROPIONATE (DIPROLENE) 0.05 % OINTMENT] Apply topically., Disp: , Rfl:      gabapentin (NEURONTIN) 300 MG capsule, Take 1 capsule (300 mg) by mouth 3 times daily, Disp: 90 capsule, Rfl: 1     hydrochlorothiazide (HYDRODIURIL) 12.5 MG tablet, Take 1 tablet (12.5 mg) by mouth daily, Disp: 60 tablet, Rfl: 0     HYDROcodone-acetaminophen (NORCO) 5-325 MG tablet, Take 1 tablet by mouth every 6 hours as needed for severe pain (7-10), Disp: 15 tablet, Rfl: 0     ibuprofen (ADVIL/MOTRIN) 200 MG tablet, Take 200 mg by mouth as needed for pain, Disp: , Rfl:      magnesium citrate 100 mg Tab, Take 250 mg by mouth, Disp: , Rfl:      multivitamin with minerals (THERA-M) 9 mg iron-400 mcg Tab tablet, [MULTIVITAMIN WITH MINERALS (THERA-M) 9 MG IRON-400 MCG TAB TABLET] Take 1 tablet by mouth daily., Disp: , Rfl:       oxyCODONE-acetaminophen (PERCOCET) 5-325 MG tablet, Take 0.5-1 tablets by mouth nightly as needed for pain, Disp: 20 tablet, Rfl: 0     polyethylene glycol (MIRALAX) 17 GM/Dose powder, Take 34 g (2 capfuls) by mouth 2 times daily for 3 days, THEN 17 g (1 capful.) daily for 30 days. (Patient not taking: Reported on 2/24/2023), Disp: 714 g, Rfl: 0     tiZANidine (ZANAFLEX) 2 MG tablet, TAKE 1 TABLET BY MOUTH THREE TIMES DAILY AS NEEDED FOR PAIN OR SPASMS, Disp: 90 tablet, Rfl: 1     valACYclovir (VALTREX) 1000 mg tablet, Take 1 tablet (1,000 mg) by mouth 2 times daily for 7 days, Disp: 14 tablet, Rfl: 0    ALLERGIES:  Reviewed by me.  Allergies   Allergen Reactions     Naproxen Hives     Tolerates ibuprofen       FAMILY HISTORY:  Reviewed by me.  Family History   Problem Relation Age of Onset     Coronary Artery Disease Mother      Coronary Artery Disease Father        SOCIAL HISTORY:   Reviewed by me.  Social History     Socioeconomic History     Marital status:      Spouse name: None     Number of children: None     Years of education: None     Highest education level: None   Tobacco Use     Smoking status: Never     Smokeless tobacco: Never   Substance and Sexual Activity     Alcohol use: Yes     Drug use: Never     Sexual activity: Not Currently     Partners: Male       --------------- PHYSICAL EXAM ---------------  Nursing notes and vitals reviewed by me.  VITALS:  Vitals:    02/28/23 2106 02/28/23 2108 02/28/23 2233 02/28/23 2237   BP:  (!) 213/111 (!) 186/88    Pulse: 115   102   Resp: 25      Temp:       TempSrc:       SpO2:    96%   Weight:       Height:           PHYSICAL EXAM:    General:  alert, interactive, no distress  Eyes:  conjunctivae clear, conjugate gaze  HENT:  atraumatic, nose with no rhinorrhea, oropharynx clear  Neck:  no meningismus  Cardiovascular:  HR 100s during exam, regular rhythm, no murmurs, brisk cap refill  Chest:  no chest wall tenderness  Pulmonary:  no stridor, normal  phonation, normal work of breathing, clear lungs bilaterally  Abdomen:  soft, nondistended, nontender  Back:  no midline spinal tenderness, left CVA tenderness with no overlying skin changes, palpable spasm to left erector spinae  Musculoskeletal:  no pretibial edema, no calf tenderness. Gross ROM intact to joints of extremities with no obvious deformities.  Skin:  warm, dry, no rash  Neuro:  awake, alert, answers questions appropriately, follows commands, moves all limbs, CN 2-12 intact, negative pronator drift, 5/5 strength to all extremities with sensation to light touch intact, no tremor  Psych:  calm, normal affect      --------------- RESULTS ---------------  EKG:    Reviewed and interpreted by me.  - sinus tachycardia at 109bpm, no ST or T wave changes, normal intervals  - unchanged from prior on 2/25/23  My read.    LAB:  Reviewed and interpreted by me.  Results for orders placed or performed during the hospital encounter of 02/28/23   CT Chest Pulmonary Embolism w Contrast    Impression    IMPRESSION:  1.  No acute pulmonary embolism or secondary signs of right heart strain.    2.  Tree-in-bud opacities in the right upper lobe are likely infectious or inflammatory.    3.  Incidental 0.5 cm left upper lobe pulmonary nodule with peripheral calcification. This calcification pattern is indeterminate for malignancy. Follow-up recommendations below:    REFERENCE:  Guidelines for Management of Incidental Pulmonary Nodules Detected on CT Images: From the Fleischner Society 2017.   Guidelines apply to incidental nodules in patients who are 35 years or older.  Guidelines do not apply to lung cancer screening, patients with immunosuppression, or patients with known primary cancer.    SINGLE NODULE  Nodule size <6 mm  Low-risk patients: No follow-up needed.  High-risk patients: Optional follow-up at 12 months.       CT Abdomen Pelvis w Contrast    Impression    IMPRESSION:   1.  Partially liquefied stool throughout the  nondilated colon, suggestive of a diarrheal state.  2.  Persistent, moderate to severe intrahepatic and extrahepatic biliary ductal dilatation, likely secondary to the postcholecystectomy state. However, there is no remote imaging to confirm stability of this finding. Correlation with LFTs and MRCP   imaging is recommended.       Basic metabolic panel   Result Value Ref Range    Sodium 141 136 - 145 mmol/L    Potassium 4.2 3.5 - 5.0 mmol/L    Chloride 104 98 - 107 mmol/L    Carbon Dioxide (CO2) 26 22 - 31 mmol/L    Anion Gap 11 5 - 18 mmol/L    Urea Nitrogen 22 8 - 28 mg/dL    Creatinine 0.95 0.60 - 1.10 mg/dL    Calcium 9.9 8.5 - 10.5 mg/dL    Glucose 110 70 - 125 mg/dL    GFR Estimate 58 (L) >60 mL/min/1.73m2   Hepatic function panel   Result Value Ref Range    Bilirubin Total 0.4 0.0 - 1.0 mg/dL    Bilirubin Direct 0.1 <=0.5 mg/dL    Protein Total 7.6 6.0 - 8.0 g/dL    Albumin 4.2 3.5 - 5.0 g/dL    Alkaline Phosphatase 72 45 - 120 U/L    AST 28 0 - 40 U/L    ALT 31 0 - 45 U/L   Result Value Ref Range    Magnesium 2.4 1.8 - 2.6 mg/dL   TSH with free T4 reflex   Result Value Ref Range    TSH 3.84 0.30 - 5.00 uIU/mL   Result Value Ref Range    Lipase 30 0 - 52 U/L   CRP inflammation   Result Value Ref Range    CRP <0.1 0.0 - <0.8 mg/dL   Result Value Ref Range    Procalcitonin 0.09 0.00 - 0.49 ng/mL   UA with Microscopic reflex to Culture    Specimen: Urine, Midstream   Result Value Ref Range    Color Urine Light Yellow Colorless, Straw, Light Yellow, Yellow    Appearance Urine Clear Clear    Glucose Urine Negative Negative mg/dL    Bilirubin Urine Negative Negative    Ketones Urine Negative Negative mg/dL    Specific Gravity Urine 1.015 1.001 - 1.030    Blood Urine Negative Negative    pH Urine 6.0 5.0 - 7.0    Protein Albumin Urine 10 (A) Negative mg/dL    Urobilinogen Urine <2.0 <2.0 mg/dL    Nitrite Urine Negative Negative    Leukocyte Esterase Urine 250 Paulina/uL (A) Negative    Mucus Urine Present (A) None Seen  /LPF    RBC Urine 2 <=2 /HPF    WBC Urine 4 <=5 /HPF    Squamous Epithelials Urine 1 <=1 /HPF    Transitional Epithelials Urine <1 <=1 /HPF   CBC with platelets and differential   Result Value Ref Range    WBC Count 6.8 4.0 - 11.0 10e3/uL    RBC Count 4.63 3.80 - 5.20 10e6/uL    Hemoglobin 14.2 11.7 - 15.7 g/dL    Hematocrit 44.0 35.0 - 47.0 %    MCV 95 78 - 100 fL    MCH 30.7 26.5 - 33.0 pg    MCHC 32.3 31.5 - 36.5 g/dL    RDW 12.9 10.0 - 15.0 %    Platelet Count 214 150 - 450 10e3/uL    % Neutrophils 59 %    % Lymphocytes 30 %    % Monocytes 8 %    % Eosinophils 2 %    % Basophils 1 %    % Immature Granulocytes 0 %    NRBCs per 100 WBC 0 <1 /100    Absolute Neutrophils 4.1 1.6 - 8.3 10e3/uL    Absolute Lymphocytes 2.0 0.8 - 5.3 10e3/uL    Absolute Monocytes 0.6 0.0 - 1.3 10e3/uL    Absolute Eosinophils 0.1 0.0 - 0.7 10e3/uL    Absolute Basophils 0.0 0.0 - 0.2 10e3/uL    Absolute Immature Granulocytes 0.0 <=0.4 10e3/uL    Absolute NRBCs 0.0 10e3/uL   Troponin I (now)   Result Value Ref Range    Troponin I 0.03 0.00 - 0.29 ng/mL   Result Value Ref Range     30 - 190 U/L   ECG 12-LEAD WITH MUSE (LHE)   Result Value Ref Range    Systolic Blood Pressure  mmHg    Diastolic Blood Pressure  mmHg    Ventricular Rate 109 BPM    Atrial Rate 109 BPM    ND Interval 120 ms    QRS Duration 90 ms     ms    QTc 465 ms    P Axis 72 degrees    R AXIS 90 degrees    T Axis 56 degrees    Interpretation ECG       Sinus tachycardia  Biatrial enlargement  Rightward axis  Abnormal ECG  When compared with ECG of 25-FEB-2023 08:02,  T wave amplitude has increased in Lateral leads  Confirmed by SEE ED PROVIDER NOTE FOR, ECG INTERPRETATION (4000),  GUY MARTINEZ ([id number]) on 2/28/2023 8:46:00 PM         RADIOLOGY:  Reviewed by me. Please see official radiology report.  Recent Results (from the past 24 hour(s))   CT Chest Pulmonary Embolism w Contrast    Narrative    EXAM: CT CHEST PULMONARY EMBOLISM W CONTRAST  LOCATION: [facility]  DATE/TIME: 2/28/2023 8:28 PM    INDICATION: flank pain  COMPARISON: None.  TECHNIQUE: CT chest pulmonary angiogram during arterial phase injection of IV contrast. Multiplanar reformats and MIP reconstructions were performed. Dose reduction techniques were used.   CONTRAST: ISOVUE 370 75 mL    FINDINGS:  ANGIOGRAM CHEST: Pulmonary arteries are normal caliber and negative for pulmonary emboli. Thoracic aorta is negative for dissection. No CT evidence of right heart strain.    LUNGS AND PLEURA: Scattered tree-in-bud opacities in the right upper lobe. 0.5 cm left upper lobe nodule with peripheral calcification (5/102).    MEDIASTINUM/AXILLAE: Normal.    CORONARY ARTERY CALCIFICATION: None.    UPPER ABDOMEN: For 2 separate CT abdomen pelvis report.    MUSCULOSKELETAL: Mild degenerative changes in the spine.      Impression    IMPRESSION:  1.  No acute pulmonary embolism or secondary signs of right heart strain.    2.  Tree-in-bud opacities in the right upper lobe are likely infectious or inflammatory.    3.  Incidental 0.5 cm left upper lobe pulmonary nodule with peripheral calcification. This calcification pattern is indeterminate for malignancy. Follow-up recommendations below:    REFERENCE:  Guidelines for Management of Incidental Pulmonary Nodules Detected on CT Images: From the Fleischner Society 2017.   Guidelines apply to incidental nodules in patients who are 35 years or older.  Guidelines do not apply to lung cancer screening, patients with immunosuppression, or patients with known primary cancer.    SINGLE NODULE  Nodule size <6 mm  Low-risk patients: No follow-up needed.  High-risk patients: Optional follow-up at 12 months.       CT Abdomen Pelvis w Contrast    Narrative    EXAM: CT ABDOMEN PELVIS W CONTRAST  LOCATION: [facility]  DATE/TIME: 2/28/2023 8:29 PM    INDICATION: Flank pain.  COMPARISON: 02/21/2023.  TECHNIQUE: CT scan of the abdomen and  pelvis was performed following injection of IV contrast. Multiplanar reformats were obtained. Dose reduction techniques were used.  CONTRAST: 75 mL Isovue 370.    FINDINGS:    LOWER CHEST: Mild to moderate cardiomegaly. Atherosclerosis of the visualized thoracic aorta.    HEPATOBILIARY: Liver enhances normally and is normal in size.    Cholecystectomy.    Unchanged moderate to severe intrahepatic and extra hepatic biliary ductal dilatation, likely attributable to the postcholecystectomy state.    PANCREAS: Enhances normally. No peripancreatic inflammatory fat stranding.    SPLEEN: Enhances normally. Normal size.    ADRENAL GLANDS: Normal.    KIDNEYS: Both kidneys enhance symmetrically, without hydronephrosis.    No nephroureterolithiasis.    Urinary bladder is unremarkable.    PELVIC ORGANS: Hysterectomy.    BOWEL: No evidence of acute gastrointestinal inflammation or obstruction. Partially liquefied stool throughout the colon, suggestive of a diarrheal state.    No intraperitoneal free fluid or free air.    LYMPH NODES: No suspicious abdominal or pelvic lymphadenopathy.    VASCULATURE: No abdominal aortic aneurysm. Mild atheromatous disease.    MUSCULOSKELETAL: No suspicious abnormality. Grade 1 anterolisthesis of L5 on S1, due to degenerative facet arthropathy.    OTHER: No additionally significant abnormalities.      Impression    IMPRESSION:   1.  Partially liquefied stool throughout the nondilated colon, suggestive of a diarrheal state.  2.  Persistent, moderate to severe intrahepatic and extrahepatic biliary ductal dilatation, likely secondary to the postcholecystectomy state. However, there is no remote imaging to confirm stability of this finding. Correlation with LFTs and MRCP   imaging is recommended.                 --------------- ADDITIONAL MDM ---------------  History:  - Supplemental history from:       -- see above charting, if applicable: Patient   - External Record(s) reviewed:       -- see above  charting, if applicable       -- Inpatient/outpatient record (most recent ED visits)    Workup:  - Chart documentation above includes differential considered and any EKGs or imaging independently interpreted by provider.  - In additional to work up documented, I considered the following work up:       -- see above charting, if applicable    External Consultation:  - Discussion of management with another provider:       -- see above charting, if applicable    Complicating Factors:  - Care impacted by chronic illness:       -- see above MDM, past medical history, & problem list  - Care affected by social determinants of health:       -- see above social history    Disposition Considerations:  - Discharge       -- I considered admission given that the patient came to the Emergency Department, but ultimately discharged the patient per decision making above       -- I recommended the patient continue their current prescription strength medication(s) as charted above in current medications list       -- I prescribed prescription strength medication(s) as charted above       -- I recommended over-the-counter medication(s) as charted above & in discharge instructions         I, Angella Cochran, am serving as a scribe to document services personally performed by Dr. Joel Calzada based on my observation and the provider's statements to me. I, Joel Calzada MD attest that Angella Cochran is acting in a scribe capacity, has observed my performance of the services and has documented them in accordance with my direction.      Joel Calzada MD  02/28/23  Emergency Medicine  [facility] EMERGENCY ROOM  [address] [address]  [phone]  Dept: [phone]       Joel Calzada MD  02/28/23 2258

## 2023-03-01 NOTE — TELEPHONE ENCOUNTER
Pt was evaluated at [facility] ED after triage phone call on 02/28/2023. Pt was given morphine in ED. Pt was seen at Neurology follow-up appt today, 03/01/2023.    Pt has clinic office visit on 03/02/2023.    No further action needed.    Dee Mo RN

## 2023-03-01 NOTE — LETTER
3/1/2023         RE: Ramila Liao  [address]  [place] [address]        Dear Colleague,    Thank you for referring your patient, Ramila Liao, to the [facility]. Please see a copy of my visit note below.    NEUROSURGERY CONSULTATION NOTE      Neurosurgery was asked to see this patient by No att. providers found for evaluation of LBP and right leg pain.      CONSULTATION ASSESSMENT AND PLAN:    Ms. Liao is a 87-year-old female with a history of right leg pain and MRI showed mild right L4-5 and L5-S1 lateral recess stenosis. On lumbar CT she has 6.6 mm listhesis of L5 on S1.   No pars defect.  She is stable on flexion and extension.  EMG is positive for right L5 radiculopathy.    During her visit today to the clinic with her son she reports no pain in her right leg.  She reports only left low back pain which has recently started.  Given the patient's symptoms and imaging findings, I recommended conservative management with physical therapy focusing on muscle stretching exercises and left sacroiliac joint injection.  I will follow her with the results of these interventions.    I discussed with the patient regarding the imaging findings and consideration of minimally invasive right L5-S1 foraminotomy in case if she start having right leg symptoms.  All the questions were answered and patient and her son sounded understanding.  They can contact us if there are any questions or concerns or new onset neurological issues.      I spent more than 60 minutes in this apt, examining the pt, reviewing the scans, reviewing notes from chart, discussing treatment options with risks and benefits and coordinating care.     Zoltan Pena MD      HPI:   Ms. Liao is a 87-year-old female who is referred to me for likely minimally invasive right L5-S1 foraminal decompression.   She reports having right leg pain starting from her right buttock region to the lateral aspect of  thigh to the right knee which started in October 2022.  Pain was 10 out of 10 at that point of time and aggravated in the bed at night and on weightbearing and relieved with pain medications.  She also reports similar kind of pain back in 2016 which improved with medications.    She reports no pain in her right leg today 0 on 10 with no tingling or numbness.  She started having left-sided low back pain and she went for the evaluation of the same and was prescribed opioids pain medication.  She is feeling better in terms of her pain.  She denies no pain radiating to her left lower extremity.  She denies any bladder or bowel problems.    She underwent physical therapy and injections back in November 2022 with some relief of her symptoms.    During her visit today in the clinic with her son she reports no pain in her right leg with pain only in her left lower back with no pain radiating to her left lower extremity.    She is nondiabetic and does not endorse any significant cardiac or pulmonary issues.    She is non-smoker does not consume alcohol every day or recreational drugs.    She has EMG which showed right L5 radiculopathy.  However she is asymptomatic at present.      Past Medical History:   Diagnosis Date     Allergic rhinitis 8/2/2005     Contact dermatitis and other eczema, due to unspecified cause 7/30/2001     Low back pain 8/2/2016     Mixed hyperlipidemia 8/9/2006       Past Surgical History:   Procedure Laterality Date     CATARACT EXTRACTION, BILATERAL       CHOLECYSTECTOMY       HYSTERECTOMY         REVIEW OF SYSTEMS:  Review Of Systems  Skin: negative  Eyes: negative  Ears/Nose/Throat: negative  Respiratory: No shortness of breath, dyspnea on exertion, cough, or hemoptysis  Cardiovascular: negative  Gastrointestinal: negative  Genitourinary: negative  Musculoskeletal: LBP and leg pain, bilateral pedal edema  Neurologic: negative  Psychiatric: negative  Hematologic/Lymphatic/Immunologic:  negative  Endocrine: negative    MEDICATIONS:  Current Outpatient Medications   Medication Sig Dispense Refill     acetaminophen (TYLENOL) 500 MG tablet Take 500-1,000 mg by mouth as needed for mild pain       aspirin 81 MG EC tablet [ASPIRIN 81 MG EC TABLET] Take 81 mg by mouth.       betamethasone dipropionate (DIPROLENE) 0.05 % ointment [BETAMETHASONE DIPROPIONATE (DIPROLENE) 0.05 % OINTMENT] Apply topically.       gabapentin (NEURONTIN) 300 MG capsule Take 1 capsule (300 mg) by mouth 3 times daily 90 capsule 1     hydrochlorothiazide (HYDRODIURIL) 12.5 MG tablet Take 1 tablet (12.5 mg) by mouth daily 60 tablet 0     ibuprofen (ADVIL/MOTRIN) 200 MG tablet Take 200 mg by mouth as needed for pain       magnesium citrate 100 mg Tab Take 250 mg by mouth       multivitamin with minerals (THERA-M) 9 mg iron-400 mcg Tab tablet [MULTIVITAMIN WITH MINERALS (THERA-M) 9 MG IRON-400 MCG TAB TABLET] Take 1 tablet by mouth daily.       oxyCODONE-acetaminophen (PERCOCET) 5-325 MG tablet Take 0.5-1 tablets by mouth nightly as needed for pain 20 tablet 0     polyethylene glycol (MIRALAX) 17 GM/Dose powder Take 34 g (2 capfuls) by mouth 2 times daily for 3 days, THEN 17 g (1 capful.) daily for 30 days. 714 g 0     tiZANidine (ZANAFLEX) 2 MG tablet TAKE 1 TABLET BY MOUTH THREE TIMES DAILY AS NEEDED FOR PAIN OR SPASMS 90 tablet 1     valACYclovir (VALTREX) 1000 mg tablet Take 1 tablet (1,000 mg) by mouth 2 times daily for 7 days 14 tablet 0         ALLERGIES/SENSITIVITIES:     Allergies   Allergen Reactions     Naproxen Hives     Tolerates ibuprofen       PERTINENT SOCIAL HISTORY: Non smoker and occasional alcoholic  Social History     Socioeconomic History     Marital status:      Spouse name: None     Number of children: None     Years of education: None     Highest education level: None   Tobacco Use     Smoking status: Never     Smokeless tobacco: Never   Substance and Sexual Activity     Alcohol use: Yes     Drug use:  "Never     Sexual activity: Not Currently     Partners: Male         FAMILY HISTORY:  Family History   Problem Relation Age of Onset     Coronary Artery Disease Mother      Coronary Artery Disease Father         PHYSICAL EXAM:   Constitutional: /63   Pulse 86   Ht 1.575 m (5' 2\")   Wt 61.7 kg (136 lb)   SpO2 96%   BMI 24.87 kg/m       Mental Status: A & O in no acute distress.  Affect is appropriate.  Speech is fluent.  Recent and remote memory are intact.  Attention span and concentration are normal.      Cranial Nerves:   CN1: grossly intact per patient recall.   CN2: No funduscopic exam performed.   CN3,4 & 6: Pupillary light response, lateral and vertical gaze normal.  No nystagmus.  Visual fields are full to confrontation.   CN5: Intact to touch   CN7: No facial weakness, smile, facial symmetry intact.   CN8: Intact to spoken voice.   CN9&10: Gag reflex, uvula midline, palate rises with phonation.   CN11: Shoulder shrug 5/5 intact bilaterally.   CN12: Tongue midline and moves freely from side to side.     Motor: No pronator drift of upper extremity.   Normal bulk and tone all muscle groups of upper and lower extremities.       Delt Bi Tri Hand Flex/  Ext Iliopsoas Quadriceps Tibialis Anterior EHL Gastroc     C5 C6 C7 C8/T1 L2 L3 L4 L5 S1   R 5 5 5 5 5 5 5 5 5   L 5 5 5 5 5 5 5 5 5          Sensory: Sensation intact bilaterally to light touch.     Coordination; finger to nose, heel to shin, rapid alternating movements smooth and rhythmic.   Romberg impaired  Heel/toe/tandem gait impared   Wide and shuffling gait     Reflexes;                       Right              Left  Brachioradialis (C5,6)      1+                 1+  Biceps   (C5,6)                 1+                 1+  Triceps  (C7,8)                 1+                 1+  Knee (L3,4)                      1+                 1+  Ankle jerk (S1,2)              1+                 1+    No hoffmans/babinski/ clonus.    No hoffmans/babinski/ " clonus.  FABERS positive on both side, Left > right  SLR + on right    No paraspinal tenderness.    IMAGING:  I personally reviewed all radiographic images and MRI lumbosacral spine showed mild lateral recess stenosis L3-4, 4-5, 5-S1 with no significant compression.     MRI 11/07/2022:   IMPRESSION:  1.  Moderate spondylosis with interval development of degenerative anterior spondylolisthesis at L5-S1 with type I endplate change on the right at L5-S1.  2.  Mild right lateral recess stenosis at L5-S1 with moderate bilateral foraminal stenoses.  3.  Mild right lateral recess stenosis at L4-L5 with mild to moderate right foraminal stenosis.  4.  Mild lateral recess stenoses at L3-L4 with shallow disc protrusion contacting the right L4 nerve root within the lateral recess.        Cc:   Susan Sutton               Again, thank you for allowing me to participate in the care of your patient.        Sincerely,        Zoltan Pena MD

## 2023-03-01 NOTE — PROGRESS NOTES
NEUROSURGERY CONSULTATION NOTE      Neurosurgery was asked to see this patient by No att. providers found for evaluation of LBP and right leg pain.      CONSULTATION ASSESSMENT AND PLAN:    Ms. Liao is a 87-year-old female with a history of right leg pain and MRI showed mild right L4-5 and L5-S1 lateral recess stenosis. On lumbar CT she has 6.6 mm listhesis of L5 on S1.   No pars defect.  She is stable on flexion and extension.  EMG is positive for right L5 radiculopathy.    During her visit today to the clinic with her son she reports no pain in her right leg.  She reports only left low back pain which has recently started.  Given the patient's symptoms and imaging findings, I recommended conservative management with physical therapy focusing on muscle stretching exercises and left sacroiliac joint injection.  I will follow her with the results of these interventions.    I discussed with the patient regarding the imaging findings and consideration of minimally invasive right L5-S1 foraminotomy in case if she start having right leg symptoms.  All the questions were answered and patient and her son sounded understanding.  They can contact us if there are any questions or concerns or new onset neurological issues.      I spent more than 60 minutes in this apt, examining the pt, reviewing the scans, reviewing notes from chart, discussing treatment options with risks and benefits and coordinating care.     Zoltan Pena MD      HPI:   Ms. Liao is a 87-year-old female who is referred to me for likely minimally invasive right L5-S1 foraminal decompression.   She reports having right leg pain starting from her right buttock region to the lateral aspect of thigh to the right knee which started in October 2022.  Pain was 10 out of 10 at that point of time and aggravated in the bed at night and on weightbearing and relieved with pain medications.  She also reports similar kind of pain back in 2016 which improved with  medications.    She reports no pain in her right leg today 0 on 10 with no tingling or numbness.  She started having left-sided low back pain and she went for the evaluation of the same and was prescribed opioids pain medication.  She is feeling better in terms of her pain.  She denies no pain radiating to her left lower extremity.  She denies any bladder or bowel problems.    She underwent physical therapy and injections back in November 2022 with some relief of her symptoms.    During her visit today in the clinic with her son she reports no pain in her right leg with pain only in her left lower back with no pain radiating to her left lower extremity.    She is nondiabetic and does not endorse any significant cardiac or pulmonary issues.    She is non-smoker does not consume alcohol every day or recreational drugs.    She has EMG which showed right L5 radiculopathy.  However she is asymptomatic at present.      Past Medical History:   Diagnosis Date     Allergic rhinitis 8/2/2005     Contact dermatitis and other eczema, due to unspecified cause 7/30/2001     Low back pain 8/2/2016     Mixed hyperlipidemia 8/9/2006       Past Surgical History:   Procedure Laterality Date     CATARACT EXTRACTION, BILATERAL       CHOLECYSTECTOMY       HYSTERECTOMY         REVIEW OF SYSTEMS:  Review Of Systems  Skin: negative  Eyes: negative  Ears/Nose/Throat: negative  Respiratory: No shortness of breath, dyspnea on exertion, cough, or hemoptysis  Cardiovascular: negative  Gastrointestinal: negative  Genitourinary: negative  Musculoskeletal: LBP and leg pain, bilateral pedal edema  Neurologic: negative  Psychiatric: negative  Hematologic/Lymphatic/Immunologic: negative  Endocrine: negative    MEDICATIONS:  Current Outpatient Medications   Medication Sig Dispense Refill     acetaminophen (TYLENOL) 500 MG tablet Take 500-1,000 mg by mouth as needed for mild pain       aspirin 81 MG EC tablet [ASPIRIN 81 MG EC TABLET] Take 81 mg by  mouth.       betamethasone dipropionate (DIPROLENE) 0.05 % ointment [BETAMETHASONE DIPROPIONATE (DIPROLENE) 0.05 % OINTMENT] Apply topically.       gabapentin (NEURONTIN) 300 MG capsule Take 1 capsule (300 mg) by mouth 3 times daily 90 capsule 1     hydrochlorothiazide (HYDRODIURIL) 12.5 MG tablet Take 1 tablet (12.5 mg) by mouth daily 60 tablet 0     ibuprofen (ADVIL/MOTRIN) 200 MG tablet Take 200 mg by mouth as needed for pain       magnesium citrate 100 mg Tab Take 250 mg by mouth       multivitamin with minerals (THERA-M) 9 mg iron-400 mcg Tab tablet [MULTIVITAMIN WITH MINERALS (THERA-M) 9 MG IRON-400 MCG TAB TABLET] Take 1 tablet by mouth daily.       oxyCODONE-acetaminophen (PERCOCET) 5-325 MG tablet Take 0.5-1 tablets by mouth nightly as needed for pain 20 tablet 0     polyethylene glycol (MIRALAX) 17 GM/Dose powder Take 34 g (2 capfuls) by mouth 2 times daily for 3 days, THEN 17 g (1 capful.) daily for 30 days. 714 g 0     tiZANidine (ZANAFLEX) 2 MG tablet TAKE 1 TABLET BY MOUTH THREE TIMES DAILY AS NEEDED FOR PAIN OR SPASMS 90 tablet 1     valACYclovir (VALTREX) 1000 mg tablet Take 1 tablet (1,000 mg) by mouth 2 times daily for 7 days 14 tablet 0         ALLERGIES/SENSITIVITIES:     Allergies   Allergen Reactions     Naproxen Hives     Tolerates ibuprofen       PERTINENT SOCIAL HISTORY: Non smoker and occasional alcoholic  Social History     Socioeconomic History     Marital status:      Spouse name: None     Number of children: None     Years of education: None     Highest education level: None   Tobacco Use     Smoking status: Never     Smokeless tobacco: Never   Substance and Sexual Activity     Alcohol use: Yes     Drug use: Never     Sexual activity: Not Currently     Partners: Male         FAMILY HISTORY:  Family History   Problem Relation Age of Onset     Coronary Artery Disease Mother      Coronary Artery Disease Father         PHYSICAL EXAM:   Constitutional: /63   Pulse 86   Ht  "1.575 m (5' 2\")   Wt 61.7 kg (136 lb)   SpO2 96%   BMI 24.87 kg/m       Mental Status: A & O in no acute distress.  Affect is appropriate.  Speech is fluent.  Recent and remote memory are intact.  Attention span and concentration are normal.      Cranial Nerves:   CN1: grossly intact per patient recall.   CN2: No funduscopic exam performed.   CN3,4 & 6: Pupillary light response, lateral and vertical gaze normal.  No nystagmus.  Visual fields are full to confrontation.   CN5: Intact to touch   CN7: No facial weakness, smile, facial symmetry intact.   CN8: Intact to spoken voice.   CN9&10: Gag reflex, uvula midline, palate rises with phonation.   CN11: Shoulder shrug 5/5 intact bilaterally.   CN12: Tongue midline and moves freely from side to side.     Motor: No pronator drift of upper extremity.   Normal bulk and tone all muscle groups of upper and lower extremities.       Delt Bi Tri Hand Flex/  Ext Iliopsoas Quadriceps Tibialis Anterior EHL Gastroc     C5 C6 C7 C8/T1 L2 L3 L4 L5 S1   R 5 5 5 5 5 5 5 5 5   L 5 5 5 5 5 5 5 5 5          Sensory: Sensation intact bilaterally to light touch.     Coordination; finger to nose, heel to shin, rapid alternating movements smooth and rhythmic.   Romberg impaired  Heel/toe/tandem gait impared   Wide and shuffling gait     Reflexes;                       Right              Left  Brachioradialis (C5,6)      1+                 1+  Biceps   (C5,6)                 1+                 1+  Triceps  (C7,8)                 1+                 1+  Knee (L3,4)                      1+                 1+  Ankle jerk (S1,2)              1+                 1+    No hoffmans/babinski/ clonus.    No hoffmans/babinski/ clonus.  FABERS positive on both side, Left > right  SLR + on right    No paraspinal tenderness.    IMAGING:  I personally reviewed all radiographic images and MRI lumbosacral spine showed mild lateral recess stenosis L3-4, 4-5, 5-S1 with no significant compression.     MRI " 11/07/2022:   IMPRESSION:  1.  Moderate spondylosis with interval development of degenerative anterior spondylolisthesis at L5-S1 with type I endplate change on the right at L5-S1.  2.  Mild right lateral recess stenosis at L5-S1 with moderate bilateral foraminal stenoses.  3.  Mild right lateral recess stenosis at L4-L5 with mild to moderate right foraminal stenosis.  4.  Mild lateral recess stenoses at L3-L4 with shallow disc protrusion contacting the right L4 nerve root within the lateral recess.        Cc:   Susan Sutton

## 2023-03-02 ENCOUNTER — OFFICE VISIT (OUTPATIENT)
Dept: FAMILY MEDICINE | Facility: CLINIC | Age: 88
End: 2023-03-02
Payer: COMMERCIAL

## 2023-03-02 VITALS
HEIGHT: 62 IN | WEIGHT: 136 LBS | RESPIRATION RATE: 16 BRPM | TEMPERATURE: 98.5 F | DIASTOLIC BLOOD PRESSURE: 68 MMHG | OXYGEN SATURATION: 99 % | HEART RATE: 83 BPM | SYSTOLIC BLOOD PRESSURE: 100 MMHG | BODY MASS INDEX: 25.03 KG/M2

## 2023-03-02 DIAGNOSIS — Z23 NEED FOR VACCINATION: ICD-10-CM

## 2023-03-02 DIAGNOSIS — M54.16 LUMBAR RADICULAR PAIN: Primary | ICD-10-CM

## 2023-03-02 DIAGNOSIS — R10.9 FLANK PAIN: ICD-10-CM

## 2023-03-02 LAB — BACTERIA UR CULT: NORMAL

## 2023-03-02 PROCEDURE — 90471 IMMUNIZATION ADMIN: CPT | Performed by: FAMILY MEDICINE

## 2023-03-02 PROCEDURE — 99213 OFFICE O/P EST LOW 20 MIN: CPT | Mod: 25 | Performed by: FAMILY MEDICINE

## 2023-03-02 PROCEDURE — 90715 TDAP VACCINE 7 YRS/> IM: CPT | Performed by: FAMILY MEDICINE

## 2023-03-02 NOTE — PROGRESS NOTES
Assessment & Plan     (M54.16) Lumbar radicular pain  (primary encounter diagnosis)  Comment: Patient with lumbar degenerative changes and right sciatic symptoms followed by neurosurgery and the spine care center  Plan:      (R10.9) Flank pain  Comment: Patient has been having intermittent recurrent severe left flank pain cause unclear possibly SI joint pathology  Plan:      (Z23) Need for vaccination  Comment:    Plan: TDAP VACCINE (Adacel, Boostrix)             PLAN:  1.  Tdap  2.  Patient has regular follow-up with neurosurgery in the pain center.  3.  Continue the same medications for now encouraged the patient to stay on top of her pain, watch her bowels fiber as needed  4.  Encourage patient also to follow-up with her primary                   No follow-ups on file.    Eliu Pimentel MD  [facility] SHELBIE Schofield is a 87 year old, presenting for the following health issues:    Patient comes in with her son with multiple medical issues.  The patient has been dealing with lumbar disc issues and changes she is followed by neurosurgery, there has been discussion of surgery however the patient has at least more recently been having less pain and because of that and because of her age and comorbidities and the like neurosurgery has been reluctant to consider surgery, patient is undergoing conservative management    In the last several weeks the patient has had 4 ER visits for left flank pain she has had numerous studies including CT scans laboratory studies and like that have not found any particular cause.  I told the patient that we may not have an answer to that however she is scheduled for a left SI joint injection that is possible that the flank pain is referred pain from the source, however I do think it is reasonable to go ahead with the injection.    The patient is on numerous medications including a narcotic, she has had at times quite extreme pain sometimes  "difficulty sleeping, she is on some fiber supplementation and is not having significant straining but I told the patient she really needs to stay on top of her pain control, such as taking her ibuprofen and Tylenol and other medications regularly.    Patient is also having lymphedema followed by a specialist on this.        ED Follow up      History of Present Illness       Back Pain:  She presents for follow up of back pain. Patient's back pain is a recurring problem.  Location of back pain:  Left lower back and left hip  Description of back pain: dull ache and sharp  Back pain spreads: left thigh    Since patient first noticed back pain, pain is: always present, but gets better and worse  Does back pain interfere with her job:  Not applicable      She eats 2-3 servings of fruits and vegetables daily.She consumes 1 sweetened beverage(s) daily.She exercises with enough effort to increase her heart rate 20 to 29 minutes per day.  She exercises with enough effort to increase her heart rate 7 days per week.   She is taking medications regularly.             Review of Systems         Objective    /68   Pulse 83   Temp 98.5  F (36.9  C) (Temporal)   Resp 16   Ht 1.575 m (5' 2\")   Wt 61.7 kg (136 lb)   LMP  (LMP Unknown)   SpO2 99%   BMI 24.87 kg/m    Body mass index is 24.87 kg/m .  Physical Exam                       "

## 2023-03-03 ENCOUNTER — TELEPHONE (OUTPATIENT)
Dept: FAMILY MEDICINE | Facility: CLINIC | Age: 88
End: 2023-03-03

## 2023-03-03 NOTE — TELEPHONE ENCOUNTER
Please call.    She can discontinue the Hydrochlorothiazide.  Recommend continuing the Gabapentin.  Can utilize the Tizanidine just as needed for muscle spasms.    Susan Sutton NP

## 2023-03-03 NOTE — TELEPHONE ENCOUNTER
Called pt. She is wondering if she should be still alternating Ibuprofen with Tylenol as the ER told her to do on 2/28. Thanks.

## 2023-03-03 NOTE — TELEPHONE ENCOUNTER
Please call.    Yes.  If she continues to have pain, then I would continue alternating the Tylenol and Ibuprofen.    Susan Sutton, LONA

## 2023-03-03 NOTE — TELEPHONE ENCOUNTER
General Call    Contacts       Type Contact Phone/Fax    03/03/2023 11:32 AM CST Phone (Incoming) Kanwal Liao (Self) [phone] (M)        Reason for Call:  Questions weather she can discontinue taking these 3 medications?     What are your questions or concerns:  Pt states she was in the clinic yesterday but forgot to ask the Dr if she can stop taking these 3 medications? Pt states she is no longer in pain. Please call Pt to discuss her questions/concerns.    tiZANidine (ZANAFLEX) 2 MG tablet  gabapentin (NEURONTIN) 300 MG capsule  hydrochlorothiazide (HYDRODIURIL) 12.5 MG tablet    Date of last appointment with provider: 03/02/2023    Could we send this information to you in [employer] or would you prefer to receive a phone call?: Patient would prefer a phone call   Okay to leave a detailed message?: Yes at Cell number on file:    Telephone Information:   Mobile [phone]     Fina Fontaine

## 2023-03-09 ENCOUNTER — HOSPITAL ENCOUNTER (OUTPATIENT)
Dept: PHYSICAL THERAPY | Facility: REHABILITATION | Age: 88
Discharge: HOME | End: 2023-03-09
Attending: NURSE PRACTITIONER
Payer: COMMERCIAL

## 2023-03-09 DIAGNOSIS — I87.303 VENOUS HYPERTENSION OF BOTH LOWER EXTREMITIES: ICD-10-CM

## 2023-03-09 DIAGNOSIS — I83.893 VARICOSE VEINS OF BOTH LEGS WITH EDEMA: ICD-10-CM

## 2023-03-09 DIAGNOSIS — M79.89 PAIN AND SWELLING OF LOWER EXTREMITY, UNSPECIFIED LATERALITY: ICD-10-CM

## 2023-03-09 DIAGNOSIS — M79.606 PAIN AND SWELLING OF LOWER EXTREMITY, UNSPECIFIED LATERALITY: ICD-10-CM

## 2023-03-09 DIAGNOSIS — I89.0 SECONDARY LYMPHEDEMA: ICD-10-CM

## 2023-03-09 DIAGNOSIS — I87.2 VENOUS (PERIPHERAL) INSUFFICIENCY: ICD-10-CM

## 2023-03-09 DIAGNOSIS — M54.41 ACUTE RIGHT-SIDED LOW BACK PAIN WITH RIGHT-SIDED SCIATICA: ICD-10-CM

## 2023-03-09 PROCEDURE — 97140 MANUAL THERAPY 1/> REGIONS: CPT | Mod: GP | Performed by: PHYSICAL THERAPIST

## 2023-03-09 PROCEDURE — 97110 THERAPEUTIC EXERCISES: CPT | Mod: GP | Performed by: PHYSICAL THERAPIST

## 2023-03-09 PROCEDURE — 97161 PT EVAL LOW COMPLEX 20 MIN: CPT | Mod: GP | Performed by: PHYSICAL THERAPIST

## 2023-03-09 NOTE — PROGRESS NOTES
Rehabilitation Services        OUTPATIENT PHYSICAL THERAPY EDEMA EVALUATION  PLAN OF TREATMENT FOR OUTPATIENT REHABILITATION  (COMPLETE FOR INITIAL CLAIMS ONLY)  Patient's Last Name, First Name, Ramila Haro                           Provider s Name:   Paulette Shelby, PT Medical Record No.  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 Visits from SOC:  1                                     __________________________________________________________________________________   Plan of Treatment/Functional Goals:    Manual lymph drainage, Gradient compression bandaging, Fit for compression garment, Precautions to prevent infection / exacerbation, Exercises, Manual therapy, Education, Skin care / precautions, Scar mobilization, Soft tissue mobilization, Myofascial release, Home management program development        GOALS  1. Goal description: patient will reduce bilateral LE swelling by 5% for reduced infection risk       Target date: 06/03/23  2. Goal description: patient will have good compression garments that manage her swelling in order to wear shoes and pants       Target date: 06/03/23  3. Goal description: patient will demonstrate understanding of her home management program to include: exercise, elevation, compression, compression pump, to prevent worsening of swelling over time       Target date: 06/03/23  4.             5.            6.               7.             8.              Treatment Frequency: 2x/week   Treatment duration: 90 days    Paulette Shelby, PT                                    I CERTIFY THE NEED FOR THESE SERVICES FURNISHED UNDER        THIS PLAN OF TREATMENT AND WHILE UNDER MY CARE     (Physician co-signature of this document indicates review and certification of the therapy plan).                   Certification date from: 03/09/23       Certification date to: 06/03/23           Referring physician: Roxanna Rodrigues   Initial Assessment  See Epic Evaluation- Start of care: 03/09/23 03/09/23 1200   Quick Adds   Quick Adds Certification   Rehab Discipline   Discipline PT   Type of Visit   Type of visit Initial Edema Evaluation   General Information   Start of care 03/09/23   Referring physician Roxanna Rodrigues   Orders Evaluate and treat as indicated   Order date 02/01/23   Medical diagnosis Pain and swelling of lower extremity, unspecified laterality (M79.606, M79.89)     Secondary lymphedema (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 No   Have you fallen and had an injury in the past year? No   Is patient a fall risk? No   Fall screen comments not a falls risk as long as she uses her walker which she reports she does at all times   Abuse Screen (yes response referral indicated)   Feels Unsafe at Home or Work/School no   Feels Threatened by Someone no   Does Anyone Try to Keep You From Having Contact with Others or Doing  Things Outside Your Home? no   Physical Signs of Abuse Present no   Subjective Report   Patient report of symptoms Subjective: Patient comes to PT today with complaints of bilateral (R>L) LE swelling primarily in ankles and feet. She states the swelling started approximately after Oct 2022. She states she had a severe back issue that began Oct 24 2022 and since then, her back and R leg have hurt and gradually, she started swelling more and more. She went to the vascular clinic and they did coban wrapping for 3 weeks. She states this helped reduce her swelling significantly, then she started wearing open toe, knee high compression garments (15-20mmHg) after that and has been doing so for the past 3 weeks. She states that since wearing the stockings, her feet and ankles have increased in size again. She is wondering if she needs different compression stockings. She also stopped going to exercise classes in her building in October 2022, but she resumed this week again. She states she sleeps on her side in her bed, but does fall asleep in her chair at times. She is working on elevating her legs more often now. She walks a lot in her building (independent Co-op living). She uses a 4WW, but just since Oct 2022 when her back pain started. Prior to that, she was walking wihtout assistive device and driving herself. She hopes she can get back to that. She is going to be starting PT again for her back.   Pain   Pain comments patient has significant left flank pain at times, seeing spine center and  starting PT speficially for this soon.   Cognitive Status   Cognitive status comments good cognition   Edema Exam / Assessment   Skin condition Pitting;Venous distention;Hemosiderin deposits;Lipodermatosclerosis;Intact   Skin condition comments epithelial hyperplasia in bilateral LEs   Pitting 3+   Pitting location dorsum of feet, mild pitting lower tibia   Ulceration No   Girth Measurements   Girth Measurements Refer to separate  girth measurement flowsheet   Activities of Daily Living   Activities of Daily Living independent   Bed Mobility   Bed mobility independent   Transfers   Transfers independent   Gait / Locomotion   Gait / Locomotion independent with 4wwy, has  cane  as well   Planned Edema Interventions   Planned edema interventions Manual lymph drainage;Gradient compression bandaging;Fit for compression garment;Precautions to prevent infection / exacerbation;Exercises;Manual therapy;Education;Skin care / precautions;Scar mobilization;Soft tissue mobilization;Myofascial release;Home management program development   Clinical Impression   Criteria for skilled therapeutic intervention met Yes   Therapy diagnosis Bilateral LE secondary lymphedema d/t venous insufficiency.   Influenced by the following impairments / conditions Edema;Stage 2   Functional limitations due to impairments / conditions wearing shoes, clothing, walking, balance   Clinical Presentation Stable/Uncomplicated   Clinical Presentation Rationale comorbidities   Clinical Decision Making (Complexity) Low complexity   Treatment Frequency 2x/week   Treatment duration 90 days   Patient / family and/or staff in agreement with plan of care Yes   Risks and benefits of therapy have been explained Yes   Clinical impression comments Assessment: Patient presents to PT with her daughter-in-law, Olya, for bilateral LE swelling. She has been doing compression, exercise, elevation for the past 4 weeks with vascular clinic doing Coban wraps and now knee high stockings. Despite this, her symptoms of bilateral LE pitting swelling, epithelial hyperplasia still persist. She would benefit from pursuing a compression pump to help her manage her LE swelling to prevent infection risk, help her don shoes and clothing and return to more independence. Currently, patient is not driving d/t the swelling in her ankles and limited mobility in her feet. She is hoping to get back to this when her  swelling is more managed. She did well with the compression wrapping done in clinic today. She was given HEP to start to help promote lymphatic fluid flow. She was also educated on elevating more often. Patient and daughter in law agree with plan to continue with PT sessions. PT also recommends that a compression pump rep come to next session for a trial. They agree to this.   Goals   Edema Eval Goals 1;2;3   Goal 1   Goal identifier swelling   Goal description patient will reduce bilateral LE swelling by 5% for reduced infection risk   Target date 06/03/23   Goal 2   Goal identifier compression   Goal description patient will have good compression garments that manage her swelling in order to wear shoes and pants   Target date 06/03/23   Goal 3   Goal identifier home program   Goal description patient will demonstrate understanding of her home management program to include: exercise, elevation, compression, compression pump, to prevent worsening of swelling over time   Target date 06/03/23   Total Evaluation Time   PT Shanda Low Complexity Minutes (60013) 30   Certification   Certification date from 03/09/23   Certification date to 06/03/23   Medical Diagnosis Pain and swelling of lower extremity, unspecified laterality (M79.606, M79.89)     Secondary lymphedema (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  (Physician co-signature of this document indicates review and certification of the therapy plan).

## 2023-03-13 ENCOUNTER — TELEPHONE (OUTPATIENT)
Dept: PHYSICAL MEDICINE AND REHAB | Facility: CLINIC | Age: 88
End: 2023-03-13
Payer: COMMERCIAL

## 2023-03-13 NOTE — TELEPHONE ENCOUNTER
Pt called [facility] inquiring about insurance coverage for upcoming left SIJI on 3-28. She is requesting a call back when it is approved.  
Pt was transferred for scheduling.  
75

## 2023-03-17 ENCOUNTER — HOSPITAL ENCOUNTER (OUTPATIENT)
Dept: PHYSICAL THERAPY | Facility: REHABILITATION | Age: 88
Discharge: HOME | End: 2023-03-17
Attending: NURSE PRACTITIONER
Payer: COMMERCIAL

## 2023-03-17 DIAGNOSIS — M62.81 GENERALIZED MUSCLE WEAKNESS: Primary | ICD-10-CM

## 2023-03-17 DIAGNOSIS — M79.89 PAIN AND SWELLING OF LOWER EXTREMITY, UNSPECIFIED LATERALITY: ICD-10-CM

## 2023-03-17 DIAGNOSIS — I89.0 SECONDARY LYMPHEDEMA: ICD-10-CM

## 2023-03-17 DIAGNOSIS — I87.303 VENOUS HYPERTENSION OF BOTH LOWER EXTREMITIES: ICD-10-CM

## 2023-03-17 DIAGNOSIS — I83.893 VARICOSE VEINS OF BOTH LEGS WITH EDEMA: ICD-10-CM

## 2023-03-17 DIAGNOSIS — I87.2 VENOUS (PERIPHERAL) INSUFFICIENCY: ICD-10-CM

## 2023-03-17 DIAGNOSIS — M79.606 PAIN AND SWELLING OF LOWER EXTREMITY, UNSPECIFIED LATERALITY: ICD-10-CM

## 2023-03-17 PROCEDURE — 97110 THERAPEUTIC EXERCISES: CPT | Mod: CQ | Performed by: PHYSICAL THERAPY ASSISTANT

## 2023-03-17 PROCEDURE — 97140 MANUAL THERAPY 1/> REGIONS: CPT | Mod: CQ | Performed by: PHYSICAL THERAPY ASSISTANT

## 2023-03-20 ENCOUNTER — HOSPITAL ENCOUNTER (OUTPATIENT)
Dept: PHYSICAL THERAPY | Facility: REHABILITATION | Age: 88
Discharge: HOME | End: 2023-03-20
Attending: NEUROLOGICAL SURGERY
Payer: COMMERCIAL

## 2023-03-20 ENCOUNTER — TELEPHONE (OUTPATIENT)
Dept: PHYSICAL THERAPY | Facility: REHABILITATION | Age: 88
End: 2023-03-20

## 2023-03-20 DIAGNOSIS — M48.061 LUMBAR FORAMINAL STENOSIS: ICD-10-CM

## 2023-03-20 PROCEDURE — 97110 THERAPEUTIC EXERCISES: CPT | Mod: GP

## 2023-03-20 PROCEDURE — 97161 PT EVAL LOW COMPLEX 20 MIN: CPT | Mod: GP

## 2023-03-20 NOTE — PROGRESS NOTES
"[facility] Rehabilitation Service    Outpatient Physical Therapy Discharge Note  Patient: Ramila Liao  : 1935    Beginning/End Dates of Reporting Period:  22 to 23    Referring Provider: Tiffanie Quan PA-C    Therapy Diagnosis: Right-sided lumbar radicular pain with subsequent balance and gait impairments     Client Self Report: Kanwal met with her primary care who recommended she go on a diuretic for her lower extremity swelling which she is still waiting to get. She also reports a fall within the last week or two, though she can't remember exactly when. She said that she was leaning down to put something into her freezer, lost her balance, and fell onto her bottom. She also said she hit her head on her garbage can, but she stood right back up and reports no injury, pain, or remaining symptoms. She said that the lock on her walker wasn't working (only the \"set lock,\" not the \"hand-squeeze lock\") and she started to fall and grabbed her walker, but it rolled away from her.    Objective Measurements:  Objective Measure: Lumbar Extension AROM  Details: Not measured today  Objective Measure: Lumbar Rotation AROM  Details: Not measured today  Objective Measure: Hip Abduction/Flexion Strength  Details: Not measured today  Objective Measure: Knee Flexion Strength  Details: Not measured today  Objective Measure: Worst Pain  Details: 9/10  Objective Measure: TUG/30 Second STS  Details: Not measured today      Outcome Measures (most recent score):  RODRICK: 42%  TU seconds with 4WW  30 Second Sit-to-stand: 9 reps    Goals:  Goal Identifier RODRICK - Short Term   Goal Description Kanwal will demonstrate improved function as evidenced by an improved (decreased) RODRICK score of less than 21%.   Target Date 22   Date Met      Progress (detail required for progress note): 42%     Goal Identifier RODRICK - Long Term   Goal " Description Kanwal will demonstrate improved function as evidenced by an improved (decreased) RODRICK score of less than 5%.   Target Date 02/06/23   Date Met      Progress (detail required for progress note): 42%     Goal Identifier Walking   Goal Description Kanwal will demonstrate improved tolerance to functional mobility as evidenced by her ability to walk for up to 30 minutes with self-report pain no higher than 2/10.   Target Date 02/06/23   Date Met      Progress (detail required for progress note): Unable     Goal Identifier Sleep   Goal Description Kanwal will be able to sleep through the night with less than 1 hour of disturbance due to back/leg pain on at least 5/7 nights a week.   Target Date 02/06/23   Date Met      Progress (detail required for progress note): Unable     Goal Identifier TUG   Goal Description Kanwal will demonstrate improved functional mobility and a decreased risk for falls as evidenced by an improved (decreased) TUG time of less than 15 seconds with or without single point cane   Target Date 02/06/23   Date Met      Progress (detail required for progress note): 23 seconds with 4WW     Goal Identifier 30 Second STS   Goal Description Kanwal will demonstraet improved tolerance to functional activity and increased lower extremity strength as evidenced by an improved 30 second STS score of at least 11 reps.   Target Date 02/06/23   Date Met      Progress (detail required for progress note): 9 reps         Plan:  Discharge from therapy.    Discharge:    Reason for Discharge: No further expectation of progress.  Patient chooses to discontinue therapy.  Patient has failed to schedule further appointments.    Equipment Issued: NA    Discharge Plan: Patient to continue home program.  Other services: continue with spine and neurosurgery follow-ups.

## 2023-03-20 NOTE — ADDENDUM NOTE
Encounter addended by: Grady Ramirez, PT on: 3/20/2023 10:29 AM   Actions taken: Episode resolved, Clinical Note Signed

## 2023-03-20 NOTE — PROGRESS NOTES
[facility]    OUTPATIENT PHYSICAL THERAPY ORTHOPEDIC EVALUATION  PLAN OF TREATMENT FOR OUTPATIENT REHABILITATION  (COMPLETE FOR INITIAL CLAIMS ONLY)  Patient's Last Name, First Name, M.I.  YOB: 1935  Ramila Liao    Provider s Name:  [facility]   Medical Record No.  [id number]   Start of Care Date:  03/20/23   Onset Date:  02/20/23   Type:     _X__PT   ___OT   ___SLP Medical Diagnosis:  Lumbar foraminal stenosis     PT Diagnosis:  Left-sided low/mid back pain with mobility deficits, as well as impaired balance and mobility   Visits from SOC:  1      _________________________________________________________________________________  Plan of Treatment/Functional Goals:  manual therapy, neuromuscular re-education, ROM, strengthening, stretching, gait training           Goals  Goal Identifier: (P) RODRICK - Short Term  Goal Description: (P) Kanwal will demonstrate improved function as evidenced by an improved (decreased) RODRICK score of less than 21%.  Target Date: (P) 04/17/23    Goal Identifier: (P) RODRICK - Long Term  Goal Description: (P) Kanwal will demonstrate improved function as evidenced by an improved (decreased) RODRICK score of less than 7%.  Target Date: (P) 05/15/23    Goal Identifier: (P) TUG  Goal Description: (P) Will assess next visit  Target Date: (P) 05/15/23    Goal Identifier: (P) 30 Second Sit-to-stand  Goal Description: (P) Kanwal will demonstrate improved lower extremity strength and increased tolerance to functional activity as evidenced by an improved 30 second sit-to-stand score of at least 10 reps without upper extremity assist.  Target Date: (P) 05/15/23    Goal Identifier: (P) FGA  Goal Description: (P) Will assess next visit  Target Date: (P) 05/15/23                                     Therapy Frequency:  1 time/week  Predicted Duration of  Therapy Intervention:  8 weeks    Grady Ramirez, PT                 I CERTIFY THE NEED FOR THESE SERVICES FURNISHED UNDER        THIS PLAN OF TREATMENT AND WHILE UNDER MY CARE     (Physician co-signature of this document indicates review and certification of the therapy plan).                     Certification Date From:  03/20/23   Certification Date To:  (P) 05/15/23    Referring Provider:  Dr. Zoltan Pena MD    Initial Assessment        See Epic Evaluation Start of Care Date: 03/20/23 03/20/23 0800   General Information   Type of Visit Initial OP Ortho PT Evaluation   Start of Care Date 03/20/23   Referring Physician Dr. Zoltan Pena MD   Patient/Family Goals Statement Improve strength and balance, and decrease low back pain   Orders Evaluate and Treat   Orders Comment stretching exercises   Date of Order 03/01/23   Certification Required? Yes   Medical Diagnosis Lumbar foraminal stenosis   Surgical/Medical history reviewed Yes   Presentation and Etiology   Pertinent history of current problem (include personal factors and/or comorbidities that impact the POC) Kanwal had PT several months ago for significant right-sided low back and leg pain. She was going to PT and being followed at the spine clinic. She eventually was scheduled for surgery, but then her pain resolved so the surgery was cancelled. Around the same time that she started to have back pain (fall of 2022), she also started to develop swelling in both legs/feet. She just started lymphedema PT a couple of weeks ago. She now returns to PT due to new onset of left-sided low back pain without any apparent mechanism of injury. She reports that the pain is not as severe as it was on the right, and the pain doesn't go down her leg. She said that the pain is worst at night and usually isn't nearly as bad during the day. She is still working with spine clinic to manage her  symptoms. She had an EMG done on the right side, that showed an L5 radiculopathy. She currently has an injection for her left low back on 3/28/23 at the spine center. She denies any new bowel or bladder changes. She reports occasional right-sided lateral thigh numbness in the same distribution as her former pain.   Impairments A. Pain;D. Decreased ROM;C. Swelling;E. Decreased flexibility;F. Decreased strength and endurance;G. Impaired balance;H. Impaired gait;K. Numbness   Functional Limitations perform activities of daily living;perform desired leisure / sports activities   Symptom Location See history   How/Where did it occur   (See history)   Onset date of current episode/exacerbation 02/20/23   Chronicity New   Pain rating (0-10 point scale) Best (/10);Worst (/10);Other   Best (/10) 0/10   Worst (/10) 8/10   Pain rating comment Current: 2/10   Pain quality A. Sharp   Frequency of pain/symptoms B. Intermittent   Pain/symptoms are: Worse during the night   Pain/symptoms exacerbated by H. Overhead reach;A. Sitting;C. Lifting;D. Carrying;I. Bending;J. ADL;K. Home tasks   Pain/symptoms eased by G. Heat;K. Other   Pain eased by comment Precription pain medication   Progression of symptoms since onset: Unchanged   Current / Previous Interventions   Diagnostic Tests:   (See medical record)   Prior Level of Function   Prior Level of Function-Mobility Modified independent   Prior Level of Function-ADLs Modified independent to independent   Current Level of Function   Current Community Support Family/friend caregiver   Patient role/employment history F. Retired   Living environment Other  (Senior living)   Current equipment-Gait/Locomotion Standard cane;Other  (4 wheel walker)   Fall Risk Screen   Fall screen completed by PT   Have you fallen 2 or more times in the past year? Yes   Have you fallen and had an injury in the past year? No   Timed Up and Go score (seconds) Will assess next visit   Is patient a fall risk? Yes    Fall screen comments Bent over to pick something up, and she went to the floor. Another time, her walker rolled away from her, and she fell. She denies any injuries with the falls.   Abuse Screen (yes response referral indicated)   Feels Unsafe at Home or Work/School no   Feels Threatened by Someone no   Does Anyone Try to Keep You From Having Contact with Others or Doing Things Outside Your Home? no   Physical Signs of Abuse Present no   Patient needs abuse support services and resources No   System Outcome Measures   Outcome Measures   (RODRICK: 36%)   Lumbar Spine/SI Objective Findings   Gait/Locomotion Wide base of support, decreased speed, 4 wheel walker, leaning forward   Balance/Proprioception (Single Leg Stance) Romberg: unable   Flexion ROM WNL   Extension ROM 75% to 99% (min pain)   Right Side Bending ROM WNL (right-sided pain)   Left Side Bending ROM WNL (left-sided pain)   Lumbar ROM Comment Bilateral rotation: 50% to 75% (painful)   Hip Flexion (L2) Strength Right: 4/5. Left: 4+/5.   Hip Abduction Strength Bilateral: 5/5   Hip Adduction Strength Bilateral: 5/5   Knee Extension (L3) Strength Bilateral: 5/5   Ankle Dorsiflexion (L4) Strength Deffered due to lymphedema wrap   Great Toe Extension (L5) Strength Deffered due to lymphedema wrap   Ankle Plantar Flexion (S1) Strength Deffered due to lymphedema wrap   Lumbar/Hip/Knee/Foot Strength Comments 30 second sit-to-stand: 9 reps with bilateral upper extremity assist.   Hip Flexor Flexibility Limited bilateral   Slump Test Positive on right   Sensation Testing Bilateral lower extremity dermatomes intact to light touch   Palpation Muscle tension throughout bilateral lumbar paraspinals with tenderness at left lumbar paraspinals and quadratus lumborum   Planned Therapy Interventions   Planned Therapy Interventions manual therapy;neuromuscular re-education;ROM;strengthening;stretching;gait training   Clinical Impression   Criteria for Skilled Therapeutic  Interventions Met yes, treatment indicated   PT Diagnosis Left-sided low/mid back pain with mobility deficits, as well as impaired balance and mobility   Influenced by the following impairments Left-sided low/mid back pain, decreased pain-free lumbar ROM, decreased core and lower extremity strength, impaired balance and mobility   Functional limitations due to impairments Walk, drive, sit, lift, carry, reach overhead, and other ADLs and leisure activities   Clinical Presentation Stable/Uncomplicated   Clinical Decision Making (Complexity) Low complexity   Therapy Frequency 1 time/week   Predicted Duration of Therapy Intervention (days/wks) 8 weeks   Risk & Benefits of therapy have been explained Yes   Patient, Family & other staff in agreement with plan of care Yes   Clinical Impression Comments Kanwal is an 87-year-old female who presents to physical therapy with new onset left low/mid back pain and mobility deficits, as well as impaired functional balance and mobility. Her impairments include left-sided low/mid back pain, decreased pain-free lumbar ROM, decreased core and lower extremity strength, and impaired functional balance and mobility. These impaitments limit her ability to walk, sit, drive, lift, carry, reach overhead, and perform other ADLs and leisure activities without pain or limitation. She will benefit from skilled therapyt to address the listed impairments and limitations and improve her function.   Ortho Goal 1   Goal Identifier RODRICK - Short Term   Goal Description Kanwal will demonstrate improved function as evidenced by an improved (decreased) RODRICK score of less than 21%.   Goal Progress 36%   Target Date 04/17/23   Ortho Goal 2   Goal Identifier RODRICK - Long Term   Goal Description Kanwal will demonstrate improved function as evidenced by an improved (decreased) RODRICK score of less than 7%.   Goal Progress 36%   Target Date 05/15/23   Ortho Goal 3   Goal Identifier TUG   Goal Description Will assess  next visit   Goal Progress Will assess next visit   Target Date 05/15/23   Ortho Goal 4   Goal Identifier 30 Second Sit-to-stand   Goal Description Kanwal will demonstrate improved lower extremity strength and increased tolerance to functional activity as evidenced by an improved 30 second sit-to-stand score of at least 10 reps without upper extremity assist.   Goal Progress 9 reps with upper extremity assist   Target Date 05/15/23   Ortho Goal 5   Goal Identifier FGA   Goal Description Will assess next visit   Goal Progress Will assess next visit   Target Date 05/15/23   Total Evaluation Time   PT Eval, Low Complexity Minutes (08302) 27   Therapy Certification   Certification date from 03/20/23   Certification date to 05/15/23   Medical Diagnosis Lumbar foraminal stenosis

## 2023-03-21 ENCOUNTER — NURSE TRIAGE (OUTPATIENT)
Dept: FAMILY MEDICINE | Facility: CLINIC | Age: 88
End: 2023-03-21
Payer: COMMERCIAL

## 2023-03-21 DIAGNOSIS — M79.606 PAIN AND SWELLING OF LOWER EXTREMITY, UNSPECIFIED LATERALITY: Primary | ICD-10-CM

## 2023-03-21 DIAGNOSIS — M79.89 PAIN AND SWELLING OF LOWER EXTREMITY, UNSPECIFIED LATERALITY: Primary | ICD-10-CM

## 2023-03-21 RX ORDER — HYDROCHLOROTHIAZIDE 12.5 MG/1
12.5 TABLET ORAL DAILY
Qty: 90 TABLET | Refills: 0 | Status: SHIPPED | OUTPATIENT
Start: 2023-03-21 | End: 2023-06-03

## 2023-03-21 NOTE — TELEPHONE ENCOUNTER
Patient may restart the Hydrochlorothiazide.  If her swelling persists, we may need to get her back to vascular for compression wraps.    Please schedule her a lab-only appointment in 1 month while on the hydrochlorothiazide.    Susan Sutton NP

## 2023-03-21 NOTE — TELEPHONE ENCOUNTER
Called patient and advise per Susan Sutton patient can restart Hydrochlorothiazide, patient verbalized understanding,  assisted patient in schedule lab only appointment on 4/21/2023.

## 2023-03-21 NOTE — TELEPHONE ENCOUNTER
Nurse Triage SBAR    Is this a 2nd Level Triage? YES, LICENSED PRACTITIONER REVIEW IS REQUIRED    Situation: bilateral edema in lower extremities - patient requesting to restart Hydrodiuril 12.5 mg - patient has medication at home.     Background: Patient states she has notice in past week bilateral edema in calves, ankles and feet.     Assessment: Started a week ago,  Edema is present in both legs, right lower leg has slightly more edema, edema is in feet, ankles and calves, denies swelling in thighs, face, hands or neck. Unknow if pitting edema as she has compresion stocking on, denies redness or infection.   States it is painful when she points her toes towards nose, no pain when touching leg or foot  Denies chest pain or difficulty breathing      Protocol Recommended Disposition:   See in Office Within 3 Days    Recommendation: Patient is requesting to start diuretic today, please advise of medication.    Patient has on hand, however she uses pharmacy of [facility].        Routed to provider    Does the patient meet one of the following criteria for ADS visit consideration? No,       Reason for Disposition    MILD swelling of both ankles (i.e., pedal edema) AND new-onset or worsening    Additional Information    Negative: Sounds like a life-threatening emergency to the triager    Negative: Chest pain    Negative: Small area of swelling and followed an insect bite to the area    Negative: Followed a knee injury    Negative: Ankle or foot injury    Negative: Pregnant with leg swelling or edema    Negative: Difficulty breathing at rest    Negative: Entire foot is cool or blue in comparison to other side    Negative: SEVERE swelling (e.g., swelling extends above knee, entire leg is swollen, weeping fluid)    Negative: Thigh or calf pain and only 1 side and present > 1 hour    Negative: Thigh, calf, or ankle swelling in only one leg    Negative: Thigh, calf, or ankle swelling in both legs, but  one side is definitely more swollen (Exception: longstanding difference between legs)    Negative: Cast on leg or ankle and has increasing pain    Negative: Can't walk or can barely stand (new-onset)    Negative: Fever and red area (or area very tender to touch)    Negative: Patient sounds very sick or weak to the triager    Negative: Swelling of face, arm or hands  (Exception: Slight puffiness of fingers during hot weather.)    Negative: Pregnant 20 or more weeks and sudden weight gain (i.e., > 2 lbs, 1 kg in one week)    Negative: MODERATE swelling of both ankles (e.g., swelling extends up to the knees) AND new-onset or worsening    Negative: Difficulty breathing with exertion AND worsening or new-onset    Negative: Looks like a boil, infected sore, deep ulcer, or other infected rash (spreading redness, pus)    Negative: Patient wants to be seen    Protocols used: LEG SWELLING AND EDEMA-A-OH

## 2023-03-24 ENCOUNTER — HOSPITAL ENCOUNTER (OUTPATIENT)
Dept: PHYSICAL THERAPY | Facility: REHABILITATION | Age: 88
Discharge: HOME | End: 2023-03-24
Attending: NURSE PRACTITIONER
Payer: COMMERCIAL

## 2023-03-24 DIAGNOSIS — M48.061 LUMBAR FORAMINAL STENOSIS: ICD-10-CM

## 2023-03-24 DIAGNOSIS — M62.81 GENERALIZED MUSCLE WEAKNESS: Primary | ICD-10-CM

## 2023-03-24 DIAGNOSIS — I89.0 SECONDARY LYMPHEDEMA: ICD-10-CM

## 2023-03-24 DIAGNOSIS — I87.2 VENOUS (PERIPHERAL) INSUFFICIENCY: ICD-10-CM

## 2023-03-24 DIAGNOSIS — M79.89 PAIN AND SWELLING OF LOWER EXTREMITY, UNSPECIFIED LATERALITY: ICD-10-CM

## 2023-03-24 DIAGNOSIS — M79.606 PAIN AND SWELLING OF LOWER EXTREMITY, UNSPECIFIED LATERALITY: ICD-10-CM

## 2023-03-24 PROCEDURE — 97140 MANUAL THERAPY 1/> REGIONS: CPT | Mod: CQ | Performed by: PHYSICAL THERAPY ASSISTANT

## 2023-03-26 ENCOUNTER — APPOINTMENT (OUTPATIENT)
Dept: RADIOLOGY | Facility: CLINIC | Age: 88
DRG: 066 | End: 2023-03-26
Attending: EMERGENCY MEDICINE
Payer: COMMERCIAL

## 2023-03-26 ENCOUNTER — APPOINTMENT (OUTPATIENT)
Dept: CT IMAGING | Facility: CLINIC | Age: 88
DRG: 066 | End: 2023-03-26
Attending: EMERGENCY MEDICINE
Payer: COMMERCIAL

## 2023-03-26 ENCOUNTER — HOSPITAL ENCOUNTER (INPATIENT)
Facility: CLINIC | Age: 88
LOS: 2 days | Discharge: NURSING, REHAB OR LONG-TERM CARE | DRG: 066 | End: 2023-03-28
Attending: EMERGENCY MEDICINE | Admitting: FAMILY MEDICINE
Payer: COMMERCIAL

## 2023-03-26 DIAGNOSIS — I66.02 STENOSIS OF LEFT MIDDLE CEREBRAL ARTERY: ICD-10-CM

## 2023-03-26 DIAGNOSIS — I63.512 CEREBROVASCULAR ACCIDENT (CVA) DUE TO STENOSIS OF LEFT MIDDLE CEREBRAL ARTERY (H): Primary | ICD-10-CM

## 2023-03-26 DIAGNOSIS — M54.16 LUMBAR RADICULAR PAIN: ICD-10-CM

## 2023-03-26 DIAGNOSIS — E78.2 MIXED HYPERLIPIDEMIA: ICD-10-CM

## 2023-03-26 DIAGNOSIS — R29.90 STROKE-LIKE SYMPTOMS: ICD-10-CM

## 2023-03-26 DIAGNOSIS — E87.6 HYPOKALEMIA: ICD-10-CM

## 2023-03-26 DIAGNOSIS — R29.898 RIGHT LEG WEAKNESS: ICD-10-CM

## 2023-03-26 PROBLEM — R60.0 EDEMA, LOWER EXTREMITY: Status: ACTIVE | Noted: 2023-03-26

## 2023-03-26 LAB
ANION GAP SERPL CALCULATED.3IONS-SCNC: 9 MMOL/L (ref 5–18)
APTT PPP: 27 SECONDS (ref 22–38)
ATRIAL RATE - MUSE: 76 BPM
BASOPHILS # BLD AUTO: 0.1 10E3/UL (ref 0–0.2)
BASOPHILS NFR BLD AUTO: 1 %
BNP SERPL-MCNC: 146 PG/ML (ref 0–167)
BUN SERPL-MCNC: 13 MG/DL (ref 8–28)
CALCIUM SERPL-MCNC: 9.7 MG/DL (ref 8.5–10.5)
CHLORIDE BLD-SCNC: 104 MMOL/L (ref 98–107)
CHOLEST SERPL-MCNC: 184 MG/DL
CO2 SERPL-SCNC: 32 MMOL/L (ref 22–31)
CREAT SERPL-MCNC: 0.77 MG/DL (ref 0.6–1.1)
DIASTOLIC BLOOD PRESSURE - MUSE: 64 MMHG
EOSINOPHIL # BLD AUTO: 0.2 10E3/UL (ref 0–0.7)
EOSINOPHIL NFR BLD AUTO: 2 %
ERYTHROCYTE [DISTWIDTH] IN BLOOD BY AUTOMATED COUNT: 13.2 % (ref 10–15)
GFR SERPL CREATININE-BSD FRML MDRD: 75 ML/MIN/1.73M2
GLUCOSE BLD-MCNC: 105 MG/DL (ref 70–125)
GLUCOSE BLDC GLUCOMTR-MCNC: 120 MG/DL (ref 70–99)
GLUCOSE BLDC GLUCOMTR-MCNC: 99 MG/DL (ref 70–99)
HBA1C MFR BLD: 5.5 %
HCT VFR BLD AUTO: 39.8 % (ref 35–47)
HDLC SERPL-MCNC: 73 MG/DL
HGB BLD-MCNC: 13 G/DL (ref 11.7–15.7)
IMM GRANULOCYTES # BLD: 0 10E3/UL
IMM GRANULOCYTES NFR BLD: 0 %
INR PPP: 0.98 (ref 0.85–1.15)
INTERPRETATION ECG - MUSE: NORMAL
LDLC SERPL CALC-MCNC: 95 MG/DL
LYMPHOCYTES # BLD AUTO: 2.1 10E3/UL (ref 0.8–5.3)
LYMPHOCYTES NFR BLD AUTO: 27 %
MAGNESIUM SERPL-MCNC: 2.1 MG/DL (ref 1.8–2.6)
MCH RBC QN AUTO: 30.4 PG (ref 26.5–33)
MCHC RBC AUTO-ENTMCNC: 32.7 G/DL (ref 31.5–36.5)
MCV RBC AUTO: 93 FL (ref 78–100)
MONOCYTES # BLD AUTO: 0.6 10E3/UL (ref 0–1.3)
MONOCYTES NFR BLD AUTO: 7 %
NEUTROPHILS # BLD AUTO: 4.9 10E3/UL (ref 1.6–8.3)
NEUTROPHILS NFR BLD AUTO: 63 %
NRBC # BLD AUTO: 0 10E3/UL
NRBC BLD AUTO-RTO: 0 /100
P AXIS - MUSE: 78 DEGREES
PLATELET # BLD AUTO: 184 10E3/UL (ref 150–450)
POTASSIUM BLD-SCNC: 3.2 MMOL/L (ref 3.5–5)
POTASSIUM BLD-SCNC: 3.7 MMOL/L (ref 3.5–5)
PR INTERVAL - MUSE: 126 MS
QRS DURATION - MUSE: 84 MS
QT - MUSE: 428 MS
QTC - MUSE: 481 MS
R AXIS - MUSE: 62 DEGREES
RBC # BLD AUTO: 4.27 10E6/UL (ref 3.8–5.2)
SODIUM SERPL-SCNC: 145 MMOL/L (ref 136–145)
SYSTOLIC BLOOD PRESSURE - MUSE: 138 MMHG
T AXIS - MUSE: 69 DEGREES
TRIGL SERPL-MCNC: 79 MG/DL
TROPONIN I SERPL-MCNC: 0.02 NG/ML (ref 0–0.29)
VENTRICULAR RATE- MUSE: 76 BPM
WBC # BLD AUTO: 7.8 10E3/UL (ref 4–11)

## 2023-03-26 PROCEDURE — 99207 PR NO BILLABLE SERVICE THIS VISIT: CPT | Performed by: NURSE PRACTITIONER

## 2023-03-26 PROCEDURE — 36415 COLL VENOUS BLD VENIPUNCTURE: CPT | Performed by: FAMILY MEDICINE

## 2023-03-26 PROCEDURE — 80048 BASIC METABOLIC PNL TOTAL CA: CPT | Performed by: EMERGENCY MEDICINE

## 2023-03-26 PROCEDURE — 84132 ASSAY OF SERUM POTASSIUM: CPT | Performed by: FAMILY MEDICINE

## 2023-03-26 PROCEDURE — 83880 ASSAY OF NATRIURETIC PEPTIDE: CPT | Performed by: EMERGENCY MEDICINE

## 2023-03-26 PROCEDURE — 99223 1ST HOSP IP/OBS HIGH 75: CPT | Performed by: FAMILY MEDICINE

## 2023-03-26 PROCEDURE — 80061 LIPID PANEL: CPT | Performed by: FAMILY MEDICINE

## 2023-03-26 PROCEDURE — 99285 EMERGENCY DEPT VISIT HI MDM: CPT | Mod: 25

## 2023-03-26 PROCEDURE — 73030 X-RAY EXAM OF SHOULDER: CPT | Mod: RT

## 2023-03-26 PROCEDURE — 72131 CT LUMBAR SPINE W/O DYE: CPT

## 2023-03-26 PROCEDURE — 85730 THROMBOPLASTIN TIME PARTIAL: CPT | Performed by: EMERGENCY MEDICINE

## 2023-03-26 PROCEDURE — 83036 HEMOGLOBIN GLYCOSYLATED A1C: CPT | Performed by: FAMILY MEDICINE

## 2023-03-26 PROCEDURE — 85610 PROTHROMBIN TIME: CPT | Performed by: EMERGENCY MEDICINE

## 2023-03-26 PROCEDURE — 250N000011 HC RX IP 250 OP 636: Performed by: EMERGENCY MEDICINE

## 2023-03-26 PROCEDURE — 250N000013 HC RX MED GY IP 250 OP 250 PS 637: Performed by: FAMILY MEDICINE

## 2023-03-26 PROCEDURE — 93005 ELECTROCARDIOGRAM TRACING: CPT | Performed by: EMERGENCY MEDICINE

## 2023-03-26 PROCEDURE — 83735 ASSAY OF MAGNESIUM: CPT | Performed by: EMERGENCY MEDICINE

## 2023-03-26 PROCEDURE — 84484 ASSAY OF TROPONIN QUANT: CPT | Performed by: EMERGENCY MEDICINE

## 2023-03-26 PROCEDURE — 70496 CT ANGIOGRAPHY HEAD: CPT

## 2023-03-26 PROCEDURE — 82962 GLUCOSE BLOOD TEST: CPT

## 2023-03-26 PROCEDURE — 85014 HEMATOCRIT: CPT | Performed by: EMERGENCY MEDICINE

## 2023-03-26 PROCEDURE — 70498 CT ANGIOGRAPHY NECK: CPT

## 2023-03-26 PROCEDURE — 36415 COLL VENOUS BLD VENIPUNCTURE: CPT | Performed by: EMERGENCY MEDICINE

## 2023-03-26 PROCEDURE — 120N000001 HC R&B MED SURG/OB

## 2023-03-26 PROCEDURE — 250N000013 HC RX MED GY IP 250 OP 250 PS 637: Performed by: EMERGENCY MEDICINE

## 2023-03-26 RX ORDER — CLOPIDOGREL BISULFATE 75 MG/1
300 TABLET ORAL ONCE
Status: DISCONTINUED | OUTPATIENT
Start: 2023-03-26 | End: 2023-03-26

## 2023-03-26 RX ORDER — IOPAMIDOL 755 MG/ML
100 INJECTION, SOLUTION INTRAVASCULAR ONCE
Status: COMPLETED | OUTPATIENT
Start: 2023-03-26 | End: 2023-03-26

## 2023-03-26 RX ORDER — ONDANSETRON 2 MG/ML
4 INJECTION INTRAMUSCULAR; INTRAVENOUS EVERY 6 HOURS PRN
Status: DISCONTINUED | OUTPATIENT
Start: 2023-03-26 | End: 2023-03-28 | Stop reason: HOSPADM

## 2023-03-26 RX ORDER — HYDROCHLOROTHIAZIDE 12.5 MG/1
12.5 TABLET ORAL DAILY
Status: DISCONTINUED | OUTPATIENT
Start: 2023-03-26 | End: 2023-03-28 | Stop reason: HOSPADM

## 2023-03-26 RX ORDER — ASPIRIN 325 MG
325 TABLET ORAL ONCE
Status: COMPLETED | OUTPATIENT
Start: 2023-03-26 | End: 2023-03-26

## 2023-03-26 RX ORDER — ASPIRIN 81 MG/1
81 TABLET ORAL DAILY
Status: DISCONTINUED | OUTPATIENT
Start: 2023-03-27 | End: 2023-03-27

## 2023-03-26 RX ORDER — LIDOCAINE 40 MG/G
CREAM TOPICAL
Status: DISCONTINUED | OUTPATIENT
Start: 2023-03-26 | End: 2023-03-28 | Stop reason: HOSPADM

## 2023-03-26 RX ORDER — TIZANIDINE 2 MG/1
2 TABLET ORAL EVERY 8 HOURS PRN
Status: DISCONTINUED | OUTPATIENT
Start: 2023-03-26 | End: 2023-03-28 | Stop reason: HOSPADM

## 2023-03-26 RX ORDER — MULTIPLE VITAMINS W/ MINERALS TAB 9MG-400MCG
1 TAB ORAL EVERY MORNING
Status: DISCONTINUED | OUTPATIENT
Start: 2023-03-27 | End: 2023-03-28 | Stop reason: HOSPADM

## 2023-03-26 RX ORDER — ONDANSETRON 4 MG/1
4 TABLET, ORALLY DISINTEGRATING ORAL EVERY 6 HOURS PRN
Status: DISCONTINUED | OUTPATIENT
Start: 2023-03-26 | End: 2023-03-28 | Stop reason: HOSPADM

## 2023-03-26 RX ORDER — HYDROCODONE BITARTRATE AND ACETAMINOPHEN 5; 325 MG/1; MG/1
1 TABLET ORAL EVERY 6 HOURS PRN
Status: ON HOLD | COMMUNITY
End: 2023-03-28

## 2023-03-26 RX ORDER — GABAPENTIN 300 MG/1
300 CAPSULE ORAL 3 TIMES DAILY
Status: DISCONTINUED | OUTPATIENT
Start: 2023-03-26 | End: 2023-03-28 | Stop reason: HOSPADM

## 2023-03-26 RX ORDER — POTASSIUM CHLORIDE 1500 MG/1
40 TABLET, EXTENDED RELEASE ORAL ONCE
Status: COMPLETED | OUTPATIENT
Start: 2023-03-26 | End: 2023-03-26

## 2023-03-26 RX ADMIN — GABAPENTIN 300 MG: 300 CAPSULE ORAL at 16:50

## 2023-03-26 RX ADMIN — HYDROCHLOROTHIAZIDE 12.5 MG: 12.5 TABLET ORAL at 17:30

## 2023-03-26 RX ADMIN — ASPIRIN 325 MG ORAL TABLET 325 MG: 325 PILL ORAL at 13:02

## 2023-03-26 RX ADMIN — POTASSIUM CHLORIDE 40 MEQ: 1500 TABLET, EXTENDED RELEASE ORAL at 11:44

## 2023-03-26 RX ADMIN — IOPAMIDOL 75 ML: 755 INJECTION, SOLUTION INTRAVENOUS at 10:40

## 2023-03-26 RX ADMIN — GABAPENTIN 300 MG: 300 CAPSULE ORAL at 20:09

## 2023-03-26 ASSESSMENT — ENCOUNTER SYMPTOMS
SPEECH DIFFICULTY: 1
WEAKNESS: 1

## 2023-03-26 ASSESSMENT — ACTIVITIES OF DAILY LIVING (ADL)
ADLS_ACUITY_SCORE: 35
ADLS_ACUITY_SCORE: 35
ADLS_ACUITY_SCORE: 42
ADLS_ACUITY_SCORE: 35
ADLS_ACUITY_SCORE: 40
ADLS_ACUITY_SCORE: 42
ADLS_ACUITY_SCORE: 35

## 2023-03-26 NOTE — PLAN OF CARE
Pt is doing well. She is up to the bathroom with the assist of 1, walker and GB. She denies pain. She is A & O x4.     Charity Franco RN on 3/26/2023 at 6:59 PM

## 2023-03-26 NOTE — ED NOTES
Spoke to GORGE Smith CNP per Dr. Ortiz's request to update that patient tolerates ASA 81 mg daily and OK to give loading dose of Aspirin 325mg today and then resume the 81mg tomorrow. She will update plan of care note for hospitalist.

## 2023-03-26 NOTE — H&P
[facility] MEDICINE ADMISSION HISTORY AND PHYSICAL   Date of Admission: 3/26/2023  Ramila Liao, 1935, [id number]    Identification/Summary:   Ramila Liao is a 87 year old female with PMH signficant for low back pain, hyperlipidemia, lower extremity edema.  Presented with intermittent slurred speech, falls and right lower extremity weakness for 48 hours.  Evaluated per stroke protocols.  CTA head and neck showed no acute CVA but did show severe stenosis at the left M1 segment.  CT lumbar spine shows L5-S1 severe right and moderate left foraminal stenosis.  MRI scanner under repair.  Admitted.  Neuro telemetry consulted.    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  Expect length of stay greater than 2 nights.  Unclear etiology to patient's symptoms.  Imaging studies as above.  Consult neuro telemetry.  Given  p.o. x1.  Continue 81 mg daily.  Utilize stroke protocols.  Check echocardiogram, thyroid cascade.  Plan on MRI head and neck as well as lumbar MRI tomorrow when machine should be operable.  -L5-S1 severe right and moderate left foraminal stenosis  Fall precautions.  PT OT consults.  Order home gabapentin 300 mg 3 times daily and tizanidine as needed.  Hold on her Norco and ibuprofen at this time.  Ordered neurosurgery consultation.  -Lower extremity edema  Order home hydrochlorothiazide 12.5 mg daily.  Utilize her GOMEZ stockings.  -Hyperlipidemia  Check a lipid panel.  Does not appear to be on a statin agent at this time.  -Hypokalemia  Replacement protocols.    -Anticoagulation   SCDs and aspirin    -COVID19 PCR not tested per current policy  Noted.  -Mike present    Code Status: No CPR- Do NOT Intubate    Disposition: Inpatient     Clinically Significant Risk Factors Present on Admission        # Hypokalemia: Lowest K =  3.2 mmol/L in last 2 days, will replace as needed                        Chief Complaint  slurred speech and right lower extremity weakness     HISTORY     Ramila Liao is a 87 year old female with PMH of low back pain, hyperlipidemia, lower extremity edema.  3/26 presented with intermittent slurred speech, falls and right lower extremity weakness for 48 hours.  Patient does deal with chronic low back pain and some impingement going down both legs but has been more prominent in the right leg for the last 2 days.  Simultaneously family has noted some slurred speech particularly in the morning.  Evaluated per stroke protocols.  CTA head and neck showed no acute CVA but did show severe stenosis at the left M1 segment.  CT lumbar spine shows L5-S1 severe right and moderate left foraminal stenosis.  MRI scanner under repair.  Neuro telemetry consulted.  Given ASA 325x1.  Symptoms have resolved.  No chest pain.  No shortness of breath.  No nausea or vomiting.  Family present and supportive.  Denies personal history of heart attack, cancer, diabetes, DVT, PE, peptic ulcer disease or sleep apnea..  Questions answered to verbalize satisfaction.    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     Priority: Medium     Edema, lower extremity 03/26/2023     Priority: Medium     Low back pain 08/02/2016     Priority: Medium     Mixed hyperlipidemia 08/09/2006     Priority: Medium     Allergic rhinitis 08/02/2005     Priority: Medium     Contact dermatitis  and other eczema, due to unspecified cause 07/30/2001     Priority: Medium     Surgical History     Past Surgical History:   Procedure Laterality Date     CATARACT EXTRACTION, BILATERAL       CHOLECYSTECTOMY       HYSTERECTOMY       Family History      Family History   Problem Relation Age of Onset     Coronary Artery Disease Mother      Coronary Artery Disease Father       Social History      Social History     Tobacco Use     Smoking status: Never     Smokeless tobacco: Never   Substance Use Topics     Alcohol use: Yes     Drug use: Never      Allergies     Allergies   Allergen Reactions     Naproxen Hives     Tolerates ibuprofen     Prior to Admission Medications      Prior to Admission Medications   Prescriptions Last Dose Informant Patient Reported? Taking?   HYDROcodone-acetaminophen (NORCO) 5-325 MG tablet Past Week at PRN  Yes Yes   Sig: Take 1 tablet by mouth every 6 hours as needed for severe pain (7-10)   aspirin 81 MG EC tablet 3/25/2023 at hs  Yes Yes   Sig: Take 81 mg by mouth At Bedtime   gabapentin (NEURONTIN) 300 MG capsule 3/26/2023 at am x1  No Yes   Sig: Take 1 capsule (300 mg) by mouth 3 times daily   hydrochlorothiazide (HYDRODIURIL) 12.5 MG tablet 3/25/2023 at am  No Yes   Sig: Take 1 tablet (12.5 mg) by mouth daily   ibuprofen (ADVIL/MOTRIN) 200 MG tablet Unknown at PRN  Yes Yes   Sig: Take 400-600 mg by mouth every 6 hours as needed for pain   magnesium citrate 100 mg Tab 3/26/2023 at am x1  Yes Yes   Sig: Take 100 mg by mouth 3 times daily as needed   multivitamin with minerals (THERA-M) 9 mg iron-400 mcg Tab tablet 3/26/2023 at am  Yes Yes   Sig: Take 1 tablet by mouth every morning   tiZANidine (ZANAFLEX) 2 MG tablet 3/25/2023 at PRN  No Yes   Sig: TAKE 1 TABLET BY MOUTH THREE TIMES DAILY AS NEEDED FOR PAIN OR SPASMS      Facility-Administered Medications: None      Review of Systems     A 12 point comprehensive review of systems was negative except as noted above in HPI.    PHYSICAL  EXAMINATION     Vitals      Temp:  [98.4  F (36.9  C)-98.7  F (37.1  C)] 98.4  F (36.9  C)  Pulse:  [69-77] 69  Resp:  [18] 18  BP: (138-142)/(64-71) 138/64  SpO2:  [98 %-99 %] 98 %    Examination     Constitutional: awake, alert, cooperative, no apparent distress, and appears stated age  Eyes: Lids and lashes normal, pupils equal, round and reactive to light, extra ocular muscles intact, sclera clear, conjunctiva normal  ENT: Normocephalic, without obvious abnormality, atraumatic, sinuses nontender on palpation, external ears without lesions, oral pharynx with moist mucous membranes, tonsils without erythema or exudates, gums normal and good dentition.  Hematologic / Lymphatic: no cervical lymphadenopathy and no supraclavicular lymphadenopathy  Respiratory: No increased work of breathing, good air exchange, clear to auscultation bilaterally, no crackles or wheezing  Cardiovascular: Normal apical impulse, regular rate and rhythm, normal S1 and S2, no S3 or S4, and no murmur noted  GI: No scars, normal bowel sounds, soft, non-distended, non-tender, no masses palpated, no hepatosplenomegally  Skin: normal skin color, texture, turgor, no redness, warmth, or swelling, and no rashes  Musculoskeletal: There is no redness, warmth, or swelling of the joints.  Full range of motion noted.  Motor strength is 5 out of 5 all extremities bilaterally.  Tone is normal.  1+ lower extremity edema bilaterally  Neurologic: Cranial nerves II-XII are grossly intact. Sensory:  Sensory intact Deep Tendon Reflexes:  Reflexes are intact and symmetrical bilaterally  Neuropsychiatric: General: normal, calm and normal eye contact Level of consciousness: alert / normal Affect: normal Orientation: oriented to self, place, time and situation Memory and insight: impaired: Seems to have some mild cognitive impairment      Pertinent Lab     Results for orders placed or performed during the hospital encounter of 03/26/23 (from the past 24 hour(s))    Basic metabolic panel   Result Value Ref Range    Sodium 145 136 - 145 mmol/L    Potassium 3.2 (L) 3.5 - 5.0 mmol/L    Chloride 104 98 - 107 mmol/L    Carbon Dioxide (CO2) 32 (H) 22 - 31 mmol/L    Anion Gap 9 5 - 18 mmol/L    Urea Nitrogen 13 8 - 28 mg/dL    Creatinine 0.77 0.60 - 1.10 mg/dL    Calcium 9.7 8.5 - 10.5 mg/dL    Glucose 105 70 - 125 mg/dL    GFR Estimate 75 >60 mL/min/1.73m2   Troponin I   Result Value Ref Range    Troponin I 0.02 0.00 - 0.29 ng/mL   CBC with platelets + differential    Narrative    The following orders were created for panel order CBC with platelets + differential.  Procedure                               Abnormality         Status                     ---------                               -----------         ------                     CBC with platelets and d...[[id number]]                      Final result                 Please view results for these tests on the individual orders.   INR   Result Value Ref Range    INR 0.98 0.85 - 1.15   PTT   Result Value Ref Range    aPTT 27 22 - 38 Seconds   Magnesium   Result Value Ref Range    Magnesium 2.1 1.8 - 2.6 mg/dL   B-Type Natriuretic Peptide ([facility] Only)   Result Value Ref Range     0 - 167 pg/mL   CBC with platelets and differential   Result Value Ref Range    WBC Count 7.8 4.0 - 11.0 10e3/uL    RBC Count 4.27 3.80 - 5.20 10e6/uL    Hemoglobin 13.0 11.7 - 15.7 g/dL    Hematocrit 39.8 35.0 - 47.0 %    MCV 93 78 - 100 fL    MCH 30.4 26.5 - 33.0 pg    MCHC 32.7 31.5 - 36.5 g/dL    RDW 13.2 10.0 - 15.0 %    Platelet Count 184 150 - 450 10e3/uL    % Neutrophils 63 %    % Lymphocytes 27 %    % Monocytes 7 %    % Eosinophils 2 %    % Basophils 1 %    % Immature Granulocytes 0 %    NRBCs per 100 WBC 0 <1 /100    Absolute Neutrophils 4.9 1.6 - 8.3 10e3/uL    Absolute Lymphocytes 2.1 0.8 - 5.3 10e3/uL    Absolute Monocytes 0.6 0.0 - 1.3 10e3/uL    Absolute Eosinophils 0.2 0.0 - 0.7 10e3/uL    Absolute Basophils 0.1 0.0 - 0.2  10e3/uL    Absolute Immature Granulocytes 0.0 <=0.4 10e3/uL    Absolute NRBCs 0.0 10e3/uL   XR Shoulder Right 2 Views    Narrative    EXAM: XR SHOULDER RIGHT 2 VIEWS  LOCATION: [facility]  DATE/TIME: 03/26/2023, 10:46 AM    INDICATION: Fall, tenderness to proximal humerus.  COMPARISON: None.      Impression    IMPRESSION:   1. There is no evidence of fracture. No subluxation or dislocation.  2. Subacromial enthesophyte narrowing the subacromial space.      Lumbar spine CT w/o contrast    Narrative    EXAM: CT LUMBAR SPINE WITHOUT CONTRAST  LOCATION: [facility]  DATE/TIME: 03/26/2023, 10:59 AM    INDICATION: Fall, low back tenderness, right leg weakness (chronic).  COMPARISON: CT abdomen and pelvis 02/28/2023.  TECHNIQUE: Routine CT Lumbar Spine without IV contrast. Multiplanar reformats. Dose reduction techniques were used.     FINDINGS:  VERTEBRA: There are five lumbar-type vertebral bodies. Anterior spondylolisthesis at L5-S1 by 6 mm. Minimal posterior spondylolisthesis at L2-L3 and L3-L4. Otherwise normal alignment. Normal vertebral body heights. Moderate disc space narrowing at L5-S1   and mild disc space narrowing at other levels. Severe facet arthropathy L5-S1 and mild to moderate facet arthropathy on the right at L3-L4 and L4-L5. No fracture or posttraumatic subluxation.     CANAL/FORAMINA: Mild spinal canal stenosis at L3-L4. Mild spinal canal stenosis at L4-L5 with mild right foraminal stenosis. Severe right and moderate left foraminal stenoses at L5-S1.    PARASPINAL: Calcified plaque abdominal aorta.      Impression    IMPRESSION:  1.  No fracture or posttraumatic subluxation.  2.  Spondylosis as detailed with degenerative anterior spondylolisthesis at L5-S1 causing severe right and moderate left foraminal stenoses. Mild spinal canal stenosis L3-L4 and L4-L5.       CTA Head Neck with Contrast    Narrative    EXAM: CTA HEAD NECK WITH CONTRAST  LOCATION:   [facility]  DATE/TIME: 03/26/2023, 10:59 AM    INDICATION: Frequent falls, intermittent slurred speech.  COMPARISON: None.  CONTRAST: 75 mL Isovue-370.  TECHNIQUE: Head and neck CT angiogram with IV contrast. Noncontrast head CT followed by axial helical CT images of the head and neck vessels obtained during the arterial phase of intravenous contrast administration. Axial 2D reconstructed images and   multiplanar 3D MIP reconstructed images of the head and neck vessels were performed by the technologist. Dose reduction techniques were used. All stenosis measurements made according to NASCET criteria unless otherwise specified.    FINDINGS:   NONCONTRAST HEAD CT:   INTRACRANIAL CONTENTS: No intracranial hemorrhage, extra-axial collection, or mass effect.  No CT evidence of acute infarct. Mild presumed chronic small vessel ischemic changes. Mild generalized volume loss. No hydrocephalus.     VISUALIZED ORBITS/SINUSES/MASTOIDS: No intraorbital abnormality. No paranasal sinus mucosal disease. No middle ear or mastoid effusion.    BONES/SOFT TISSUES: No acute abnormality.    HEAD CTA:  ANTERIOR CIRCULATION: Severe stenosis proximal left M1 segment. No other large artery stenosis or occlusion. No aneurysm. Standard [place] anatomy.    POSTERIOR CIRCULATION: No stenosis/occlusion, aneurysm, or high-flow vascular malformation. Dominant left and smaller right vertebral artery contribute to a normal basilar artery.     DURAL VENOUS SINUSES: Expected enhancement of the major dural venous sinuses.    NECK CTA:  RIGHT CAROTID: No measurable stenosis or dissection.    LEFT CAROTID: No measurable stenosis or dissection.    VERTEBRAL ARTERIES: No focal stenosis or dissection. Dominant left and smaller right vertebral arteries.    AORTIC ARCH: Classic aortic arch anatomy with no significant stenosis at the origin of the great vessels.    NONVASCULAR STRUCTURES: Unremarkable.      Impression     IMPRESSION:   HEAD CT:  1.  No acute intracranial process.    HEAD CTA:   1.  Severe stenosis left M1 segment.  2.  No other large artery stenosis or occlusion. No aneurysm.    NECK CTA:  1.  No carotid or vertebral artery stenosis.       ECG 12-LEAD WITH MUSE (LHE)   Result Value Ref Range    Systolic Blood Pressure 138 mmHg    Diastolic Blood Pressure 64 mmHg    Ventricular Rate 76 BPM    Atrial Rate 76 BPM    ME Interval 126 ms    QRS Duration 84 ms     ms    QTc 481 ms    P Axis 78 degrees    R AXIS 62 degrees    T Axis 69 degrees    Interpretation ECG       Sinus rhythm  Possible Left atrial enlargement  Borderline ECG  When compared with ECG of 28-FEB-2023 20:38,  No significant change was found  Confirmed by SEE ED PROVIDER NOTE FOR, ECG INTERPRETATION ([id number]),  GM DE JESUS ([id number]) on 3/26/2023 1:44:40 PM         Pertinent Radiology     Radiology Results:   Recent Results (from the past 24 hour(s))   XR Shoulder Right 2 Views    Narrative    EXAM: XR SHOULDER RIGHT 2 VIEWS  LOCATION: [facility]  DATE/TIME: 03/26/2023, 10:46 AM    INDICATION: Fall, tenderness to proximal humerus.  COMPARISON: None.      Impression    IMPRESSION:   1. There is no evidence of fracture. No subluxation or dislocation.  2. Subacromial enthesophyte narrowing the subacromial space.      Lumbar spine CT w/o contrast    Narrative    EXAM: CT LUMBAR SPINE WITHOUT CONTRAST  LOCATION: [facility]  DATE/TIME: 03/26/2023, 10:59 AM    INDICATION: Fall, low back tenderness, right leg weakness (chronic).  COMPARISON: CT abdomen and pelvis 02/28/2023.  TECHNIQUE: Routine CT Lumbar Spine without IV contrast. Multiplanar reformats. Dose reduction techniques were used.     FINDINGS:  VERTEBRA: There are five lumbar-type vertebral bodies. Anterior spondylolisthesis at L5-S1 by 6 mm. Minimal posterior spondylolisthesis at L2-L3 and L3-L4. Otherwise normal alignment. Normal vertebral  body heights. Moderate disc space narrowing at L5-S1   and mild disc space narrowing at other levels. Severe facet arthropathy L5-S1 and mild to moderate facet arthropathy on the right at L3-L4 and L4-L5. No fracture or posttraumatic subluxation.     CANAL/FORAMINA: Mild spinal canal stenosis at L3-L4. Mild spinal canal stenosis at L4-L5 with mild right foraminal stenosis. Severe right and moderate left foraminal stenoses at L5-S1.    PARASPINAL: Calcified plaque abdominal aorta.      Impression    IMPRESSION:  1.  No fracture or posttraumatic subluxation.  2.  Spondylosis as detailed with degenerative anterior spondylolisthesis at L5-S1 causing severe right and moderate left foraminal stenoses. Mild spinal canal stenosis L3-L4 and L4-L5.       CTA Head Neck with Contrast    Narrative    EXAM: CTA HEAD NECK WITH CONTRAST  LOCATION: [facility]  DATE/TIME: 03/26/2023, 10:59 AM    INDICATION: Frequent falls, intermittent slurred speech.  COMPARISON: None.  CONTRAST: 75 mL Isovue-370.  TECHNIQUE: Head and neck CT angiogram with IV contrast. Noncontrast head CT followed by axial helical CT images of the head and neck vessels obtained during the arterial phase of intravenous contrast administration. Axial 2D reconstructed images and   multiplanar 3D MIP reconstructed images of the head and neck vessels were performed by the technologist. Dose reduction techniques were used. All stenosis measurements made according to NASCET criteria unless otherwise specified.    FINDINGS:   NONCONTRAST HEAD CT:   INTRACRANIAL CONTENTS: No intracranial hemorrhage, extra-axial collection, or mass effect.  No CT evidence of acute infarct. Mild presumed chronic small vessel ischemic changes. Mild generalized volume loss. No hydrocephalus.     VISUALIZED ORBITS/SINUSES/MASTOIDS: No intraorbital abnormality. No paranasal sinus mucosal disease. No middle ear or mastoid effusion.    BONES/SOFT TISSUES: No acute  abnormality.    HEAD CTA:  ANTERIOR CIRCULATION: Severe stenosis proximal left M1 segment. No other large artery stenosis or occlusion. No aneurysm. Standard [place] anatomy.    POSTERIOR CIRCULATION: No stenosis/occlusion, aneurysm, or high-flow vascular malformation. Dominant left and smaller right vertebral artery contribute to a normal basilar artery.     DURAL VENOUS SINUSES: Expected enhancement of the major dural venous sinuses.    NECK CTA:  RIGHT CAROTID: No measurable stenosis or dissection.    LEFT CAROTID: No measurable stenosis or dissection.    VERTEBRAL ARTERIES: No focal stenosis or dissection. Dominant left and smaller right vertebral arteries.    AORTIC ARCH: Classic aortic arch anatomy with no significant stenosis at the origin of the great vessels.    NONVASCULAR STRUCTURES: Unremarkable.      Impression    IMPRESSION:   HEAD CT:  1.  No acute intracranial process.    HEAD CTA:   1.  Severe stenosis left M1 segment.  2.  No other large artery stenosis or occlusion. No aneurysm.    NECK CTA:  1.  No carotid or vertebral artery stenosis.         EKG Results: personally reviewed.   Sinus rhythm possible left atrial enlargement.  No ST-T wave changes.    Advance Care Planning        Chavez Ortiz MD  [facility]   Phone: #[phone]

## 2023-03-26 NOTE — CONSULTS
"Brief Stroke Note:  Received message from RN that patient is not allergic to ASA and in fact takes 81mg nightly with no issue. Plavix orders have been cancelled. Patient already loaded with ASA 325mg, will continue with 81mg ASA daily starting tomorrow. Will not initiate DAPT at this time.     Nirmala PENNINGTON, CNP  Vascular Neurology  To page me or covering stroke neurology team member, click here: AMCOM   Choose \"On Call\" tab at top, then search dropdown box for \"Neurology Adult\", select location, press Enter, then look for stroke/neuro ICU/telestroke.    "

## 2023-03-26 NOTE — ED TRIAGE NOTES
Patient presents to the ED with ongoing complaints of right leg weakness, she has had several falls in last few days and some intermittent episodes of slurred speech since Friday. No slurred speech during her triage, Dr Camargo present for physical exam with patient.

## 2023-03-26 NOTE — CONSULTS
"  [facility]    Stroke Telephone Note    I was called by Grady Camargo on 03/26/23 regarding patient Ramila Liao. The patient is a 87 year old female with PMH of HLD and recently diagnosed with R L5 radiculopathy on EMG (however noted to be asymptomatic with 5/5 strength in BLE noted at neurosurgery visit 3/1/23). She presents to the ED for evaluation of falls ands speech changes. LKW is >24 hours ago; family notes that she has had multiple falls over the past 2 days along with intermittent slurred speech over past 24-48 hours. Per ED provider, on exam patient does have drift to RLE but no clearly slurred speech. Family concerned patient not safe to return home. MRI machine currently not functional.    Imaging Findings   CT head:no evidence of hemorrhage or clear establishing infarct   CTA head/neck: severe L M1 stenosis, no other significant stenosis; no LVO     Impression  Recurrent falls, RLE weakness and intermittent dysarthria; etiology unclear, but concern for stroke vs TIA is elevated given findings of focal L M1 stenosis on CTA    R L5 radiculopathy per recent EMG; possibly alternative cause of the RLE weakness although per most recent neurosurgery notes patient was asymptomatic and with 5/5 strength of BLE    Recommendations   -load with Plavix 300mg x1 now, followed by 75mg daily (reported ASA allergy)  -brain MRI w/wo contrast when available; if MRI remains down could consider repeat CT scan in approximately 24 hours to assess for establishing infarct     My recommendations are based on the information provided over the phone by Ramila Liao's in-person providers. They are not intended to replace the clinical judgment of her in-person providers. I was not requested to personally see or examine the patient at this time.    GORGE Maravilla CNP  Vascular Neurology    To page me or covering stroke neurology team member, click here: AMCOM  Choose \"On Call\" tab at " "top, then select \"NEUROLOGY/ALL SITES\" from middle drop-down box, press Enter, then look for \"stroke\" or \"telestroke\" for your site.      "

## 2023-03-26 NOTE — ED PROVIDER NOTES
EMERGENCY DEPARTMENT ENCOUNTER      NAME: Ramila Liao  AGE: 87 year old female  YOB: 1935  MRN: [id number]  EVALUATION DATE & TIME: No admission date for patient encounter.    PCP: Susan Sutton    ED PROVIDER: Elliot Camargo MD        Chief Complaint   Patient presents with     Fall     Extremity Weakness         FINAL IMPRESSION:  1. Hypokalemia    2. Right leg weakness    3. Stroke-like symptoms    4. Stenosis of left middle cerebral artery          ED COURSE & MEDICAL DECISION MAKING:    Pertinent Labs & Imaging studies reviewed. (See chart for details)  87 year old female presents to the Emergency Department for evaluation of right leg weakness and intermittent slurred speech x48 hours    Not a tier 1 or tier 2 stroke candidate based on duration of symptoms    Care complicated due to MRI machine being down.    ED Course as of 03/26/23 1534   Sun Mar 26, 2023   0946 Patient was seen in triage due to critical capacity.  Patient is here with a family members with intermittent slurred speech for more than 24 hours.  No current slurred speech.  Has been falling and bruised her right arm.  Fallings been occurring for the last 48 hours.  Lives independently and fell and has not been to get up without calling somebody.  Has recently been seen by neurosurgery for L5 radiculopathy diagnosed by EMG.  Her right leg is likely weak secondary to this radiculopathy.   0947 Symptoms suggestive of possible TIA, hypoglycemia, hyponatremia, anemia, stroke, medication effect since its only the morning and she is on gabapentin   0947 Family is concerned about her safety at home.  Unfortunately MRI machine is currently broken.   0947 Will obtain CTA head and neck for TIA evaluation, EKG, BMP, CBC, troponin, BMP, coagulation, magnesium since she is on diuretic   0948 Ambulates with walker and with walker gait seems steady and stable.   1129 Potassium(!): 3.2  Given oral potassium is on  HCTZ   1155 Troponin I: 0.02    1155 BNP: 146   1155 Magnesium: 2.1   1155 WBC: 7.8   1155 CT low back does not show fractures but does show severe stenosis right L5-S1   1155 CTA shows severe stenosis left M1 segments that could cause some slurred speech as well as right leg weakness   1156 X-ray right shoulder no fracture   1156 Spoke with stroke neuro recommends Plavix load and admission.  Stroke neuro is aware MRI machine is currently down for repair     Leg weakness appears to be new possibly from stroke or possibly from worsening radiculopathy.    Spoke with stroke neuro.  Plan for admission to the hospital.  Given full dose aspirin.    Possible stroke versus possible TIA and right L5 radiculopathy.  Spoke to hospitalist who agrees to admit patient    9:21 AM I met with the patient to gather history and to perform my initial exam. We discussed plans for the ED course, including diagnostic testing and treatment.   11:51 AM I spoke with stroke neurology, Dr. Love. Recommends loading dose of Plavix due to NSAID allergy and MRI when able.   11:54 AM I updated charge nurse on ED plan.  11:56 AM I rechecked on the patient and family and updated them on imaging and ED course. Patient reports she has tolerated Aspirin (takes 81 mg every night). Plan for Aspirin.  1:09 PM I spoke with the hospitalist, Dr. Ortiz, who accepts the patient for admission.        Medical Decision Making    History:    Supplemental history from: Family Member/Significant Other    External Record(s) reviewed: Outpatient Record: spine and neurosurgery consult visit, 3/1/2023    Work Up:    Chart documentation includes differential considered and any EKGs or imaging independently interpreted by provider, where specified.    In additional to work up documented, I considered the following work up: Documented in chart, if applicable.    External consultation:    Discussion of management with another provider: Documented in chart, if applicable    Complicating  factors:    Care impacted by chronic illness: N/A    Care affected by social determinants of health: N/A    Disposition considerations: Admit.          Voice recognition software was used in the creation of this note. Any grammatical or nonsensical errors are due to inherent errors with the software and are not the intention of the writer.         At the conclusion of the encounter I discussed the results of all of the tests and the disposition. The questions were answered. The patient or family acknowledged understanding and was agreeable with the care plan.             MEDICATIONS GIVEN IN THE EMERGENCY:  Medications   hydrochlorothiazide half-tab 12.5 mg (has no administration in time range)   tiZANidine (ZANAFLEX) tablet 2 mg (has no administration in time range)   gabapentin (NEURONTIN) capsule 300 mg (has no administration in time range)   multivitamin w/minerals (THERA-VIT-M) tablet 1 tablet (has no administration in time range)   lidocaine 1 % 0.1-1 mL (has no administration in time range)   lidocaine (LMX4) cream (has no administration in time range)   sodium chloride (PF) 0.9% PF flush 3 mL (has no administration in time range)   sodium chloride (PF) 0.9% PF flush 3 mL (has no administration in time range)   Medication Instructions - Avoid dextrose in IV solutions. (has no administration in time range)   medication instruction - No oral meds if patient didn't pass dysphagia screen (has no administration in time range)   aspirin EC tablet 81 mg (has no administration in time range)   ondansetron (ZOFRAN ODT) ODT tab 4 mg (has no administration in time range)     Or   ondansetron (ZOFRAN) injection 4 mg (has no administration in time range)   iopamidol (ISOVUE-370) solution 100 mL (75 mLs Intravenous $Given 3/26/23 1040)   potassium chloride ER (KLOR-CON M) CR tablet 40 mEq (40 mEq Oral $Given 3/26/23 1144)   aspirin (ASA) tablet 325 mg (325 mg Oral $Given 3/26/23 1302)       NEW PRESCRIPTIONS STARTED AT  "TODAY'S ER VISIT  New Prescriptions    No medications on file          =================================================================    HPI    Triage note  \"Patient presents to the ED with ongoing complaints of right leg weakness, she has had several falls in last few days and some intermittent episodes of slurred speech since Friday. 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    Per chart review, patient presented to Spine and Neurosurgery on 3/1/2023 for a referral for likely minimally invasive right L5-S1 foraminal decompression. Patient has a history of right leg pain and MRI showed mild right L4-L5 and L5-S1 lateral recess stenosis. CT lumbar has 6.6 mm listhesis of L5 on S1. No pars defect. Stable on flexion and extension. EMG positive for right L5 radiculopathy. During this visit, patient reported no leg pain. Reported only left-lower back pain which recently started. Recommended conservative management with physical therapy.     Patient presents with intermittent slurred speech, recent falls, right leg weakness, and bruising to the back and right arm. Slurred speech has been present since possibly 3/24, but certainly present 3/25. She denies having slurred speech now and speech seems normal to family member. Slurred speech mostly present in the morning. Patient has a history pinched nerve and has been experiencing right leg weakness. She reports this is worse right now. Per falls, patient had 2 falls on 3/25 (yesterday) and 1 fall this morning in the bathroom. " "Patient was walking with her walker from the commode to the [facility] when her walker caught and she fell on her right arm. Bruising along her right arm and back are from her falls (unsure if from fall from this morning or yesterday). Patient was unable to get up and needed assistance (this morning and yesterday). Patient has been able to ambulate, but her right leg is not functioning well. Denies any right leg pain. She had her phone on her to call for assistance. Patient lives at [facility] in an Independent Living. Leg wraps have been in place until this morning.    Patient denies blood thinner use. Family member reports the patient takes a medication that \"takes water out.\" She is also taking gabapentin 3 times per day. She took 2 yesterday. No increases in gabapentin dosing. Patient does not report of any other medical concerns or complaints at this time.     REVIEW OF SYSTEMS   Review of Systems   Musculoskeletal:        Negative for right leg pain.   Skin:        Positive for bruising to right arm and back.   Neurological: Positive for speech difficulty (slurred, not currently present) and weakness (right leg).     PAST MEDICAL HISTORY:  Past Medical History:   Diagnosis Date     Allergic rhinitis 08/02/2005     Contact dermatitis and other eczema, due to unspecified cause 07/30/2001     Edema, lower extremity      Low back pain 08/02/2016     Mixed hyperlipidemia 08/09/2006       PAST SURGICAL HISTORY:  Past Surgical History:   Procedure Laterality Date     CATARACT EXTRACTION, BILATERAL       CHOLECYSTECTOMY       HYSTERECTOMY             CURRENT MEDICATIONS:    aspirin 81 MG EC tablet  gabapentin (NEURONTIN) 300 MG capsule  hydrochlorothiazide (HYDRODIURIL) 12.5 MG tablet  HYDROcodone-acetaminophen (NORCO) 5-325 MG tablet  ibuprofen (ADVIL/MOTRIN) 200 MG tablet  magnesium citrate 100 mg Tab  multivitamin with minerals (THERA-M) 9 mg iron-400 mcg Tab tablet  tiZANidine (ZANAFLEX) 2 MG " tablet        ALLERGIES:  Allergies   Allergen Reactions     Naproxen Hives     Tolerates ibuprofen       FAMILY HISTORY:  Family History   Problem Relation Age of Onset     Coronary Artery Disease Mother      Coronary Artery Disease Father        SOCIAL HISTORY:   Social History     Socioeconomic History     Marital status:    Tobacco Use     Smoking status: Never     Smokeless tobacco: Never   Substance and Sexual Activity     Alcohol use: Yes     Drug use: Never     Sexual activity: Not Currently     Partners: Male       VITALS:  /64   Pulse 69   Temp 98.4  F (36.9  C) (Oral)   Resp 18   Wt 61.7 kg (136 lb)   LMP  (LMP Unknown)   SpO2 98%   BMI 24.87 kg/m      PHYSICAL EXAM      Vitals: /64   Pulse 69   Temp 98.4  F (36.9  C) (Oral)   Resp 18   Wt 61.7 kg (136 lb)   LMP  (LMP Unknown)   SpO2 98%   BMI 24.87 kg/m    General: Appears in no acute distress, awake, alert, interactive.  Eyes: Conjunctivae non-injected. Sclera anicteric.  HENT: Atraumatic.  Neck: Supple.  Respiratory/Chest: Respiration unlabored.  Heart:   Abdomen: non distended  Musculoskeletal: Normal extremities. No erythema. Tenderness to lumbar spine. Tenderness to right shoulder. Tenderness to proximal humerus. Slight edema to lower extremity.  Skin: Normal color. No rash or diaphoresis. Bruising to right mid-back , nontender. Very faint bruising to the right thoracic area.  Neurologic: Face symmetric, moves all extremities spontaneously. Speech clear. Right leg drift. NIH 1.  Psychiatric: Oriented to person, place, and time. Affect appropriate.    National Institutes of Health Stroke Scale  Exam Interval: Baseline   Score    Level of consciousness: (0)   Alert, keenly responsive    LOC questions: (0)   Answers both questions correctly    LOC commands: (0)   Performs both tasks correctly    Best gaze: (0)   Normal    Visual: (0)   No visual loss    Facial palsy: (0)   Normal symmetrical movements    Motor arm  (left): (0)   No drift    Motor arm (right): (0)   No drift    Motor leg (left): (0)   No drift    Motor leg (right): (1)   Drift    Limb ataxia: (0)   Absent    Sensory: (0)   Normal- no sensory loss    Best language: (0)   Normal- no aphasia    Dysarthria: (0)   Normal    Extinction and inattention: (0)   No abnormality        Total Score:  1        LAB:  All pertinent labs reviewed and interpreted.  Results for orders placed or performed during the hospital encounter of 03/26/23   CTA Head Neck with Contrast    Impression    IMPRESSION:   HEAD CT:  1.  No acute intracranial process.    HEAD CTA:   1.  Severe stenosis left M1 segment.  2.  No other large artery stenosis or occlusion. No aneurysm.    NECK CTA:  1.  No carotid or vertebral artery stenosis.       Lumbar spine CT w/o contrast    Impression    IMPRESSION:  1.  No fracture or posttraumatic subluxation.  2.  Spondylosis as detailed with degenerative anterior spondylolisthesis at L5-S1 causing severe right and moderate left foraminal stenoses. Mild spinal canal stenosis L3-L4 and L4-L5.       XR Shoulder Right 2 Views    Impression    IMPRESSION:   1. There is no evidence of fracture. No subluxation or dislocation.  2. Subacromial enthesophyte narrowing the subacromial space.      Basic metabolic panel   Result Value Ref Range    Sodium 145 136 - 145 mmol/L    Potassium 3.2 (L) 3.5 - 5.0 mmol/L    Chloride 104 98 - 107 mmol/L    Carbon Dioxide (CO2) 32 (H) 22 - 31 mmol/L    Anion Gap 9 5 - 18 mmol/L    Urea Nitrogen 13 8 - 28 mg/dL    Creatinine 0.77 0.60 - 1.10 mg/dL    Calcium 9.7 8.5 - 10.5 mg/dL    Glucose 105 70 - 125 mg/dL    GFR Estimate 75 >60 mL/min/1.73m2   Result Value Ref Range    Troponin I 0.02 0.00 - 0.29 ng/mL   Result Value Ref Range    INR 0.98 0.85 - 1.15   Result Value Ref Range    aPTT 27 22 - 38 Seconds   Result Value Ref Range    Magnesium 2.1 1.8 - 2.6 mg/dL   B-Type Natriuretic Peptide ([facility] Only)   Result Value Ref Range      0 - 167 pg/mL   CBC with platelets and differential   Result Value Ref Range    WBC Count 7.8 4.0 - 11.0 10e3/uL    RBC Count 4.27 3.80 - 5.20 10e6/uL    Hemoglobin 13.0 11.7 - 15.7 g/dL    Hematocrit 39.8 35.0 - 47.0 %    MCV 93 78 - 100 fL    MCH 30.4 26.5 - 33.0 pg    MCHC 32.7 31.5 - 36.5 g/dL    RDW 13.2 10.0 - 15.0 %    Platelet Count 184 150 - 450 10e3/uL    % Neutrophils 63 %    % Lymphocytes 27 %    % Monocytes 7 %    % Eosinophils 2 %    % Basophils 1 %    % Immature Granulocytes 0 %    NRBCs per 100 WBC 0 <1 /100    Absolute Neutrophils 4.9 1.6 - 8.3 10e3/uL    Absolute Lymphocytes 2.1 0.8 - 5.3 10e3/uL    Absolute Monocytes 0.6 0.0 - 1.3 10e3/uL    Absolute Eosinophils 0.2 0.0 - 0.7 10e3/uL    Absolute Basophils 0.1 0.0 - 0.2 10e3/uL    Absolute Immature Granulocytes 0.0 <=0.4 10e3/uL    Absolute NRBCs 0.0 10e3/uL   ECG 12-LEAD WITH MUSE (LHE)   Result Value Ref Range    Systolic Blood Pressure 138 mmHg    Diastolic Blood Pressure 64 mmHg    Ventricular Rate 76 BPM    Atrial Rate 76 BPM    RI Interval 126 ms    QRS Duration 84 ms     ms    QTc 481 ms    P Axis 78 degrees    R AXIS 62 degrees    T Axis 69 degrees    Interpretation ECG       Sinus rhythm  Possible Left atrial enlargement  Borderline ECG  When compared with ECG of 28-FEB-2023 20:38,  No significant change was found  Confirmed by SEE ED PROVIDER NOTE FOR, ECG INTERPRETATION ([id number]),  GM DE JESUS ([id number]) on 3/26/2023 1:44:40 PM         RADIOLOGY:  Reviewed all pertinent imaging. Please see official radiology report.  CTA Head Neck with Contrast   Final Result   IMPRESSION:    HEAD CT:   1.  No acute intracranial process.      HEAD CTA:    1.  Severe stenosis left M1 segment.   2.  No other large artery stenosis or occlusion. No aneurysm.      NECK CTA:   1.  No carotid or vertebral artery stenosis.            Lumbar spine CT w/o contrast   Final Result   IMPRESSION:   1.  No fracture or posttraumatic subluxation.   2.   Spondylosis as detailed with degenerative anterior spondylolisthesis at L5-S1 causing severe right and moderate left foraminal stenoses. Mild spinal canal stenosis L3-L4 and L4-L5.            XR Shoulder Right 2 Views   Final Result   IMPRESSION:    1. There is no evidence of fracture. No subluxation or dislocation.   2. Subacromial enthesophyte narrowing the subacromial space.          Echocardiogram Complete - For age > 60 yrs    (Results Pending)       EK2023 1340  Sinus rhythm, left atrial enlargement  No ST changes  Ventricular rate 76  AL interval 126  QRS 84  QTc 481  Axis 62  Compared to 2023 no significant change        I have independently reviewed and interpreted the EKG(s) documented above.      I, Abner Colon, am serving as a scribe to document services personally performed by Grady Camargo MD based on my observation and the provider's statements to me. I, Dr. Grady Camargo, attest that Abner Colon is acting in a scribe capacity, has observed my performance of the services and has documented them in accordance with my direction.    Grady Camargo MD  Emergency Medicine  [facility] EMERGENCY ROOM  [address], [place] [id number] [phone]     Grady Camargo MD  [phone]       Grady Camargo MD  [phone]

## 2023-03-26 NOTE — PHARMACY-ADMISSION MEDICATION HISTORY
Pharmacy Note - Admission Medication History    Pertinent Provider Information:      ______________________________________________________________________    Prior To Admission (PTA) med list completed and updated in EMR.       PTA Med List   Medication Sig Last Dose     aspirin 81 MG EC tablet Take 81 mg by mouth At Bedtime 3/25/2023 at hs     gabapentin (NEURONTIN) 300 MG capsule Take 1 capsule (300 mg) by mouth 3 times daily 3/26/2023 at am x1     hydrochlorothiazide (HYDRODIURIL) 12.5 MG tablet Take 1 tablet (12.5 mg) by mouth daily 3/25/2023 at am     HYDROcodone-acetaminophen (NORCO) 5-325 MG tablet Take 1 tablet by mouth every 6 hours as needed for severe pain (7-10) Past Week at PRN     ibuprofen (ADVIL/MOTRIN) 200 MG tablet Take 400-600 mg by mouth every 6 hours as needed for pain Unknown at PRN     magnesium citrate 100 mg Tab Take 100 mg by mouth 3 times daily as needed 3/26/2023 at am x1     multivitamin with minerals (THERA-M) 9 mg iron-400 mcg Tab tablet Take 1 tablet by mouth every morning 3/26/2023 at am     tiZANidine (ZANAFLEX) 2 MG tablet TAKE 1 TABLET BY MOUTH THREE TIMES DAILY AS NEEDED FOR PAIN OR SPASMS 3/25/2023 at PRN       Information source(s): Patient, Family member and [facility]  Method of interview communication: in-person    Summary of Changes to PTA Med List  New: Norco   Discontinued: Percocet & betamethasone oint   Changed: magnesium to TID prn     Patient was asked about OTC/herbal products specifically.  PTA med list reflects this.    In the past week, patient estimated taking medication this percent of the time:  greater than 90%.    Medication Affordability:  Not including over the counter (OTC) medications, was there a time in the past 12 months when you did not take your medications as prescribed because of cost?: No    Allergies were reviewed, assessed, and updated with the patient.      Patient does not use any multi-dose medications prior to  admission.    The information provided in this note is only as accurate as the sources available at the time of the update(s).    Thank you for the opportunity to participate in the care of this patient.    Wesley Ceja  3/26/2023 12:51 PM

## 2023-03-27 ENCOUNTER — APPOINTMENT (OUTPATIENT)
Dept: CARDIOLOGY | Facility: CLINIC | Age: 88
DRG: 066 | End: 2023-03-27
Attending: FAMILY MEDICINE
Payer: COMMERCIAL

## 2023-03-27 ENCOUNTER — APPOINTMENT (OUTPATIENT)
Dept: PHYSICAL THERAPY | Facility: CLINIC | Age: 88
DRG: 066 | End: 2023-03-27
Attending: FAMILY MEDICINE
Payer: COMMERCIAL

## 2023-03-27 ENCOUNTER — APPOINTMENT (OUTPATIENT)
Dept: OCCUPATIONAL THERAPY | Facility: CLINIC | Age: 88
DRG: 066 | End: 2023-03-27
Attending: FAMILY MEDICINE
Payer: COMMERCIAL

## 2023-03-27 ENCOUNTER — APPOINTMENT (OUTPATIENT)
Dept: MRI IMAGING | Facility: CLINIC | Age: 88
DRG: 066 | End: 2023-03-27
Attending: FAMILY MEDICINE
Payer: COMMERCIAL

## 2023-03-27 ENCOUNTER — APPOINTMENT (OUTPATIENT)
Dept: SPEECH THERAPY | Facility: CLINIC | Age: 88
DRG: 066 | End: 2023-03-27
Attending: FAMILY MEDICINE
Payer: COMMERCIAL

## 2023-03-27 LAB
ANION GAP SERPL CALCULATED.3IONS-SCNC: 9 MMOL/L (ref 5–18)
BUN SERPL-MCNC: 14 MG/DL (ref 8–28)
CALCIUM SERPL-MCNC: 9 MG/DL (ref 8.5–10.5)
CHLORIDE BLD-SCNC: 106 MMOL/L (ref 98–107)
CO2 SERPL-SCNC: 28 MMOL/L (ref 22–31)
CREAT SERPL-MCNC: 0.73 MG/DL (ref 0.6–1.1)
ERYTHROCYTE [DISTWIDTH] IN BLOOD BY AUTOMATED COUNT: 13.3 % (ref 10–15)
GFR SERPL CREATININE-BSD FRML MDRD: 80 ML/MIN/1.73M2
GLUCOSE BLD-MCNC: 105 MG/DL (ref 70–125)
GLUCOSE BLDC GLUCOMTR-MCNC: 122 MG/DL (ref 70–99)
GLUCOSE BLDC GLUCOMTR-MCNC: 140 MG/DL (ref 70–99)
HCT VFR BLD AUTO: 36.6 % (ref 35–47)
HGB BLD-MCNC: 12.1 G/DL (ref 11.7–15.7)
MCH RBC QN AUTO: 31.1 PG (ref 26.5–33)
MCHC RBC AUTO-ENTMCNC: 33.1 G/DL (ref 31.5–36.5)
MCV RBC AUTO: 94 FL (ref 78–100)
PLATELET # BLD AUTO: 166 10E3/UL (ref 150–450)
POTASSIUM BLD-SCNC: 3.7 MMOL/L (ref 3.5–5)
RBC # BLD AUTO: 3.89 10E6/UL (ref 3.8–5.2)
SODIUM SERPL-SCNC: 143 MMOL/L (ref 136–145)
WBC # BLD AUTO: 7 10E3/UL (ref 4–11)

## 2023-03-27 PROCEDURE — 250N000013 HC RX MED GY IP 250 OP 250 PS 637: Performed by: FAMILY MEDICINE

## 2023-03-27 PROCEDURE — 99222 1ST HOSP IP/OBS MODERATE 55: CPT | Performed by: NURSE PRACTITIONER

## 2023-03-27 PROCEDURE — 85027 COMPLETE CBC AUTOMATED: CPT | Performed by: FAMILY MEDICINE

## 2023-03-27 PROCEDURE — 93306 TTE W/DOPPLER COMPLETE: CPT

## 2023-03-27 PROCEDURE — A9585 GADOBUTROL INJECTION: HCPCS | Performed by: FAMILY MEDICINE

## 2023-03-27 PROCEDURE — 255N000002 HC RX 255 OP 636: Performed by: FAMILY MEDICINE

## 2023-03-27 PROCEDURE — 250N000013 HC RX MED GY IP 250 OP 250 PS 637: Performed by: STUDENT IN AN ORGANIZED HEALTH CARE EDUCATION/TRAINING PROGRAM

## 2023-03-27 PROCEDURE — 97116 GAIT TRAINING THERAPY: CPT | Mod: GP

## 2023-03-27 PROCEDURE — 72148 MRI LUMBAR SPINE W/O DYE: CPT

## 2023-03-27 PROCEDURE — 92610 EVALUATE SWALLOWING FUNCTION: CPT | Mod: GN

## 2023-03-27 PROCEDURE — 93306 TTE W/DOPPLER COMPLETE: CPT | Mod: 26 | Performed by: INTERNAL MEDICINE

## 2023-03-27 PROCEDURE — 82962 GLUCOSE BLOOD TEST: CPT

## 2023-03-27 PROCEDURE — 99232 SBSQ HOSP IP/OBS MODERATE 35: CPT | Performed by: FAMILY MEDICINE

## 2023-03-27 PROCEDURE — 36415 COLL VENOUS BLD VENIPUNCTURE: CPT | Performed by: FAMILY MEDICINE

## 2023-03-27 PROCEDURE — 97110 THERAPEUTIC EXERCISES: CPT | Mod: GP

## 2023-03-27 PROCEDURE — 97162 PT EVAL MOD COMPLEX 30 MIN: CPT | Mod: GP

## 2023-03-27 PROCEDURE — 97166 OT EVAL MOD COMPLEX 45 MIN: CPT | Mod: GO

## 2023-03-27 PROCEDURE — 97535 SELF CARE MNGMENT TRAINING: CPT | Mod: GO

## 2023-03-27 PROCEDURE — 120N000001 HC R&B MED SURG/OB

## 2023-03-27 PROCEDURE — 82310 ASSAY OF CALCIUM: CPT | Performed by: FAMILY MEDICINE

## 2023-03-27 PROCEDURE — 70553 MRI BRAIN STEM W/O & W/DYE: CPT

## 2023-03-27 RX ORDER — ASPIRIN 325 MG
325 TABLET ORAL DAILY
Status: DISCONTINUED | OUTPATIENT
Start: 2023-03-28 | End: 2023-03-28 | Stop reason: HOSPADM

## 2023-03-27 RX ORDER — CLOPIDOGREL BISULFATE 75 MG/1
300 TABLET ORAL ONCE
Status: COMPLETED | OUTPATIENT
Start: 2023-03-27 | End: 2023-03-27

## 2023-03-27 RX ORDER — ATORVASTATIN CALCIUM 40 MG/1
40 TABLET, FILM COATED ORAL EVERY EVENING
Status: DISCONTINUED | OUTPATIENT
Start: 2023-03-27 | End: 2023-03-28 | Stop reason: HOSPADM

## 2023-03-27 RX ORDER — GADOBUTROL 604.72 MG/ML
6 INJECTION INTRAVENOUS ONCE
Status: COMPLETED | OUTPATIENT
Start: 2023-03-27 | End: 2023-03-27

## 2023-03-27 RX ORDER — CLOPIDOGREL BISULFATE 75 MG/1
75 TABLET ORAL DAILY
Status: DISCONTINUED | OUTPATIENT
Start: 2023-03-28 | End: 2023-03-28 | Stop reason: HOSPADM

## 2023-03-27 RX ADMIN — ATORVASTATIN CALCIUM 40 MG: 40 TABLET, FILM COATED ORAL at 20:53

## 2023-03-27 RX ADMIN — GABAPENTIN 300 MG: 300 CAPSULE ORAL at 20:53

## 2023-03-27 RX ADMIN — ASPIRIN 81 MG: 81 TABLET, COATED ORAL at 07:35

## 2023-03-27 RX ADMIN — HYDROCHLOROTHIAZIDE 12.5 MG: 12.5 TABLET ORAL at 07:34

## 2023-03-27 RX ADMIN — GADOBUTROL 6 ML: 604.72 INJECTION INTRAVENOUS at 16:25

## 2023-03-27 RX ADMIN — GABAPENTIN 300 MG: 300 CAPSULE ORAL at 15:16

## 2023-03-27 RX ADMIN — CLOPIDOGREL BISULFATE 300 MG: 75 TABLET ORAL at 18:48

## 2023-03-27 RX ADMIN — GABAPENTIN 300 MG: 300 CAPSULE ORAL at 07:35

## 2023-03-27 RX ADMIN — MULTIPLE VITAMINS W/ MINERALS TAB 1 TABLET: TAB at 07:35

## 2023-03-27 ASSESSMENT — ACTIVITIES OF DAILY LIVING (ADL)
ADLS_ACUITY_SCORE: 42
ADLS_ACUITY_SCORE: 42
PREVIOUS_RESPONSIBILITIES: MEAL PREP;HOUSEKEEPING;LAUNDRY;DRIVING
ADLS_ACUITY_SCORE: 42

## 2023-03-27 NOTE — SIGNIFICANT EVENT
Significant Event Note    Time of event: 5:40 PM March 27, 2023    Description of event:  Spoke with [place] radiology and MRI shows an acute left basal ganglia and corona radiata infarcts.  This would be consistent with right extremity symptoms.    MRI lumbar shows L5-S1 moderate to severe foraminal stenosis on the right and mild to moderate on the left.     Plan:  Updated nursing staff.  Have asked them to contact neuro telemetry.    Discussed with: bedside nurse    Chavez Ortiz MD

## 2023-03-27 NOTE — PROGRESS NOTES
[facility] MEDICINE  PROGRESS NOTE     Code Status: No CPR- Do NOT Intubate       Identification/Summary:   Ramila Liao is a 87 year old female with PMH signficant for low back pain, hyperlipidemia, lower extremity edema.  Presented with intermittent slurred speech, falls and right lower extremity weakness for 48 hours.  Evaluated per stroke protocols.  CTA head and neck showed no acute CVA but did show severe stenosis at the left M1 segment.  CT lumbar spine shows L5-S1 severe right and moderate left foraminal stenosis.  MRI brain and lumbar spine interpretation pending.  Neuro telemetry consulted.  Clinically feeling better.  Slurred speech has resolved.     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  Imaging studies as above.  Consult neuro telemetry.  Given  p.o. x1.  Continue 81 mg daily.  Utilize stroke protocols.  Echocardiogram unremarkable.  EF 60% and no significant abnormalities.  2/28 TSH 3.84.  MRI head / neck and lumbar spine completed.  Interpretation pending.  Likely neuro telemetry recommendations to be given on 3/28.  Will stay until then.  -L5-S1 severe right and moderate left foraminal stenosis  Fall precautions.  PT OT consults.  Continue home gabapentin 300 mg 3 times daily and tizanidine as needed.  Hold on her Norco and ibuprofen at this time.  Appreciate neurosurgery consultation.  They are also awaiting MRI results.  -Lower extremity edema  Continue home hydrochlorothiazide 12.5 mg daily.  Utilize her GOMEZ stockings.  -Hyperlipidemia  April 2022 cholesterol 218, , HDL 69 and triglycerides 112.  March 2023 cholesterol 184, LDL 95, HDL 73 and triglycerides 79.  Does not appear to be on a statin agent at this time.  Further recommendations pending.  -Hypokalemia  Replacement  protocols.     -Anticoagulation   SCDs and aspirin     -COVID19 PCR not tested per current policy  Noted.  Fluids: Saline lock  Pain meds: Tylenol as needed  Therapy: PT OT recommending TCU at this time.  Passed speech therapy.  Ellison:Not present  Lines: None       Current Diet  Orders Placed This Encounter      Low Saturated Fat Na <2400 mg    Supplements  None    Barriers to Discharge: MRI interpretation, recommendations from telemetry neurology and neurosurgery    Disposition: Possible discharge 3/28    Clinically Significant Risk Factors        # Hypokalemia: Lowest K = 3.2 mmol/L in last 2 days, will replace as needed                         Interval History/Subjective:  Patient doing better.  Feels like her strength is doing better.  Family has not appreciated any slurred speech today.  No chest pain.  No shortness of breath.  No nausea or vomiting.  Eating without difficulty.  Passed speech therapy.  Questions answered to verbalized satisfaction.    Physical Exam/Objective:  Temp:  [97.8  F (36.6  C)-98.9  F (37.2  C)] 98.9  F (37.2  C)  Pulse:  [] 81  Resp:  [11-28] 19  BP: (125-158)/(61-73) 158/73  SpO2:  [94 %-99 %] 98 %  Wt Readings from Last 4 Encounters:   03/26/23 61.7 kg (136 lb)   03/02/23 61.7 kg (136 lb)   03/01/23 61.7 kg (136 lb)   02/28/23 61.7 kg (136 lb)     Body mass index is 24.87 kg/m .    Constitutional: awake, alert, cooperative, no apparent distress, and appears stated age  ENT: Normocephalic, without obvious abnormality, atraumatic, external ears without lesions, oral pharynx with moist mucous membranes, tonsils without erythema or exudates, gums normal and good dentition.  Respiratory: No increased work of breathing, good air exchange, clear to auscultation bilaterally, no crackles or wheezing  Cardiovascular: Normal apical impulse, regular rate and rhythm, normal S1 and S2, no S3 or S4, and no murmur noted  GI: No scars, normal bowel sounds, soft, non-distended, non-tender, no  masses palpated, no hepatosplenomegally  Skin: normal skin color, texture, turgor, no redness, warmth, or swelling, and no rashes  Musculoskeletal: There is no redness, warmth, or swelling of the joints.  Motor strength is 5 out of 5 all extremities bilaterally.  Tone is normal.  Stable 1+ lower extremity edema bilaterally  Neurologic: Cranial nerves II-XII are grossly intact. Sensory:  Sensory intact  Neuropsychiatric: General: normal, calm and normal eye contact Level of consciousness: alert / normal Affect: normal Orientation: oriented to self, place, time and situation Memory and insight: normal, memory for past and recent events intact and thought process normal      Medications:   Personally Reviewed.  Medications     - MEDICATION INSTRUCTIONS -       - MEDICATION INSTRUCTIONS -         aspirin  81 mg Oral Daily     gabapentin  300 mg Oral TID     gadobutrol  6 mL Intravenous Once     hydrochlorothiazide  12.5 mg Oral Daily     multivitamin w/minerals  1 tablet Oral QAM     sodium chloride (PF)  3 mL Intracatheter Q8H       Data reviewed today: I personally reviewed all new medications, labs, imaging/diagnostics reports over the past 24 hours. Pertinent findings include:    Imaging:   Recent Results (from the past 24 hour(s))   Echocardiogram Complete - For age > 60 yrs    Narrative    [id number]  [id number]  [id number]  [id number]^ANAYELI^[place] [address]     Name: BRE TORRES  MRN: [id number]  : 1935  Study Date: 2023 09:17 AM  Age: 87 yrs  Gender: Female  Patient Location: [facility]  Reason For Study: Cerebrovascular Incident  Ordering Physician: KARLOS GUADALUPE  Performed By: WR     BSA: 1.6 m2  Height: 62 in  Weight: 136 lb  HR: 78  BP: 128/62 mmHg  ______________________________________________________________________________  Procedure  Complete Portable Echo  Adult.  ______________________________________________________________________________  Interpretation Summary     1. Normal left ventricular size and systolic performance with a visually  estimated ejection fraction of 60-65%.  2. No significant valvular heart disease is identified on this study.  3. Normal right ventricular size and systolic performance.  ______________________________________________________________________________  Left ventricle:  Normal left ventricular size and systolic performance with a visually  estimated ejection fraction of 60-65%. There is normal regional wall motion.  Left ventricular wall thickness is normal.     Assessment of LV Diastolic Function: The cumulative findings are indeterminate  in the evaluation of diastolic function [The septal e' velocity is < 7 cm/s &  lateral e' velocity is < 10 cm/s. The average E/e' is < 14. TR velocity is <  2.8 m/s. Left atrial volume index is greater than 34 mL/mÂ ].     Right ventricle:  Normal right ventricular size and systolic performance.     Left atrium:  There is borderline left atrial enlargement.     Right atrium:  The right atrium is of normal size.     IVC:  The IVC is mildly dilated.     Aortic valve:  The aortic valve is comprised of three cusps. No significant aortic stenosis  or aortic insufficiency is detected on this study.     Mitral valve:  The mitral valve appears morphologically normal.There is mild mitral  insufficiency.     Tricuspid valve:  The tricuspid valve is grossly morphologically normal. There is mild tricuspid  insufficiency.     Pulmonic valve:  The pulmonic valve is grossly morphologically normal. There is trace pulmonic  insufficiency.     Thoracic aorta:  The aortic root and proximal ascending aorta are of normal dimension.     Pericardium:  There is no significant pericardial  effusion.  ______________________________________________________________________________  ______________________________________________________________________________  MMode/2D Measurements & Calculations  RVDd: 3.2 cm  IVSd: 0.89 cm  LVIDd: 4.8 cm  LVIDs: 3.3 cm  LVPWd: 1.0 cm  FS: 29.9 %  LV mass(C)d: 160.6 grams  LV mass(C)dI: 99.0 grams/m2  Ao root diam: 2.9 cm  LA dimension: 3.3 cm  asc Aorta Diam: 2.9 cm  LA/Ao: 1.1  LVOT diam: 1.8 cm  LVOT area: 2.5 cm2  LA Volume Indexed (AL/bp): 34.1 ml/m2     RWT: 0.44  TAPSE: 2.2 cm     Time Measurements  Aortic HR: 78.0 BPM  MM HR: 78.0 BPM     Doppler Measurements & Calculations  MV E max rodger: 68.4 cm/sec  MV A max rodger: 85.3 cm/sec  MV E/A: 0.80  MV dec slope: 298.7 cm/sec2  MV dec time: 0.23 sec  Ao V2 max: 109.5 cm/sec  Ao max P.0 mmHg  Ao V2 mean: 78.2 cm/sec  Ao mean P.8 mmHg  Ao V2 VTI: 24.8 cm  EDY(I,D): 1.9 cm2  EDY(V,D): 1.8 cm2  LV V1 max P.3 mmHg  LV V1 max: 76.6 cm/sec  LV V1 VTI: 18.4 cm  CO(LVOT): 3.7 l/min  CI(LVOT): 2.3 l/min/m2  SV(LVOT): 46.9 ml  SI(LVOT): 28.9 ml/m2  PA acc time: 0.14 sec  PI end-d rodger: 103.8 cm/sec  TR max rodger: 266.0 cm/sec  TR max P.3 mmHg     AV Rodger Ratio (DI): 0.70  EDY Index (cm2/m2): 1.2  E/E': 9.5  E/E' avg: 10.3  Lateral E/e': 9.5  Medial E/e': 11.0  Peak E' Rodger: 7.2 cm/sec  RV S Rodger: 12.6 cm/sec     ______________________________________________________________________________  Report approved by: Dustin Guzman 2023 10:33 AM             Labs:  Echocardiogram Complete - For age > 60 yrs   Final Result      CTA Head Neck with Contrast   Final Result   IMPRESSION:    HEAD CT:   1.  No acute intracranial process.      HEAD CTA:    1.  Severe stenosis left M1 segment.   2.  No other large artery stenosis or occlusion. No aneurysm.      NECK CTA:   1.  No carotid or vertebral artery stenosis.            Lumbar spine CT w/o contrast   Final Result   IMPRESSION:   1.  No fracture or posttraumatic  subluxation.   2.  Spondylosis as detailed with degenerative anterior spondylolisthesis at L5-S1 causing severe right and moderate left foraminal stenoses. Mild spinal canal stenosis L3-L4 and L4-L5.            XR Shoulder Right 2 Views   Final Result   IMPRESSION:    1. There is no evidence of fracture. No subluxation or dislocation.   2. Subacromial enthesophyte narrowing the subacromial space.          MR Brain w/o & w Contrast    (Results Pending)   MR Lumbar Spine w/o Contrast    (Results Pending)     Recent Results (from the past 24 hour(s))   Potassium    Collection Time: 03/26/23  5:21 PM   Result Value Ref Range    Potassium 3.7 3.5 - 5.0 mmol/L   Glucose by meter    Collection Time: 03/26/23  5:46 PM   Result Value Ref Range    GLUCOSE BY METER POCT 120 (H) 70 - 99 mg/dL   Glucose by meter    Collection Time: 03/26/23  9:17 PM   Result Value Ref Range    GLUCOSE BY METER POCT 99 70 - 99 mg/dL   Glucose by meter    Collection Time: 03/27/23  6:27 AM   Result Value Ref Range    GLUCOSE BY METER POCT 122 (H) 70 - 99 mg/dL   CBC with platelets    Collection Time: 03/27/23  7:22 AM   Result Value Ref Range    WBC Count 7.0 4.0 - 11.0 10e3/uL    RBC Count 3.89 3.80 - 5.20 10e6/uL    Hemoglobin 12.1 11.7 - 15.7 g/dL    Hematocrit 36.6 35.0 - 47.0 %    MCV 94 78 - 100 fL    MCH 31.1 26.5 - 33.0 pg    MCHC 33.1 31.5 - 36.5 g/dL    RDW 13.3 10.0 - 15.0 %    Platelet Count 166 150 - 450 10e3/uL   Basic metabolic panel    Collection Time: 03/27/23  7:22 AM   Result Value Ref Range    Sodium 143 136 - 145 mmol/L    Potassium 3.7 3.5 - 5.0 mmol/L    Chloride 106 98 - 107 mmol/L    Carbon Dioxide (CO2) 28 22 - 31 mmol/L    Anion Gap 9 5 - 18 mmol/L    Urea Nitrogen 14 8 - 28 mg/dL    Creatinine 0.73 0.60 - 1.10 mg/dL    Calcium 9.0 8.5 - 10.5 mg/dL    Glucose 105 70 - 125 mg/dL    GFR Estimate 80 >60 mL/min/1.73m2   Glucose by meter    Collection Time: 03/27/23 11:51 AM   Result Value Ref Range    GLUCOSE BY METER POCT  140 (H) 70 - 99 mg/dL       Pending Labs:  Unresulted Labs Ordered in the Past 30 Days of this Admission     No orders found for last 31 day(s).          Chavez Ortiz MD  [facility]  [facility]  Phone: #[phone]

## 2023-03-27 NOTE — PROGRESS NOTES
"Brief Stroke Note:  Consultation received; initially scheduled to complete tele-stroke consult at 1445 today. However at that time patient was being prepped to complete MRI. At present, patient is still down at MRI and not available for consultation.     Will plan to see patient for tele-stroke consultation tomorrow 3/28/28. Continue ASA 81mg at this time. Please contact us with any additional questions in the interim.    Nirmala PENNINGTON, CNP  Vascular Neurology  To page me or covering stroke neurology team member, click here: AMCOM   Choose \"On Call\" tab at top, then search dropdown box for \"Neurology Adult\", select location, press Enter, then look for stroke/neuro ICU/telestroke.    "

## 2023-03-27 NOTE — CONSULTS
NEUROSURGERY CONSULTATION NOTE    Ramila Liao   [address]   [facility] [address]  87 year old female  Admission Date/Time: 3/26/2023 10:00 AM  Primary Care Provider: [facility] Attending Physician: Chavez Ortiz MD    Neurosurgery was asked to see this patient by Chavez Ortiz MD for evaluation of right leg weakness.     PROBLEM LIST:  Principal Problem:    Stenosis of left middle cerebral artery  Active Problems:    Hypokalemia    Right leg weakness    Stroke-like symptoms    Edema, lower extremity       CONSULTATION ASSESSMENT AND PLAN: 87 year old female known to our service, previously seen by Dr. Pena on 3/1 for positive EMG, right L5 radic presents with 2-3 day hx of slurred speech and right leg weakness. At the time of previous appointment she was asymptomatic of L5 radiculopathy (previous pain from the buttock, outer thigh and to the knee).  Patient tells me her current symptoms essentially started at the same time. CTA with severe M1 segment stenosis, no MRI yet. On exam, she denies symptoms of right L5 radic, weakness primarily with control of the right foot/ankle, no numbness or tingling. She denies buttock pain, thigh pain or knee pain. She also has bilateral edema in lower extremities associated with redness which could be contributing- she previously follows with vascular surgery. Will await MRI lumbar and brain results but this could be related to acute ischemia. If ischemia present, would need to hold off on surgical intervention likely regardless. Full plan to follow MRI imaging completion.  Please call with questions. On exam, strength and sensation intact.          HPI:  87 year old female presents when her family noted 2-3 days of slurred speech as well as right leg weakness, falls, trouble ambulating. Kanwal tells me she did not really notice the slurred speech but that both speech and leg started at approximately the same time frame. She also notes  however intermittent difficulty with the right leg ongoing for some time. She currently feels her symptoms are about the same, no better or worse. Follows with lymphedema specialists for the swelling in her feet. No back pain, right leg pain, she just describes weakness in the right foot and ankle.   Past Medical History:   Diagnosis Date     Allergic rhinitis 08/02/2005     Contact dermatitis and other eczema, due to unspecified cause 07/30/2001     Edema, lower extremity      Low back pain 08/02/2016     Mixed hyperlipidemia 08/09/2006     Past Surgical History:   Procedure Laterality Date     CATARACT EXTRACTION, BILATERAL       CHOLECYSTECTOMY       HYSTERECTOMY         REVIEW OF SYSTEMS:   Negative apart from above.     MEDICATIONS:  Current Outpatient Medications   Medication Sig Dispense Refill     aspirin 81 MG EC tablet Take 81 mg by mouth At Bedtime       gabapentin (NEURONTIN) 300 MG capsule Take 1 capsule (300 mg) by mouth 3 times daily 90 capsule 1     hydrochlorothiazide (HYDRODIURIL) 12.5 MG tablet Take 1 tablet (12.5 mg) by mouth daily 90 tablet 0     HYDROcodone-acetaminophen (NORCO) 5-325 MG tablet Take 1 tablet by mouth every 6 hours as needed for severe pain (7-10)       ibuprofen (ADVIL/MOTRIN) 200 MG tablet Take 400-600 mg by mouth every 6 hours as needed for pain       magnesium citrate 100 mg Tab Take 100 mg by mouth 3 times daily as needed       multivitamin with minerals (THERA-M) 9 mg iron-400 mcg Tab tablet Take 1 tablet by mouth every morning       tiZANidine (ZANAFLEX) 2 MG tablet TAKE 1 TABLET BY MOUTH THREE TIMES DAILY AS NEEDED FOR PAIN OR SPASMS 90 tablet 1         ALLERGIES/SENSITIVITIES:     Allergies   Allergen Reactions     Naproxen Hives     Tolerates ibuprofen       PERTINENT SOCIAL HISTORY: reviewed  Social History     Socioeconomic History     Marital status:    Tobacco Use     Smoking status: Never     Smokeless tobacco: Never   Substance and Sexual Activity      Alcohol use: Yes     Drug use: Never     Sexual activity: Not Currently     Partners: Male         FAMILY HISTORY:  Family History   Problem Relation Age of Onset     Coronary Artery Disease Mother      Coronary Artery Disease Father         PHYSICAL EXAM:   Constitutional: /65 (BP Location: Right arm, Patient Position: Semi-Stoddard's, Cuff Size: Adult Regular)   Pulse 79   Temp 98  F (36.7  C) (Oral)   Resp 18   Wt 61.7 kg (136 lb)   LMP  (LMP Unknown)   SpO2 96%   BMI 24.87 kg/m       Mental Status: A & O in no acute distress.  Affect is appropriate.  Speech is fluent.  Recent and remote memory are intact.  Attention span and concentration are normal.     Cranial Nerves: CN1: grossly intact per patient recall. CN2: No funduscopic exam performed. CN3,4 & 6: Pupillary light response, lateral and vertical gaze normal.  No nystagmus.  Visual fields are full to confrontation. CN5: Intact to touch CN7: No facial weakness, smile, facial symmetry intact. CN8: Intact to spoken voice. CN9&10: Gag reflex, uvula midline, palate rises with phonation. CN11: Shoulder shrug 5/5 intact bilaterally. CN12: Tongue midline and moves freely from side to side.     Motor: No pronator drift of upper extremity. Normal bulk and tone all muscle groups of upper and lower extremities.    Strength: seemingly some command delay in the right ankle, no discernable change in strength from right to left leg from knee down.      Sensory: Sensation intact bilaterally to light touch.      IMAGING:  I personally reviewed all radiographic images   CTA reviewed  CT lumbar reviewed  - Unchanged from previous imaging, L5-S1 spondylolisthesis   No new fractures         (non critical care) I spent more than 70 minutes in this apt, examining the pt, reviewing the scans, reviewing notes from chart, discussing treatment options with risks and benefits and coordinating care. >50 % clinic time was spent in face to face counseling and coordinating  care    Mitzy Glasgow NP      Cc:   No referring provider defined for this encounter.    Susan Sutton  [unfilled]

## 2023-03-27 NOTE — PLAN OF CARE
"  Problem: Plan of Care - These are the overarching goals to be used throughout the patient stay.    Goal: Absence of Hospital-Acquired Illness or Injury  3/27/2023 1527 by Gregoria Carnes RN  Outcome: Progressing  3/27/2023 1527 by Gregoria Carnes RN  Outcome: Progressing  Intervention: Identify and Manage Fall Risk  Recent Flowsheet Documentation  Taken 3/27/2023 1201 by Gregoria Carnes RN  Safety Promotion/Fall Prevention: safety round/check completed  Taken 3/27/2023 0900 by Gregoria Carnes RN  Safety Promotion/Fall Prevention: safety round/check completed  Intervention: Prevent Skin Injury  Recent Flowsheet Documentation  Taken 3/27/2023 1300 by Gregoria Carnes RN  Body Position:   turned   position changed independently    Problem: Plan of Care - These are the overarching goals to be used throughout the patient stay.    Goal: Absence of Hospital-Acquired Illness or Injury  Intervention: Prevent Skin Injury  Recent Flowsheet Documentation  Taken 3/27/2023 1300 by Gregoria Carnes RN  Body Position:   turned   position changed independently    Patient remains on tele running NSR. Denying any pain. Patient reports decreased sensation to RLE. States that the extremity \"feels different\". RLE is also harder to move per patient report. Patient is currently in MRI. Family remains at bedside. Tolerating PO diet.     "

## 2023-03-27 NOTE — UTILIZATION REVIEW
Admission Status; Secondary Review Determination   Under the authority of the Utilization Management Committee, the utilization review process indicated a secondary review on Ramila Liao. The review outcome is based on review of the medical records, discussions with staff, and applying clinical experience noted on the date of the review.   (x) Inpatient Status Appropriate - This patient's medical care is consistent with medical management for inpatient care and reasonable inpatient medical practice.     RATIONALE FOR DETERMINATION   Ramila Liao is an 87 yr old female with low back pain, HLD, LE edema who presented with acute neuro changes present x 48 hours.  Unclear if CVA (severe stenosis M1 on CTA) and L5-S1 moderate stenosis.  Next step in evaluation would be MRI however unable to obtain thus far.  Ongoing therapy and eval for suspected CVA but if not CVA needs neurosurgery consultation.  At this time, not stable for discharge with ongoing evaluation in progress.    At the time of admission with the information available to the attending physician more than 2 nights Hospital complex care was anticipated, based on patient risk of adverse outcome if treated as outpatient and complex care required. Inpatient admission is appropriate based on the Medicare guidelines.   The information on this document is developed by the utilization review team in order for the business office to ensure compliance. This only denotes the appropriateness of proper admission status and does not reflect the quality of care rendered.   The definitions of Inpatient Status and Observation Status used in making the determination above are those provided in the CMS Coverage Manual, Chapter 1 and Chapter 6, section 70.4.   Sincerely,   Katarina Patel MD  Utilization Review  Physician Advisor  [facility]

## 2023-03-27 NOTE — PROGRESS NOTES
"Vascular neurology note     MRI brain is done and I personally reviewed. There is an acute infarction in the left hemisphere in a watershed distribution. The patient has severe L M1 stenosis which is probably the culprit.     Recommend increasing ASA to 325 mg/d and adding clopidogrel (300 mg loading dose now then 75 mg/d). Keep patient on both medications for 3 months. After that stop clopidogrel and continue  mg/d. Also recommend atorvastatin 40 mg/d. Target LDL < 70. In terms of BP management, we should avoid hypotension. The patient is currently on hydrochlorothiazide 12.5 mg/d which is her home regimen. Her BP today has been in the 120s-130s/60s and there is no report of neurological deterioration. So ok to continue current regimen but avoid BP < 110/60 especially if the low BP is associated with neurological worsening.     Ramirez Omer MD, Msc, FALISANDRO, FAAN   of Neurology  [facility]     03/27/2023 6:19 PM  To page me or covering stroke neurology team member, click here: AMCOM  Choose \"On Call\" tab at top, then search dropdown box for \"Neurology Adult\" & press Enter, look for Neuro ICU/Stroke    "

## 2023-03-27 NOTE — PLAN OF CARE
A&Ox4. Follows commands   Neuro checks every 4 hrs.   Fluent speech. PERRL. NIH 2  RLE more weak (intermittent drift)  However both lower ext strength 4/5.  R foot drop, diminished/numbness sensation.   Clear lung sounds, RA.   VSS. SR.   Dependent lower extremity/redness.   Denies pain.   Plan for MRI when available and ECHO today.   Up 1 assist, commode.

## 2023-03-27 NOTE — PROGRESS NOTES
Neurosurgery Note:    Consult received for right leg weakness in the setting of possible stroke vs lumbar stenosis/lumbar radiculopathy. MRI brain and lumbar pending, given her stenosis at M1 on the left on CT and new right leg weakness that was not present on neurosurgery appt 3/1 - suspect this could be related to acute ischemia but will await MRI. If acute ischemia is present, would likely need to hold off on any neurosurgical intervention regardless of MRI lumbar results. Please call with questions    Mitzy Glasgow CNP  [facility] Neurosurgery  O: [phone]

## 2023-03-27 NOTE — PROGRESS NOTES
Internal medicine cross cover note    -Discussed with the stroke neurologist, appreciate their input  -Ordering an additional baby aspirin tonight, starting full dose aspirin tomorrow, ordering 300 mg Plavix tonight and starting 75 mg tomorrow  -Also ordering statin- as per rec, starting atorvastatin 40 mg tonight.  -Updated bedside team and also primary attending    Mu [place]   [facility]  [facility]   Securely message with the [employer] Web Console (learn more here)  Text page via Hotlist Paging/Directory

## 2023-03-27 NOTE — PROGRESS NOTES
03/27/23 0945   Appointment Info   Signing Clinician's Name / Credentials (OT) Pilar Moseley, OTR/L   Living Environment   People in Home alone   Current Living Arrangements apartment   Living Environment Comments WIS with GB and Std toilet with vanity on the R   Self-Care   Usual Activity Tolerance good   Current Activity Tolerance moderate   Instrumental Activities of Daily Living (IADL)   Previous Responsibilities meal prep;housekeeping;laundry;driving   IADL Comments Pt recently has not driven.  Pt reports family is assisting her with shopping.   General Information   Onset of Illness/Injury or Date of Surgery 03/27/23   Referring Physician Dr. Chavez Ortiz   Patient/Family Therapy Goal Statement (OT) To return home when able.   Additional Occupational Profile Info/Pertinent History of Current Problem Adm with R L/E weakness, falls at home.   PMH:  PMH signficant for low back pain, hyperlipidemia, lower extremity edema.   General Observations and Info R L/E weakness.   Cognitive Status Examination   Orientation Status person;time  (Not oriented to place.)   Follows Commands WFL   Visual Perception   Visual Impairment/Limitations corrective lenses full-time   Sensory   Sensory Comments Pt reports no numbness or tingling in either of her U/Es   Posture   Posture Comments Pt tends to lean to her R side when sitting upright in her gurney.   Range of Motion Comprehensive   General Range of Motion no range of motion deficits identified   Strength Comprehensive (MMT)   General Manual Muscle Testing (MMT) Assessment upper extremity strength deficits identified   Upper Extremity (Manual Muscle Testing)   Upper Extremity: Manual Muscle Testing (MMT) left shoulder strength deficit;right shoulder strength deficit   Comment, MMT: Upper Extremity shoulder flex 4/5 bilat   Coordination   Upper Extremity Coordination No deficits were identified   Coordination Comments Able to oppose all fingers with thumb on her R and L  hand.   Bed Mobility   Bed Mobility supine-sit;sit-supine   Supine-Sit [place] (Bed Mobility) minimum assist (75% patient effort)   Sit-Supine [place] (Bed Mobility) minimum assist (75% patient effort)   Transfers   Transfers bed-chair transfer;sit-stand transfer   Transfer Skill: Bed to Chair/Chair to Bed   Bed-Chair [place] (Transfers) minimum assist (75% patient effort)   Assistive Device (Bed-Chair Transfers) rolling walker   Sit-Stand Transfer   Sit-Stand [place] (Transfers) minimum assist (75% patient effort)   Assistive Device (Sit-Stand Transfers) walker, 4-wheeled   Balance   Balance Assessment standing balance: static   Balance Comments moderate   Activities of Daily Living   BADL Assessment/Intervention lower body dressing   Lower Body Dressing Assessment/Training   Position (Lower Body Dressing) supported sitting;supported standing   Comment, (Lower Body Dressing) to doff the R shoe   [place] Level (Lower Body Dressing) minimum assist (75% patient effort)   Clinical Impression   Criteria for Skilled Therapeutic Interventions Met (OT) Yes, treatment indicated   OT Diagnosis decreased indep with ADLs due to R L/E weakness - poss CVA or lumbar stenosis.  MRI pending   OT Problem List-Impairments impacting ADL problems related to;mobility   Assessment of Occupational Performance 3-5 Performance Deficits   Identified Performance Deficits dressing, trsfs, bathing, G/H, toileting.   Planned Therapy Interventions (OT) ADL retraining;transfer training   Clinical Decision Making Complexity (OT) moderate complexity   Risk & Benefits of therapy have been explained evaluation/treatment results reviewed;participants included;patient;son   OT Total Evaluation Time   OT Eval, Moderate Complexity Minutes (33991) 15   OT Goals   Therapy Frequency (OT) Daily   OT Predicted Duration/Target Date for Goal Attainment 03/31/23   OT Goals Hygiene/Grooming;Lower Body Dressing;Transfers;Toilet  Transfer/Toileting;Cognition   OT: Hygiene/Grooming modified independent   OT: Lower Body Dressing Modified independent   OT: Transfer Modified independent   OT: Toilet Transfer/Toileting Modified independent   OT: Cognitive Patient/caregiver will verbalize understanding of cognitive assessment results/recommendations as needed for safe discharge planning  (With VC as needed.)   Interventions   Interventions Quick Adds Self-Care/Home Management   Self-Care/Home Management   Self-Care/Home Mgmt/ADL, Compensatory, Meal Prep Minutes (68323) 25   Symptoms Noted During/After Treatment (Meal Preparation/Planning Training) none   Treatment Detail/Skilled Intervention Pt seen in the ED.  Pt resting on her gurney leaning to her R side.  Pt stated this leaning to the R is new.  Pt also reporting weakness in her R L/E.  Worked on bed mobility - rolling to her L side with min A of one.  Worked on supine to sit with min A of one.  Once in stitting, Pt was able to maintain her midline posture.  Worked on sit to stand from gurney and from armed chair.  Needed Min A of one.  VC needed for correct hand placement and to use the breaks on her FWW.  Worked on L/B dressing from the chair with min A to doff the R shoe.  No c/o pain when bending forward.  Pt returned to her gurney with max A of one due to needing help in lifting her r leg up onto the gurney.  Gurney is also too high for Pt.  At end of session, Pt resting in her gurney with side rails up and call light within reach.  Son present and supportive.   Lower Body Dressing Training Assistance minimum assist (75% patient effort)   Lower Body Dressing Training Assistance 1 person assist   [place] Level (Toilet Training)   (declined need)   OT Discharge Planning   OT Plan Check for results from MRI - CVA vs Lumbar stenosis.  Further assess BR trsfs.  Pt using a 4WW.  R L/E weakness.   OT Discharge Recommendation (DC Rec) (S)  Transitional Care Facility   OT Rationale for DC Rec  Currently needs min A of 1 with trsfs.  Would benefit from further OT to increase indep with all self cares and trsfs prior to returning home alone.   OT Brief overview of current status Min A with trsfs using FWW.  Min A with dressing due to weakness in the R L/E   Total Session Time   Timed Code Treatment Minutes 25   Total Session Time (sum of timed and untimed services) 40

## 2023-03-27 NOTE — PROGRESS NOTES
03/27/23 1050   Appointment Info   Signing Clinician's Name / Credentials (PT) Venessa Mac PT   Living Environment   People in Home alone   Current Living Arrangements independent living facility   Home Accessibility no concerns   Transportation Anticipated family or friend will provide   Self-Care   Equipment Currently Used at Home walker, rolling;cane, straight  (4WW and RW)   Fall history within last six months yes   Number of times patient has fallen within last six months 3   Activity/Exercise/Self-Care Comment independent ADL's/IADL's; family assisting with driving and shopping recently; uses 4WW in apt and building; has RW in car   General Information   Onset of Illness/Injury or Date of Surgery 03/26/23   Referring Physician Dr. Ortiz   Patient/Family Therapy Goals Statement (PT) R leg stronger and working better   Pertinent History of Current Problem (include personal factors and/or comorbidities that impact the POC) stenosis L MCA, slurred speech, RLE weakness, falls, L5-S1 foraminal stenosis, Le edema   Existing Precautions/Restrictions fall   Cognition   Affect/Mental Status (Cognition) WFL   Orientation Status (Cognition) oriented x 4   Follows Commands (Cognition) WFL   Integumentary/Edema   Integumentary/Edema Comments BLE edema 2+ pitting dorsum of feet; pt wears compression stockings at home; completed outpatient lymphedema services   Range of Motion (ROM)   Range of Motion ROM is WFL   Strength (Manual Muscle Testing)   Strength (Manual Muscle Testing) Deficits observed during functional mobility   Strength Comments RLe weakness during ambulation with poor step length and foot clearance; needing assist for lifting R leg in/out of bed   Bed Mobility   Bed Mobility supine-sit;sit-supine   Supine-Sit [place] (Bed Mobility) minimum assist (75% patient effort)   Sit-Supine [place] (Bed Mobility) moderate assist (50% patient effort)   Transfers   Transfers sit-stand transfer   Sit-Stand  Transfer   Sit-Stand [place] (Transfers) supervision;verbal cues   Assistive Device (Sit-Stand Transfers) walker, 4-wheeled   Gait/Stairs (Locomotion)   [place] Level (Gait) contact guard;verbal cues;nonverbal cues (demo/gesture)   Assistive Device (Gait) walker, 4-wheeled   Distance in Feet 90   Pattern (Gait) step-to   Deviations/Abnormal Patterns (Gait) festinating/shuffling;base of support, narrow;stride length decreased;weight shifting decreased;cecilia decreased  (shuffling gait worse on RLE; difficulty lifting feet to complete turns with walker)   Clinical Impression   Criteria for Skilled Therapeutic Intervention Yes, treatment indicated   PT Diagnosis (PT) impaired functional mobility   Influenced by the following impairments decreased strength, balance, sensation; BLE edema   Functional limitations due to impairments bed mob., transfers, gait   Clinical Presentation (PT Evaluation Complexity) Stable/Uncomplicated   Clinical Presentation Rationale pt presents as medically diagnosed   Clinical Decision Making (Complexity) moderate complexity   Planned Therapy Interventions (PT) balance training;bed mobility training;gait training;home exercise program;patient/family education;neuromuscular re-education;strengthening;transfer training   Anticipated Equipment Needs at Discharge (PT) walker, rolling   Risk & Benefits of therapy have been explained evaluation/treatment results reviewed;patient;son   PT Total Evaluation Time   PT Eval, Moderate Complexity Minutes (20289) 20   Physical Therapy Goals   PT Frequency Daily   PT Predicted Duration/Target Date for Goal Attainment 04/03/23   PT Goals Bed Mobility;Transfers;Gait   PT: Bed Mobility Supine to/from sit;Modified independent   PT: Transfers Modified independent;Sit to/from stand;Assistive device   PT: Gait Supervision/stand-by assist;Rolling walker;150 feet   PT Discharge Planning   PT Plan gait training with pt's 4WW; Le ex, bed mob.   PT Discharge  Recommendation (DC Rec) Transitional Care Facility;home with assist;home with home care physical therapy   PT Rationale for DC Rec pt at risk of falling and would benefit from further PT for strengthening and mobility training; if home, recommend increased assist including sba with ambulation with walker   PT Brief overview of current status amb. cga with walker 90 feet   Total Session Time   Total Session Time (sum of timed and untimed services) 20

## 2023-03-27 NOTE — CONSULTS
Care Management Initial Consult    General Information  Assessment completed with: Patient, Family, Son Carrington and Pt  Type of CM/SW Visit: Initial Assessment    Primary Care Provider verified and updated as needed: Yes   Readmission within the last 30 days:        Reason for Consult: discharge planning        Communication Assessment  Patient's communication style: spoken language (English or Bilingual)          Cognitive  Cognitive/Neuro/Behavioral: .WDL except  Level of Consciousness: alert  Arousal Level: opens eyes spontaneously  Orientation: oriented x 4  Mood/Behavior: cooperative, behavior appropriate to situation  Best Language: 0 - No aphasia  Speech: clear, logical    Living Environment:   People in home: alone     Current living Arrangements: apartment      Able to return to prior arrangements:         Family/Social Support:  Care provided by: self  Provides care for:       Children          Description of Support System: Supportive         Current Resources:   Patient receiving home care services: No     Community Resources: None  Equipment currently used at home: walker, rolling, cane, straight (4WW and RW)  Supplies currently used at home:      Employment/Financial:  Employment Status: retired          Lifestyle & Psychosocial Needs:  Social Determinants of Health     Tobacco Use: Low Risk      Smoking Tobacco Use: Never     Smokeless Tobacco Use: Never     Passive Exposure: Not on file   Alcohol Use: Not on file   Financial Resource Strain: Not on file   Food Insecurity: Not on file   Transportation Needs: Not on file   Physical Activity: Not on file   Stress: Not on file   Social Connections: Not on file   Intimate Partner Violence: Not on file   Depression: Not at risk     PHQ-2 Score: 0   Housing Stability: Not on file     Additional Information:  CM met with elizabeth Shultz and with the Pt. Pt is in agreement to TCU  And would like the [facility]. CM shared that more referrals would need to be sent,  however [place] would be marked as primary locations. Pt states understanding. Family states understanding that transport may be private pay if they need paid transport. TCU referrals sent as needed.     JJ Coronado

## 2023-03-28 ENCOUNTER — APPOINTMENT (OUTPATIENT)
Dept: OCCUPATIONAL THERAPY | Facility: CLINIC | Age: 88
DRG: 066 | End: 2023-03-28
Payer: COMMERCIAL

## 2023-03-28 ENCOUNTER — APPOINTMENT (OUTPATIENT)
Dept: CARDIOLOGY | Facility: CLINIC | Age: 88
DRG: 066 | End: 2023-03-28
Attending: PHYSICIAN ASSISTANT
Payer: COMMERCIAL

## 2023-03-28 ENCOUNTER — APPOINTMENT (OUTPATIENT)
Dept: PHYSICAL THERAPY | Facility: CLINIC | Age: 88
DRG: 066 | End: 2023-03-28
Payer: COMMERCIAL

## 2023-03-28 VITALS
HEART RATE: 88 BPM | WEIGHT: 136 LBS | OXYGEN SATURATION: 97 % | BODY MASS INDEX: 24.87 KG/M2 | SYSTOLIC BLOOD PRESSURE: 165 MMHG | TEMPERATURE: 98 F | DIASTOLIC BLOOD PRESSURE: 73 MMHG | RESPIRATION RATE: 16 BRPM

## 2023-03-28 LAB
HOLD SPECIMEN: NORMAL
POTASSIUM BLD-SCNC: 3.6 MMOL/L (ref 3.5–5)

## 2023-03-28 PROCEDURE — 97110 THERAPEUTIC EXERCISES: CPT | Mod: GP

## 2023-03-28 PROCEDURE — 84132 ASSAY OF SERUM POTASSIUM: CPT | Performed by: FAMILY MEDICINE

## 2023-03-28 PROCEDURE — 97116 GAIT TRAINING THERAPY: CPT | Mod: GP

## 2023-03-28 PROCEDURE — 250N000013 HC RX MED GY IP 250 OP 250 PS 637: Performed by: FAMILY MEDICINE

## 2023-03-28 PROCEDURE — 97535 SELF CARE MNGMENT TRAINING: CPT | Mod: GO

## 2023-03-28 PROCEDURE — 36415 COLL VENOUS BLD VENIPUNCTURE: CPT | Performed by: FAMILY MEDICINE

## 2023-03-28 PROCEDURE — 250N000013 HC RX MED GY IP 250 OP 250 PS 637: Performed by: STUDENT IN AN ORGANIZED HEALTH CARE EDUCATION/TRAINING PROGRAM

## 2023-03-28 PROCEDURE — 93270 REMOTE 30 DAY ECG REV/REPORT: CPT

## 2023-03-28 PROCEDURE — 99239 HOSP IP/OBS DSCHRG MGMT >30: CPT | Performed by: FAMILY MEDICINE

## 2023-03-28 PROCEDURE — G0425 INPT/ED TELECONSULT30: HCPCS | Mod: G0 | Performed by: PHYSICIAN ASSISTANT

## 2023-03-28 RX ORDER — ASPIRIN 325 MG
325 TABLET ORAL DAILY
DISCHARGE
Start: 2023-03-29 | End: 2023-04-21

## 2023-03-28 RX ORDER — ATORVASTATIN CALCIUM 40 MG/1
40 TABLET, FILM COATED ORAL EVERY EVENING
DISCHARGE
Start: 2023-03-28 | End: 2023-04-21

## 2023-03-28 RX ORDER — CLOPIDOGREL BISULFATE 75 MG/1
75 TABLET ORAL DAILY
DISCHARGE
Start: 2023-03-29 | End: 2023-04-21

## 2023-03-28 RX ORDER — TIZANIDINE 2 MG/1
TABLET ORAL
Qty: 90 TABLET | Refills: 0 | DISCHARGE
Start: 2023-03-28

## 2023-03-28 RX ADMIN — GABAPENTIN 300 MG: 300 CAPSULE ORAL at 14:35

## 2023-03-28 RX ADMIN — GABAPENTIN 300 MG: 300 CAPSULE ORAL at 08:04

## 2023-03-28 RX ADMIN — MULTIPLE VITAMINS W/ MINERALS TAB 1 TABLET: TAB at 08:04

## 2023-03-28 RX ADMIN — CLOPIDOGREL BISULFATE 75 MG: 75 TABLET ORAL at 08:04

## 2023-03-28 RX ADMIN — ASPIRIN 325 MG ORAL TABLET 325 MG: 325 PILL ORAL at 08:04

## 2023-03-28 ASSESSMENT — ACTIVITIES OF DAILY LIVING (ADL)
ADLS_ACUITY_SCORE: 38
ADLS_ACUITY_SCORE: 42
ADLS_ACUITY_SCORE: 42
ADLS_ACUITY_SCORE: 38
ADLS_ACUITY_SCORE: 42
ADLS_ACUITY_SCORE: 38
ADLS_ACUITY_SCORE: 42
ADLS_ACUITY_SCORE: 38
ADLS_ACUITY_SCORE: 38

## 2023-03-28 NOTE — PROGRESS NOTES
[facility] MEDICINE  PROGRESS NOTE     Code Status: No CPR- Do NOT Intubate       Identification/Summary:   Ramila Liao is a 87 year old female with PMH signficant for low back pain, hyperlipidemia, lower extremity edema.  Presented with intermittent slurred speech, falls and right lower extremity weakness for 48 hours.  Evaluated per stroke protocols.  CTA head and neck showed no acute CVA but did show severe stenosis at the left M1 segment.  CT lumbar spine shows L5-S1 severe right and moderate left foraminal stenosis.  MRI lumbar spine shows stable L5-S1 foraminal stenosis findings.  Neurosurgery recommends addressing stroke issues prior to spine.  MRI brain shows acute left basal ganglia and corona radiata infarcts.  Plavix and aspirin recommended by telemetry neurology.  Reassessment today 3/28.  Clinically feeling better.  Slurred speech has resolved.  PT OT recommending TCU currently.     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  Imaging studies as above.  Consult neuro telemetry.  Given  p.o. x1.  Continue 81 mg daily.  Utilize stroke protocols.  Echocardiogram unremarkable.  EF 60% and no significant abnormalities.  2/28 TSH 3.84.  MRI head / neck  shows acute left basal ganglia and corona radiata infarcts.  Neuro telemetry recommending loading with Plavix and aspirin.  Neuro telemetry will reassess the patient 3/20 8 in the afternoon.  -L5-S1 severe right and moderate left foraminal stenosis  Fall precautions.  PT OT consults.  Continue home gabapentin 300 mg 3 times daily and tizanidine as needed.  Hold on her Norco and ibuprofen at this time.  MRI lumbar spine shows stable L5-S1 severe right and moderate left foraminal stenosis.  Neurosurgery recommends addressing CVA prior to back issues.  Can follow-up with them as  an outpatient.  -Lower extremity edema  Continue home hydrochlorothiazide 12.5 mg daily.  Utilize her GOMEZ stockings.  -Hyperlipidemia  April 2022 cholesterol 218, , HDL 69 and triglycerides 112.  March 2023 cholesterol 184, LDL 95, HDL 73 and triglycerides 79.  Does not appear to be on a statin agent at this time.  Further recommendations pending.  -Hypokalemia  Replacement protocols.     -Anticoagulation   SCDs and aspirin     -COVID19 PCR not tested per current policy  Noted.  Fluids: Saline lock  Pain meds: Tylenol as needed  Therapy: PT OT recommending TCU at this time.  Passed speech therapy.   Ellison:Not present  Lines: None       Current Diet  Orders Placed This Encounter      Low Saturated Fat Na <2400 mg    Supplements  None    Barriers to Discharge: Neuro telemetry reassessment, PT OT recommendations    Disposition: Potentially could discharge later today    Clinically Significant Risk Factors        # Hypokalemia: Lowest K = 3.2 mmol/L in last 2 days, will replace as needed                         Interval History/Subjective:  Patient doing well.  No chest pain.  No shortness of breath.  No nausea or vomiting.  Denies any extremity weakness at this time.  Denies any difficulty using her right hand.  Speech is clear.  No family present.  Questions answered to verbalized satisfaction.    Physical Exam/Objective:  Temp:  [97.2  F (36.2  C)-98.9  F (37.2  C)] 98.1  F (36.7  C)  Pulse:  [] 84  Resp:  [16-20] 20  BP: (138-158)/(64-74) 154/74  SpO2:  [94 %-98 %] 96 %  Wt Readings from Last 4 Encounters:   03/26/23 61.7 kg (136 lb)   03/02/23 61.7 kg (136 lb)   03/01/23 61.7 kg (136 lb)   02/28/23 61.7 kg (136 lb)     Body mass index is 24.87 kg/m .    Constitutional: awake, alert, cooperative, no apparent distress, and appears stated age  ENT: Normocephalic, without obvious abnormality, atraumatic, external ears without lesions, oral pharynx with moist mucous membranes, tonsils without erythema or  exudates, gums normal and good dentition.  Respiratory: No increased work of breathing, good air exchange, clear to auscultation bilaterally, no crackles or wheezing  Cardiovascular: Normal apical impulse, regular rate and rhythm, normal S1 and S2, no S3 or S4, and no murmur noted  GI: No scars, normal bowel sounds, soft, non-distended, non-tender, no masses palpated, no hepatosplenomegally  Skin: normal skin color, texture, turgor, no redness, warmth, or swelling, and no rashes  Musculoskeletal: There is no redness, warmth, or swelling of the joints.  Motor strength is 5 out of 5 all extremities bilaterally.  Tone is normal. no lower extremity pitting edema present  Neurologic: Cranial nerves II-XII are grossly intact. Sensory:  Sensory intact  Neuropsychiatric: General: normal, calm and normal eye contact Level of consciousness: alert / normal Affect: normal Orientation: oriented to self, place, time and situation Memory and insight: normal, memory for past and recent events intact and thought process normal      Medications:   Personally Reviewed.  Medications     - MEDICATION INSTRUCTIONS -       - MEDICATION INSTRUCTIONS -         aspirin  325 mg Oral Daily     atorvastatin  40 mg Oral QPM     clopidogrel  75 mg Oral Daily     gabapentin  300 mg Oral TID     [Held by provider] hydrochlorothiazide  12.5 mg Oral Daily     multivitamin w/minerals  1 tablet Oral QAM     sodium chloride (PF)  3 mL Intracatheter Q8H       Data reviewed today: I personally reviewed all new medications, labs, imaging/diagnostics reports over the past 24 hours. Pertinent findings include:    Imaging:   Recent Results (from the past 24 hour(s))   Echocardiogram Complete - For age > 60 yrs    Narrative    [id number]  [id number]  [id number]  [id number] [address]     Name: BRE TORRES  MRN: [id number]  : 1935  Study Date: 2023 09:17 AM  Age: 87 yrs  Gender:  Female  Patient Location: [facility]  Reason For Study: Cerebrovascular Incident  Ordering Physician: KARLOS GUADALUPE  Performed By: WR     BSA: 1.6 m2  Height: 62 in  Weight: 136 lb  HR: 78  BP: 128/62 mmHg  ______________________________________________________________________________  Procedure  Complete Portable Echo Adult.  ______________________________________________________________________________  Interpretation Summary     1. Normal left ventricular size and systolic performance with a visually  estimated ejection fraction of 60-65%.  2. No significant valvular heart disease is identified on this study.  3. Normal right ventricular size and systolic performance.  ______________________________________________________________________________  Left ventricle:  Normal left ventricular size and systolic performance with a visually  estimated ejection fraction of 60-65%. There is normal regional wall motion.  Left ventricular wall thickness is normal.     Assessment of LV Diastolic Function: The cumulative findings are indeterminate  in the evaluation of diastolic function [The septal e' velocity is < 7 cm/s &  lateral e' velocity is < 10 cm/s. The average E/e' is < 14. TR velocity is <  2.8 m/s. Left atrial volume index is greater than 34 mL/mÂ ].     Right ventricle:  Normal right ventricular size and systolic performance.     Left atrium:  There is borderline left atrial enlargement.     Right atrium:  The right atrium is of normal size.     IVC:  The IVC is mildly dilated.     Aortic valve:  The aortic valve is comprised of three cusps. No significant aortic stenosis  or aortic insufficiency is detected on this study.     Mitral valve:  The mitral valve appears morphologically normal.There is mild mitral  insufficiency.     Tricuspid valve:  The tricuspid valve is grossly morphologically normal. There is mild tricuspid  insufficiency.     Pulmonic valve:  The pulmonic valve is grossly morphologically normal.  There is trace pulmonic  insufficiency.     Thoracic aorta:  The aortic root and proximal ascending aorta are of normal dimension.     Pericardium:  There is no significant pericardial effusion.  ______________________________________________________________________________  ______________________________________________________________________________  MMode/2D Measurements & Calculations  RVDd: 3.2 cm  IVSd: 0.89 cm  LVIDd: 4.8 cm  LVIDs: 3.3 cm  LVPWd: 1.0 cm  FS: 29.9 %  LV mass(C)d: 160.6 grams  LV mass(C)dI: 99.0 grams/m2  Ao root diam: 2.9 cm  LA dimension: 3.3 cm  asc Aorta Diam: 2.9 cm  LA/Ao: 1.1  LVOT diam: 1.8 cm  LVOT area: 2.5 cm2  LA Volume Indexed (AL/bp): 34.1 ml/m2     RWT: 0.44  TAPSE: 2.2 cm     Time Measurements  Aortic HR: 78.0 BPM  MM HR: 78.0 BPM     Doppler Measurements & Calculations  MV E max rodger: 68.4 cm/sec  MV A max rodger: 85.3 cm/sec  MV E/A: 0.80  MV dec slope: 298.7 cm/sec2  MV dec time: 0.23 sec  Ao V2 max: 109.5 cm/sec  Ao max P.0 mmHg  Ao V2 mean: 78.2 cm/sec  Ao mean P.8 mmHg  Ao V2 VTI: 24.8 cm  EDY(I,D): 1.9 cm2  EDY(V,D): 1.8 cm2  LV V1 max P.3 mmHg  LV V1 max: 76.6 cm/sec  LV V1 VTI: 18.4 cm  CO(LVOT): 3.7 l/min  CI(LVOT): 2.3 l/min/m2  SV(LVOT): 46.9 ml  SI(LVOT): 28.9 ml/m2  PA acc time: 0.14 sec  PI end-d rodger: 103.8 cm/sec  TR max rodger: 266.0 cm/sec  TR max P.3 mmHg     AV Rodger Ratio (DI): 0.70  EDY Index (cm2/m2): 1.2  E/E': 9.5  E/E' avg: 10.3  Lateral E/e': 9.5  Medial E/e': 11.0  Peak E' Rodger: 7.2 cm/sec  RV S Rodger: 12.6 cm/sec     ______________________________________________________________________________  Report approved by: Dustin Guzman 2023 10:33 AM         MR Lumbar Spine w/o Contrast    Narrative    EXAM: MR LUMBAR SPINE W/O CONTRAST  LOCATION: [facility]  DATE/TIME: 3/27/2023 4:24 PM    INDICATION: Right lower extremity weakness. Bilateral L5-S1 impingement on CT. Low back pain.  COMPARISON: CT dated  03/26/2023 and MRI dated 11/07/2022.  TECHNIQUE: Routine Lumbar Spine MRI without IV contrast.    FINDINGS:   Nomenclature is based on 5 lumbar type vertebral bodies. Unchanged alignment with 1 mm stepwise grade 1 retrolisthesis at L2-L4 and 5 mm grade 1 anterolisthesis at L5-S1, degenerative. No pars interarticularis defect. Maintained vertebral body heights.   Mild intervertebral disc height loss and desiccation throughout the visualized spine, most notably at L5-S1. No suspicious bone marrow signal intensity. Improved but persistent previously seen mild Modic type I degenerative change at L5-S1 on the right.   Mildly increased intra-articular fluid signal and small extradural synovial cysts at the bilateral L5-S1 facet joints. Unremarkable conus medullaris terminating at L1. Unremarkable cauda equina nerve roots with the exception for crowding at L3-L5   secondary to spinal canal stenosis, as described below. Similar intrahepatic and extrahepatic biliary ductal dilatation as compared to CT from 02/28/2023. Unremarkable visualized bony pelvis.    T12-L1: A similar disc bulge with a superimposed right subarticular/foraminal disc protrusion without significant spinal canal or foraminal stenosis. Mild right-sided facet arthrosis.    L1-L2: A similar disc bulge, mild hypertrophy of the ligamenta flava, and mild bilateral facet arthrosis without significant spinal canal or foraminal stenosis.    L2-L3: A similar disc bulge, hypertrophy of the ligamenta flava, and facet arthrosis without significant spinal canal or foraminal stenosis.     L3-L4: A similar disc bulge with superimposed bilateral foraminal/far lateral disc protrusions which, along with facet arthrosis and ligamentum flavum hypertrophy, contributes to similar mild trefoil-type spinal canal and bilateral foraminal stenosis. Of   note, similar mass effect upon the descending L4 nerve roots, greater on the right.    L4-L5: A similar disc bulge, hypertrophy of  the ligamenta flava, and facet arthrosis with unchanged mild trefoil-type spinal canal stenosis. Similar mild to moderate right foraminal stenosis. No significant left foraminal narrowing.    L5-S1: Unchanged anterolisthesis with uncovering and bulging of the intervertebral disc and advanced facet arthrosis contributing to similar moderate to severe right and mild to moderate left foraminal stenosis. No significant spinal canal stenosis.      Impression    IMPRESSION:  1.  No significant change in multilevel spondylosis since 11/07/2022 with similar mild trefoil-type spinal canal stenosis at L3-L5.  2.  Varying degrees of similar foraminal stenosis, worst and moderate to severe at L5-S1 on the right, mild to moderate on the left. See above for details.   MR Brain w/o & w Contrast    Narrative    EXAM: MR BRAIN W/O and W CONTRAST  LOCATION: [facility]  DATE/TIME: 3/27/2023 4:24 PM    INDICATION: Slurred speech and right lower extremity weakness.  COMPARISON: CT/CTA dated 03/26/2023.  CONTRAST: Gadavist 6mL  TECHNIQUE: Routine multiplanar multisequence head MRI without and with intravenous contrast.    FINDINGS:  INTRACRANIAL CONTENTS: Multifocal foci of restricted diffusion within the left basal ganglia and corona radiata with corresponding low ADC values and FLAIR hyperintensity. No significant associated mass effect. No acute intracranial hemorrhage or   extra-axial fluid collection. A punctate focus of signal loss on the T2*weighted images within the left frontal operculum without corresponding signal abnormality on the FLAIR or T1-weighted images, most consistent with a chronic microhemorrhage versus   calcification. No intracranial mass or abnormal enhancement. Scattered foci of T2 prolongation within the bilateral cerebral white matter, nonspecific although most likely the sequela of mild chronic microvascular ischemia. Normal ventricles and sulci   for age. Normal position of the  cerebellar tonsils.     SELLA: Within technique limitations, no gross abnormality.    OSSEOUS STRUCTURES/SOFT TISSUES: Normal marrow signal. The major intracranial vascular flow voids are maintained.     ORBITS: Within technique and susceptibility artifact limitations, no acute abnormality. Status post bilateral cataract extraction.    SINUSES/MASTOIDS: No significant paranasal sinus or mastoid mucosal disease.        Impression    IMPRESSION:  1.  Acute multifocal infarcts involving the left basal ganglia and corona radiata without significant mass effect or acute intracranial hemorrhage.  2.  Mild presumed chronic microvascular ischemic change.    These findings were discussed over the phone with Dr. Ortiz on 03/27/2023 at 1739 CST.       Labs:  MR Brain w/o & w Contrast   Final Result   IMPRESSION:   1.  Acute multifocal infarcts involving the left basal ganglia and corona radiata without significant mass effect or acute intracranial hemorrhage.   2.  Mild presumed chronic microvascular ischemic change.      These findings were discussed over the phone with Dr. Ortiz on 03/27/2023 at 1739 CST.      MR Lumbar Spine w/o Contrast   Final Result   IMPRESSION:   1.  No significant change in multilevel spondylosis since 11/07/2022 with similar mild trefoil-type spinal canal stenosis at L3-L5.   2.  Varying degrees of similar foraminal stenosis, worst and moderate to severe at L5-S1 on the right, mild to moderate on the left. See above for details.      Echocardiogram Complete - For age > 60 yrs   Final Result      CTA Head Neck with Contrast   Final Result   IMPRESSION:    HEAD CT:   1.  No acute intracranial process.      HEAD CTA:    1.  Severe stenosis left M1 segment.   2.  No other large artery stenosis or occlusion. No aneurysm.      NECK CTA:   1.  No carotid or vertebral artery stenosis.            Lumbar spine CT w/o contrast   Final Result   IMPRESSION:   1.  No fracture or posttraumatic subluxation.    2.  Spondylosis as detailed with degenerative anterior spondylolisthesis at L5-S1 causing severe right and moderate left foraminal stenoses. Mild spinal canal stenosis L3-L4 and L4-L5.            XR Shoulder Right 2 Views   Final Result   IMPRESSION:    1. There is no evidence of fracture. No subluxation or dislocation.   2. Subacromial enthesophyte narrowing the subacromial space.            Recent Results (from the past 24 hour(s))   Glucose by meter    Collection Time: 03/27/23 11:51 AM   Result Value Ref Range    GLUCOSE BY METER POCT 140 (H) 70 - 99 mg/dL   Potassium    Collection Time: 03/28/23  6:06 AM   Result Value Ref Range    Potassium 3.6 3.5 - 5.0 mmol/L   Extra Purple Top Tube    Collection Time: 03/28/23  6:06 AM   Result Value Ref Range    Hold Specimen JIC        Pending Labs:  Unresulted Labs Ordered in the Past 30 Days of this Admission     No orders found from 2/24/2023 to 3/27/2023.          Chavez Ortiz MD  [facility]  Phone: #[phone]

## 2023-03-28 NOTE — DISCHARGE SUMMARY
[facility] MEDICINE  DISCHARGE SUMMARY     Primary Care Physician: Susan Sutton  Admission Date: 3/26/2023   Discharge Provider: Chavez Ortiz MD Discharge Date: 3/28/2023    Diet:   Active Diet and Nourishment Order   Procedures     Low Saturated Fat Na <2400 mg     Diet     Code Status: No CPR- Do NOT Intubate   Activity: DCACTIVITY: Activity as tolerated  Ambulate with assistance.  Fall precautions.      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        PROCEDURES ( this hospitalization only)      None    RECOMMENDATIONS TO OUTPATIENT PROVIDER FOR F/U VISIT   Follow-up Appointments     Follow Up and recommended labs and tests      1.  Follow-up with TCU care provider per standard protocols.  2.  Follow-up with primary care in 1 to 2 weeks.  3.  Follow-up with general neurology in 6 to 8 weeks.  4.  Recommend outpatient sleep study.  5.  Follow-up with [facility] neurosurgery in 6 to 8 weeks.  6.  Recheck LFTs and lipid panel in 1 month.               DISPOSITION     Skilled Nursing Facility    SUMMARY OF HOSPITAL COURSE:      Ramila Liao is a 87 year old female with PMH signficant for low back pain, hyperlipidemia, lower extremity edema.  Presented with intermittent slurred speech, falls and right lower extremity weakness for 48 hours.  Evaluated per  stroke protocols.  CTA head and neck showed no acute CVA but did show severe stenosis at the left M1 segment.  CT lumbar spine shows L5-S1 severe right and moderate left foraminal stenosis.  MRI was down on the day of admission.  Admitted.      1.  Neurologic.   Neuro telemetry consulted.  Started on aspirin.  Symptoms had resolved spontaneously.  Following day MRI brain shows acute left basal ganglia and corona radiata infarcts.  Plavix and aspirin recommended by telemetry neurology.   Started on Lipitor for hyperlipidemia not at goal. Clinically feeling better.  Slurred speech has resolved.  PT OT recommending TCU.  Discharge to accepting facility.  Follow-up closely with neurology.    2.  Spine. MRI lumbar spine shows stable L5-S1 foraminal stenosis severe on the right moderate on the left compared to previous study.  Neurosurgery recommends addressing stroke issues prior to spine.  Follow-up with neurosurgery in 6 to 8 weeks.    3.  Hyperlipidemia.  Per telemetry neurology, LDL 95.  Recommended Lipitor 40 mg daily and follow-up with PCP for titration to goal LDL 40-70, <40 increases risk of Intracranial hemorrhage.    Medically otherwise patient was stable.    Discharge Medications with Med changes:     Current Discharge Medication List      START taking these medications    Details   aspirin (ASA) 325 MG tablet Take 1 tablet (325 mg) by mouth daily    Associated Diagnoses: Cerebrovascular accident (CVA) due to stenosis of left middle cerebral artery (H)      atorvastatin (LIPITOR) 40 MG tablet Take 1 tablet (40 mg) by mouth every evening    Associated Diagnoses: Cerebrovascular accident (CVA) due to stenosis of left middle cerebral artery (H); Mixed hyperlipidemia      clopidogrel (PLAVIX) 75 MG tablet Take 1 tablet (75 mg) by mouth daily    Associated Diagnoses: Cerebrovascular accident (CVA) due to stenosis of left middle cerebral artery (H)         CONTINUE these medications which have CHANGED    Details    tiZANidine (ZANAFLEX) 2 MG tablet TAKE 1 TABLET BY MOUTH THREE TIMES DAILY AS NEEDED FOR PAIN OR SPASMS  Qty: 90 tablet, Refills: 0    Associated Diagnoses: Lumbar radicular pain         CONTINUE these medications which have NOT CHANGED    Details   gabapentin (NEURONTIN) 300 MG capsule Take 1 capsule (300 mg) by mouth 3 times daily  Qty: 90 capsule, Refills: 1    Associated Diagnoses: Herpes zoster without complication      hydrochlorothiazide (HYDRODIURIL) 12.5 MG tablet Take 1 tablet (12.5 mg) by mouth daily  Qty: 90 tablet, Refills: 0    Associated Diagnoses: Pain and swelling of lower extremity, unspecified laterality      magnesium citrate 100 mg Tab Take 100 mg by mouth 3 times daily as needed      multivitamin with minerals (THERA-M) 9 mg iron-400 mcg Tab tablet Take 1 tablet by mouth every morning         STOP taking these medications       aspirin 81 MG EC tablet Comments:   Reason for Stopping:         HYDROcodone-acetaminophen (NORCO) 5-325 MG tablet Comments:   Reason for Stopping:         ibuprofen (ADVIL/MOTRIN) 200 MG tablet Comments:   Reason for Stopping:                 Rationale for medication changes:      Plavix and aspirin for stroke prevention.  Lipitor for hyperlipidemia.  Zanaflex for lumbar pain.      Consults     NEUROLOGY IP STROKE CONSULT  SPEECH LANGUAGE PATH ADULT IP CONSULT  PHARMACY IP CONSULT  PHARMACY IP CONSULT  PHARMACY IP CONSULT  PHYSICAL THERAPY ADULT IP CONSULT  OCCUPATIONAL THERAPY ADULT IP CONSULT  REHAB ADMISSIONS LIAISON IP CONSULT  CARE MANAGEMENT / SOCIAL WORK IP CONSULT  NEUROSURGERY IP CONSULT  PHYSICAL THERAPY ADULT IP CONSULT  OCCUPATIONAL THERAPY ADULT IP CONSULT  SMOKING CESSATION PROGRAM IP CONSULT      Immunizations given this encounter     Most Recent Immunizations   Administered Date(s) Administered     COVID-19 Vaccine 12+ (Pfizer 2022) 04/19/2022     COVID-19 Vaccine 12+ (Pfizer) 09/28/2021     COVID-19 Vaccine Bivalent Booster 12+ (Pfizer) 10/05/2022      FLUAD(HD)65+ QUAD 09/26/2022     Influenza (High Dose) 3 valent vaccine 10/16/2021     Influenza (IIV3) PF 10/20/2015     Influenza Vaccine 65+ (Fluzone HD) 09/28/2020     Pneumo Conj 13-V (2010&after) 11/05/2015     Pneumococcal 23 valent 09/03/2009     TDAP (Adacel,Boostrix) 03/02/2023     Td (Adult), Adsorbed 08/02/2005     Zoster recombinant adjuvanted (SHINGRIX) 09/23/2020     Zoster vaccine, live 12/06/2010           Anticoagulation Information      Recent INR results:   Recent Labs   Lab 03/26/23  0952   INR 0.98     Warfarin doses (if applicable) or name of other anticoagulant:  mg daily x 90 days then decrease to 81 mg daily.  Plavix 75 mg daily x 90 days      SIGNIFICANT IMAGING FINDINGS     Results for orders placed or performed during the hospital encounter of 03/26/23   CTA Head Neck with Contrast    Impression    IMPRESSION:   HEAD CT:  1.  No acute intracranial process.    HEAD CTA:   1.  Severe stenosis left M1 segment.  2.  No other large artery stenosis or occlusion. No aneurysm.    NECK CTA:  1.  No carotid or vertebral artery stenosis.       Lumbar spine CT w/o contrast    Impression    IMPRESSION:  1.  No fracture or posttraumatic subluxation.  2.  Spondylosis as detailed with degenerative anterior spondylolisthesis at L5-S1 causing severe right and moderate left foraminal stenoses. Mild spinal canal stenosis L3-L4 and L4-L5.       XR Shoulder Right 2 Views    Impression    IMPRESSION:   1. There is no evidence of fracture. No subluxation or dislocation.  2. Subacromial enthesophyte narrowing the subacromial space.      MR Brain w/o & w Contrast    Impression    IMPRESSION:  1.  Acute multifocal infarcts involving the left basal ganglia and corona radiata without significant mass effect or acute intracranial hemorrhage.  2.  Mild presumed chronic microvascular ischemic change.    These findings were discussed over the phone with Dr. Ortiz on 03/27/2023 at 1739 CST.   MR Lumbar Spine  w/o Contrast    Impression    IMPRESSION:  1.  No significant change in multilevel spondylosis since 11/07/2022 with similar mild trefoil-type spinal canal stenosis at L3-L5.  2.  Varying degrees of similar foraminal stenosis, worst and moderate to severe at L5-S1 on the right, mild to moderate on the left. See above for details.       SIGNIFICANT LABORATORY FINDINGS     Most Recent 3 CBC's:Recent Labs   Lab Test 03/27/23  0722 03/26/23  0952 02/28/23  1829   WBC 7.0 7.8 6.8   HGB 12.1 13.0 14.2   MCV 94 93 95    184 214     Most Recent 3 BMP's:Recent Labs   Lab Test 03/28/23  0606 03/27/23  1151 03/27/23  0722 03/27/23  0627 03/26/23  1746 03/26/23  1721 03/26/23 0952 02/28/23  1829   NA  --   --  143  --   --   --  145 141   POTASSIUM 3.6  --  3.7  --   --  3.7 3.2* 4.2   CHLORIDE  --   --  106  --   --   --  104 104   CO2  --   --  28  --   --   --  32* 26   BUN  --   --  14  --   --   --  13 22   CR  --   --  0.73  --   --   --  0.77 0.95   ANIONGAP  --   --  9  --   --   --  9 11   LIZBETH  --   --  9.0  --   --   --  9.7 9.9   GLC  --  140* 105 122*   < >  --  105 110    < > = values in this interval not displayed.     Most Recent 2 LFT's:Recent Labs   Lab Test 02/28/23 1829 02/25/23  0751   AST 28 23   ALT 31 19   ALKPHOS 72 69   BILITOTAL 0.4 0.5     Most Recent 3 INR's:Recent Labs   Lab Test 03/26/23  0952   INR 0.98     Most Recent 3 BNP's:Recent Labs   Lab Test 01/20/23  0907   NTBNP 489     Most Recent D-dimer:No lab results found.  Most Recent Cholesterol Panel:Recent Labs   Lab Test 03/26/23 0952   CHOL 184   LDL 95   HDL 73   TRIG 79     Most Recent Hemoglobin A1c:Recent Labs   Lab Test 03/26/23  0952   A1C 5.5     Most Recent 6 glucoses:Recent Labs   Lab Test 03/27/23  1151 03/27/23  0722 03/27/23  0627 03/26/23  2117 03/26/23  1746 03/26/23  0952   * 105 122* 99 120* 105     Recent Results (from the past 1008 hour(s))   Troponin I    Collection Time: 03/26/23  9:52 AM   Result Value Ref  Range    Troponin I 0.02 0.00 - 0.29 ng/mL   Troponin I (now)    Collection Time: 02/28/23  6:29 PM   Result Value Ref Range    Troponin I 0.03 0.00 - 0.29 ng/mL   Troponin I (now)    Collection Time: 02/25/23  7:51 AM   Result Value Ref Range    Troponin I 0.02 0.00 - 0.29 ng/mL     7-Day Micro Results     No results found for the last 168 hours.            Discharge Orders        Adult Sleep Eval & Management Referral      General info for SNF    Length of Stay Estimate: Short Term Care estimated <30 days  Condition at Discharge: Improving  Level of care:skilled   Rehabilitation Potential: Excellent  Admission H&P remains valid and up-to-date: Yes  Recent Chemotherapy: N/A  Use Nursing Home Standing Orders: Yes     Mantoux instructions    Give two-step Mantoux (PPD) Per Facility Policy Yes     Reason for your hospital stay    Left-sided stroke.  Bilateral lumbar nerve impingement.     Follow Up and recommended labs and tests    1.  Follow-up with TCU care provider per standard protocols.  2.  Follow-up with primary care in 1 to 2 weeks.  3.  Follow-up with general neurology in 6 to 8 weeks.  4.  Recommend outpatient sleep study.  5.  Follow-up with [facility] neurosurgery in 6 to 8 weeks.  6.  Recheck LFTs and lipid panel in 1 month.     Activity - Up with nursing assistance     Additional Discharge Instructions    - mg daily x 90 days then decrease to 81 mg daily  -Plavix 75 mg daily x 90 days  -LDL 95, Lipitor 40 mg daily, follow-up with PCP for titration to goal LDL 40-70, <40 increases risk of Intracranial hemorrhage     No CPR- Do NOT Intubate     Physical Therapy Adult Consult    Evaluate and treat as clinically indicated.    Reason: CVA with right lower extremity weakness     Occupational Therapy Adult Consult    Evaluate and treat as clinically indicated.    Reason: CVA with slurred speech and right lower extremity weakness     Fall precautions     Diet    Follow this diet upon discharge:  Orders Placed This Encounter      Low Saturated Fat Na <2400 mg     Stroke Hospital Follow Up (for neurologist use only)    [email or web address] will call you to coordinate care as prescribed by your provider. If you don t hear from a representative within 2 business days, please call [phone].         Examination   Physical Exam   Temp:  [97.2  F (36.2  C)-98.9  F (37.2  C)] 98.1  F (36.7  C)  Pulse:  [] 86  Resp:  [16-20] 20  BP: (138-158)/(64-74) 138/72  SpO2:  [94 %-98 %] 97 %  Wt Readings from Last 4 Encounters:   03/26/23 61.7 kg (136 lb)   03/02/23 61.7 kg (136 lb)   03/01/23 61.7 kg (136 lb)   02/28/23 61.7 kg (136 lb)           Please see EMR for more detailed significant labs, imaging, consultant notes etc.    IChavez MD, personally saw the patient today and spent greater than 30 minutes discharging this patient.    Chavez Ortiz MD  [facility]    CC:Susan Sutton

## 2023-03-28 NOTE — CONSULTS
[facility]    Stroke Consult Note    Reason for Consult:  Stroke    Chief Complaint: Fall and Extremity Weakness       HPI  Ramila Liao is a 87 year old female with pertinent past medical history of HLD, recent diagnosis of R L5 radiculopathy on EMG but asymptomatic, on PTA ASA 81 mg daily.    She presented to the [facility] ED 3/26/23 due to multiple falls and intermittent slurred speech x 2 days. In ED there was noticeable RLE weakness with drift. CT/CTA with severe focal L M1 stenosis. MRI shows acute multifocal L basal ganglia/corona radiata ischemic infarcts (watershed distribution). She was started on DAPT with ASA and plavix and admitted for stroke work-up.    She was seen today via telemedicine video exam for inpatient consult.  Describes symptom onset as on Saturday she was walking up to her apartment from the  and noticed that her right leg was not working well, she fell attempting to walk inside.  She called her friend who came to assist her and she again fell.  Her son was called and she was also noted to have some slurred speech and noticed that she had slowed responses.    Symptoms today have significantly improved but still has persistent RLE weakness and decreased sensation.  Her son notices some slight hesitancy in her speech but otherwise no slurred words.    We discussed her stroke and suspicion for atherosclerotic narrowing.  Discussed risk factors for atherosclerosis.  She reports her SBP at home is usually 120s.  LDL 95, discussed will start cholesterol-lowering medication to reach goal.  No smoking history.  She does endorse nightly snoring that wakes her up and excessive daytime sleepiness.  She is open to a referral to sleep medicine clinic to evaluate for sleep apnea.  Denies any fevers/night sweats, or unintentional weight loss.    Stroke Evaluation Summarized    MRI/Head CT MRI: acute multifocal L basal ganglia and corona radiata ischemic  infarcts  CTH: negative for acute pathology   Intracranial Vasculature CTA head: focal severe L M1 stenosis   Cervical Vasculature CTA neck: unremarkable     Echocardiogram TTE: LV norm, EF 60-65%, no wma, RV norm, borderline LA enlargement, RA norm,    EKG/Telemetry SR, possible LA enlargement   Other Testing Not Applicable     LDL  3/26/2023: 95 mg/dL   A1C  3/26/2023: 5.5 %   Troponin No lab value available in past 48 hrs       Impression  Acute multifocal L basal ganglia and corona radiata ischemic infarcts secondary to intacranial atherosclerotic disease with severe L M1 stenosis, does have borderline LA enlargement, rule out Afib    Shuffling gait x several years    Recommendations  -Neuro checks and vitals every 4 hours  - goal SBP <180 while inpatient, monitor BPs and avoid hypotension or any drops that lead to increased symptoms  -long term outpatient blood pressure goal <130/80 to be achieved slowly as able to tolerate, recommend home monitoring twice daily in AM and PM, keep log and bring to f/u with PCP  - mg daily x 90 days then decrease to 81 mg daily  -Plavix 75 mg daily x 90 days  -LDL 95, Lipitor 40 mg daily, follow-up with PCP for titration to goal LDL 40-70, <40 increases risk of Intracranial hemorrhage  -Blood glucose monitoring, Hgb A1c 5.5%, (goal <7% for secondary stroke prevention), follow-up with PCP  -Mediterranean diet can be beneficial for overall decreased cardiovascular risk, please print hand out for patient at discharge   -telemetry, 30 day CardioNet monitoring at discharge if no Afib found on telemetry (ordered)  -PT/OT/SPT   -Euthermia, euglycemia, eunatremia   -Stroke Education  -Stroke Class per [facility]    Patient Follow-up    -f/u with PCP in 1-2 weeks  -f/u with general neurology team in 6-8 weeks (ordered)  - Follow-up with sleep medicine clinic to evaluate for sleep apnea (ordered)    Thank you for this consult. No further stroke evaluation is  "recommended, so we will sign off. Please contact us with any additional questions.    Nikki Eagle PA-C  Vascular Neurology    To page me or covering stroke neurology team member, click here: [facility]COM  Choose \"On Call\" tab at top, then select \"NEUROLOGY/ALL SITES\" from middle drop-down box, press Enter, then look for \"stroke\" or \"telestroke\" for your site.    _____________________________________________________    Clinically Significant Risk Factors                                  Past Medical History   Past Medical History:   Diagnosis Date     Allergic rhinitis 08/02/2005     Contact dermatitis and other eczema, due to unspecified cause 07/30/2001     Edema, lower extremity      Low back pain 08/02/2016     Mixed hyperlipidemia 08/09/2006     Past Surgical History   Past Surgical History:   Procedure Laterality Date     CATARACT EXTRACTION, BILATERAL       CHOLECYSTECTOMY       HYSTERECTOMY       Medications   Home Meds  Prior to Admission medications    Medication Sig Start Date End Date Taking? Authorizing Provider   aspirin 81 MG EC tablet Take 81 mg by mouth At Bedtime 1/26/21  Yes Provider, Historical   gabapentin (NEURONTIN) 300 MG capsule Take 1 capsule (300 mg) by mouth 3 times daily 2/24/23  Yes Susan Sutton NP   hydrochlorothiazide (HYDRODIURIL) 12.5 MG tablet Take 1 tablet (12.5 mg) by mouth daily 3/21/23  Yes Susan Sutton NP   HYDROcodone-acetaminophen (NORCO) 5-325 MG tablet Take 1 tablet by mouth every 6 hours as needed for severe pain (7-10)   Yes Unknown, Entered By History   ibuprofen (ADVIL/MOTRIN) 200 MG tablet Take 400-600 mg by mouth every 6 hours as needed for pain   Yes Reported, Patient   magnesium citrate 100 mg Tab Take 100 mg by mouth 3 times daily as needed 1/26/21  Yes Provider, Historical   multivitamin with minerals (THERA-M) 9 mg iron-400 mcg Tab tablet Take 1 tablet by mouth every morning 1/26/21  Yes Provider, Historical   tiZANidine (ZANAFLEX) 2 MG tablet TAKE 1 TABLET " BY MOUTH THREE TIMES DAILY AS NEEDED FOR PAIN OR SPASMS 2/6/23  Yes Tiffanie Quan PA-C       Scheduled Meds    aspirin  325 mg Oral Daily     atorvastatin  40 mg Oral QPM     clopidogrel  75 mg Oral Daily     gabapentin  300 mg Oral TID     [Held by provider] hydrochlorothiazide  12.5 mg Oral Daily     multivitamin w/minerals  1 tablet Oral QAM     sodium chloride (PF)  3 mL Intracatheter Q8H       Infusion Meds    - MEDICATION INSTRUCTIONS -       - MEDICATION INSTRUCTIONS -         PRN Meds  lidocaine 4%, lidocaine (buffered or not buffered), - MEDICATION INSTRUCTIONS -, - MEDICATION INSTRUCTIONS -, ondansetron **OR** ondansetron, sodium chloride (PF), tiZANidine    Allergies   Allergies   Allergen Reactions     Naproxen Hives     Tolerates ibuprofen     Family History   Family History   Problem Relation Age of Onset     Coronary Artery Disease Mother      Coronary Artery Disease Father      Social History   Social History     Tobacco Use     Smoking status: Never     Smokeless tobacco: Never   Substance Use Topics     Alcohol use: Yes     Drug use: Never       Review of Systems   The 10 point Review of Systems is negative other than noted in the HPI or here.        PHYSICAL EXAMINATION   Temp:  [97.2  F (36.2  C)-98.9  F (37.2  C)] 98.1  F (36.7  C)  Pulse:  [] 84  Resp:  [16-20] 20  BP: (138-158)/(64-74) 154/74  SpO2:  [94 %-98 %] 96 %    General Exam  General:  patient lying in bed without any acute distress    HEENT:  normocephalic/atraumatic  Pulmonary:  no respiratory distress    Neuro Exam  Mental Status:  alert, oriented x 3, stated she is at [facility] which is nearby, provides reliable history, follows commands, speech clear and fluent with mild hesitancy in speech noticed by son compared to her baseline, naming and repetition normal  Cranial Nerves:  visual fields intact (tested by nurse), EOMI with normal smooth pursuit, facial sensation intact and symmetric (tested by nurse), facial  movements symmetric, hearing not formally tested but intact to conversation, no dysarthria, tongue protrusion midline  Motor: RLE weakness with slight drift, no weakness appreciated to other extremities  Reflexes:  unable to test (telestroke)  Sensory:  no extinction on double simultaneous stimulation (assessed by nurse), RLE decreased sensation to light touch, otherwise light touch intact to other extremities (tested by RN)  Coordination:  Finger-to-nose without dysmetria bilaterally, no dysmetria to bilateral lower extremities out of proportion weakness  Station/Gait:  unable to test due to telestroke    Dysphagia Screen  Per Nursing    Stroke Scales    NIHSS  1a. Level of Consciousness 0-->Alert, keenly responsive   1b. LOC Questions 0-->Answers both questions correctly   1c. LOC Commands 0-->Performs both tasks correctly   2.   Best Gaze 0-->Normal   3.   Visual 0-->No visual loss   4.   Facial Palsy 0-->Normal symmetrical movements   5a. Motor Arm, Left 0-->No drift, limb holds 90 (or 45) degrees for full 10 secs   5b. Motor Arm, Right 0-->No drift, limb holds 90 (or 45) degrees for full 10 secs   6a. Motor Leg, Left 0-->No drift, leg holds 30 degree position for full 5 secs   6b. Motor Leg, right 1-->Drift, leg falls by the end of the 5-sec period but does not hit bed   7.   Limb Ataxia 0-->Absent   8.   Sensory (S) 1-->Mild-to-moderate sensory loss, patient feels pinprick is less sharp or is dull on the affected side, or there is a loss of superficial pain with pinprick, but patient is aware of being touched (slightly less sensation RLE)   9.   Best Language (S) 0-->No aphasia, normal (son says maybe slightly slowed/hesitant speech)   10. Dysarthria 0-->Normal   11. Extinction and Inattention  0-->No abnormality   Total 2 (03/28/23 1059)       Modified Dillon Score (Pre-morbid)  (S) 3 (uses walker, lives at independent living facility) - (S) Moderate disability.  Requires some help, but able to walk  unassisted. (uses walker, lives at independent living facility)     Imaging  I personally reviewed all imaging; relevant findings per HPI.    Labs Data   CBC  Recent Labs   Lab 03/27/23  0722 03/26/23  0952   WBC 7.0 7.8   RBC 3.89 4.27   HGB 12.1 13.0   HCT 36.6 39.8    184     Basic Metabolic Panel   Recent Labs   Lab 03/28/23  0606 03/27/23  1151 03/27/23  0722 03/27/23  0627 03/26/23  1746 03/26/23  1721 03/26/23  0952   NA  --   --  143  --   --   --  145   POTASSIUM 3.6  --  3.7  --   --  3.7 3.2*   CHLORIDE  --   --  106  --   --   --  104   CO2  --   --  28  --   --   --  32*   BUN  --   --  14  --   --   --  13   CR  --   --  0.73  --   --   --  0.77   GLC  --  140* 105 122*   < >  --  105   LIZBETH  --   --  9.0  --   --   --  9.7    < > = values in this interval not displayed.     Liver Panel  No results for input(s): PROTTOTAL, ALBUMIN, BILITOTAL, ALKPHOS, AST, ALT, BILIDIRECT in the last 168 hours.  INR    Recent Labs   Lab Test 03/26/23  0952   INR 0.98      Lipid Profile    Recent Labs   Lab Test 03/26/23  0952 04/21/22  0909   CHOL 184 218*   HDL 73 69   LDL 95 127   TRIG 79 112     A1C    Recent Labs   Lab Test 03/26/23  0952   A1C 5.5     Troponin    Recent Labs   Lab 03/26/23  0952   TROPONINI 0.02          Stroke Consult Data Data   Telestroke Service Details  (for non-emergent stroke consult with tele)  Video start time 03/28/23   1100   Video end time 03/28/23   1132   Type of service telemedicine diagnostic assessment of acute neurological changes   Reason telemedicine is appropriate patient requires assessment with a specialist for diagnosis and treatment of neurological symptoms   Mode of transmission secure interactive audio and video communication per Delilah   Originating site (patient location) [facility]    Distant site (provider location) [facility]     I have personally spent a total of 70 minutes providing care today,  time spent in reviewing medical records and reviewing tests, examining the patient and obtaining history, coordination of care, and discussion with the patient and/or family regarding diagnostic results, prognosis, symptom management, risks and benefits of management options, and development of plan of care. Greater than 50% was spent in counseling and coordination of care.

## 2023-03-28 NOTE — PROGRESS NOTES
MRI lumbar and Brain reviewed. Patient has signs of acute infarct left hemisphere with severe left M1 stenosis. Neurology aware and has prescribed Plavix and aspirin. MRI lumbar is similar to 11/2022 which shows multilevel spondylosis with mild trefoil stenosis L3-5 as well as similar foraminal stenosis most significant at L5-S1 on the right. When seen earlier today, one of patient's complaints was right ankle/foot  weakness/coordination, denying any other symptoms of L5 radiculopathy without focal weakness noted by provider. Given her acute infarct, would focus her cares on this vs lumbar stenosis that is unchanged since November. We can revisit lumbar stenosis, L5 radiculopathy (shown on OP EMG) at a later date. If patient develops weakness with strength exam that is unexplained by acute infarct, would revisit sooner.     CINDY Guevara  [facility]surgery  O: [phone]

## 2023-03-28 NOTE — PROGRESS NOTES
NEUROSURGERY CONSULTATION NOTE    Ramila Liao   [address]   [facility] [address]  87 year old female  Admission Date/Time: 3/26/2023 10:00 AM  Primary Care Provider: [facility] Attending Physician: Chavez Ortiz MD    Neurosurgery was asked to see this patient by Chavez Ortiz MD for evaluation of right leg weakness.     PROBLEM LIST:  Principal Problem:    Stenosis of left middle cerebral artery  Active Problems:    Hypokalemia    Right leg weakness    Stroke-like symptoms    Edema, lower extremity       CONSULTATION ASSESSMENT AND PLAN: 87 year old female known to our service, previously seen by Dr. Pena on 3/1 for positive EMG, right L5 radic presented to [facility] on 3/26 with 2-3 day hx of slurred speech and right leg weakness. At the time of previous appointment she was asymptomatic of L5 radiculopathy (previous pain from the buttock, outer thigh and to the knee).  Patient tells me her current symptoms essentially started at the same time.     We reviewed her results from yesterday. Brain MRI demonstrates acute multifocal infarcts in the left basal ganglia and corona radiata. Treatment of this will take priority for this admission by the neurology team. We reviewed her lumbar MRI which demonstrates stable mild canal stenosis L3-5 with moderate to severe right foraminal stenosis at L5-S1. I do not appreciate weakness in the R foot beyond some difference in coordination. This could also be related to her stroke rather than her radiculopathy. Recommend continuing PT/OT, pain control. Would not offer surgery at this time given her acute stroke. We would like to see her as an outpatient in 4-6wks to discuss symptoms. For now, treatment of her stroke will take priority and we will sign off.    Plan:  1. Treatment of stroke by neurology team  2. Follow up in the outpatient neurosurgery clinic with [facility] on [facility]ena clinic day to review symptoms in 4-6wks. No new imaging needed at this  time.   3. We will sign off. Please contact our team with questions or concerns      HPI:  87 year old female presents when her family noted 2-3 days of slurred speech as well as right leg weakness, falls, trouble ambulating. Kanwal tells me she did not really notice the slurred speech but that both speech and leg started at approximately the same time frame. She also notes however intermittent difficulty with the right leg ongoing for some time. She currently feels her symptoms are about the same, no better or worse. Follows with lymphedema specialists for the swelling in her feet. No back pain, right leg pain, she just describes weakness in the right foot and ankle.       SUBJECTIVE:  Kanwal is doing fine. She continues to endorse some weakness in her R foot only. Arm on the R is fine. Some soreness of the leg.     PHYSICAL EXAM:   Constitutional: BP (!) 154/74 (BP Location: Right arm)   Pulse 84   Temp 98.1  F (36.7  C) (Oral)   Resp 20   Wt 136 lb (61.7 kg)   LMP  (LMP Unknown)   SpO2 96%   BMI 24.87 kg/m       Mental Status: A & O in no acute distress.  Affect is appropriate.  Speech is fluent.  Recent and remote memory are intact.  Attention span and concentration are normal.    General: seated in recliner in NAD     Normal bulk and tone all muscle groups of upper and lower extremities.    Strength: seemingly some command delay in the right ankle, no discernable change in strength from right to left leg from knee down.   Strength is 5/5 throughout both lower extremities, all planes     Sensory: Sensation intact bilaterally to light touch throughout both lower extremities      IMAGING:  I personally reviewed all radiographic images   EXAM: MR BRAIN W/O and W CONTRAST  LOCATION: [facility]  DATE/TIME: 3/27/2023 4:24 PM     INDICATION: Slurred speech and right lower extremity weakness.  COMPARISON: CT/CTA dated 03/26/2023.  CONTRAST: Gadavist 6mL  TECHNIQUE: Routine multiplanar  multisequence head MRI without and with intravenous contrast.     FINDINGS:  INTRACRANIAL CONTENTS: Multifocal foci of restricted diffusion within the left basal ganglia and corona radiata with corresponding low ADC values and FLAIR hyperintensity. No significant associated mass effect. No acute intracranial hemorrhage or   extra-axial fluid collection. A punctate focus of signal loss on the T2*weighted images within the left frontal operculum without corresponding signal abnormality on the FLAIR or T1-weighted images, most consistent with a chronic microhemorrhage versus   calcification. No intracranial mass or abnormal enhancement. Scattered foci of T2 prolongation within the bilateral cerebral white matter, nonspecific although most likely the sequela of mild chronic microvascular ischemia. Normal ventricles and sulci   for age. Normal position of the cerebellar tonsils.      SELLA: Within technique limitations, no gross abnormality.     OSSEOUS STRUCTURES/SOFT TISSUES: Normal marrow signal. The major intracranial vascular flow voids are maintained.      ORBITS: Within technique and susceptibility artifact limitations, no acute abnormality. Status post bilateral cataract extraction.     SINUSES/MASTOIDS: No significant paranasal sinus or mastoid mucosal disease.                                                                        IMPRESSION:  1.  Acute multifocal infarcts involving the left basal ganglia and corona radiata without significant mass effect or acute intracranial hemorrhage.  2.  Mild presumed chronic microvascular ischemic change.     These findings were discussed over the phone with Dr. Ortiz on 03/27/2023 at 1739 CST.      EXAM: MR LUMBAR SPINE W/O CONTRAST  LOCATION: [facility]  DATE/TIME: 3/27/2023 4:24 PM     INDICATION: Right lower extremity weakness. Bilateral L5-S1 impingement on CT. Low back pain.  COMPARISON: CT dated 03/26/2023 and MRI dated  11/07/2022.  TECHNIQUE: Routine Lumbar Spine MRI without IV contrast.     FINDINGS:   Nomenclature is based on 5 lumbar type vertebral bodies. Unchanged alignment with 1 mm stepwise grade 1 retrolisthesis at L2-L4 and 5 mm grade 1 anterolisthesis at L5-S1, degenerative. No pars interarticularis defect. Maintained vertebral body heights.   Mild intervertebral disc height loss and desiccation throughout the visualized spine, most notably at L5-S1. No suspicious bone marrow signal intensity. Improved but persistent previously seen mild Modic type I degenerative change at L5-S1 on the right.   Mildly increased intra-articular fluid signal and small extradural synovial cysts at the bilateral L5-S1 facet joints. Unremarkable conus medullaris terminating at L1. Unremarkable cauda equina nerve roots with the exception for crowding at L3-L5   secondary to spinal canal stenosis, as described below. Similar intrahepatic and extrahepatic biliary ductal dilatation as compared to CT from 02/28/2023. Unremarkable visualized bony pelvis.     T12-L1: A similar disc bulge with a superimposed right subarticular/foraminal disc protrusion without significant spinal canal or foraminal stenosis. Mild right-sided facet arthrosis.     L1-L2: A similar disc bulge, mild hypertrophy of the ligamenta flava, and mild bilateral facet arthrosis without significant spinal canal or foraminal stenosis.     L2-L3: A similar disc bulge, hypertrophy of the ligamenta flava, and facet arthrosis without significant spinal canal or foraminal stenosis.      L3-L4: A similar disc bulge with superimposed bilateral foraminal/far lateral disc protrusions which, along with facet arthrosis and ligamentum flavum hypertrophy, contributes to similar mild trefoil-type spinal canal and bilateral foraminal stenosis. Of   note, similar mass effect upon the descending L4 nerve roots, greater on the right.     L4-L5: A similar disc bulge, hypertrophy of the ligamenta  flava, and facet arthrosis with unchanged mild trefoil-type spinal canal stenosis. Similar mild to moderate right foraminal stenosis. No significant left foraminal narrowing.     L5-S1: Unchanged anterolisthesis with uncovering and bulging of the intervertebral disc and advanced facet arthrosis contributing to similar moderate to severe right and mild to moderate left foraminal stenosis. No significant spinal canal stenosis.                                                                      IMPRESSION:  1.  No significant change in multilevel spondylosis since 11/07/2022 with similar mild trefoil-type spinal canal stenosis at L3-L5.  2.  Varying degrees of similar foraminal stenosis, worst and moderate to severe at L5-S1 on the right, mild to moderate on the left. See above for details.      (non critical care) I spent more than 70 minutes in this apt, examining the pt, reviewing the scans, reviewing notes from chart, discussing treatment options with risks and benefits and coordinating care. >50 % clinic time was spent in face to face counseling and coordinating care    Trinidad HENDERSON  [facility]surgery  O. [phone]

## 2023-03-28 NOTE — PLAN OF CARE
Physical Therapy Discharge Summary    Reason for therapy discharge:    Discharged to transitional care facility.    Progress towards therapy goal(s). See goals on Care Plan in [facility] electronic health record for goal details.  Goals not met.  Barriers to achieving goals:   discharge from facility.    Therapy recommendation(s):    Continued therapy is recommended.  Rationale/Recommendations:  Continue PT at TCU as pt is progressing towards goals..

## 2023-03-28 NOTE — PLAN OF CARE
Problem: Plan of Care - These are the overarching goals to be used throughout the patient stay.    Goal: Optimal Comfort and Wellbeing  Outcome: Progressing   Goal Outcome Evaluation:       Pt alert and oriented X 4. Pt remains on tele, has been  NSR. Occasionally tachycardic between 101-107).  Pt ambulates with A x1 with her home walker. Pt reported mild chest pain, denies N/V, headache. VSS. Noc Hospitalist Dr. Comer notified, stated continue to monitor. Pt appears comfortable. Sleeping between cares.

## 2023-03-28 NOTE — PROGRESS NOTES
Care Management Discharge Note    Discharge Date: 03/28/2023       Discharge Disposition: Transitional Care    Discharge Services: None    Discharge DME: None    Discharge Transportation: agency    Private pay costs discussed: transportation costs    PAS Confirmation Code: [id number] ([id number])  Patient/family educated on Medicare website which has current facility and service quality ratings: yes    Education Provided on the Discharge Plan:    Persons Notified of Discharge Plans: patient  Patient/Family in Agreement with the Plan: yes    Handoff Referral Completed: Yes    Additional Information:  See below         DANIEL Mauro auth submitted for TCU coverage, pending.     Messages left for TCUs    10:45 AM  Natali at [facility] accepted patient for TCU pending auth.     Met with patient and son Carrington. They are agreeable. Patient says she has a tele stroke appointment today at 1445.     Discussed transport and patient is agreeable to wheelchair ride and is aware of potential cost.     11:48 AM  Received verbal auth from Luis King     [id number]    11:54 AM  Spoke to Natali at [facility]. She no longer has bed available     12:18 PM  Natali at [facility] called and they can accept patient today     [id number] Auth#    2:54 PM  Updated daughter in law Syed of 1715 ride time. She will update rest of the family. Patient is agreeable. Updated Natali at [facility] of ride time.     Nurse updated

## 2023-03-28 NOTE — PROGRESS NOTES
Occupational Therapy Discharge Summary    Reason for therapy discharge:    Discharged to transitional care facility.    Progress towards therapy goal(s). See goals on Care Plan in [facility] electronic health record for goal details.  Goals partially met.  Barriers to achieving goals:   discharge from facility.    Therapy recommendation(s):    Continued therapy is recommended.  Rationale/Recommendations:  To increase indep with trsfs using her 4WW and increase indep with ADLs.  R L/E weak - needs reminders to pick her R foot up with each step.  Needs reminders to use her brakes on her walker.

## 2023-03-28 NOTE — PLAN OF CARE
Problem: Sensory Impairment  Goal: Compensation for Sensory Deficit  Outcome: Progressing     Problem: Stroke, Ischemic (Includes Transient Ischemic Attack)  Goal: Optimal Coping  Outcome: Progressing  Goal: Optimal Cerebral Tissue Perfusion  Outcome: Progressing  Intervention: Protect and Optimize Cerebral Perfusion  Recent Flowsheet Documentation  Taken 3/28/2023 1300 by Marii Quispe RN  Sensory Stimulation Regulation: care clustered  Taken 3/28/2023 0800 by Marii Quispe RN  Sensory Stimulation Regulation: care clustered  Goal: Optimal Cognitive Function  Outcome: Progressing  Intervention: Optimize Cognitive Function  Recent Flowsheet Documentation  Taken 3/28/2023 1300 by Marii Quispe RN  Sensory Stimulation Regulation: care clustered  Reorientation Measures:   clock in view   familiar social contact encouraged  Environment Familiarity/Consistency: daily routine followed  Taken 3/28/2023 0800 by Marii Quispe RN  Sensory Stimulation Regulation: care clustered  Reorientation Measures:   clock in view   familiar social contact encouraged  Environment Familiarity/Consistency: daily routine followed  Goal: Improved Communication Skills  Outcome: Progressing  Intervention: Optimize Communication Skills  Recent Flowsheet Documentation  Taken 3/28/2023 1300 by Marii Quispe RN  Communication Enhancement Strategies:   call light answered in person   extra time allowed for response   one-step directions provided   repetition utilized  Taken 3/28/2023 0800 by Marii Quispe RN  Communication Enhancement Strategies:   call light answered in person   extra time allowed for response   one-step directions provided   repetition utilized  Goal: Optimal Functional Ability  Outcome: Progressing  Intervention: Optimize Functional Ability  Recent Flowsheet Documentation  Taken 3/28/2023 1300 by Marii Quispe RN  Activity Management:   activity adjusted per tolerance   up in chair  Taken 3/28/2023  0800 by Marii Quispe RN  Activity Management:   activity adjusted per tolerance   up in chair  Goal: Effective Oxygenation and Ventilation  Outcome: Progressing  Goal: Improved Sensorimotor Function  Outcome: Progressing  Intervention: Optimize Range of Motion, Motor Control and Function  Recent Flowsheet Documentation  Taken 3/28/2023 1300 by Marii Quispe RN  Positioning/Transfer Devices:   pillows   in use  Taken 3/28/2023 0800 by Marii Quispe RN  Positioning/Transfer Devices:   pillows   in use  Goal: Optimal Eating and Swallowing without Aspiration  Outcome: Progressing  Goal: Effective Urinary Elimination  Outcome: Progressing     Pt is A & O x 4 & endorses mild pain to the L. Ribs; No analgesics given at this time. Noted slight weakness & decreased movement/sensation to RLE. Tele running NSR w/ prolonged QTc. Tele-stroke appointment at 1100 completed & recommending a holter monitor. Neuro-surg recommending to F/U outpatient for lumbar stenosis. Continuing on the K+ protocol which will be a lab redraw in the AM. Voiding spontaneously. BS present & passing flatus. Saline locked. VSS. Tolerating a low saturated fat & Na+ <2400 mg diet. Up w/ an assist of 1, walker & gait belt. Anticipating discharge pending TCU placement.     VIRGINIA Vivar  Shift: 0700 - 1530

## 2023-03-28 NOTE — PLAN OF CARE
Goal Outcome Evaluation:      Plan of Care Reviewed With: patient    Overall Patient Progress: improvingOverall Patient Progress: improving       Problem: Plan of Care - These are the overarching goals to be used throughout the patient stay.    Goal: Absence of Hospital-Acquired Illness or Injury  Intervention: Identify and Manage Fall Risk  Recent Flowsheet Documentation  Taken 3/27/2023 1830 by Estuardo Moreno RN  Safety Promotion/Fall Prevention:    activity supervised    assistive device/personal items within reach    clutter free environment maintained    fall prevention program maintained    increased rounding and observation    mobility aid in reach    nonskid shoes/slippers when out of bed    patient and family education    room near nurse's station    safety round/check completed  Admitted from ED this evening. On arrival, airway clear and patent  Breathing spontaneously on room air  Blood pressure within range, warm to touch, perfusing okay,   Alert and oriented, gcs 15,  skin in dry and intact,  has bilateral leg edema, has got right hand iv which is saline locked.  On low fat diet,  slurred speech improved.  On K protocol and is for Telemetry. She requires assist of one to ambulate with her home walker and pt is fully continent.   PT/OT recommended TCU.

## 2023-03-28 NOTE — PHARMACY-CONSULT NOTE
Pharmacy Consult to evaluate for medication related stroke core measures    Ramila Liao, 87 year old female admitted for ischemic cva on 3/26/2023.    Thrombolytic was not given because of Time from onset contraindications    VTE Prophylaxis SCDs /PCDs placed on 3/28/2023, as appropriate prior to end of hospital day 2.    Antithrombotic: aspirin and clopidogrel started on 3/26, as appropriate by end of hospital day 2. Continue antithrombotic therapy on discharge to meet quality measures, unless contraindicated.    Anticoagulation if history of A-fib/flutter: Patient does not have history of A-fib/flutter - anticoagulation not required for medication related stroke core measures.     LDL Cholesterol Calculated   Date Value Ref Range Status   03/26/2023 95 <=129 mg/dL Final       Patient currently receiving Lipitor (atorvastatin) continue statin on discharge to meet quality measures, unless contraindicated.    Recommendations: None at this time    Thank you for the consult.    Richard Estrada [facility] 3/28/2023 1:21 PM

## 2023-03-29 ENCOUNTER — TRANSITIONAL CARE UNIT VISIT (OUTPATIENT)
Dept: GERIATRICS | Facility: CLINIC | Age: 88
End: 2023-03-29
Payer: COMMERCIAL

## 2023-03-29 ENCOUNTER — LAB REQUISITION (OUTPATIENT)
Dept: LAB | Facility: CLINIC | Age: 88
End: 2023-03-29
Payer: COMMERCIAL

## 2023-03-29 VITALS
RESPIRATION RATE: 20 BRPM | BODY MASS INDEX: 23.74 KG/M2 | HEART RATE: 87 BPM | DIASTOLIC BLOOD PRESSURE: 64 MMHG | WEIGHT: 129 LBS | OXYGEN SATURATION: 96 % | TEMPERATURE: 97.7 F | SYSTOLIC BLOOD PRESSURE: 153 MMHG | HEIGHT: 62 IN

## 2023-03-29 DIAGNOSIS — R53.81 PHYSICAL DECONDITIONING: ICD-10-CM

## 2023-03-29 DIAGNOSIS — Z51.81 ENCOUNTER FOR THERAPEUTIC DRUG LEVEL MONITORING: ICD-10-CM

## 2023-03-29 DIAGNOSIS — R29.90 STROKE-LIKE SYMPTOMS: ICD-10-CM

## 2023-03-29 DIAGNOSIS — R29.898 RIGHT LEG WEAKNESS: Primary | ICD-10-CM

## 2023-03-29 PROCEDURE — 99310 SBSQ NF CARE HIGH MDM 45: CPT | Performed by: NURSE PRACTITIONER

## 2023-03-29 NOTE — PROGRESS NOTES
Patient discharged to [facility] at 1715 today. EMS staff picked patient up with wheel chair. Report given to RN recieving patient at [facility]. Daughter was present at discharge.

## 2023-03-29 NOTE — PROGRESS NOTES
[facility] GERIATRIC SERVICES       Patient Ramila Liao  MRN: [id number]        Reason for Visit     Chief Complaint   Patient presents with     RECHECK     INITIAL       Code Status     DNR/I    Assessment     Left basal ganglion/ corona radiata CVA  l middle cerebral a stenosis  Recurrent falls  L 5-S1 severe foraminal stenosis  Lymphedema  HLD  Generalized weakness    Plan     Pt is admitted to TCU for strengthening and rehab.  Admitted to the hospital with acute CVA  Continue aspirin and Plavix as ordered by neurology x 90d then resume asa 81mg .  Lipitor added for management of hyperlipidemia  Recheck labs  Continue with PT/OT  Also noted to have severe L5-S1 foraminal stenosis  Neurosurgery was consulted  Given tizanidine  Has gabapentin  Denies pain at rest  Outpatient follow-up in 6 to 8 weeks once her stroke issues are addressed  On Lipitor for hyperlipidemia  Has lymphedema and takes hydrochlorothiazide in very low doses  teds ordered  Labs done today were reviewed and within limits of normal  Son and daughter-in-law present at bedside and updated they are concerned about her falls risk and cognitive status also  Care conference to be scheduled soon      History     Patient is a very pleasant 87 year old female who is admitted to TCU  Patient was admitted with acute CVA affecting the left basal ganglia and corona radiator  Neurology was consulted  Started on aspirin as well as Plavix  Lipitor started for hyperlipidemia  Discharged to the TCU for PT and OT  Also noted to have severe L5-S1 foraminal stenosis  Neurosurgery has recommended outpatient follow-up for this      Past Medical & Surgical History     PAST MEDICAL HISTORY:   Past Medical History:   Diagnosis Date     Allergic rhinitis 08/02/2005     Contact dermatitis and other eczema, due to unspecified cause 07/30/2001     Edema, lower extremity      Low back pain 08/02/2016     Mixed hyperlipidemia 08/09/2006      PAST SURGICAL HISTORY:   has a  past surgical history that includes Hysterectomy; Cholecystectomy; and Cataract Extraction, Bilateral.      Past Social History     Reviewed,  reports that she has never smoked. She has never used smokeless tobacco. She reports current alcohol use. She reports that she does not use drugs.    Family History     Reviewed, and family history includes Coronary Artery Disease in her father and mother.    Medication List     Current Outpatient Medications   Medication     aspirin (ASA) 325 MG tablet     atorvastatin (LIPITOR) 40 MG tablet     clopidogrel (PLAVIX) 75 MG tablet     gabapentin (NEURONTIN) 300 MG capsule     hydrochlorothiazide (HYDRODIURIL) 12.5 MG tablet     magnesium citrate 100 mg Tab     multivitamin with minerals (THERA-M) 9 mg iron-400 mcg Tab tablet     tiZANidine (ZANAFLEX) 2 MG tablet     No current facility-administered medications for this visit.          Allergies     Allergies   Allergen Reactions     Naproxen Hives     Tolerates ibuprofen       Review of Systems   A comprehensive review of 14 systems was done. Pertinent findings noted here and in history of present illness. All the rest negative.  Constitutional: Negative.  Negative for fever, chills, she has  activity change, appetite change and fatigue.   HENT: Negative for congestion and facial swelling.    Eyes: Negative for photophobia, redness and visual disturbance.   Post stroke has been noticing some visual issues but denies any concern  Respiratory: Negative for cough and chest tightness.    Cardiovascular: Negative for chest pain, palpitations and has chronic leg swelling.   Gastrointestinal: Negative for nausea, diarrhea, constipation, blood in stool and abdominal distention.   Genitourinary: Negative.    Musculoskeletal: Negative. Denies any pain  Not sure about falls   Skin: Negative.    Neurological: Negative for dizziness, tremors, syncope, now has right-sided weakness, light-headedness and headaches.   Hematological: Does not  "bruise/bleed easily.   Psychiatric/Behavioral: Negative.  Some recall issues noted      Physical Exam   BP (!) 146/64   Pulse 114   Temp 98.4  F (36.9  C)   Resp 20   Ht 1.575 m (5' 2\")   Wt 58.5 kg (129 lb)   LMP  (LMP Unknown)   SpO2 96%   BMI 23.59 kg/m       Constitutional: Oriented to person, place, and time and appears well-developed.   HEENT:  Normocephalic and atraumatic.  Eyes: Conjunctivae and EOM are normal. Pupils are equal, round, and reactive to light. No discharge.  No scleral icterus. Nose normal. Mouth/Throat: Oropharynx is clear and moist. No oropharyngeal exudate.    NECK: Normal range of motion. Neck supple. No JVD present. No tracheal deviation present. No thyromegaly present.   CARDIOVASCULAR: Normal rate, regular rhythm and intact distal pulses.  Exam reveals no gallop and no friction rub.  Systolic murmur present.  PULMONARY: Effort normal and breath sounds normal. No respiratory distress.No Wheezing or rales.  ABDOMEN: Soft. Bowel sounds are normal. No distension and no mass.  There is no tenderness. There is no rebound and no guarding. No HSM.  MUSCULOSKELETAL: Normal range of motion. Mild kyphosis, no tenderness.  LYMPH NODES: Has no cervical, supraclavicular, axillary and groin adenopathy.   NEUROLOGICAL: Alert and oriented to person, place, and time. No cranial nerve deficit.  Normal muscle tone. Coordination normal.   Right-sided weakness leg greater than arm  Strength 4 / 5  GENITOURINARY: Deferred exam.  SKIN: Skin is warm and dry. No rash noted. No erythema. No pallor.   EXTREMITIES: No cyanosis, no clubbing, 2+ lower extremity edema. No Deformity.  PSYCHIATRIC: Normal mood, affect and behavior.  Some recall issues of recent events      Lab Results     Recent Results (from the past 240 hour(s))   Basic metabolic panel    Collection Time: 03/26/23  9:52 AM   Result Value Ref Range    Sodium 145 136 - 145 mmol/L    Potassium 3.2 (L) 3.5 - 5.0 mmol/L    Chloride 104 98 - 107 " mmol/L    Carbon Dioxide (CO2) 32 (H) 22 - 31 mmol/L    Anion Gap 9 5 - 18 mmol/L    Urea Nitrogen 13 8 - 28 mg/dL    Creatinine 0.77 0.60 - 1.10 mg/dL    Calcium 9.7 8.5 - 10.5 mg/dL    Glucose 105 70 - 125 mg/dL    GFR Estimate 75 >60 mL/min/1.73m2   Troponin I    Collection Time: 03/26/23  9:52 AM   Result Value Ref Range    Troponin I 0.02 0.00 - 0.29 ng/mL   INR    Collection Time: 03/26/23  9:52 AM   Result Value Ref Range    INR 0.98 0.85 - 1.15   PTT    Collection Time: 03/26/23  9:52 AM   Result Value Ref Range    aPTT 27 22 - 38 Seconds   Magnesium    Collection Time: 03/26/23  9:52 AM   Result Value Ref Range    Magnesium 2.1 1.8 - 2.6 mg/dL   B-Type Natriuretic Peptide (Former[facility])    Collection Time: 03/26/23  9:52 AM   Result Value Ref Range     0 - 167 pg/mL   CBC with platelets and differential    Collection Time: 03/26/23  9:52 AM   Result Value Ref Range    WBC Count 7.8 4.0 - 11.0 10e3/uL    RBC Count 4.27 3.80 - 5.20 10e6/uL    Hemoglobin 13.0 11.7 - 15.7 g/dL    Hematocrit 39.8 35.0 - 47.0 %    MCV 93 78 - 100 fL    MCH 30.4 26.5 - 33.0 pg    MCHC 32.7 31.5 - 36.5 g/dL    RDW 13.2 10.0 - 15.0 %    Platelet Count 184 150 - 450 10e3/uL    % Neutrophils 63 %    % Lymphocytes 27 %    % Monocytes 7 %    % Eosinophils 2 %    % Basophils 1 %    % Immature Granulocytes 0 %    NRBCs per 100 WBC 0 <1 /100    Absolute Neutrophils 4.9 1.6 - 8.3 10e3/uL    Absolute Lymphocytes 2.1 0.8 - 5.3 10e3/uL    Absolute Monocytes 0.6 0.0 - 1.3 10e3/uL    Absolute Eosinophils 0.2 0.0 - 0.7 10e3/uL    Absolute Basophils 0.1 0.0 - 0.2 10e3/uL    Absolute Immature Granulocytes 0.0 <=0.4 10e3/uL    Absolute NRBCs 0.0 10e3/uL   Hemoglobin A1c    Collection Time: 03/26/23  9:52 AM   Result Value Ref Range    Hemoglobin A1C 5.5 <5.7 %   Lipid panel reflex to direct LDL: Non-fasting    Collection Time: 03/26/23  9:52 AM   Result Value Ref Range    Cholesterol 184 <=199 mg/dL    Triglycerides 79 <=149 mg/dL    Direct  Measure HDL 73 >=50 mg/dL    LDL Cholesterol Calculated 95 <=129 mg/dL   ECG 12-LEAD WITH MUSE (LHE)    Collection Time: 03/26/23  1:40 PM   Result Value Ref Range    Systolic Blood Pressure 138 mmHg    Diastolic Blood Pressure 64 mmHg    Ventricular Rate 76 BPM    Atrial Rate 76 BPM    WY Interval 126 ms    QRS Duration 84 ms     ms    QTc 481 ms    P Axis 78 degrees    R AXIS 62 degrees    T Axis 69 degrees    Interpretation ECG       Sinus rhythm  Possible Left atrial enlargement  Borderline ECG  When compared with ECG of 28-FEB-2023 20:38,  No significant change was found  Confirmed by SEE ED PROVIDER NOTE FOR, ECG INTERPRETATION ([id number]),  GM DE JESUS ([id number]) on 3/26/2023 1:44:40 PM     Potassium    Collection Time: 03/26/23  5:21 PM   Result Value Ref Range    Potassium 3.7 3.5 - 5.0 mmol/L   Glucose by meter    Collection Time: 03/26/23  5:46 PM   Result Value Ref Range    GLUCOSE BY METER POCT 120 (H) 70 - 99 mg/dL   Glucose by meter    Collection Time: 03/26/23  9:17 PM   Result Value Ref Range    GLUCOSE BY METER POCT 99 70 - 99 mg/dL   Glucose by meter    Collection Time: 03/27/23  6:27 AM   Result Value Ref Range    GLUCOSE BY METER POCT 122 (H) 70 - 99 mg/dL   CBC with platelets    Collection Time: 03/27/23  7:22 AM   Result Value Ref Range    WBC Count 7.0 4.0 - 11.0 10e3/uL    RBC Count 3.89 3.80 - 5.20 10e6/uL    Hemoglobin 12.1 11.7 - 15.7 g/dL    Hematocrit 36.6 35.0 - 47.0 %    MCV 94 78 - 100 fL    MCH 31.1 26.5 - 33.0 pg    MCHC 33.1 31.5 - 36.5 g/dL    RDW 13.3 10.0 - 15.0 %    Platelet Count 166 150 - 450 10e3/uL   Basic metabolic panel    Collection Time: 03/27/23  7:22 AM   Result Value Ref Range    Sodium 143 136 - 145 mmol/L    Potassium 3.7 3.5 - 5.0 mmol/L    Chloride 106 98 - 107 mmol/L    Carbon Dioxide (CO2) 28 22 - 31 mmol/L    Anion Gap 9 5 - 18 mmol/L    Urea Nitrogen 14 8 - 28 mg/dL    Creatinine 0.73 0.60 - 1.10 mg/dL    Calcium 9.0 8.5 - 10.5 mg/dL    Glucose 105  70 - 125 mg/dL    GFR Estimate 80 >60 mL/min/1.73m2   Glucose by meter    Collection Time: 03/27/23 11:51 AM   Result Value Ref Range    GLUCOSE BY METER POCT 140 (H) 70 - 99 mg/dL   Potassium    Collection Time: 03/28/23  6:06 AM   Result Value Ref Range    Potassium 3.6 3.5 - 5.0 mmol/L   Extra Purple Top Tube    Collection Time: 03/28/23  6:06 AM   Result Value Ref Range    Hold Specimen [facility]    Basic metabolic panel    Collection Time: 03/30/23  9:45 AM   Result Value Ref Range    Sodium 137 136 - 145 mmol/L    Potassium 4.0 3.4 - 5.3 mmol/L    Chloride 100 98 - 107 mmol/L    Carbon Dioxide (CO2) 23 22 - 29 mmol/L    Anion Gap 14 7 - 15 mmol/L    Urea Nitrogen 19.9 8.0 - 23.0 mg/dL    Creatinine 0.86 0.51 - 0.95 mg/dL    Calcium 9.0 8.8 - 10.2 mg/dL    Glucose 133 (H) 70 - 99 mg/dL    GFR Estimate 66 >60 mL/min/1.73m2   CBC with platelets    Collection Time: 03/30/23  9:45 AM   Result Value Ref Range    WBC Count 7.2 4.0 - 11.0 10e3/uL    RBC Count 4.13 3.80 - 5.20 10e6/uL    Hemoglobin 12.5 11.7 - 15.7 g/dL    Hematocrit 39.9 35.0 - 47.0 %    MCV 97 78 - 100 fL    MCH 30.3 26.5 - 33.0 pg    MCHC 31.3 (L) 31.5 - 36.5 g/dL    RDW 13.2 10.0 - 15.0 %    Platelet Count 164 150 - 450 10e3/uL   Magnesium    Collection Time: 03/30/23  9:45 AM   Result Value Ref Range    Magnesium 2.0 1.7 - 2.3 mg/dL             Electronically signed by    Summer Tyler MD

## 2023-03-29 NOTE — LETTER
3/29/2023        RE: Ramila Liao  [address]  [facility] [address]         HEALTH GERIATRIC SERVICES  Chief Complaint   Patient presents with     [facility] F/U     [facility] Record Number:  [id number]  Place of Service where encounter took place:  [facility] ([facility]) [[id number]]  Code Status:  No CPR- Do NOT Intubate     HISTORY:      HPI:  Ramila Liao  is 87 year old (1935) undergoing physical and occupational therapy. She is with [facility] signficant for low back pain, hyperlipidemia, lower extremity edema.  Presented with intermittent slurred speech, falls and right lower extremity weakness for 48 hours.  Evaluated per stroke protocols.  CTA head and neck showed no acute CVA but did show severe stenosis at the left M1 segment.  CT lumbar spine shows L5-S1 severe right and moderate left foraminal stenosis.  MRI was down on the day of admission.  Admitted.    Excerpted from records  1.  Neurologic.   Neuro telemetry consulted.  Started on aspirin.  Symptoms had resolved spontaneously.  Following day MRI brain shows acute left basal ganglia and corona radiata infarcts.  Plavix and aspirin recommended by telemetry neurology.   Started on Lipitor for hyperlipidemia not at goal. Clinically feeling better.  Slurred speech has resolved.  PT OT recommending TCU.       2.  Spine. MRI lumbar spine shows stable L5-S1 foraminal stenosis severe on the right moderate on the left compared to previous study.  Neurosurgery recommends addressing stroke issues prior to spine.  Follow-up with neurosurgery in 6 to 8 weeks.    Today she was seen to review vital signs labs, routine visit and to establish care.  She denied chest pain shortness of breath cough congestion constipation or diarrhea.  She is able to move all extremities.  Currently wearing a Holter monitor.  She does get seen at the lymphedema clinic however she had no lower extremity edema except for slight edema left foot.  Her  appointment will be put on hold during TCU.  She also sees spine PT on a weekly basis and those will also be put on hold during her TCU however she does want to go to her April 7, 2023 appointment with spine because she gets an injection due to chronic back pain    ALLERGIES:Naproxen    PAST MEDICAL HISTORY:   Past Medical History:   Diagnosis Date     Allergic rhinitis 08/02/2005     Contact dermatitis and other eczema, due to unspecified cause 07/30/2001     Edema, lower extremity      Low back pain 08/02/2016     Mixed hyperlipidemia 08/09/2006       PAST SURGICAL HISTORY:   has a past surgical history that includes Hysterectomy; Cholecystectomy; and Cataract Extraction, Bilateral.    FAMILY HISTORY: family history includes Coronary Artery Disease in her father and mother.    SOCIAL HISTORY:  reports that she has never smoked. She has never used smokeless tobacco. She reports current alcohol use. She reports that she does not use drugs.    ROS:  Constitutional: Negative for activity change, appetite change, fatigue and fever.   HENT: Negative for congestion.    Respiratory: Negative for cough, shortness of breath and wheezing.    Cardiovascular: Negative for chest pain and leg swelling.   Gastrointestinal: Negative for abdominal distention, abdominal pain, constipation, diarrhea and nausea.   Genitourinary: Negative for dysuria.   Musculoskeletal: Negative for arthralgia.  Positive for back pain.   Skin: Negative for color change and wound.   Neurological: Negative for dizziness.   Psychiatric/Behavioral: Negative for agitation, behavioral problems and confusion.     Physical Exam:  Constitutional:       Appearance: Patient is well-developed.   HENT:      Head: Normocephalic.   Eyes:      Conjunctiva/sclera: Conjunctivae normal.   Neck:      Musculoskeletal: Normal range of motion.   Cardiovascular:      Rate and Rhythm: Normal rate and regular rhythm.      Heart sounds: Normal heart sounds. No murmur.  "  Pulmonary:      Effort: No respiratory distress.      Breath sounds: Normal breath sounds. No wheezing or rales.   Abdominal:      General: Bowel sounds are normal. There is no distension.      Palpations: Abdomen is soft.      Tenderness: There is no abdominal tenderness.   Musculoskeletal:       Normal range of motion.     Skin:General:        Skin is warm.   Neurological:         Mental Status: Patient is alert and oriented to person, place, and time.   Psychiatric:         Behavior: Behavior normal.     Vitals:BP (!) 153/64   Pulse 87   Temp 97.7  F (36.5  C)   Resp 20   Ht 1.575 m (5' 2\")   Wt 58.5 kg (129 lb)   LMP  (LMP Unknown)   SpO2 96%   BMI 23.59 kg/m   and Body mass index is 23.59 kg/m .    Lab/Diagnostic data:   Recent Results (from the past 240 hour(s))   Basic metabolic panel    Collection Time: 03/26/23  9:52 AM   Result Value Ref Range    Sodium 145 136 - 145 mmol/L    Potassium 3.2 (L) 3.5 - 5.0 mmol/L    Chloride 104 98 - 107 mmol/L    Carbon Dioxide (CO2) 32 (H) 22 - 31 mmol/L    Anion Gap 9 5 - 18 mmol/L    Urea Nitrogen 13 8 - 28 mg/dL    Creatinine 0.77 0.60 - 1.10 mg/dL    Calcium 9.7 8.5 - 10.5 mg/dL    Glucose 105 70 - 125 mg/dL    GFR Estimate 75 >60 mL/min/1.73m2   Troponin I    Collection Time: 03/26/23  9:52 AM   Result Value Ref Range    Troponin I 0.02 0.00 - 0.29 ng/mL   INR    Collection Time: 03/26/23  9:52 AM   Result Value Ref Range    INR 0.98 0.85 - 1.15   PTT    Collection Time: 03/26/23  9:52 AM   Result Value Ref Range    aPTT 27 22 - 38 Seconds   Magnesium    Collection Time: 03/26/23  9:52 AM   Result Value Ref Range    Magnesium 2.1 1.8 - 2.6 mg/dL   B-Type Natriuretic Peptide ([facility] Only)    Collection Time: 03/26/23  9:52 AM   Result Value Ref Range     0 - 167 pg/mL   CBC with platelets and differential    Collection Time: 03/26/23  9:52 AM   Result Value Ref Range    WBC Count 7.8 4.0 - 11.0 10e3/uL    RBC Count 4.27 3.80 - 5.20 10e6/uL    " Hemoglobin 13.0 11.7 - 15.7 g/dL    Hematocrit 39.8 35.0 - 47.0 %    MCV 93 78 - 100 fL    MCH 30.4 26.5 - 33.0 pg    MCHC 32.7 31.5 - 36.5 g/dL    RDW 13.2 10.0 - 15.0 %    Platelet Count 184 150 - 450 10e3/uL    % Neutrophils 63 %    % Lymphocytes 27 %    % Monocytes 7 %    % Eosinophils 2 %    % Basophils 1 %    % Immature Granulocytes 0 %    NRBCs per 100 WBC 0 <1 /100    Absolute Neutrophils 4.9 1.6 - 8.3 10e3/uL    Absolute Lymphocytes 2.1 0.8 - 5.3 10e3/uL    Absolute Monocytes 0.6 0.0 - 1.3 10e3/uL    Absolute Eosinophils 0.2 0.0 - 0.7 10e3/uL    Absolute Basophils 0.1 0.0 - 0.2 10e3/uL    Absolute Immature Granulocytes 0.0 <=0.4 10e3/uL    Absolute NRBCs 0.0 10e3/uL   Hemoglobin A1c    Collection Time: 03/26/23  9:52 AM   Result Value Ref Range    Hemoglobin A1C 5.5 <5.7 %   Lipid panel reflex to direct LDL: Non-fasting    Collection Time: 03/26/23  9:52 AM   Result Value Ref Range    Cholesterol 184 <=199 mg/dL    Triglycerides 79 <=149 mg/dL    Direct Measure HDL 73 >=50 mg/dL    LDL Cholesterol Calculated 95 <=129 mg/dL   ECG 12-LEAD WITH MUSE (LHE)    Collection Time: 03/26/23  1:40 PM   Result Value Ref Range    Systolic Blood Pressure 138 mmHg    Diastolic Blood Pressure 64 mmHg    Ventricular Rate 76 BPM    Atrial Rate 76 BPM    CT Interval 126 ms    QRS Duration 84 ms     ms    QTc 481 ms    P Axis 78 degrees    R AXIS 62 degrees    T Axis 69 degrees    Interpretation ECG       Sinus rhythm  Possible Left atrial enlargement  Borderline ECG  When compared with ECG of 28-FEB-2023 20:38,  No significant change was found  Confirmed by SEE ED PROVIDER NOTE FOR, ECG INTERPRETATION ([id number]),  GM DE JESUS ([id number]) on 3/26/2023 1:44:40 PM     Potassium    Collection Time: 03/26/23  5:21 PM   Result Value Ref Range    Potassium 3.7 3.5 - 5.0 mmol/L   Glucose by meter    Collection Time: 03/26/23  5:46 PM   Result Value Ref Range    GLUCOSE BY METER POCT 120 (H) 70 - 99 mg/dL   Glucose by meter     Collection Time: 03/26/23  9:17 PM   Result Value Ref Range    GLUCOSE BY METER POCT 99 70 - 99 mg/dL   Glucose by meter    Collection Time: 03/27/23  6:27 AM   Result Value Ref Range    GLUCOSE BY METER POCT 122 (H) 70 - 99 mg/dL   CBC with platelets    Collection Time: 03/27/23  7:22 AM   Result Value Ref Range    WBC Count 7.0 4.0 - 11.0 10e3/uL    RBC Count 3.89 3.80 - 5.20 10e6/uL    Hemoglobin 12.1 11.7 - 15.7 g/dL    Hematocrit 36.6 35.0 - 47.0 %    MCV 94 78 - 100 fL    MCH 31.1 26.5 - 33.0 pg    MCHC 33.1 31.5 - 36.5 g/dL    RDW 13.3 10.0 - 15.0 %    Platelet Count 166 150 - 450 10e3/uL   Basic metabolic panel    Collection Time: 03/27/23  7:22 AM   Result Value Ref Range    Sodium 143 136 - 145 mmol/L    Potassium 3.7 3.5 - 5.0 mmol/L    Chloride 106 98 - 107 mmol/L    Carbon Dioxide (CO2) 28 22 - 31 mmol/L    Anion Gap 9 5 - 18 mmol/L    Urea Nitrogen 14 8 - 28 mg/dL    Creatinine 0.73 0.60 - 1.10 mg/dL    Calcium 9.0 8.5 - 10.5 mg/dL    Glucose 105 70 - 125 mg/dL    GFR Estimate 80 >60 mL/min/1.73m2   Glucose by meter    Collection Time: 03/27/23 11:51 AM   Result Value Ref Range    GLUCOSE BY METER POCT 140 (H) 70 - 99 mg/dL   Potassium    Collection Time: 03/28/23  6:06 AM   Result Value Ref Range    Potassium 3.6 3.5 - 5.0 mmol/L   Extra Purple Top Tube    Collection Time: 03/28/23  6:06 AM   Result Value Ref Range    Hold Specimen [facility]        MEDICATIONS:     Review of your medicines          Accurate as of March 29, 2023  9:22 AM. If you have any questions, ask your nurse or doctor.            CONTINUE these medicines which have NOT CHANGED      Dose / Directions   aspirin 325 MG tablet  Commonly known as: ASA  Used for: Cerebrovascular accident (CVA) due to stenosis of left middle cerebral artery (H)      Dose: 325 mg  Take 1 tablet (325 mg) by mouth daily  Refills: 0     atorvastatin 40 MG tablet  Commonly known as: LIPITOR  Used for: Cerebrovascular accident (CVA) due to stenosis of left middle  cerebral artery (H), Mixed hyperlipidemia      Dose: 40 mg  Take 1 tablet (40 mg) by mouth every evening  Refills: 0     clopidogrel 75 MG tablet  Commonly known as: PLAVIX  Used for: Cerebrovascular accident (CVA) due to stenosis of left middle cerebral artery (H)      Dose: 75 mg  Take 1 tablet (75 mg) by mouth daily  Refills: 0     gabapentin 300 MG capsule  Commonly known as: NEURONTIN  Used for: Herpes zoster without complication      Dose: 300 mg  Take 1 capsule (300 mg) by mouth 3 times daily  Quantity: 90 capsule  Refills: 1     hydrochlorothiazide 12.5 MG tablet  Commonly known as: HYDRODIURIL  Used for: Pain and swelling of lower extremity, unspecified laterality      Dose: 12.5 mg  Take 1 tablet (12.5 mg) by mouth daily  Quantity: 90 tablet  Refills: 0     Magnesium Citrate 100 MG Tabs      Dose: 100 mg  Take 100 mg by mouth 3 times daily as needed  Refills: 0     multivitamin w/minerals tablet      Dose: 1 tablet  Take 1 tablet by mouth every morning  Refills: 0     tiZANidine 2 MG tablet  Commonly known as: ZANAFLEX  Used for: Lumbar radicular pain      TAKE 1 TABLET BY MOUTH THREE TIMES DAILY AS NEEDED FOR PAIN OR SPASMS  Quantity: 90 tablet  Refills: 0            ASSESSMENT/PLAN  Encounter Diagnoses   Name Primary?     Right leg weakness Yes     Stroke-like symptoms      Physical deconditioning      Right leg weakness PT OT     Left-sided stroke continue PT OT, follow-up with neurology 6 to 8 weeks, aspirin 325 mg 1 time a day for 90 days, atorvastatin 40 mg at bedtime, Plavix    Pain management gabapentin, tizanidine 2 mg every 8 hours as needed    Lower extremity swelling currently on 12.5 mg Hydro chlorothiazide currently just minimal swelling left foot    electronically signed by: Cheri Byrne CNP        Sincerely,        Cheri Byrne CNP

## 2023-03-29 NOTE — PROGRESS NOTES
[facility] GERIATRIC SERVICES  Chief Complaint   Patient presents with     [facility] F/U     [facility] Record Number:  [id number]  Place of Service where encounter took place:  [facility] ([facility]) [[id number]]  Code Status:  No CPR- Do NOT Intubate     HISTORY:      HPI:  Ramila Liao  is 87 year old (1935) undergoing physical and occupational therapy. She is with [facility] signficant for low back pain, hyperlipidemia, lower extremity edema.  Presented with intermittent slurred speech, falls and right lower extremity weakness for 48 hours.  Evaluated per stroke protocols.  CTA head and neck showed no acute CVA but did show severe stenosis at the left M1 segment.  CT lumbar spine shows L5-S1 severe right and moderate left foraminal stenosis.  MRI was down on the day of admission.  Admitted.    Excerpted from records  1.  Neurologic.   Neuro telemetry consulted.  Started on aspirin.  Symptoms had resolved spontaneously.  Following day MRI brain shows acute left basal ganglia and corona radiata infarcts.  Plavix and aspirin recommended by telemetry neurology.   Started on Lipitor for hyperlipidemia not at goal. Clinically feeling better.  Slurred speech has resolved.  PT OT recommending TCU.       2.  Spine. MRI lumbar spine shows stable L5-S1 foraminal stenosis severe on the right moderate on the left compared to previous study.  Neurosurgery recommends addressing stroke issues prior to spine.  Follow-up with neurosurgery in 6 to 8 weeks.    Today she was seen to review vital signs labs, routine visit and to establish care.  She denied chest pain shortness of breath cough congestion constipation or diarrhea.  She is able to move all extremities.  Currently wearing a Holter monitor.  She does get seen at the lymphedema clinic however she had no lower extremity edema except for slight edema left foot.  Her appointment will be put on hold during TCU.  She also sees spine PT on a weekly basis and those will  also be put on hold during her TCU however she does want to go to her April 7, 2023 appointment with spine because she gets an injection due to chronic back pain    ALLERGIES:Naproxen    PAST MEDICAL HISTORY:   Past Medical History:   Diagnosis Date     Allergic rhinitis 08/02/2005     Contact dermatitis and other eczema, due to unspecified cause 07/30/2001     Edema, lower extremity      Low back pain 08/02/2016     Mixed hyperlipidemia 08/09/2006       PAST SURGICAL HISTORY:   has a past surgical history that includes Hysterectomy; Cholecystectomy; and Cataract Extraction, Bilateral.    FAMILY HISTORY: family history includes Coronary Artery Disease in her father and mother.    SOCIAL HISTORY:  reports that she has never smoked. She has never used smokeless tobacco. She reports current alcohol use. She reports that she does not use drugs.    ROS:  Constitutional: Negative for activity change, appetite change, fatigue and fever.   HENT: Negative for congestion.    Respiratory: Negative for cough, shortness of breath and wheezing.    Cardiovascular: Negative for chest pain and leg swelling.   Gastrointestinal: Negative for abdominal distention, abdominal pain, constipation, diarrhea and nausea.   Genitourinary: Negative for dysuria.   Musculoskeletal: Negative for arthralgia.  Positive for back pain.   Skin: Negative for color change and wound.   Neurological: Negative for dizziness.   Psychiatric/Behavioral: Negative for agitation, behavioral problems and confusion.     Physical Exam:  Constitutional:       Appearance: Patient is well-developed.   HENT:      Head: Normocephalic.   Eyes:      Conjunctiva/sclera: Conjunctivae normal.   Neck:      Musculoskeletal: Normal range of motion.   Cardiovascular:      Rate and Rhythm: Normal rate and regular rhythm.      Heart sounds: Normal heart sounds. No murmur.   Pulmonary:      Effort: No respiratory distress.      Breath sounds: Normal breath sounds. No wheezing or  "rales.   Abdominal:      General: Bowel sounds are normal. There is no distension.      Palpations: Abdomen is soft.      Tenderness: There is no abdominal tenderness.   Musculoskeletal:       Normal range of motion.     Skin:General:        Skin is warm.   Neurological:         Mental Status: Patient is alert and oriented to person, place, and time.   Psychiatric:         Behavior: Behavior normal.     Vitals:BP (!) 153/64   Pulse 87   Temp 97.7  F (36.5  C)   Resp 20   Ht 1.575 m (5' 2\")   Wt 58.5 kg (129 lb)   LMP  (LMP Unknown)   SpO2 96%   BMI 23.59 kg/m   and Body mass index is 23.59 kg/m .    Lab/Diagnostic data:   Recent Results (from the past 240 hour(s))   Basic metabolic panel    Collection Time: 03/26/23  9:52 AM   Result Value Ref Range    Sodium 145 136 - 145 mmol/L    Potassium 3.2 (L) 3.5 - 5.0 mmol/L    Chloride 104 98 - 107 mmol/L    Carbon Dioxide (CO2) 32 (H) 22 - 31 mmol/L    Anion Gap 9 5 - 18 mmol/L    Urea Nitrogen 13 8 - 28 mg/dL    Creatinine 0.77 0.60 - 1.10 mg/dL    Calcium 9.7 8.5 - 10.5 mg/dL    Glucose 105 70 - 125 mg/dL    GFR Estimate 75 >60 mL/min/1.73m2   Troponin I    Collection Time: 03/26/23  9:52 AM   Result Value Ref Range    Troponin I 0.02 0.00 - 0.29 ng/mL   INR    Collection Time: 03/26/23  9:52 AM   Result Value Ref Range    INR 0.98 0.85 - 1.15   PTT    Collection Time: 03/26/23  9:52 AM   Result Value Ref Range    aPTT 27 22 - 38 Seconds   Magnesium    Collection Time: 03/26/23  9:52 AM   Result Value Ref Range    Magnesium 2.1 1.8 - 2.6 mg/dL   B-Type Natriuretic Peptide ([facility] Only)    Collection Time: 03/26/23  9:52 AM   Result Value Ref Range     0 - 167 pg/mL   CBC with platelets and differential    Collection Time: 03/26/23  9:52 AM   Result Value Ref Range    WBC Count 7.8 4.0 - 11.0 10e3/uL    RBC Count 4.27 3.80 - 5.20 10e6/uL    Hemoglobin 13.0 11.7 - 15.7 g/dL    Hematocrit 39.8 35.0 - 47.0 %    MCV 93 78 - 100 fL    MCH 30.4 26.5 - 33.0 pg "    MCHC 32.7 31.5 - 36.5 g/dL    RDW 13.2 10.0 - 15.0 %    Platelet Count 184 150 - 450 10e3/uL    % Neutrophils 63 %    % Lymphocytes 27 %    % Monocytes 7 %    % Eosinophils 2 %    % Basophils 1 %    % Immature Granulocytes 0 %    NRBCs per 100 WBC 0 <1 /100    Absolute Neutrophils 4.9 1.6 - 8.3 10e3/uL    Absolute Lymphocytes 2.1 0.8 - 5.3 10e3/uL    Absolute Monocytes 0.6 0.0 - 1.3 10e3/uL    Absolute Eosinophils 0.2 0.0 - 0.7 10e3/uL    Absolute Basophils 0.1 0.0 - 0.2 10e3/uL    Absolute Immature Granulocytes 0.0 <=0.4 10e3/uL    Absolute NRBCs 0.0 10e3/uL   Hemoglobin A1c    Collection Time: 03/26/23  9:52 AM   Result Value Ref Range    Hemoglobin A1C 5.5 <5.7 %   Lipid panel reflex to direct LDL: Non-fasting    Collection Time: 03/26/23  9:52 AM   Result Value Ref Range    Cholesterol 184 <=199 mg/dL    Triglycerides 79 <=149 mg/dL    Direct Measure HDL 73 >=50 mg/dL    LDL Cholesterol Calculated 95 <=129 mg/dL   ECG 12-LEAD WITH MUSE (LHE)    Collection Time: 03/26/23  1:40 PM   Result Value Ref Range    Systolic Blood Pressure 138 mmHg    Diastolic Blood Pressure 64 mmHg    Ventricular Rate 76 BPM    Atrial Rate 76 BPM    NV Interval 126 ms    QRS Duration 84 ms     ms    QTc 481 ms    P Axis 78 degrees    R AXIS 62 degrees    T Axis 69 degrees    Interpretation ECG       Sinus rhythm  Possible Left atrial enlargement  Borderline ECG  When compared with ECG of 28-FEB-2023 20:38,  No significant change was found  Confirmed by SEE ED PROVIDER NOTE FOR, ECG INTERPRETATION ([id number]),  GM DE JESUS ([id number]) on 3/26/2023 1:44:40 PM     Potassium    Collection Time: 03/26/23  5:21 PM   Result Value Ref Range    Potassium 3.7 3.5 - 5.0 mmol/L   Glucose by meter    Collection Time: 03/26/23  5:46 PM   Result Value Ref Range    GLUCOSE BY METER POCT 120 (H) 70 - 99 mg/dL   Glucose by meter    Collection Time: 03/26/23  9:17 PM   Result Value Ref Range    GLUCOSE BY METER POCT 99 70 - 99 mg/dL   Glucose  by meter    Collection Time: 03/27/23  6:27 AM   Result Value Ref Range    GLUCOSE BY METER POCT 122 (H) 70 - 99 mg/dL   CBC with platelets    Collection Time: 03/27/23  7:22 AM   Result Value Ref Range    WBC Count 7.0 4.0 - 11.0 10e3/uL    RBC Count 3.89 3.80 - 5.20 10e6/uL    Hemoglobin 12.1 11.7 - 15.7 g/dL    Hematocrit 36.6 35.0 - 47.0 %    MCV 94 78 - 100 fL    MCH 31.1 26.5 - 33.0 pg    MCHC 33.1 31.5 - 36.5 g/dL    RDW 13.3 10.0 - 15.0 %    Platelet Count 166 150 - 450 10e3/uL   Basic metabolic panel    Collection Time: 03/27/23  7:22 AM   Result Value Ref Range    Sodium 143 136 - 145 mmol/L    Potassium 3.7 3.5 - 5.0 mmol/L    Chloride 106 98 - 107 mmol/L    Carbon Dioxide (CO2) 28 22 - 31 mmol/L    Anion Gap 9 5 - 18 mmol/L    Urea Nitrogen 14 8 - 28 mg/dL    Creatinine 0.73 0.60 - 1.10 mg/dL    Calcium 9.0 8.5 - 10.5 mg/dL    Glucose 105 70 - 125 mg/dL    GFR Estimate 80 >60 mL/min/1.73m2   Glucose by meter    Collection Time: 03/27/23 11:51 AM   Result Value Ref Range    GLUCOSE BY METER POCT 140 (H) 70 - 99 mg/dL   Potassium    Collection Time: 03/28/23  6:06 AM   Result Value Ref Range    Potassium 3.6 3.5 - 5.0 mmol/L   Extra Purple Top Tube    Collection Time: 03/28/23  6:06 AM   Result Value Ref Range    Hold Specimen [facility]        MEDICATIONS:     Review of your medicines          Accurate as of March 29, 2023  9:22 AM. If you have any questions, ask your nurse or doctor.            CONTINUE these medicines which have NOT CHANGED      Dose / Directions   aspirin 325 MG tablet  Commonly known as: ASA  Used for: Cerebrovascular accident (CVA) due to stenosis of left middle cerebral artery (H)      Dose: 325 mg  Take 1 tablet (325 mg) by mouth daily  Refills: 0     atorvastatin 40 MG tablet  Commonly known as: LIPITOR  Used for: Cerebrovascular accident (CVA) due to stenosis of left middle cerebral artery (H), Mixed hyperlipidemia      Dose: 40 mg  Take 1 tablet (40 mg) by mouth every evening  Refills:  0     clopidogrel 75 MG tablet  Commonly known as: PLAVIX  Used for: Cerebrovascular accident (CVA) due to stenosis of left middle cerebral artery (H)      Dose: 75 mg  Take 1 tablet (75 mg) by mouth daily  Refills: 0     gabapentin 300 MG capsule  Commonly known as: NEURONTIN  Used for: Herpes zoster without complication      Dose: 300 mg  Take 1 capsule (300 mg) by mouth 3 times daily  Quantity: 90 capsule  Refills: 1     hydrochlorothiazide 12.5 MG tablet  Commonly known as: HYDRODIURIL  Used for: Pain and swelling of lower extremity, unspecified laterality      Dose: 12.5 mg  Take 1 tablet (12.5 mg) by mouth daily  Quantity: 90 tablet  Refills: 0     Magnesium Citrate 100 MG Tabs      Dose: 100 mg  Take 100 mg by mouth 3 times daily as needed  Refills: 0     multivitamin w/minerals tablet      Dose: 1 tablet  Take 1 tablet by mouth every morning  Refills: 0     tiZANidine 2 MG tablet  Commonly known as: ZANAFLEX  Used for: Lumbar radicular pain      TAKE 1 TABLET BY MOUTH THREE TIMES DAILY AS NEEDED FOR PAIN OR SPASMS  Quantity: 90 tablet  Refills: 0            ASSESSMENT/PLAN  Encounter Diagnoses   Name Primary?     Right leg weakness Yes     Stroke-like symptoms      Physical deconditioning      Right leg weakness PT OT     Left-sided stroke continue PT OT, follow-up with neurology 6 to 8 weeks, aspirin 325 mg 1 time a day for 90 days, atorvastatin 40 mg at bedtime, Plavix    Pain management gabapentin, tizanidine 2 mg every 8 hours as needed    Lower extremity swelling currently on 12.5 mg Hydro chlorothiazide currently just minimal swelling left foot    electronically signed by: Cheri Byrne CNP

## 2023-03-30 ENCOUNTER — PATIENT OUTREACH (OUTPATIENT)
Dept: CARE COORDINATION | Facility: CLINIC | Age: 88
End: 2023-03-30

## 2023-03-30 ENCOUNTER — TRANSITIONAL CARE UNIT VISIT (OUTPATIENT)
Dept: GERIATRICS | Facility: CLINIC | Age: 88
End: 2023-03-30
Payer: COMMERCIAL

## 2023-03-30 ENCOUNTER — TELEPHONE (OUTPATIENT)
Dept: GERIATRICS | Facility: CLINIC | Age: 88
End: 2023-03-30

## 2023-03-30 VITALS
BODY MASS INDEX: 23.74 KG/M2 | SYSTOLIC BLOOD PRESSURE: 146 MMHG | HEIGHT: 62 IN | RESPIRATION RATE: 20 BRPM | TEMPERATURE: 98.4 F | HEART RATE: 114 BPM | DIASTOLIC BLOOD PRESSURE: 64 MMHG | OXYGEN SATURATION: 96 % | WEIGHT: 129 LBS

## 2023-03-30 DIAGNOSIS — Z86.73 HISTORY OF CVA (CEREBROVASCULAR ACCIDENT): ICD-10-CM

## 2023-03-30 DIAGNOSIS — I66.02 STENOSIS OF LEFT MIDDLE CEREBRAL ARTERY: ICD-10-CM

## 2023-03-30 DIAGNOSIS — M54.41 ACUTE RIGHT-SIDED LOW BACK PAIN WITH RIGHT-SIDED SCIATICA: ICD-10-CM

## 2023-03-30 DIAGNOSIS — R60.0 EDEMA, LOWER EXTREMITY: ICD-10-CM

## 2023-03-30 DIAGNOSIS — E78.2 MIXED HYPERLIPIDEMIA: ICD-10-CM

## 2023-03-30 DIAGNOSIS — R29.898 RIGHT LEG WEAKNESS: Primary | ICD-10-CM

## 2023-03-30 LAB
ANION GAP SERPL CALCULATED.3IONS-SCNC: 14 MMOL/L (ref 7–15)
BUN SERPL-MCNC: 19.9 MG/DL (ref 8–23)
CALCIUM SERPL-MCNC: 9 MG/DL (ref 8.8–10.2)
CHLORIDE SERPL-SCNC: 100 MMOL/L (ref 98–107)
CREAT SERPL-MCNC: 0.86 MG/DL (ref 0.51–0.95)
DEPRECATED HCO3 PLAS-SCNC: 23 MMOL/L (ref 22–29)
ERYTHROCYTE [DISTWIDTH] IN BLOOD BY AUTOMATED COUNT: 13.2 % (ref 10–15)
GFR SERPL CREATININE-BSD FRML MDRD: 66 ML/MIN/1.73M2
GLUCOSE SERPL-MCNC: 133 MG/DL (ref 70–99)
HCT VFR BLD AUTO: 39.9 % (ref 35–47)
HGB BLD-MCNC: 12.5 G/DL (ref 11.7–15.7)
MAGNESIUM SERPL-MCNC: 2 MG/DL (ref 1.7–2.3)
MCH RBC QN AUTO: 30.3 PG (ref 26.5–33)
MCHC RBC AUTO-ENTMCNC: 31.3 G/DL (ref 31.5–36.5)
MCV RBC AUTO: 97 FL (ref 78–100)
PLATELET # BLD AUTO: 164 10E3/UL (ref 150–450)
POTASSIUM SERPL-SCNC: 4 MMOL/L (ref 3.4–5.3)
RBC # BLD AUTO: 4.13 10E6/UL (ref 3.8–5.2)
SODIUM SERPL-SCNC: 137 MMOL/L (ref 136–145)
WBC # BLD AUTO: 7.2 10E3/UL (ref 4–11)

## 2023-03-30 PROCEDURE — 85027 COMPLETE CBC AUTOMATED: CPT | Mod: ORL | Performed by: NURSE PRACTITIONER

## 2023-03-30 PROCEDURE — 83735 ASSAY OF MAGNESIUM: CPT | Mod: ORL | Performed by: NURSE PRACTITIONER

## 2023-03-30 PROCEDURE — 80048 BASIC METABOLIC PNL TOTAL CA: CPT | Mod: ORL | Performed by: NURSE PRACTITIONER

## 2023-03-30 PROCEDURE — P9604 ONE-WAY ALLOW PRORATED TRIP: HCPCS | Mod: ORL | Performed by: NURSE PRACTITIONER

## 2023-03-30 PROCEDURE — 36415 COLL VENOUS BLD VENIPUNCTURE: CPT | Mod: ORL | Performed by: NURSE PRACTITIONER

## 2023-03-30 PROCEDURE — 99305 1ST NF CARE MODERATE MDM 35: CPT | Performed by: FAMILY MEDICINE

## 2023-03-30 NOTE — LETTER
Hand-off  for Care Coordination  What is Care Coordination?  [facility] Care Coordination Services are available to people in complex situations,   for example medical, social or financial. The Care Coordinator, a SW or an RN, works with the   patient and their doctor to determine health goals, obtain resources, achieve outcomes,   and develop plans to coordinate care across settings.      o Patient Name:   Ramila Liao  o Patient :     1935  o Patient PCP:     Susan Sutton NP    o Patient Primary Clinic:   [address] [place], [place] [address]  o [place] [address]  o D/C Facility: _____________________________________________   o TCU Contact Info for questions: ___________________________  o D/C Date:  ______________________________________________  o Follow-up Apt with PCP after TCU D/C:   ____________________  o Other Follow-up Apt s:      _________________________________________  Additional information (concerns, and Home Care, ect ):   __________________________________________________________________  __________________________________________________________________  Care Coordinator to Contact at [place]  Fax to: [phone]  Attn:  Aundrea Saini RN Clinic Care Coordination [facility]  Phone: [phone]

## 2023-03-30 NOTE — PROGRESS NOTES
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     Transition to Facility:              Facility Name: [facility]              Contact name and phone number/fax: [phone]    Plan: RN/SW Care Coordinator will await notification from facility staff informing RN/SW Care Coordinator of patient's discharge plans/needs. RN/SW Care Coordinator will review chart and outreach to facility staff every 4 weeks and as needed.

## 2023-03-30 NOTE — LETTER
3/30/2023        RE: Ramila Liao  [address]  [facility] [address]        [facility] GERIATRIC SERVICES       Patient Ramila Liao  MRN: [id number]        Reason for Visit     Chief Complaint   Patient presents with     RECHECK     INITIAL       Code Status     DNR/I    Assessment     Left basal ganglion/ corona radiata CVA  l middle cerebral a stenosis  Recurrent falls  L 5-S1 severe foraminal stenosis  Lymphedema  HLD  Generalized weakness    Plan     Pt is admitted to TCU for strengthening and rehab.  Admitted to the hospital with acute CVA  Continue aspirin and Plavix as ordered by neurology x 90d then resume asa 81mg .  Lipitor added for management of hyperlipidemia  Recheck labs  Continue with PT/OT  Also noted to have severe L5-S1 foraminal stenosis  Neurosurgery was consulted  Given tizanidine  Has gabapentin  Denies pain at rest  Outpatient follow-up in 6 to 8 weeks once her stroke issues are addressed  On Lipitor for hyperlipidemia  Has lymphedema and takes hydrochlorothiazide in very low doses  teds ordered  Labs done today were reviewed and within limits of normal  Son and daughter-in-law present at bedside and updated they are concerned about her falls risk and cognitive status also  Care conference to be scheduled soon      History     Patient is a very pleasant 87 year old female who is admitted to TCU  Patient was admitted with acute CVA affecting the left basal ganglia and corona radiator  Neurology was consulted  Started on aspirin as well as Plavix  Lipitor started for hyperlipidemia  Discharged to the TCU for PT and OT  Also noted to have severe L5-S1 foraminal stenosis  Neurosurgery has recommended outpatient follow-up for this      Past Medical & Surgical History     PAST MEDICAL HISTORY:   Past Medical History:   Diagnosis Date     Allergic rhinitis 08/02/2005     Contact dermatitis and other eczema, due to unspecified cause 07/30/2001     Edema, lower extremity      Low  back pain 08/02/2016     Mixed hyperlipidemia 08/09/2006      PAST SURGICAL HISTORY:   has a past surgical history that includes Hysterectomy; Cholecystectomy; and Cataract Extraction, Bilateral.      Past Social History     Reviewed,  reports that she has never smoked. She has never used smokeless tobacco. She reports current alcohol use. She reports that she does not use drugs.    Family History     Reviewed, and family history includes Coronary Artery Disease in her father and mother.    Medication List     Current Outpatient Medications   Medication     aspirin (ASA) 325 MG tablet     atorvastatin (LIPITOR) 40 MG tablet     clopidogrel (PLAVIX) 75 MG tablet     gabapentin (NEURONTIN) 300 MG capsule     hydrochlorothiazide (HYDRODIURIL) 12.5 MG tablet     magnesium citrate 100 mg Tab     multivitamin with minerals (THERA-M) 9 mg iron-400 mcg Tab tablet     tiZANidine (ZANAFLEX) 2 MG tablet     No current facility-administered medications for this visit.          Allergies     Allergies   Allergen Reactions     Naproxen Hives     Tolerates ibuprofen       Review of Systems   A comprehensive review of 14 systems was done. Pertinent findings noted here and in history of present illness. All the rest negative.  Constitutional: Negative.  Negative for fever, chills, she has  activity change, appetite change and fatigue.   HENT: Negative for congestion and facial swelling.    Eyes: Negative for photophobia, redness and visual disturbance.   Post stroke has been noticing some visual issues but denies any concern  Respiratory: Negative for cough and chest tightness.    Cardiovascular: Negative for chest pain, palpitations and has chronic leg swelling.   Gastrointestinal: Negative for nausea, diarrhea, constipation, blood in stool and abdominal distention.   Genitourinary: Negative.    Musculoskeletal: Negative. Denies any pain  Not sure about falls   Skin: Negative.    Neurological: Negative for dizziness, tremors,  "syncope, now has right-sided weakness, light-headedness and headaches.   Hematological: Does not bruise/bleed easily.   Psychiatric/Behavioral: Negative.  Some recall issues noted      Physical Exam   BP (!) 146/64   Pulse 114   Temp 98.4  F (36.9  C)   Resp 20   Ht 1.575 m (5' 2\")   Wt 58.5 kg (129 lb)   LMP  (LMP Unknown)   SpO2 96%   BMI 23.59 kg/m       Constitutional: Oriented to person, place, and time and appears well-developed.   HEENT:  Normocephalic and atraumatic.  Eyes: Conjunctivae and EOM are normal. Pupils are equal, round, and reactive to light. No discharge.  No scleral icterus. Nose normal. Mouth/Throat: Oropharynx is clear and moist. No oropharyngeal exudate.    NECK: Normal range of motion. Neck supple. No JVD present. No tracheal deviation present. No thyromegaly present.   CARDIOVASCULAR: Normal rate, regular rhythm and intact distal pulses.  Exam reveals no gallop and no friction rub.  Systolic murmur present.  PULMONARY: Effort normal and breath sounds normal. No respiratory distress.No Wheezing or rales.  ABDOMEN: Soft. Bowel sounds are normal. No distension and no mass.  There is no tenderness. There is no rebound and no guarding. No HSM.  MUSCULOSKELETAL: Normal range of motion. Mild kyphosis, no tenderness.  LYMPH NODES: Has no cervical, supraclavicular, axillary and groin adenopathy.   NEUROLOGICAL: Alert and oriented to person, place, and time. No cranial nerve deficit.  Normal muscle tone. Coordination normal.   Right-sided weakness leg greater than arm  Strength 4 / 5  GENITOURINARY: Deferred exam.  SKIN: Skin is warm and dry. No rash noted. No erythema. No pallor.   EXTREMITIES: No cyanosis, no clubbing, 2+ lower extremity edema. No Deformity.  PSYCHIATRIC: Normal mood, affect and behavior.  Some recall issues of recent events      Lab Results     Recent Results (from the past 240 hour(s))   Basic metabolic panel    Collection Time: 03/26/23  9:52 AM   Result Value Ref Range "    Sodium 145 136 - 145 mmol/L    Potassium 3.2 (L) 3.5 - 5.0 mmol/L    Chloride 104 98 - 107 mmol/L    Carbon Dioxide (CO2) 32 (H) 22 - 31 mmol/L    Anion Gap 9 5 - 18 mmol/L    Urea Nitrogen 13 8 - 28 mg/dL    Creatinine 0.77 0.60 - 1.10 mg/dL    Calcium 9.7 8.5 - 10.5 mg/dL    Glucose 105 70 - 125 mg/dL    GFR Estimate 75 >60 mL/min/1.73m2   Troponin I    Collection Time: 03/26/23  9:52 AM   Result Value Ref Range    Troponin I 0.02 0.00 - 0.29 ng/mL   INR    Collection Time: 03/26/23  9:52 AM   Result Value Ref Range    INR 0.98 0.85 - 1.15   PTT    Collection Time: 03/26/23  9:52 AM   Result Value Ref Range    aPTT 27 22 - 38 Seconds   Magnesium    Collection Time: 03/26/23  9:52 AM   Result Value Ref Range    Magnesium 2.1 1.8 - 2.6 mg/dL   B-Type Natriuretic Peptide ([facility])    Collection Time: 03/26/23  9:52 AM   Result Value Ref Range     0 - 167 pg/mL   CBC with platelets and differential    Collection Time: 03/26/23  9:52 AM   Result Value Ref Range    WBC Count 7.8 4.0 - 11.0 10e3/uL    RBC Count 4.27 3.80 - 5.20 10e6/uL    Hemoglobin 13.0 11.7 - 15.7 g/dL    Hematocrit 39.8 35.0 - 47.0 %    MCV 93 78 - 100 fL    MCH 30.4 26.5 - 33.0 pg    MCHC 32.7 31.5 - 36.5 g/dL    RDW 13.2 10.0 - 15.0 %    Platelet Count 184 150 - 450 10e3/uL    % Neutrophils 63 %    % Lymphocytes 27 %    % Monocytes 7 %    % Eosinophils 2 %    % Basophils 1 %    % Immature Granulocytes 0 %    NRBCs per 100 WBC 0 <1 /100    Absolute Neutrophils 4.9 1.6 - 8.3 10e3/uL    Absolute Lymphocytes 2.1 0.8 - 5.3 10e3/uL    Absolute Monocytes 0.6 0.0 - 1.3 10e3/uL    Absolute Eosinophils 0.2 0.0 - 0.7 10e3/uL    Absolute Basophils 0.1 0.0 - 0.2 10e3/uL    Absolute Immature Granulocytes 0.0 <=0.4 10e3/uL    Absolute NRBCs 0.0 10e3/uL   Hemoglobin A1c    Collection Time: 03/26/23  9:52 AM   Result Value Ref Range    Hemoglobin A1C 5.5 <5.7 %   Lipid panel reflex to direct LDL: Non-fasting    Collection Time: 03/26/23  9:52 AM    Result Value Ref Range    Cholesterol 184 <=199 mg/dL    Triglycerides 79 <=149 mg/dL    Direct Measure HDL 73 >=50 mg/dL    LDL Cholesterol Calculated 95 <=129 mg/dL   ECG 12-LEAD WITH MUSE ([facility])    Collection Time: 03/26/23  1:40 PM   Result Value Ref Range    Systolic Blood Pressure 138 mmHg    Diastolic Blood Pressure 64 mmHg    Ventricular Rate 76 BPM    Atrial Rate 76 BPM    RI Interval 126 ms    QRS Duration 84 ms     ms    QTc 481 ms    P Axis 78 degrees    R AXIS 62 degrees    T Axis 69 degrees    Interpretation ECG       Sinus rhythm  Possible Left atrial enlargement  Borderline ECG  When compared with ECG of 28-FEB-2023 20:38,  No significant change was found  Confirmed by SEE ED PROVIDER NOTE FOR, ECG INTERPRETATION ([id number]),  GM DE JESUS ([id number]) on 3/26/2023 1:44:40 PM     Potassium    Collection Time: 03/26/23  5:21 PM   Result Value Ref Range    Potassium 3.7 3.5 - 5.0 mmol/L   Glucose by meter    Collection Time: 03/26/23  5:46 PM   Result Value Ref Range    GLUCOSE BY METER POCT 120 (H) 70 - 99 mg/dL   Glucose by meter    Collection Time: 03/26/23  9:17 PM   Result Value Ref Range    GLUCOSE BY METER POCT 99 70 - 99 mg/dL   Glucose by meter    Collection Time: 03/27/23  6:27 AM   Result Value Ref Range    GLUCOSE BY METER POCT 122 (H) 70 - 99 mg/dL   CBC with platelets    Collection Time: 03/27/23  7:22 AM   Result Value Ref Range    WBC Count 7.0 4.0 - 11.0 10e3/uL    RBC Count 3.89 3.80 - 5.20 10e6/uL    Hemoglobin 12.1 11.7 - 15.7 g/dL    Hematocrit 36.6 35.0 - 47.0 %    MCV 94 78 - 100 fL    MCH 31.1 26.5 - 33.0 pg    MCHC 33.1 31.5 - 36.5 g/dL    RDW 13.3 10.0 - 15.0 %    Platelet Count 166 150 - 450 10e3/uL   Basic metabolic panel    Collection Time: 03/27/23  7:22 AM   Result Value Ref Range    Sodium 143 136 - 145 mmol/L    Potassium 3.7 3.5 - 5.0 mmol/L    Chloride 106 98 - 107 mmol/L    Carbon Dioxide (CO2) 28 22 - 31 mmol/L    Anion Gap 9 5 - 18 mmol/L    Urea Nitrogen 14  8 - 28 mg/dL    Creatinine 0.73 0.60 - 1.10 mg/dL    Calcium 9.0 8.5 - 10.5 mg/dL    Glucose 105 70 - 125 mg/dL    GFR Estimate 80 >60 mL/min/1.73m2   Glucose by meter    Collection Time: 03/27/23 11:51 AM   Result Value Ref Range    GLUCOSE BY METER POCT 140 (H) 70 - 99 mg/dL   Potassium    Collection Time: 03/28/23  6:06 AM   Result Value Ref Range    Potassium 3.6 3.5 - 5.0 mmol/L   Extra Purple Top Tube    Collection Time: 03/28/23  6:06 AM   Result Value Ref Range    Hold Specimen [facility]    Basic metabolic panel    Collection Time: 03/30/23  9:45 AM   Result Value Ref Range    Sodium 137 136 - 145 mmol/L    Potassium 4.0 3.4 - 5.3 mmol/L    Chloride 100 98 - 107 mmol/L    Carbon Dioxide (CO2) 23 22 - 29 mmol/L    Anion Gap 14 7 - 15 mmol/L    Urea Nitrogen 19.9 8.0 - 23.0 mg/dL    Creatinine 0.86 0.51 - 0.95 mg/dL    Calcium 9.0 8.8 - 10.2 mg/dL    Glucose 133 (H) 70 - 99 mg/dL    GFR Estimate 66 >60 mL/min/1.73m2   CBC with platelets    Collection Time: 03/30/23  9:45 AM   Result Value Ref Range    WBC Count 7.2 4.0 - 11.0 10e3/uL    RBC Count 4.13 3.80 - 5.20 10e6/uL    Hemoglobin 12.5 11.7 - 15.7 g/dL    Hematocrit 39.9 35.0 - 47.0 %    MCV 97 78 - 100 fL    MCH 30.3 26.5 - 33.0 pg    MCHC 31.3 (L) 31.5 - 36.5 g/dL    RDW 13.2 10.0 - 15.0 %    Platelet Count 164 150 - 450 10e3/uL   Magnesium    Collection Time: 03/30/23  9:45 AM   Result Value Ref Range    Magnesium 2.0 1.7 - 2.3 mg/dL             Electronically signed by    Summer Tyler MD                             Sincerely,        JAMSHID Barreto

## 2023-03-30 NOTE — TELEPHONE ENCOUNTER
[facility] Geriatrics Lab Note     Provider: Summer Tyler MD  Facility: [facility]  Facility Type:  TCU    Allergies   Allergen Reactions     Naproxen Hives     Tolerates ibuprofen       Labs Reviewed by provider: Heme 2, BMP, Mg     Verbal Order/Direction given by Provider: No new orders.      Provider giving Order:  Summer Tyler MD    Verbal Order given to: Afa([phone])    Tyson Eller RN

## 2023-03-31 ENCOUNTER — TELEPHONE (OUTPATIENT)
Dept: GERIATRICS | Facility: CLINIC | Age: 88
End: 2023-03-31
Payer: COMMERCIAL

## 2023-03-31 NOTE — TELEPHONE ENCOUNTER
[facility] Triage Nurse Telephone Encounter    Provider: Summer Tyler MD  Facility: [facility]  Facility Type:  TCU    Caller: Beth  Call Back Number: [phone]    Allergies:    Allergies   Allergen Reactions     Naproxen Hives     Tolerates ibuprofen        Reason for call: Nurse is reporting that patient tested positive for Covid as of yesterday evening, however symptoms began on 3/29/23.  Patient currently experiencing a runny nose, congestion, cough, and fatigue.  Patient is requesting antiviral treatment.  After reviewing patient's meds, the pharmacist recommends Molnupiravir.      Verbal Order/Direction given by Provider: Molnupiravir 800mg Q 12 hours x 5 days.      Provider giving Order:  Summer Tyler MD    Verbal Order given to: Mona Eller RN

## 2023-04-11 ENCOUNTER — TRANSITIONAL CARE UNIT VISIT (OUTPATIENT)
Dept: GERIATRICS | Facility: CLINIC | Age: 88
End: 2023-04-11
Payer: COMMERCIAL

## 2023-04-11 VITALS
SYSTOLIC BLOOD PRESSURE: 145 MMHG | WEIGHT: 125 LBS | OXYGEN SATURATION: 96 % | DIASTOLIC BLOOD PRESSURE: 64 MMHG | TEMPERATURE: 97.7 F | HEIGHT: 62 IN | HEART RATE: 94 BPM | RESPIRATION RATE: 18 BRPM | BODY MASS INDEX: 23 KG/M2

## 2023-04-11 DIAGNOSIS — E78.2 MIXED HYPERLIPIDEMIA: ICD-10-CM

## 2023-04-11 DIAGNOSIS — I66.02 STENOSIS OF LEFT MIDDLE CEREBRAL ARTERY: ICD-10-CM

## 2023-04-11 DIAGNOSIS — M54.41 ACUTE RIGHT-SIDED LOW BACK PAIN WITH RIGHT-SIDED SCIATICA: ICD-10-CM

## 2023-04-11 DIAGNOSIS — R29.898 RIGHT LEG WEAKNESS: Primary | ICD-10-CM

## 2023-04-11 DIAGNOSIS — R53.81 PHYSICAL DECONDITIONING: ICD-10-CM

## 2023-04-11 DIAGNOSIS — Z86.73 HISTORY OF CVA (CEREBROVASCULAR ACCIDENT): ICD-10-CM

## 2023-04-11 PROCEDURE — 99309 SBSQ NF CARE MODERATE MDM 30: CPT | Performed by: FAMILY MEDICINE

## 2023-04-11 NOTE — LETTER
4/11/2023        RE: Ramila Liao  [address]  [facility] [address]        [facility] GERIATRIC SERVICES       Patient Ramila Liao  MRN: [id number]        Reason for Visit     Chief Complaint   Patient presents with     Follow Up       Code Status     DNR/I    Assessment     Left basal ganglion/ corona radiata CVA  l middle cerebral a stenosis  Recurrent falls  L 5-S1 severe foraminal stenosis  Lymphedema  HLD  Generalized weakness    Plan     Pt is admitted to TCU for strengthening and rehab.  Admitted to the hospital with acute CVA  Continue aspirin and Plavix as ordered by neurology x 90d then resume asa 81mg .  Lipitor added for management of hyperlipidemia  Reports doing well  SLUMS 15/30  CPT 4.8/ 5.6  MOOD is stable  Back pain is managed pt uses tizanidine prn ; has gabapentin  She has cancelled spine follow-up appt  Edema managed with hydrochlorothiazide  Cont pt-WALKIG WELL WITH WALKER          History     Patient is a very pleasant 87 year old female who is admitted to TCU  Patient was admitted with acute CVA affecting the left basal ganglia and corona radiator  Neurology was consulted  Started on aspirin as well as Plavix  Lipitor started for hyperlipidemia  Discharged to the TCU for PT and OT  Walking >500ft using a walker  Also noted to have severe L5-S1 foraminal stenosis  Neurosurgery has recommended outpatient follow-up for this  Pt however has canceled this appt      Past Medical & Surgical History     PAST MEDICAL HISTORY:   Past Medical History:   Diagnosis Date     Allergic rhinitis 08/02/2005     Contact dermatitis and other eczema, due to unspecified cause 07/30/2001     Edema, lower extremity      Low back pain 08/02/2016     Mixed hyperlipidemia 08/09/2006      PAST SURGICAL HISTORY:   has a past surgical history that includes Hysterectomy; Cholecystectomy; and Cataract Extraction, Bilateral.      Past Social History     Reviewed,  reports that she has never smoked. She  has never used smokeless tobacco. She reports current alcohol use. She reports that she does not use drugs.    Family History     Reviewed, and family history includes Coronary Artery Disease in her father and mother.    Medication List     Current Outpatient Medications   Medication     aspirin (ASA) 325 MG tablet     atorvastatin (LIPITOR) 40 MG tablet     clopidogrel (PLAVIX) 75 MG tablet     gabapentin (NEURONTIN) 300 MG capsule     hydrochlorothiazide (HYDRODIURIL) 12.5 MG tablet     magnesium citrate 100 mg Tab     multivitamin with minerals (THERA-M) 9 mg iron-400 mcg Tab tablet     tiZANidine (ZANAFLEX) 2 MG tablet     No current facility-administered medications for this visit.          Allergies     Allergies   Allergen Reactions     Naproxen Hives     Tolerates ibuprofen       Review of Systems   A comprehensive review of 14 systems was done. Pertinent findings noted here and in history of present illness. All the rest negative.  Constitutional: Negative.  Negative for fever, chills, she has  activity change, appetite change and fatigue.   HENT: Negative for congestion and facial swelling.    Eyes: Negative for photophobia, redness and visual disturbance.   Post stroke has been noticing some visual issues but denies any concern  Respiratory: Negative for cough and chest tightness.    Cardiovascular: Negative for chest pain, palpitations and has chronic leg swelling.   Gastrointestinal: Negative for nausea, diarrhea, constipation, blood in stool and abdominal distention.   Genitourinary: Negative.    Musculoskeletal: Negative. Denies any pain  Not sure about falls   Skin: Negative.    Neurological: Negative for dizziness, tremors, syncope, now has right-sided weakness, light-headedness and headaches.   Hematological: Does not bruise/bleed easily.   Psychiatric/Behavioral: Negative.  Some recall issues noted      Physical Exam   BP (!) 145/64   Pulse 94   Temp 97.7  F (36.5  C)   Resp 18   Ht 1.575 m  "(5' 2\")   Wt 56.7 kg (125 lb)   LMP  (LMP Unknown)   SpO2 96%   BMI 22.86 kg/m       Constitutional: Oriented to person, place, and time and appears well-developed.   HEENT:  Normocephalic and atraumatic.  Eyes: Conjunctivae and EOM are normal. Pupils are equal, round, and reactive to light. No discharge.  No scleral icterus. Nose normal. Mouth/Throat: Oropharynx is clear and moist. No oropharyngeal exudate.    NECK: Normal range of motion. Neck supple. No JVD present. No tracheal deviation present. No thyromegaly present.   CARDIOVASCULAR: Normal rate, regular rhythm and intact distal pulses.  Exam reveals no gallop and no friction rub.  Systolic murmur present.  PULMONARY: Effort normal and breath sounds normal. No respiratory distress.No Wheezing or rales.  ABDOMEN: Soft. Bowel sounds are normal. No distension and no mass.  There is no tenderness. There is no rebound and no guarding. No HSM.  MUSCULOSKELETAL: Normal range of motion. Mild kyphosis, no tenderness.  LYMPH NODES: Has no cervical, supraclavicular, axillary and groin adenopathy.   NEUROLOGICAL: Alert and oriented to person, place, and time. No cranial nerve deficit.  Normal muscle tone. Coordination normal.   Right-sided weakness leg greater than arm  Strength 4 / 5  GENITOURINARY: Deferred exam.  SKIN: Skin is warm and dry. No rash noted. No erythema. No pallor.   EXTREMITIES: No cyanosis, no clubbing, 2+ lower extremity edema. No Deformity.  PSYCHIATRIC: Normal mood, affect and behavior.  Some recall issues of recent events      Lab Results     Reviewed in chart        Electronically signed by    Summer Tyler MD                             Sincerely,        JAMSHID Barreto      "

## 2023-04-11 NOTE — PROGRESS NOTES
[facility] GERIATRIC SERVICES  Chief Complaint   Patient presents with     Discharge Summary [facility] Medical Record Number:  [id number]  Place of Service where encounter took place:  No question data found.  Code Status:  No CPR- Do NOT Intubate     HISTORY:      HPI:  Ramila Liao  is 87 year old (1935) undergoing physical and occupational therapy. She is with [facility] signficant for low back pain, hyperlipidemia, lower extremity edema.  Presented with intermittent slurred speech, falls and right lower extremity weakness for 48 hours.  Evaluated per stroke protocols.  CTA head and neck showed no acute CVA but did show severe stenosis at the left M1 segment.  CT lumbar spine shows L5-S1 severe right and moderate left foraminal stenosis.  MRI was down on the day of admission.  Admitted.    Excerpted from records  1.  Neurologic.   Neuro telemetry consulted.  Started on aspirin.  Symptoms had resolved spontaneously.  Following day MRI brain shows acute left basal ganglia and corona radiata infarcts.  Plavix and aspirin recommended by telemetry neurology.   Started on Lipitor for hyperlipidemia not at goal. Clinically feeling better.  Slurred speech has resolved.  PT OT recommending TCU.       2.  Spine. MRI lumbar spine shows stable L5-S1 foraminal stenosis severe on the right moderate on the left compared to previous study.  Neurosurgery recommends addressing stroke issues prior to spine.  Follow-up with neurosurgery in 6 to 8 weeks.    Today she was seen to review vital signs labs, routine visit and and a face-to-face for discharge.  She will discharge to home on 4/15/2023 with current medications and treatments.  She will have PT OT home health aide.  She denied chest pain shortness of breath cough congestion constipation or diarrhea.  She is able to move all extremities.   She does get seen at the lymphedema clinic however she had no lower extremity edema except for slight edema left foot.   Her appointment will be put on hold during TCU.  She also sees spine PT on a weekly basis and those will also be put on hold during her TCU.  Today she reports no BM x2 days, she is passing flatus abdomen nondistended or tender, bowel sounds active x4 quadrants nursing will bring her bowel meds this morning.    ALLERGIES:Naproxen    PAST MEDICAL HISTORY:   Past Medical History:   Diagnosis Date     Allergic rhinitis 08/02/2005     Contact dermatitis and other eczema, due to unspecified cause 07/30/2001     Edema, lower extremity      Low back pain 08/02/2016     Mixed hyperlipidemia 08/09/2006       PAST SURGICAL HISTORY:   has a past surgical history that includes Hysterectomy; Cholecystectomy; and Cataract Extraction, Bilateral.    FAMILY HISTORY: family history includes Coronary Artery Disease in her father and mother.    SOCIAL HISTORY:  reports that she has never smoked. She has never used smokeless tobacco. She reports current alcohol use. She reports that she does not use drugs.    ROS:  Constitutional: Negative for activity change, appetite change, fatigue and fever.   HENT: Negative for congestion.    Respiratory: Negative for cough, shortness of breath and wheezing.    Cardiovascular: Negative for chest pain and leg swelling.   Gastrointestinal: Negative for abdominal distention, abdominal pain, constipation, diarrhea and nausea.   Genitourinary: Negative for dysuria.   Musculoskeletal: Negative for arthralgia.  Positive for intermittent  back pain.   Skin: Negative for color change and wound.   Neurological: Negative for dizziness.   Psychiatric/Behavioral: Negative for agitation, behavioral problems and confusion.     Physical Exam:  Constitutional:       Appearance: Patient is well-developed.   HENT:      Head: Normocephalic.   Eyes:      Conjunctiva/sclera: Conjunctivae normal.   Neck:      Musculoskeletal: Normal range of motion.   Cardiovascular:      Rate and Rhythm: Normal rate and regular rhythm.       Heart sounds: Normal heart sounds. No murmur.   Pulmonary:      Effort: No respiratory distress.      Breath sounds: Normal breath sounds. No wheezing or rales.   Abdominal:      General: Bowel sounds are normal. There is no distension.      Palpations: Abdomen is soft.      Tenderness: There is no abdominal tenderness.   Musculoskeletal:       Normal range of motion.     Skin:General:        Skin is warm.   Neurological:         Mental Status: Patient is alert and oriented to person, place, and time.   Psychiatric:         Behavior: Behavior normal.     Vitals:BP (!) 145/64   Pulse 94   Temp 97.7  F (36.5  C)   Resp 18   Wt 56.9 kg (125 lb 6.4 oz)   LMP  (LMP Unknown)   SpO2 96%   BMI 22.94 kg/m   and Body mass index is 22.94 kg/m .    Lab/Diagnostic data:   No results found for this or any previous visit (from the past 240 hour(s)).    MEDICATIONS:     Review of your medicines          Accurate as of April 11, 2023 11:51 AM. If you have any questions, ask your nurse or doctor.            CONTINUE these medicines which have NOT CHANGED      Dose / Directions   aspirin 325 MG tablet  Commonly known as: ASA  Used for: Cerebrovascular accident (CVA) due to stenosis of left middle cerebral artery (H)      Dose: 325 mg  Take 1 tablet (325 mg) by mouth daily  Refills: 0     atorvastatin 40 MG tablet  Commonly known as: LIPITOR  Used for: Cerebrovascular accident (CVA) due to stenosis of left middle cerebral artery (H), Mixed hyperlipidemia      Dose: 40 mg  Take 1 tablet (40 mg) by mouth every evening  Refills: 0     clopidogrel 75 MG tablet  Commonly known as: PLAVIX  Used for: Cerebrovascular accident (CVA) due to stenosis of left middle cerebral artery (H)      Dose: 75 mg  Take 1 tablet (75 mg) by mouth daily  Refills: 0     gabapentin 300 MG capsule  Commonly known as: NEURONTIN  Used for: Herpes zoster without complication      Dose: 300 mg  Take 1 capsule (300 mg) by mouth 3 times daily  Quantity: 90  capsule  Refills: 1     hydrochlorothiazide 12.5 MG tablet  Commonly known as: HYDRODIURIL  Used for: Pain and swelling of lower extremity, unspecified laterality      Dose: 12.5 mg  Take 1 tablet (12.5 mg) by mouth daily  Quantity: 90 tablet  Refills: 0     Magnesium Citrate 100 MG Tabs      Dose: 100 mg  Take 100 mg by mouth 3 times daily as needed  Refills: 0     multivitamin w/minerals tablet      Dose: 1 tablet  Take 1 tablet by mouth every morning  Refills: 0     tiZANidine 2 MG tablet  Commonly known as: ZANAFLEX  Used for: Lumbar radicular pain      TAKE 1 TABLET BY MOUTH THREE TIMES DAILY AS NEEDED FOR PAIN OR SPASMS  Quantity: 90 tablet  Refills: 0            ASSESSMENT/PLAN  Encounter Diagnoses   Name Primary?     Stenosis of left middle cerebral artery Yes     Physical deconditioning      Pain management      Right leg weakness she will have home care services PT OT home health aide    Left-sided stroke continue PT OT, follow-up with neurology 6 to 8 weeks, aspirin 325 mg 1 time a day for 90 days, atorvastatin 40 mg at bedtime, Plavix    Pain management gabapentin, tizanidine 2 mg every 8 hours as needed    Lower extremity swelling currently on 12.5 mg Hydro chlorothiazide currently just minimal swelling left foot      DISCHARGE PLAN/FACE TO FACE:  I certify that services are/were furnished while this patient was under the care of a physician and that a physician or an allowed non-physician practitioner (NPP), had a face-to-face encounter that meets the physician face-to-face encounter requirements. The encounter was in whole, or in part, related to the primary reason for home health. The patient is confined to his/her home and needs intermittent skilled nursing, physical therapy, speech-language pathology, or the continued need for occupational therapy. A plan of care has been established by a physician and is periodically reviewed by a physician.  Date of Face-to-Face Encounter: 4/12/2023    I certify  that, based on my findings, the following services are medically necessary home health services: PT OT home health aide    My clinical findings support the need for the above skilled services because: PT OT for continued strength and endurance, home health aide to assist with activities of daily living    This patient is homebound because: She is deconditioned following L5-S1 stenosis and C-spine on a weekly basis.  She will also continue home therapy  Today she reports no BM x2 days she is passing flatus abdomen is nontender, bowel sounds active x4 quadrants  The patient is, or has been, under my care and I have initiated the establishment of the plan of care. This patient will be followed by a physician who will periodically review the plan of care.    Schedule follow up visit with primary care provider within 7 days to reestablish care.    Electronically signed by: Cheri Byrne CNP

## 2023-04-11 NOTE — PROGRESS NOTES
[facility] GERIATRIC SERVICES       Patient Ramila Liao  MRN: [id number]        Reason for Visit     Chief Complaint   Patient presents with     Follow Up       Code Status     DNR/I    Assessment     Left basal ganglion/ corona radiata CVA  l middle cerebral a stenosis  Recurrent falls  L 5-S1 severe foraminal stenosis  Lymphedema  HLD  Generalized weakness    Plan     Pt is admitted to TCU for strengthening and rehab.  Admitted to the hospital with acute CVA  Continue aspirin and Plavix as ordered by neurology x 90d then resume asa 81mg .  Lipitor added for management of hyperlipidemia  Reports doing well  SLUMS 15/30  CPT 4.8/ 5.6  MOOD is stable  Back pain is managed pt uses tizanidine prn ; has gabapentin  She has cancelled spine follow-up appt  Edema managed with hydrochlorothiazide  Cont pt-WALKIG WELL WITH WALKER          History     Patient is a very pleasant 87 year old female who is admitted to TCU  Patient was admitted with acute CVA affecting the left basal ganglia and corona radiator  Neurology was consulted  Started on aspirin as well as Plavix  Lipitor started for hyperlipidemia  Discharged to the TCU for PT and OT  Walking >500ft using a walker  Also noted to have severe L5-S1 foraminal stenosis  Neurosurgery has recommended outpatient follow-up for this  Pt however has canceled this appt      Past Medical & Surgical History     PAST MEDICAL HISTORY:   Past Medical History:   Diagnosis Date     Allergic rhinitis 08/02/2005     Contact dermatitis and other eczema, due to unspecified cause 07/30/2001     Edema, lower extremity      Low back pain 08/02/2016     Mixed hyperlipidemia 08/09/2006      PAST SURGICAL HISTORY:   has a past surgical history that includes Hysterectomy; Cholecystectomy; and Cataract Extraction, Bilateral.      Past Social History     Reviewed,  reports that she has never smoked. She has never used smokeless tobacco. She reports current alcohol use. She reports that she does  "not use drugs.    Family History     Reviewed, and family history includes Coronary Artery Disease in her father and mother.    Medication List     Current Outpatient Medications   Medication     aspirin (ASA) 325 MG tablet     atorvastatin (LIPITOR) 40 MG tablet     clopidogrel (PLAVIX) 75 MG tablet     gabapentin (NEURONTIN) 300 MG capsule     hydrochlorothiazide (HYDRODIURIL) 12.5 MG tablet     magnesium citrate 100 mg Tab     multivitamin with minerals (THERA-M) 9 mg iron-400 mcg Tab tablet     tiZANidine (ZANAFLEX) 2 MG tablet     No current facility-administered medications for this visit.          Allergies     Allergies   Allergen Reactions     Naproxen Hives     Tolerates ibuprofen       Review of Systems   A comprehensive review of 14 systems was done. Pertinent findings noted here and in history of present illness. All the rest negative.  Constitutional: Negative.  Negative for fever, chills, she has  activity change, appetite change and fatigue.   HENT: Negative for congestion and facial swelling.    Eyes: Negative for photophobia, redness and visual disturbance.   Post stroke has been noticing some visual issues but denies any concern  Respiratory: Negative for cough and chest tightness.    Cardiovascular: Negative for chest pain, palpitations and has chronic leg swelling.   Gastrointestinal: Negative for nausea, diarrhea, constipation, blood in stool and abdominal distention.   Genitourinary: Negative.    Musculoskeletal: Negative. Denies any pain  Not sure about falls   Skin: Negative.    Neurological: Negative for dizziness, tremors, syncope, now has right-sided weakness, light-headedness and headaches.   Hematological: Does not bruise/bleed easily.   Psychiatric/Behavioral: Negative.  Some recall issues noted      Physical Exam   BP (!) 145/64   Pulse 94   Temp 97.7  F (36.5  C)   Resp 18   Ht 1.575 m (5' 2\")   Wt 56.7 kg (125 lb)   LMP  (LMP Unknown)   SpO2 96%   BMI 22.86 kg/m   "     Constitutional: Oriented to person, place, and time and appears well-developed.   HEENT:  Normocephalic and atraumatic.  Eyes: Conjunctivae and EOM are normal. Pupils are equal, round, and reactive to light. No discharge.  No scleral icterus. Nose normal. Mouth/Throat: Oropharynx is clear and moist. No oropharyngeal exudate.    NECK: Normal range of motion. Neck supple. No JVD present. No tracheal deviation present. No thyromegaly present.   CARDIOVASCULAR: Normal rate, regular rhythm and intact distal pulses.  Exam reveals no gallop and no friction rub.  Systolic murmur present.  PULMONARY: Effort normal and breath sounds normal. No respiratory distress.No Wheezing or rales.  ABDOMEN: Soft. Bowel sounds are normal. No distension and no mass.  There is no tenderness. There is no rebound and no guarding. No HSM.  MUSCULOSKELETAL: Normal range of motion. Mild kyphosis, no tenderness.  LYMPH NODES: Has no cervical, supraclavicular, axillary and groin adenopathy.   NEUROLOGICAL: Alert and oriented to person, place, and time. No cranial nerve deficit.  Normal muscle tone. Coordination normal.   Right-sided weakness leg greater than arm  Strength 4 / 5  GENITOURINARY: Deferred exam.  SKIN: Skin is warm and dry. No rash noted. No erythema. No pallor.   EXTREMITIES: No cyanosis, no clubbing, 2+ lower extremity edema. No Deformity.  PSYCHIATRIC: Normal mood, affect and behavior.  Some recall issues of recent events      Lab Results     Reviewed in chart        Electronically signed by    Summer Tyler MD

## 2023-04-12 ENCOUNTER — DISCHARGE SUMMARY NURSING HOME (OUTPATIENT)
Dept: GERIATRICS | Facility: CLINIC | Age: 88
End: 2023-04-12
Payer: COMMERCIAL

## 2023-04-12 VITALS
BODY MASS INDEX: 23.08 KG/M2 | HEIGHT: 62 IN | HEART RATE: 94 BPM | TEMPERATURE: 97.7 F | WEIGHT: 125.4 LBS | DIASTOLIC BLOOD PRESSURE: 64 MMHG | SYSTOLIC BLOOD PRESSURE: 145 MMHG | OXYGEN SATURATION: 96 % | RESPIRATION RATE: 18 BRPM

## 2023-04-12 DIAGNOSIS — R53.81 PHYSICAL DECONDITIONING: ICD-10-CM

## 2023-04-12 DIAGNOSIS — I66.02 STENOSIS OF LEFT MIDDLE CEREBRAL ARTERY: Primary | ICD-10-CM

## 2023-04-12 DIAGNOSIS — R52 PAIN MANAGEMENT: ICD-10-CM

## 2023-04-12 PROCEDURE — 99316 NF DSCHRG MGMT 30 MIN+: CPT | Performed by: NURSE PRACTITIONER

## 2023-04-12 NOTE — LETTER
4/12/2023        RE: Ramila Liao  [address]  [place] [place] [id number]         HEALTH GERIATRIC SERVICES  Chief Complaint   Patient presents with     Discharge Summary [facility] Medical Record Number:  [id number]  Place of Service where encounter took place:  No question data found.  Code Status:  No CPR- Do NOT Intubate     HISTORY:      HPI:  Ramila Liao  is 87 year old (1935) undergoing physical and occupational therapy. She is with [facility] signficant for low back pain, hyperlipidemia, lower extremity edema.  Presented with intermittent slurred speech, falls and right lower extremity weakness for 48 hours.  Evaluated per stroke protocols.  CTA head and neck showed no acute CVA but did show severe stenosis at the left M1 segment.  CT lumbar spine shows L5-S1 severe right and moderate left foraminal stenosis.  MRI was down on the day of admission.  Admitted.    Excerpted from records  1.  Neurologic.   Neuro telemetry consulted.  Started on aspirin.  Symptoms had resolved spontaneously.  Following day MRI brain shows acute left basal ganglia and corona radiata infarcts.  Plavix and aspirin recommended by telemetry neurology.   Started on Lipitor for hyperlipidemia not at goal. Clinically feeling better.  Slurred speech has resolved.  PT OT recommending TCU.       2.  Spine. MRI lumbar spine shows stable L5-S1 foraminal stenosis severe on the right moderate on the left compared to previous study.  Neurosurgery recommends addressing stroke issues prior to spine.  Follow-up with neurosurgery in 6 to 8 weeks.    Today she was seen to review vital signs labs, routine visit and and a face-to-face for discharge.  She will discharge to home on 4/15/2023 with current medications and treatments.  She will have PT OT home health aide.  She denied chest pain shortness of breath cough congestion constipation or diarrhea.  She is able to move all extremities.   She does get seen at the  lymphedema clinic however she had no lower extremity edema except for slight edema left foot.  Her appointment will be put on hold during TCU.  She also sees spine PT on a weekly basis and those will also be put on hold during her TCU.  Today she reports no BM x2 days, she is passing flatus abdomen nondistended or tender, bowel sounds active x4 quadrants nursing will bring her bowel meds this morning.    ALLERGIES:Naproxen    PAST MEDICAL HISTORY:   Past Medical History:   Diagnosis Date     Allergic rhinitis 08/02/2005     Contact dermatitis and other eczema, due to unspecified cause 07/30/2001     Edema, lower extremity      Low back pain 08/02/2016     Mixed hyperlipidemia 08/09/2006       PAST SURGICAL HISTORY:   has a past surgical history that includes Hysterectomy; Cholecystectomy; and Cataract Extraction, Bilateral.    FAMILY HISTORY: family history includes Coronary Artery Disease in her father and mother.    SOCIAL HISTORY:  reports that she has never smoked. She has never used smokeless tobacco. She reports current alcohol use. She reports that she does not use drugs.    ROS:  Constitutional: Negative for activity change, appetite change, fatigue and fever.   HENT: Negative for congestion.    Respiratory: Negative for cough, shortness of breath and wheezing.    Cardiovascular: Negative for chest pain and leg swelling.   Gastrointestinal: Negative for abdominal distention, abdominal pain, constipation, diarrhea and nausea.   Genitourinary: Negative for dysuria.   Musculoskeletal: Negative for arthralgia.  Positive for intermittent  back pain.   Skin: Negative for color change and wound.   Neurological: Negative for dizziness.   Psychiatric/Behavioral: Negative for agitation, behavioral problems and confusion.     Physical Exam:  Constitutional:       Appearance: Patient is well-developed.   HENT:      Head: Normocephalic.   Eyes:      Conjunctiva/sclera: Conjunctivae normal.   Neck:      Musculoskeletal:  Normal range of motion.   Cardiovascular:      Rate and Rhythm: Normal rate and regular rhythm.      Heart sounds: Normal heart sounds. No murmur.   Pulmonary:      Effort: No respiratory distress.      Breath sounds: Normal breath sounds. No wheezing or rales.   Abdominal:      General: Bowel sounds are normal. There is no distension.      Palpations: Abdomen is soft.      Tenderness: There is no abdominal tenderness.   Musculoskeletal:       Normal range of motion.     Skin:General:        Skin is warm.   Neurological:         Mental Status: Patient is alert and oriented to person, place, and time.   Psychiatric:         Behavior: Behavior normal.     Vitals:BP (!) 145/64   Pulse 94   Temp 97.7  F (36.5  C)   Resp 18   Wt 56.9 kg (125 lb 6.4 oz)   LMP  (LMP Unknown)   SpO2 96%   BMI 22.94 kg/m   and Body mass index is 22.94 kg/m .    Lab/Diagnostic data:   No results found for this or any previous visit (from the past 240 hour(s)).    MEDICATIONS:     Review of your medicines          Accurate as of April 11, 2023 11:51 AM. If you have any questions, ask your nurse or doctor.            CONTINUE these medicines which have NOT CHANGED      Dose / Directions   aspirin 325 MG tablet  Commonly known as: ASA  Used for: Cerebrovascular accident (CVA) due to stenosis of left middle cerebral artery (H)      Dose: 325 mg  Take 1 tablet (325 mg) by mouth daily  Refills: 0     atorvastatin 40 MG tablet  Commonly known as: LIPITOR  Used for: Cerebrovascular accident (CVA) due to stenosis of left middle cerebral artery (H), Mixed hyperlipidemia      Dose: 40 mg  Take 1 tablet (40 mg) by mouth every evening  Refills: 0     clopidogrel 75 MG tablet  Commonly known as: PLAVIX  Used for: Cerebrovascular accident (CVA) due to stenosis of left middle cerebral artery (H)      Dose: 75 mg  Take 1 tablet (75 mg) by mouth daily  Refills: 0     gabapentin 300 MG capsule  Commonly known as: NEURONTIN  Used for: Herpes zoster  without complication      Dose: 300 mg  Take 1 capsule (300 mg) by mouth 3 times daily  Quantity: 90 capsule  Refills: 1     hydrochlorothiazide 12.5 MG tablet  Commonly known as: HYDRODIURIL  Used for: Pain and swelling of lower extremity, unspecified laterality      Dose: 12.5 mg  Take 1 tablet (12.5 mg) by mouth daily  Quantity: 90 tablet  Refills: 0     Magnesium Citrate 100 MG Tabs      Dose: 100 mg  Take 100 mg by mouth 3 times daily as needed  Refills: 0     multivitamin w/minerals tablet      Dose: 1 tablet  Take 1 tablet by mouth every morning  Refills: 0     tiZANidine 2 MG tablet  Commonly known as: ZANAFLEX  Used for: Lumbar radicular pain      TAKE 1 TABLET BY MOUTH THREE TIMES DAILY AS NEEDED FOR PAIN OR SPASMS  Quantity: 90 tablet  Refills: 0            ASSESSMENT/PLAN  Encounter Diagnoses   Name Primary?     Stenosis of left middle cerebral artery Yes     Physical deconditioning      Pain management      Right leg weakness she will have home care services PT OT home health aide    Left-sided stroke continue PT OT, follow-up with neurology 6 to 8 weeks, aspirin 325 mg 1 time a day for 90 days, atorvastatin 40 mg at bedtime, Plavix    Pain management gabapentin, tizanidine 2 mg every 8 hours as needed    Lower extremity swelling currently on 12.5 mg Hydro chlorothiazide currently just minimal swelling left foot      DISCHARGE PLAN/FACE TO FACE:  I certify that services are/were furnished while this patient was under the care of a physician and that a physician or an allowed non-physician practitioner (NPP), had a face-to-face encounter that meets the physician face-to-face encounter requirements. The encounter was in whole, or in part, related to the primary reason for home health. The patient is confined to his/her home and needs intermittent skilled nursing, physical therapy, speech-language pathology, or the continued need for occupational therapy. A plan of care has been established by a physician  and is periodically reviewed by a physician.  Date of Face-to-Face Encounter: 4/12/2023    I certify that, based on my findings, the following services are medically necessary home health services: PT OT home health aide    My clinical findings support the need for the above skilled services because: PT OT for continued strength and endurance, home health aide to assist with activities of daily living    This patient is homebound because: She is deconditioned following L5-S1 stenosis and C-spine on a weekly basis.  She will also continue home therapy  Today she reports no BM x2 days she is passing flatus abdomen is nontender, bowel sounds active x4 quadrants  The patient is, or has been, under my care and I have initiated the establishment of the plan of care. This patient will be followed by a physician who will periodically review the plan of care.    Schedule follow up visit with primary care provider within 7 days to reestablish care.    Electronically signed by: Cheri Byrne CNP          Sincerely,        Cheri Byrne CNP

## 2023-04-17 ENCOUNTER — MEDICAL CORRESPONDENCE (OUTPATIENT)
Dept: HEALTH INFORMATION MANAGEMENT | Facility: CLINIC | Age: 88
End: 2023-04-17

## 2023-04-17 ENCOUNTER — TELEPHONE (OUTPATIENT)
Dept: FAMILY MEDICINE | Facility: CLINIC | Age: 88
End: 2023-04-17

## 2023-04-17 NOTE — TELEPHONE ENCOUNTER
Home Health Care    Reason for call: Patient needs  PT orders  2x a week  for 3 weeks and then 1x  a week for 4 weeks.    Home Health Aide 1x a week for 4 weeks.     Also Speech Therapy    Orders are needed for this patient.ASAP      Pt Provider:Susan Sutton     Phone Number Homecare Nurse can be reached at: [phone]    Can we leave a detailed message on this number? YES      Could we send this information to you in [employer] or would you prefer to receive a phone call?:   Patient would prefer a phone call   Okay to leave a detailed message?: Yes at Cell number on file:    Telephone Information:   Mobile [phone]

## 2023-04-18 ENCOUNTER — OFFICE VISIT (OUTPATIENT)
Dept: FAMILY MEDICINE | Facility: CLINIC | Age: 88
End: 2023-04-18
Payer: COMMERCIAL

## 2023-04-18 ENCOUNTER — TELEPHONE (OUTPATIENT)
Dept: NURSING | Facility: CLINIC | Age: 88
End: 2023-04-18

## 2023-04-18 VITALS
SYSTOLIC BLOOD PRESSURE: 130 MMHG | OXYGEN SATURATION: 96 % | DIASTOLIC BLOOD PRESSURE: 60 MMHG | WEIGHT: 123.25 LBS | TEMPERATURE: 97.7 F | HEART RATE: 90 BPM | BODY MASS INDEX: 22.54 KG/M2

## 2023-04-18 DIAGNOSIS — R06.83 SNORING: ICD-10-CM

## 2023-04-18 DIAGNOSIS — L60.8 TOENAIL DEFORMITY: ICD-10-CM

## 2023-04-18 DIAGNOSIS — I63.512 CEREBROVASCULAR ACCIDENT (CVA) DUE TO STENOSIS OF LEFT MIDDLE CEREBRAL ARTERY (H): Primary | ICD-10-CM

## 2023-04-18 DIAGNOSIS — G47.19 EXCESSIVE DAYTIME SLEEPINESS: ICD-10-CM

## 2023-04-18 LAB
ALBUMIN SERPL BCG-MCNC: 4 G/DL (ref 3.5–5.2)
ALP SERPL-CCNC: 77 U/L (ref 35–104)
ALT SERPL W P-5'-P-CCNC: 14 U/L (ref 10–35)
ANION GAP SERPL CALCULATED.3IONS-SCNC: 12 MMOL/L (ref 7–15)
AST SERPL W P-5'-P-CCNC: 23 U/L (ref 10–35)
BILIRUB SERPL-MCNC: 0.3 MG/DL
BUN SERPL-MCNC: 15.3 MG/DL (ref 8–23)
CALCIUM SERPL-MCNC: 9.4 MG/DL (ref 8.8–10.2)
CHLORIDE SERPL-SCNC: 102 MMOL/L (ref 98–107)
CHOLEST SERPL-MCNC: 129 MG/DL
CREAT SERPL-MCNC: 0.96 MG/DL (ref 0.51–0.95)
DEPRECATED HCO3 PLAS-SCNC: 25 MMOL/L (ref 22–29)
GFR SERPL CREATININE-BSD FRML MDRD: 58 ML/MIN/1.73M2
GLUCOSE SERPL-MCNC: 95 MG/DL (ref 70–99)
HDLC SERPL-MCNC: 62 MG/DL
LDLC SERPL CALC-MCNC: 51 MG/DL
NONHDLC SERPL-MCNC: 67 MG/DL
POTASSIUM SERPL-SCNC: 4.5 MMOL/L (ref 3.4–5.3)
PROT SERPL-MCNC: 6.8 G/DL (ref 6.4–8.3)
SODIUM SERPL-SCNC: 139 MMOL/L (ref 136–145)
TRIGL SERPL-MCNC: 79 MG/DL

## 2023-04-18 PROCEDURE — 80053 COMPREHEN METABOLIC PANEL: CPT | Performed by: NURSE PRACTITIONER

## 2023-04-18 PROCEDURE — 80061 LIPID PANEL: CPT | Performed by: NURSE PRACTITIONER

## 2023-04-18 PROCEDURE — 36415 COLL VENOUS BLD VENIPUNCTURE: CPT | Performed by: NURSE PRACTITIONER

## 2023-04-18 PROCEDURE — 99495 TRANSJ CARE MGMT MOD F2F 14D: CPT | Performed by: NURSE PRACTITIONER

## 2023-04-18 RX ORDER — ACETAMINOPHEN 500 MG
1000 TABLET ORAL EVERY 6 HOURS PRN
COMMUNITY

## 2023-04-18 NOTE — ADDENDUM NOTE
Encounter addended by: Grady Ramirez, PT on: 4/18/2023 7:07 AM   Actions taken: Episode resolved, Clinical Note Signed

## 2023-04-18 NOTE — PROGRESS NOTES
Assessment & Plan     Cerebrovascular accident (CVA) due to stenosis of left middle cerebral artery (H)  Patient doing well.  Continue homecare as started.  Recheck labs; tolerating statin well.  Follow up with neurology.   - Comprehensive metabolic panel (BMP + Alb, Alk Phos, ALT, AST, Total. Bili, TP)  - Adult Neurology  Referral  - Lipid panel reflex to direct LDL Non-fasting    Excessive daytime sleepiness  - Adult Sleep Eval & Management  Referral    Snoring  - Adult Sleep Eval & Management  Referral    Toenail deformity  - Orthopedic  Referral       MED REC REQUIRED  Post Medication Reconciliation Status:  Discharge medications reconciled, continue medications without change      Susan Sutton NP  [facility] SHELBIE Schofield is a 87 year old who presents for a hospitalization follow up.  She is accompanied by her son.    Started on Plavix, aspirin, and Atorvastatin in the hospital.  She is tolerating these well.  No new falls at home.  Still feels slightly weaker on the right side.  Has homecare.  Blood pressures have been somewhat labile at home.  Hospital recommended a sleep study to rule out DMITRI.    Patient's lower extremity swelling has been much better since hospitalization.  Still using her compression stockings.  Was doing PT for lymphedema, but this is likely not necessary at this time.    Requesting to see podiatry for her toenails.     Hospital F/U (Needs toe nails clipped([facility] or Home Health Aide), tylnol at night 1000mg extra strength, magnesium citrate, was seeing 2 pt for swelling, lymphedema, swelling is down(continue wearing compression socks?), right leg occasional pain, sleep study was recommended(referral), water intake(was told to drink more beginning half and not much second half))        3/2/2023     7:50 AM   Additional Questions   Roomed by Rocio HARVEY CMA   Accompanied by son         Hospital Follow-up  Visit:    Hospital/Nursing Home/IP Rehab Facility: [facility]  Date of Admission: 03/26/2023  Date of Discharge: 03/28/2023  Reason(s) for Admission: Cerebrovascular Accident    Was your hospitalization related to COVID-19? No   Problems taking medications regularly:  None  Medication changes since discharge: None  Problems adhering to non-medication therapy:  None    Summary of hospitalization:  [facility] discharge summary reviewed  Diagnostic Tests/Treatments reviewed.  Follow up needed: labs  Other Healthcare Providers Involved in Patient s Care:         Homecare, Specialist appointment - neurology, outpatient sleep study and Physical Therapy  Update since discharge: improved.    Plan of care communicated with patient and family         Review of Systems   Pertinent items in HPI        Objective    /60 (BP Location: Left arm, Patient Position: Sitting, Cuff Size: Adult Regular)   Pulse 90   Temp 97.7  F (36.5  C) (Temporal)   Wt 55.9 kg (123 lb 4 oz)   LMP  (LMP Unknown)   SpO2 96%   BMI 22.54 kg/m    Body mass index is 22.54 kg/m .  Physical Exam   GENERAL: healthy, alert and no distress  NECK: no adenopathy, no asymmetry, masses, or scars and thyroid normal to palpation  RESP: lungs clear to auscultation - no rales, rhonchi or wheezes  CV: regular rate and rhythm, normal S1 S2, no S3 or S4, no murmur, click or rub, trace right LE swelling  NEURO: Normal strength and tone, mentation intact and speech normal  PSYCH: mentation appears normal, affect normal/bright

## 2023-04-18 NOTE — TELEPHONE ENCOUNTER
The Home Care/Assisted Living/Nursing Facility is calling regarding an established patient.  Has the patient seen Home Care in the past or is currently residing in Assisted Living or Nursing Facility? Yes.     The [facility], Assisted Living or Nursing Home Eval and Treatment standing order and can be signed as standing order signature required by RN.    PT/OT/Speech Therapy     2 additional visits    Any additional Orders:  Are there any orders requested, not stated above, that are outside of the standing order and must be routed to a licensed practitioner for approval?    No    Writer has verified Requestor will send fax to have orders signed.

## 2023-04-18 NOTE — PROGRESS NOTES
[facility] Rehabilitation Service    Outpatient Physical Therapy Discharge Note  Patient: Ramila Liao  : 1935    Beginning/End Dates of Reporting Period:  3/20/23 to 3/20/23    Referring Provider: Dr. Zoltan Pena MD    Therapy Diagnosis: Left-sided low/mid back pain with mobility deficits, as well as impaired balance and mobility     Client Self Report: Kanwal had PT several months ago for significant right-sided low back and leg pain. She was going to PT and being followed at the spine clinic. She eventually was scheduled for surgery, but then her pain resolved so the surgery was cancelled. Around the same time that she started to have back pain (fall of ), she also started to develop swelling in both legs/feet. She just started lymphedema PT a couple of weeks ago. She now returns to PT due to new onset of left-sided low back pain without any apparent mechanism of injury. She reports that the pain is not as severe as it was on the right, and the pain doesn't go down her leg. She said that the pain is worst at night and usually isn't nearly as bad during the day. She is still working with spine clinic to manage her symptoms. She had an EMG done on the right side, that showed an L5 radiculopathy. She currently has an injection for her left low back on 3/28/23 at the spine center. She denies any new bowel or bladder changes. She reports occasional right-sided lateral thigh numbness in the same distribution as her former pain.    Objective Measurements:  Objective Measure: Hip Flexion Strength  Details: Right: 4/5. Left: 4+/5.  Objective Measure: 30 Second Sit-to-stand  Details: 9 reps with bilateral upper extremity assist  Objective Measure: Romberg  Details: Less than 1 second  Objective Measure: Worst Pain  Details: 8/10  Objective Measure: TUG  Details: Will assess next visit  Objective Measure: FGA  Details: Will  assess next visit      Outcome Measures (most recent score):  RODRICK: 36%    Goals:  Goal Identifier RODRICK - Short Term   Goal Description Kanwal will demonstrate improved function as evidenced by an improved (decreased) RODRICK score of less than 21%.   Target Date 04/17/23   Date Met      Progress (detail required for progress note): 36%     Goal Identifier RODRICK - Long Term   Goal Description Kanwal will demonstrate improved function as evidenced by an improved (decreased) RODRICK score of less than 7%.   Target Date 05/15/23   Date Met      Progress (detail required for progress note): 36%     Goal Identifier TUG   Goal Description Will assess next visit   Target Date 05/15/23   Date Met      Progress (detail required for progress note): Will assess next visit     Goal Identifier 30 Second Sit-to-stand   Goal Description Kanwal will demonstrate improved lower extremity strength and increased tolerance to functional activity as evidenced by an improved 30 second sit-to-stand score of at least 10 reps without upper extremity assist.   Target Date 05/15/23   Date Met      Progress (detail required for progress note): 9 reps with upper extremity assist     Goal Identifier FGA   Goal Description Will assess next visit   Target Date 05/15/23   Date Met      Progress (detail required for progress note): Will assess next visit       Plan:  Discharge from therapy.    Discharge:    Reason for Discharge: Change in medical status.    Equipment Issued: NA    Discharge Plan: PT was unable to discuss discharge planning with the patient as the discharge was unplanned.

## 2023-04-18 NOTE — TELEPHONE ENCOUNTER
Thalia GARDNER from [facility] calling for verbal orders.   Call to [facility] staff to assist.  Thalia Malhotra RN   04/18/23 2:33 PM  [facility] Nurse Advisor

## 2023-04-19 ENCOUNTER — TELEPHONE (OUTPATIENT)
Dept: FAMILY MEDICINE | Facility: CLINIC | Age: 88
End: 2023-04-19

## 2023-04-19 DIAGNOSIS — I63.512 CEREBROVASCULAR ACCIDENT (CVA) DUE TO STENOSIS OF LEFT MIDDLE CEREBRAL ARTERY (H): Primary | ICD-10-CM

## 2023-04-19 NOTE — TELEPHONE ENCOUNTER
4-19-23    Order/Referral Request    Who is requesting: elizabeth Gibbs    Orders being requested: DME fro mobile guardian pendant     Reason service is needed/diagnosis: pt is looking for this DME order for safety reasons    When are orders needed by: soon        Does patient have a preference on a Group/Provider/Facility? [facility]       Where to send orders: Fax (son doesn't have fax #)    Could we send this information to you in [facility] or would you prefer to receive a phone call?:   Patient would prefer a phone call   Okay to leave a detailed message?: Yes at Cell number on file:    Telephone Information:   Mobile [phone]

## 2023-04-20 ENCOUNTER — TELEPHONE (OUTPATIENT)
Dept: PHYSICAL THERAPY | Facility: REHABILITATION | Age: 88
End: 2023-04-20
Payer: COMMERCIAL

## 2023-04-20 ENCOUNTER — PATIENT OUTREACH (OUTPATIENT)
Dept: CARE COORDINATION | Facility: CLINIC | Age: 88
End: 2023-04-20
Payer: COMMERCIAL

## 2023-04-20 NOTE — TELEPHONE ENCOUNTER
Son called to ask about the status of compression pump for Kanwal.PT called him back and stated that at this time, the compression pump info with with the repfrom [employer], but  PT will  Reach out to [employer] to get info. PT  will give  [employer] son's (Sai) phone number so they can update with the status of the pump. Sai will reach back out to PT next week if he hasn't heard anything.

## 2023-04-21 ENCOUNTER — TELEPHONE (OUTPATIENT)
Dept: FAMILY MEDICINE | Facility: CLINIC | Age: 88
End: 2023-04-21

## 2023-04-21 ENCOUNTER — NURSE TRIAGE (OUTPATIENT)
Dept: NURSING | Facility: CLINIC | Age: 88
End: 2023-04-21

## 2023-04-21 DIAGNOSIS — E78.2 MIXED HYPERLIPIDEMIA: ICD-10-CM

## 2023-04-21 DIAGNOSIS — I63.512 CEREBROVASCULAR ACCIDENT (CVA) DUE TO STENOSIS OF LEFT MIDDLE CEREBRAL ARTERY (H): ICD-10-CM

## 2023-04-21 NOTE — TELEPHONE ENCOUNTER
Order/Referral Request    Who is requesting: Marge FLORES    Orders being requested: delay of speech eval and treat for Monday 4.24.23    Reason service is needed/diagnosis: swallowing difficulty    When are orders needed by: ASAVIK      Where to send orders: Verbal Order OK    Could we send this information to you in [facility] or would you prefer to receive a phone call?:   Patient would prefer a phone call   Okay to leave a detailed message?: Yes at Other phone number:  [phone]

## 2023-04-21 NOTE — TELEPHONE ENCOUNTER
"Routing refill request to provider for review/approval because:  Unaware of last quantity and number of refills given.  Unsure why this was routed to us high priority    Last Written Prescription Date:  3/29/23  Last Fill Quantity: NA,  # refills: NA   Last office visit provider:  4/18/23    Requested Prescriptions   Pending Prescriptions Disp Refills     clopidogrel (PLAVIX) 75 MG tablet       Sig: Take 1 tablet (75 mg) by mouth daily       Plavix Passed - 4/21/2023  4:01 PM        Passed - No active PPI on record unless is Protonix        Passed - Normal HGB on file in past 12 months     Recent Labs   Lab Test 03/30/23  0945   HGB 12.5               Passed - Normal Platelets on file in past 12 months     Recent Labs   Lab Test 03/30/23  0945                  Passed - Recent (12 mo) or future (30 days) visit within the authorizing provider's specialty     Patient has had an office visit with the authorizing provider or a provider within the authorizing providers department within the previous 12 mos or has a future within next 30 days. See \"Patient Info\" tab in inbasket, or \"Choose Columns\" in Meds & Orders section of the refill encounter.              Passed - Medication is active on med list        Passed - Patient is age 18 or older        Passed - No active pregnancy on record        Passed - No positive pregnancy test in past 12 months        Last Written Prescription Date:  3/29/23  Last Fill Quantity: NA,  # refills: NA   Last office visit provider:  4/18/23          aspirin (ASA) 325 MG tablet 90 tablet 1     Sig: Take 1 tablet (325 mg) by mouth daily       Analgesics (Non-Narcotic Tylenol and ASA Only) Passed - 4/21/2023  4:01 PM        Passed - Recent (12 mo) or future (30 days) visit within the authorizing provider's specialty     Patient has had an office visit with the authorizing provider or a provider within the authorizing providers department within the previous 12 mos or has a future within " "next 30 days. See \"Patient Info\" tab in inbasket, or \"Choose Columns\" in Meds & Orders section of the refill encounter.              Passed - Patient is age 20 years or older     If ASA is flagged for ages under 20 years old. Forward to provider for confirmation Ryes Syndrome is not a concern.              Passed - Medication is active on med list        Last Written Prescription Date: 3/28/23  Last Fill Quantity: NA,  # refills: NA   Last office visit provider:  4/18/23        atorvastatin (LIPITOR) 40 MG tablet 90 tablet 1     Sig: Take 1 tablet (40 mg) by mouth every evening       Statins Protocol Passed - 4/21/2023  4:01 PM        Passed - LDL on file in past 12 months     Recent Labs   Lab Test 04/18/23  1054   LDL 51             Passed - No abnormal creatine kinase in past 12 months     Recent Labs   Lab Test 02/28/23  1829                   Passed - Recent (12 mo) or future (30 days) visit within the authorizing provider's specialty     Patient has had an office visit with the authorizing provider or a provider within the authorizing providers department within the previous 12 mos or has a future within next 30 days. See \"Patient Info\" tab in inbasket, or \"Choose Columns\" in Meds & Orders section of the refill encounter.              Passed - Medication is active on med list        Passed - Patient is age 18 or older        Passed - No active pregnancy on record        Passed - No positive pregnancy test in past 12 months             Marge Ramirez RN 04/21/23 4:06 PM  "

## 2023-04-21 NOTE — TELEPHONE ENCOUNTER
Joel Gibbs calling about medication list.  Patient was recently discharged from transitional care at [facility] in [place].    Saw PCP Susan Sutton NP on 4-18-23.    Uses [facility] [employer] in [place], [place] [facility]    Ce Ball RN  [facility]

## 2023-04-24 RX ORDER — ASPIRIN 325 MG
325 TABLET ORAL DAILY
Qty: 90 TABLET | Refills: 1 | Status: SHIPPED | OUTPATIENT
Start: 2023-04-24

## 2023-04-24 RX ORDER — ATORVASTATIN CALCIUM 40 MG/1
40 TABLET, FILM COATED ORAL EVERY EVENING
Qty: 90 TABLET | Refills: 1 | Status: SHIPPED | OUTPATIENT
Start: 2023-04-24

## 2023-04-24 RX ORDER — CLOPIDOGREL BISULFATE 75 MG/1
75 TABLET ORAL DAILY
Qty: 90 TABLET | Refills: 1 | Status: SHIPPED | OUTPATIENT
Start: 2023-04-24 | End: 2024-01-11

## 2023-04-27 ENCOUNTER — TELEPHONE (OUTPATIENT)
Dept: FAMILY MEDICINE | Facility: CLINIC | Age: 88
End: 2023-04-27
Payer: COMMERCIAL

## 2023-04-27 NOTE — TELEPHONE ENCOUNTER
The Home Care/Assisted Living/Nursing Facility is calling regarding an established patient.  Has the patient seen Home Care in the past or is currently residing in Assisted Living or Nursing Facility? Sejal.     Marge calling from [facility] requesting the following orders that are NOT within the Home Care, Assisted Living or Nursing Home Eval and Treatment standing order and must be ordered by a Licensed Practitioner.    Preferred Call Back Number: [phone]    PT/OT/Speech Therapy  1/week for 4 weeks to work on speech and cognition    Routing to Licensed Practitioner (Provider) to review request and provide approval or recommendation.    Writer has verified [employer] will send fax to have orders signed.

## 2023-05-01 ENCOUNTER — PRE VISIT (OUTPATIENT)
Dept: NEUROSURGERY | Facility: CLINIC | Age: 88
End: 2023-05-01
Payer: COMMERCIAL

## 2023-05-01 NOTE — TELEPHONE ENCOUNTER
NEUROSURGERY- NEW PREVISIT PLANNING       Record Status/Location     Referring Provider Epic ED   Diagnosis In 3/26/23 hospital note L5-S1 severe right and moderate left foraminal stenosis   MRI (HEAD, NECK, SPINE) Epic Lumbar 3/27/23   CT Epic Lumbar 3/26/23   X-ray Epic Lumbar 1/18/23   INJECTION Epic 11/10/22; 12/13/22; 1/2/23   PHYSICAL THERAPY Epic 2022/2023, multiple sessions   SURGERY  NO

## 2023-05-02 PROCEDURE — 93272 ECG/REVIEW INTERPRET ONLY: CPT | Performed by: INTERNAL MEDICINE

## 2023-05-08 NOTE — ADDENDUM NOTE
Encounter addended by: Paulette Shelby, PT on: 5/8/2023 10:00 AM   Actions taken: Episode resolved, Clinical Note Signed

## 2023-05-08 NOTE — PROGRESS NOTES
[facility] Rehabilitation Service    Outpatient Physical Therapy Discharge Note  Patient: Ramila Liao    : 1935      Beginning/End Dates of Reporting Period:  3/9/2023 to 3/24/2023    Referring Provider: Roxanna Helms Diagnosis: LE lymphedema     Client Self Report: see daily doc flowsheet    Objective Measurements: see daily doc flowsheet    Goals: see daily doc flowsheet    Plan: see daily doc flowsheet    Discharge: yes, patient did not return for further PT.

## 2023-05-10 ENCOUNTER — TELEPHONE (OUTPATIENT)
Dept: FAMILY MEDICINE | Facility: CLINIC | Age: 88
End: 2023-05-10
Payer: COMMERCIAL

## 2023-05-10 NOTE — TELEPHONE ENCOUNTER
"Home Health Care    Reason for call:  Notifying pt had an unwitnessed fall at home this morning. Pt was turning around in the bathroom and fell. Pt denies hitting head, however states it was possible they brushed up against the wall. Head did NOT hit the floor. Pt has some mild right hip pain when seated. Mild discomfort with ambulation. PT visualized pt's head and right hip, no bruising noted. Pt appeared to be \"fine,\" cognition intact. Pt is ambulating slightly slower during PT session. Pt is on aspirin 325 mg.    Pt also noting prior to stroke, pt was having a hard time lifting up feet, unstable gait, and writing overtime is getting smaller and smaller. PT wondering if pt has parkinson's.    Pt lives in a Senior Living community. Pt's son will be visiting pt today. Pt is also going to group exercise today.     If PCP has any questions, concerns, or order changes, ok to call Pily. Otherwise consider message FYI.    Pt Provider: Susan Sutton    Phone Number Homecare Physical Therapist can be reached at: [phone]    Can we leave a detailed message on this number? YES    Dee Mo RN  "

## 2023-05-12 ENCOUNTER — TELEPHONE (OUTPATIENT)
Dept: FAMILY MEDICINE | Facility: CLINIC | Age: 88
End: 2023-05-12
Payer: COMMERCIAL

## 2023-05-12 NOTE — TELEPHONE ENCOUNTER
The Home Care/Assisted Living/Nursing Facility is calling regarding an established patient.  Has the patient seen Home Care in the past or is currently residing in Assisted Living or Nursing Facility? Yes.     KENDRA Vásquez calling from [facility] requesting the following orders that are within the Home Care, Assisted Living or Nursing Home Eval and Treatment standing order and can be signed as standing order signature required by RN.    Preferred Call Back Number: [phone]    PT/OT/Speech Therapy: OT continuation, one additional visit for follow-up on pt Fall.   Order given per RN standing order.    Any additional Orders:  Are there any orders requested, not stated above, that are outside of the standing order and must be routed to a licensed practitioner for approval?    No    Writer has verified Requestor will send fax to have orders signed.    Radha Betancur RN, BSN  [facility]

## 2023-05-17 NOTE — PROGRESS NOTES
__________________________________________________________    ___________________________________  ESTABLISHED PATIENT NEUROLOGY NOTE    DATE OF VISIT: 5/18/2023  MRN: [id number]  PATIENT NAME: Ramila Liao  YOB: 1935      Chief Complaint: Patient presents with:  Hospital F/U: Hosp follow up for CVA 3/26/23. Pt states she is doing well. Had a fall a week ago, has been moving slower since the fall.    SUBJECTIVE:                                                      History of Present Illness: Ramila Liao is a 87 year old female presenting for follow-up for multifocal L basal ganglia and corona radiata ischemic infarcts.     She was hospitalized at [facility] on 03/26/23 - 03/28/23. Prior to the hospital stay, she had a past medical history of HLD, recent diagnosis of R L5 radiculopathy on EMG but asymptomatic, on PTA ASA 81 mg daily.    She presented to the hospital due to multiple falls and intermittent slurred speech x 2 days. In ED there was noticeable RLE weakness with drift. CT/CTA with severe focal L M1 stenosis. MRI shows acute multifocal L basal ganglia/corona radiata ischemic infarcts (watershed distribution).    Stroke Evaluation summarized:    MRI/Head CT MRI: acute multifocal L basal ganglia and corona radiata ischemic infarcts  CTH: negative for acute pathology   Intracranial Vasculature CTA head: focal severe L M1 stenosis   Cervical Vasculature CTA neck: unremarkable      Echocardiogram TTE: LV norm, EF 60-65%, no wma, RV norm, borderline LA enlargement, RA norm,    EKG/Telemetry SR, possible LA enlargement   Other Testing Not Applicable      LDL  3/26/2023: 95 mg/dL   A1C  3/26/2023: 5.5 %   Troponin No lab value available in past 48 hrs      Impression  Acute multifocal L basal ganglia and corona radiata ischemic infarcts secondary to intacranial atherosclerotic disease with severe L M1 stenosis, does have borderline LA enlargement, rule out  Afib     Shuffling gait x several years    Recommend increasing ASA to 325 mg/d and adding clopidogrel (300 mg loading dose now then 75 mg/d). Keep patient on both medications for 3 months. After that stop clopidogrel and continue     05/18/23: Ramila presents to the clinic for poststroke follow-up.  She is accompanied by 2 of her sons in person and 1 on the telephone.  They have a list of questions (2 pages).  I attempted to go through and answer these questions to the best of my ability.  Ramila arrived to the hospital for stroke work-up after having a fall and right lower extremity weakness/heaviness.  She is currently working with PT.  She was signed off from occupational and speech therapy.  She denies any dysarthria or dysphagia.  She states that her right upper extremity is 90% recovered.  Her right lower extremity is 40% to 50% back to baseline.  She denies any numbness.  She states that these extremities feel heavy with some weakness.  She states that it is difficult to  her right leg while walking.     Ramila had a fall 8 days ago.  She was leaving the bathroom and reached back for her walker and tumbled down.  Since this time she feels her movement have regressed and she is slower when ambulating.  Currently she uses a walker with transitioning and ambulating.  She has some complaints of increased right leg pain, that is improving. she followed with the spine clinic in the past for this pain.  She has a follow-up appointment on 5/23/2023 with the spine clinic.  We discussed that if her pain worsens to be seen sooner.    Family had concerns of bilateral ankle and feet swelling.  They stated this was better controlled when she was in the TCU.  We discussed wearing her compression stockings and elevating her feet throughout the day.  If it continues to be a concern they can speak with her primary care provider on management of bilateral lower extremity edema.    Family has concerns that  Ramila's shuffling gait and report that her writing has become smaller poststroke.  They are worried that she has a diagnosis of Parkinson's disease and would like an evaluation for this.  We discussed establishing care with a neurologist at her next appointment to discuss concerns of Parkinson's disease/symptoms.  Ramila feels that she is able to take full strides with her left leg.  Her right leg feels more shuffled post stroke due to heaviness.  Her son states that he feels she has had a shuffled gait for years.       Current Prevention Medications:    Aspirin 325 mg  Atorvastatin 40 mg tablet  Plavix 75 mg tablet x90 days  Hydrochlorothiazide 12.5 mg tablet    Perceived Side Effects: No    Medication Notes:   AED Medication Compliance:  compliant      Psycho-Social History: Ramila Liao currently lives [place] with self IND living. Highest level of education Two years of college. Employment status:  and organist, retired.    Patient does not smoke, rare alcohol use, no recreational drug use.  Currently, patient denies feeling depressed, denies feeling anhedonia, denies suicidal  thoughts, and denies having feelings of excessive guilt/worthlessness.  We reviewed importance of mental and emotional wellbeing and impact on health.      Current Medications:   acetaminophen (TYLENOL) 500 MG tablet, Take 1,000 mg by mouth every 6 hours as needed for mild pain  aspirin (ASA) 325 MG tablet, Take 1 tablet (325 mg) by mouth daily  atorvastatin (LIPITOR) 40 MG tablet, Take 1 tablet (40 mg) by mouth every evening  clopidogrel (PLAVIX) 75 MG tablet, Take 1 tablet (75 mg) by mouth daily  gabapentin (NEURONTIN) 300 MG capsule, Take 1 capsule (300 mg) by mouth 3 times daily  hydrochlorothiazide (HYDRODIURIL) 12.5 MG tablet, Take 1 tablet (12.5 mg) by mouth daily  multivitamin with minerals (THERA-M) 9 mg iron-400 mcg Tab tablet, Take 1 tablet by mouth every morning  tiZANidine (ZANAFLEX) 2 MG tablet, TAKE 1  TABLET BY MOUTH THREE TIMES DAILY AS NEEDED FOR PAIN OR SPASMS  magnesium citrate 100 mg Tab, Take 100 mg by mouth 3 times daily as needed (Patient not taking: Reported on 5/18/2023)    No current facility-administered medications on file prior to visit.    Past Medical History:   Patient  has a past medical history of Allergic rhinitis (08/02/2005), Contact dermatitis and other eczema, due to unspecified cause (07/30/2001), Edema, lower extremity, Low back pain (08/02/2016), and Mixed hyperlipidemia (08/09/2006).  Surgical History:  She  has a past surgical history that includes Hysterectomy; Cholecystectomy; and Cataract Extraction, Bilateral.  Family and Social History:  Reviewed, and she  reports that she has never smoked. She has never used smokeless tobacco. She reports current alcohol use. She reports that she does not use drugs.  Reviewed, and family history includes Coronary Artery Disease in her father and mother.    RECENT DIAGNOSTIC STUDIES:   Labs:    Coagulation studies:  Recent Labs   Lab Test 03/26/23  0952   INR 0.98        Lipid panel:  Recent Labs   Lab Test 04/18/23  1054 03/26/23  0952   CHOL 129 184   HDL 62 73   LDL 51 95   TRIG 79 79       HbA1C:  Recent Labs   Lab Test 03/26/23  0952   A1C 5.5       Troponin:  No lab results found.  Recent Labs   Lab Test 03/26/23  0952 02/28/23  1829 02/25/23  0751   TROPONINI 0.02 0.03 0.02     No lab results found.    Imaging: See [facility]    Cardiac event monitor  Cardiac event monitoring from 3/28/2023 to 4/26/2023 (monitored duration 13d 7h 24m).  Baseline rhythm was sinus rhythm 90bpm.    Reported heart rate range 50 to 120bpm, average 89bpm.  No symptoms recorded.  Automated recordings included sinus rhythm 84-90bpm.  No nonsustained or sustained tachyarrhythmias.  No atrial fibrillation.  There were no pauses noted.  Supraventricular and ventricular ectopic beat frequency are not reported on this monitoring modality.       REVIEW OF SYSTEMS:               "                                        10-point review of systems is negative except as mentioned above in HPI.    EXAM:                                                      Physical Exam:   Vitals: /63 (BP Location: Right arm, Patient Position: Sitting)   Pulse 82   Ht 1.575 m (5' 2\")   Wt 55.8 kg (123 lb)   LMP  (LMP Unknown)   BMI 22.50 kg/m    BMI= Body mass index is 22.5 kg/m .  GENERAL: NAD.  HEENT: NC/AT.  PULM: Non-labored breathing.   Neurologic:  MENTAL STATUS: Alert, attentive. Speech is fluent. Normal comprehension. Normal concentration. Adequate fund of knowledge.   CRANIAL NERVES: Visual fields intact to confrontation. Pupils equally, round and reactive to light. Facial sensation and movement normal. EOM full. Hearing intact to conversation. Trapezius strength intact. Palate moves symmetrically. Tongue midline.  MOTOR: 5/5 in proximal and distal muscle groups of upper and lower extremities. Tone and bulk normal.   DTRs: Intact and symmetric in biceps, BR, and dim patellae.   SENSATION: Normal light touch throughout.   COORDINATION: Normal finger nose finger. Finger tapping normal. Knee heel shin normal.  STATION AND GAIT:Dependant on walker. Slow shuffling of right leg.      ASSESSMENT and PLAN:                                                      Assessment:    ICD-10-CM    1. Stenosis of left middle cerebral artery  I66.02 Stroke Hospital Follow Up (for neurologist use only)      2. Cerebrovascular accident (CVA) due to stenosis of left middle cerebral artery (H)  I63.512 Stroke Hospital Follow Up (for neurologist use only)     Adult Neurology  Referral     Occupational Therapy Referral        Ramila Liao is a 87 year old female presenting for follow-up for multifocal L basal ganglia and corona radiata ischemic infarcts. She was hospitalized at [facility] on 03/26/23 - 03/28/23. Prior to the hospital stay, she had a past medical history of HLD, recent " diagnosis of R L5 radiculopathy on EMG but asymptomatic, on PTA ASA 81 mg daily. She presented to the hospital due to multiple falls and intermittent slurred speech x 2 days.  Ramila continues to work with physical therapy.  She continues to have residual weakness/heaviness of her right extremities.  She is taking her medications as prescribed and tolerating well.  Family has concerns over Parkinson-like symptoms poststroke and would like a work-up completed for this.  We discussed interventions outlined below and will plan to see the patient back in October for stroke follow-up and to further discuss concerns over Parkinson's disease when establishing care with a neurologist.  Ramila and her sons understand and agree with this plan    Plan:     Symptom management  - Continue physical and occupational therapy    Secondary prevention  -Continue with aspirin 325 mg tablet daily     Lifestyle  - mediterranean diet  - exercise    Risk Factors   Elevated blood Pressure  BP: 112/63  - Goal <130/80  - Continue preventive therapy: Hydrochlorothiazide as prescribed     Elevated cholesterol levels   LDL: 51  - Goal < 70 mg/dl  - Continue preventive therapy: Atorvastatin as prescribed     Obstructive sleep apnea  - Follow with the Sleep study clinic for evaluation    --- Plan to follow-up with your primary care provider to manage your vascular risk factors  --- Plan on follow up in the Neurology Clinic in 5 months.  --- Please feel free to reach out if you have any further questions or concerns.  --- Seek immediate medical attention if an emergency arises or if your health becomes progressively worse.     For any acute neurologic deficits she was advised to  go directly to the hospital rather than call the clinic.    It was a pleasure to meet you today!     Total Time: Total time spent for face to face visit, reviewing labs/imaging studies, counseling and coordination of care was: 1 Hour spent on the date of the encounter  doing chart review, review of test results, patient visit, documentation and discussion with family       This note was dictated using voice recognition software.  Any grammatical or context distortions are unintentional and inherent to the software.    Sushma Bravo, DNP, APRN, CNP  [facility] Neu[facility]

## 2023-05-18 ENCOUNTER — OFFICE VISIT (OUTPATIENT)
Dept: NEUROLOGY | Facility: CLINIC | Age: 88
End: 2023-05-18
Attending: PHYSICIAN ASSISTANT
Payer: COMMERCIAL

## 2023-05-18 VITALS
HEIGHT: 62 IN | HEART RATE: 82 BPM | SYSTOLIC BLOOD PRESSURE: 112 MMHG | WEIGHT: 123 LBS | DIASTOLIC BLOOD PRESSURE: 63 MMHG | BODY MASS INDEX: 22.63 KG/M2

## 2023-05-18 DIAGNOSIS — I66.02 STENOSIS OF LEFT MIDDLE CEREBRAL ARTERY: ICD-10-CM

## 2023-05-18 DIAGNOSIS — I63.512 CEREBROVASCULAR ACCIDENT (CVA) DUE TO STENOSIS OF LEFT MIDDLE CEREBRAL ARTERY (H): ICD-10-CM

## 2023-05-18 PROCEDURE — 99205 OFFICE O/P NEW HI 60 MIN: CPT

## 2023-05-18 NOTE — LETTER
5/18/2023         RE: Ramila Liao  [address]  [facility] [address]        Dear Colleague,    Thank you for referring your patient, Ramila Liao, to the [facility] [place]. Please see a copy of my visit note below.    __________________________________________________________    ___________________________________  ESTABLISHED PATIENT NEUROLOGY NOTE    DATE OF VISIT: 5/18/2023  MRN: [id number]  PATIENT NAME: Ramila Liao  YOB: 1935      Chief Complaint: Patient presents with:  Hospital F/U: Hosp follow up for CVA 3/26/23. Pt states she is doing well. Had a fall a week ago, has been moving slower since the fall.    SUBJECTIVE:                                                      History of Present Illness: aRmila Liao is a 87 year old female presenting for follow-up for multifocal L basal ganglia and corona radiata ischemic infarcts.     She was hospitalized at [facility] on 03/26/23 - 03/28/23. Prior to the hospital stay, she had a past medical history of HLD, recent diagnosis of R L5 radiculopathy on EMG but asymptomatic, on PTA ASA 81 mg daily.    She presented to the hospital due to multiple falls and intermittent slurred speech x 2 days. In ED there was noticeable RLE weakness with drift. CT/CTA with severe focal L M1 stenosis. MRI shows acute multifocal L basal ganglia/corona radiata ischemic infarcts (watershed distribution).    Stroke Evaluation summarized:    MRI/Head CT MRI: acute multifocal L basal ganglia and corona radiata ischemic infarcts  CTH: negative for acute pathology   Intracranial Vasculature CTA head: focal severe L M1 stenosis   Cervical Vasculature CTA neck: unremarkable      Echocardiogram TTE: LV norm, EF 60-65%, no wma, RV norm, borderline LA enlargement, RA norm,    EKG/Telemetry SR, possible LA enlargement   Other Testing Not Applicable      LDL  3/26/2023: 95 mg/dL   A1C  3/26/2023: 5.5 %    Troponin No lab value available in past 48 hrs      Impression  Acute multifocal L basal ganglia and corona radiata ischemic infarcts secondary to intacranial atherosclerotic disease with severe L M1 stenosis, does have borderline LA enlargement, rule out Afib     Shuffling gait x several years    Recommend increasing ASA to 325 mg/d and adding clopidogrel (300 mg loading dose now then 75 mg/d). Keep patient on both medications for 3 months. After that stop clopidogrel and continue     05/18/23: Ramila presents to the clinic for poststroke follow-up.  She is accompanied by 2 of her sons in person and 1 on the telephone.  They have a list of questions (2 pages).  I attempted to go through and answer these questions to the best of my ability.  Ramila arrived to the hospital for stroke work-up after having a fall and right lower extremity weakness/heaviness.  She is currently working with PT.  She was signed off from occupational and speech therapy.  She denies any dysarthria or dysphagia.  She states that her right upper extremity is 90% recovered.  Her right lower extremity is 40% to 50% back to baseline.  She denies any numbness.  She states that these extremities feel heavy with some weakness.  She states that it is difficult to  her right leg while walking.     Ramila had a fall 8 days ago.  She was leaving the bathroom and reached back for her walker and tumbled down.  Since this time she feels her movement have regressed and she is slower when ambulating.  Currently she uses a walker with transitioning and ambulating.  She has some complaints of increased right leg pain, that is improving. she followed with the spine clinic in the past for this pain.  She has a follow-up appointment on 5/23/2023 with the spine clinic.  We discussed that if her pain worsens to be seen sooner.    Family had concerns of bilateral ankle and feet swelling.  They stated this was better controlled when she was in the  TCU.  We discussed wearing her compression stockings and elevating her feet throughout the day.  If it continues to be a concern they can speak with her primary care provider on management of bilateral lower extremity edema.    Family has concerns that Ramila's shuffling gait and report that her writing has become smaller poststroke.  They are worried that she has a diagnosis of Parkinson's disease and would like an evaluation for this.  We discussed establishing care with a neurologist at her next appointment to discuss concerns of Parkinson's disease/symptoms.  Ramila feels that she is able to take full strides with her left leg.  Her right leg feels more shuffled post stroke due to heaviness.  Her son states that he feels she has had a shuffled gait for years.       Current Prevention Medications:    Aspirin 325 mg  Atorvastatin 40 mg tablet  Plavix 75 mg tablet x90 days  Hydrochlorothiazide 12.5 mg tablet    Perceived Side Effects: No    Medication Notes:   AED Medication Compliance:  compliant      Psycho-Social History: Ramila Liao currently lives [place] with self IND living. Highest level of education Two years of college. Employment status:  and organist, retired.    Patient does not smoke, rare alcohol use, no recreational drug use.  Currently, patient denies feeling depressed, denies feeling anhedonia, denies suicidal  thoughts, and denies having feelings of excessive guilt/worthlessness.  We reviewed importance of mental and emotional wellbeing and impact on health.      Current Medications:   acetaminophen (TYLENOL) 500 MG tablet, Take 1,000 mg by mouth every 6 hours as needed for mild pain  aspirin (ASA) 325 MG tablet, Take 1 tablet (325 mg) by mouth daily  atorvastatin (LIPITOR) 40 MG tablet, Take 1 tablet (40 mg) by mouth every evening  clopidogrel (PLAVIX) 75 MG tablet, Take 1 tablet (75 mg) by mouth daily  gabapentin (NEURONTIN) 300 MG capsule, Take 1 capsule (300 mg) by  mouth 3 times daily  hydrochlorothiazide (HYDRODIURIL) 12.5 MG tablet, Take 1 tablet (12.5 mg) by mouth daily  multivitamin with minerals (THERA-M) 9 mg iron-400 mcg Tab tablet, Take 1 tablet by mouth every morning  tiZANidine (ZANAFLEX) 2 MG tablet, TAKE 1 TABLET BY MOUTH THREE TIMES DAILY AS NEEDED FOR PAIN OR SPASMS  magnesium citrate 100 mg Tab, Take 100 mg by mouth 3 times daily as needed (Patient not taking: Reported on 5/18/2023)    No current facility-administered medications on file prior to visit.    Past Medical History:   Patient  has a past medical history of Allergic rhinitis (08/02/2005), Contact dermatitis and other eczema, due to unspecified cause (07/30/2001), Edema, lower extremity, Low back pain (08/02/2016), and Mixed hyperlipidemia (08/09/2006).  Surgical History:  She  has a past surgical history that includes Hysterectomy; Cholecystectomy; and Cataract Extraction, Bilateral.  Family and Social History:  Reviewed, and she  reports that she has never smoked. She has never used smokeless tobacco. She reports current alcohol use. She reports that she does not use drugs.  Reviewed, and family history includes Coronary Artery Disease in her father and mother.    RECENT DIAGNOSTIC STUDIES:   Labs:    Coagulation studies:  Recent Labs   Lab Test 03/26/23  0952   INR 0.98        Lipid panel:  Recent Labs   Lab Test 04/18/23  1054 03/26/23  0952   CHOL 129 184   HDL 62 73   LDL 51 95   TRIG 79 79       HbA1C:  Recent Labs   Lab Test 03/26/23  0952   A1C 5.5       Troponin:  No lab results found.  Recent Labs   Lab Test 03/26/23  0952 02/28/23  1829 02/25/23  0751   TROPONINI 0.02 0.03 0.02     No lab results found.    Imaging: See [facility]    Cardiac event monitor  Cardiac event monitoring from 3/28/2023 to 4/26/2023 (monitored duration 13d 7h 24m).  Baseline rhythm was sinus rhythm 90bpm.    Reported heart rate range 50 to 120bpm, average 89bpm.  No symptoms recorded.  Automated recordings included sinus  "rhythm 84-90bpm.  No nonsustained or sustained tachyarrhythmias.  No atrial fibrillation.  There were no pauses noted.  Supraventricular and ventricular ectopic beat frequency are not reported on this monitoring modality.       REVIEW OF SYSTEMS:                                                      10-point review of systems is negative except as mentioned above in HPI.    EXAM:                                                      Physical Exam:   Vitals: /63 (BP Location: Right arm, Patient Position: Sitting)   Pulse 82   Ht 1.575 m (5' 2\")   Wt 55.8 kg (123 lb)   LMP  (LMP Unknown)   BMI 22.50 kg/m    BMI= Body mass index is 22.5 kg/m .  GENERAL: NAD.  HEENT: NC/AT.  PULM: Non-labored breathing.   Neurologic:  MENTAL STATUS: Alert, attentive. Speech is fluent. Normal comprehension. Normal concentration. Adequate fund of knowledge.   CRANIAL NERVES: Visual fields intact to confrontation. Pupils equally, round and reactive to light. Facial sensation and movement normal. EOM full. Hearing intact to conversation. Trapezius strength intact. Palate moves symmetrically. Tongue midline.  MOTOR: 5/5 in proximal and distal muscle groups of upper and lower extremities. Tone and bulk normal.   DTRs: Intact and symmetric in biceps, BR, and dim patellae.   SENSATION: Normal light touch throughout.   COORDINATION: Normal finger nose finger. Finger tapping normal. Knee heel shin normal.  STATION AND GAIT:Dependant on walker. Slow shuffling of right leg.      ASSESSMENT and PLAN:                                                      Assessment:    ICD-10-CM    1. Stenosis of left middle cerebral artery  I66.02 Stroke Hospital Follow Up (for neurologist use only)      2. Cerebrovascular accident (CVA) due to stenosis of left middle cerebral artery (H)  I63.512 Stroke Hospital Follow Up (for neurologist use only)     Adult Neurology [facility] Referral     Occupational Therapy Referral        Ramila DARIUS Yanni is a 87 year " old female presenting for follow-up for multifocal L basal ganglia and corona radiata ischemic infarcts. She was hospitalized at [facility] on 03/26/23 - 03/28/23. Prior to the hospital stay, she had a past medical history of HLD, recent diagnosis of R L5 radiculopathy on EMG but asymptomatic, on PTA ASA 81 mg daily. She presented to the hospital due to multiple falls and intermittent slurred speech x 2 days.  Ramila continues to work with physical therapy.  She continues to have residual weakness/heaviness of her right extremities.  She is taking her medications as prescribed and tolerating well.  Family has concerns over Parkinson-like symptoms poststroke and would like a work-up completed for this.  We discussed interventions outlined below and will plan to see the patient back in October for stroke follow-up and to further discuss concerns over Parkinson's disease when establishing care with a neurologist.  Ramila and her sons understand and agree with this plan    Plan:     Symptom management  - Continue physical and occupational therapy    Secondary prevention  -Continue with aspirin 325 mg tablet daily     Lifestyle  - mediterranean diet  - exercise    Risk Factors   Elevated blood Pressure  BP: 112/63  - Goal <130/80  - Continue preventive therapy: Hydrochlorothiazide as prescribed     Elevated cholesterol levels   LDL: 51  - Goal < 70 mg/dl  - Continue preventive therapy: Atorvastatin as prescribed     Obstructive sleep apnea  - Follow with the Sleep study clinic for evaluation    --- Plan to follow-up with your primary care provider to manage your vascular risk factors  --- Plan on follow up in the Neurology Clinic in 5 months.  --- Please feel free to reach out if you have any further questions or concerns.  --- Seek immediate medical attention if an emergency arises or if your health becomes progressively worse.     For any acute neurologic deficits she was advised to  go directly to  the hospital rather than call the clinic.    It was a pleasure to meet you today!     Total Time: Total time spent for face to face visit, reviewing labs/imaging studies, counseling and coordination of care was: 1 Hour spent on the date of the encounter doing chart review, review of test results, patient visit, documentation and discussion with family       This note was dictated using voice recognition software.  Any grammatical or context distortions are unintentional and inherent to the software.    Sushma Bravo, TARAN, APRN, CNP  [facility]       Again, thank you for allowing me to participate in the care of your patient.        Sincerely,        GORGE Faulkner CNP

## 2023-05-18 NOTE — PROGRESS NOTES
"Clinical Swallow Evaluation with Speech Pathology     03/27/23 1153   Appointment Info   Signing Clinician's Name / Credentials (SLP) Kalpana Andrews MA, CCC-SLP   General Information   Onset of Illness/Injury or Date of Surgery 03/26/23   Referring Physician Chavez Ortiz MD   Pertinent History of Current Problem   Per H&P, \"Ramila Liao is a 87 year old female with PMH signficant for low back pain, hyperlipidemia, lower extremity edema.  Presented with intermittent slurred speech, falls and right lower extremity weakness for 48 hours.  Evaluated per stroke protocols.  CTA head and neck showed no acute CVA but did show severe stenosis at the left M1 segment.  CT lumbar spine shows L5-S1 severe right and moderate left foraminal stenosis.  MRI scanner under repair.  Admitted.  Neuro telemetry consulted.\"     General Observations Patient alert and sitting upright on cart upon arrival. Son Carrington was present.   Type of Evaluation   Type of Evaluation Swallow Evaluation   Oral Motor   Oral Musculature generally intact   Mucosal Quality adequate   Dentition (Oral Motor)   Comment, Dentition (Oral Motor) Patient is missing a few molars. She states that this does not significantly impact her chewing ability.   Dentition (Oral Motor) natural dentition;some missing teeth   Facial Symmetry (Oral Motor)   Facial Symmetry (Oral Motor) WNL   Lip Function (Oral Motor)   Lip Range of Motion (Oral Motor) WNL   Lip Strength (Oral Motor) WNL   Tongue Function (Oral Motor)   Tongue Strength (Oral Motor) WNL   Tongue Coordination/Speed (Oral Motor) reduced rate   Tongue ROM (Oral Motor) WNL   Jaw Function (Oral Motor)   Jaw Function (Oral Motor) WNL   Cough/Swallow/Gag Reflex (Oral Motor)   Volitional Throat Clear/Cough (Oral Motor) WNL   Volitional Swallow (Oral Motor) WNL   Vocal Quality/Secretion Management (Oral Motor)   Vocal Quality (Oral Motor) WNL   Secretion Management (Oral Motor) WNL   Comment, Vocal " 2132: Patient ambulated from L&D to room 324 with standby assistance from L&D nurses; writer assumed care of patient after bedside report at 2128 hours. VS and assessment completed. Patient oriented to room, birth certificate worksheet, visitor policy, Postpartum/ Care manual, and EPDS. Patient also educated how to call nursery at [phone]. [phone] for baby needs. Telephone placed within reach. Patient informed may be up without assistance, but to please call for assistance from nurse before getting out of bed if experiencing dizziness or lightheadedness. Patient voiced understanding and call bell within reach. Water pitcher filled. Significant other has pillow and blankets to spend the night. Quality/Secretion Management (Oral Motor) Patient's vocal quality is clear.   General Swallowing Observations   Past History of Dysphagia None per patient report and none per cursory review of previous records.   Comment, General Swallowing Observations Per RN report, patient has been tolerating current diet without s/sx of aspiration. She has also been tolerating pills with water without difficulty.   Respiratory Support (General Swallowing Observations) none   Current Diet/Method of Nutritional Intake (General Swallowing Observations, NIS) regular diet;thin liquids (level 0)  (Low Saturated Fat Na <2400 mg)   Swallowing Evaluation Clinical swallow evaluation   Clinical Swallow Evaluation   Feeding Assistance no assistance needed   Clinical Swallow Evaluation Textures Trialed thin liquids;solid foods   Clinical Swallow Eval: Thin Liquid Texture Trial   Mode of Presentation, Thin Liquids cup;straw;self-fed   Volume of Liquid or Food Presented ~4 ounces   Oral Phase of Swallow WFL   Pharyngeal Phase of Swallow intact  (No overt s/sx of aspiration)   Diagnostic Statement Subjectively, swallow response appeared timely. No overt clinical s/sx of aspiration observed.   Clinical Swallow Evaluation: Solid Food Texture Trial   Mode of Presentation self-fed   Volume Presented 4 bites   Oral Phase WFL   Pharyngeal Phase intact  (No overt s/sx of aspiration)   Diagnostic Statement Patient demonstrated effective and timely mastication of a regular texture solid. No oral stasis noted after swallows. No overt clinical s/sx of aspiration observed.   Esophageal Phase of Swallow   Patient reports or presents with symptoms of esophageal dysphagia No   Swallowing Recommendations   Diet Consistency Recommendations regular diet;thin liquids (level 0)   Supervision Level for Intake patient independent   Mode of Delivery Recommendations bolus size, small;slow rate of intake   Monitoring/Assistance Required (Eating/Swallowing) stop eating  activities when fatigue is present   Recommended Feeding/Eating Techniques (Swallow Eval) maintain upright sitting position for eating   Medication Administration Recommendations, Swallowing (SLP) As tolerated   Instrumental Assessment Recommendations instrumental evaluation not recommended at this time   Clinical Impression   Criteria for Skilled Therapeutic Interventions Met (SLP Eval) Evaluation only;No problems identified which require skilled intervention   Clinical Impression Comments   Clinical swallow evaluation completed per MD order. No oral dysphagia observed. Pharyngeal dysphagia is not suspected due to absence of clinical s/sx of aspiration, as well as no prior history of dysphagia. Patient appears appropriate to continue current diet of Regular textures and thin liquids with use of standard safe swallowing precautions. A Speech/Language evaluation was deferred as patient's speech is intact (i.e., no dysarthria noted) and she appears to be functioning at/near her cognitive-linguistic baseline. No further Speech Therapy needs at this time. Please reconsult if new concerns re: swallow, speech, language, or cognition arise.     SLP Total Evaluation Time   Eval: oral/pharyngeal swallow function, clinical swallow Minutes (12256) 15   SLP Discharge Planning   SLP Plan Evaluation only. Will complete current orders at this time.   SLP Discharge Recommendation home with assist  (Defer to PT/OT if recommendation differs)   SLP Rationale for DC Rec Patient's swallow function, speech, language, and cognition appear intact.   SLP Brief overview of current status  Continue current diet of Regular textures and thin liquids as tolerated. Patient to follow standard safe swallowing precautions, including sitting fully upright for all intake, eating slowly, and taking small bites/sips.   Total Session Time   Total Session Time (sum of timed and untimed services) 15

## 2023-05-18 NOTE — PROGRESS NOTES
Assessment:   Ramila Liao is a 87 year old y.o. female with past medical history significant for recent CVA (on Plavix) hyperlipidemia who presents today for follow-up regarding pain which begins in the right buttock and radiates down the right lower extremity.  Pain is chronic but worse after a fall in her bathroom 3 weeks ago in which she landed on her right side.  My review of an MRI lumbar spine shows grade 1 spondylolisthesis at L5-S1 where there is moderate bilateral foraminal stenosis.    MRI pelvis was unremarkable.  EMG right lower extremity shows active right L5 radiculopathy.  There is also peripheral polyneuropathy.  Her right lower extremity symptoms have been refractory to extensive conservative treatment including physical therapy and multiple injections as well as medical management. Patient previously saw Dr. Pena who recommended a minimally invasive right L5-S1 foraminotomy for treatment of the right L5 radiculopathy.  When she saw him she was also having left-sided pain and he recommended a left SI joint injection for the left-sided pain has resolved completely.  - Since I saw the patient she had a CVA which affected her right side.  She has had increased right lower extremity weakness related to stroke.  It is difficult to tell how much of her symptoms are related to her stroke versus a radiculopathy.  However, she has also had other changes including a shuffling gait with both lower extremities, difficulty initiating movements, and small handwriting.  Family is concerned that she may have Parkinson's.  I will have her see neurology.  - She continues to have bilateral lower extremity edema.  She is going to follow-up with her primary care provider later today about this.             Plan:     A shared decision making plan was used.  The patient's values and choices were respected.  The following represents what was discussed and decided upon by the physician assistant and the patient.       1.  DIAGNOSTIC TESTS:    -I reviewed the MRI lumbar spine.  - I reviewed the ultrasound right lower extremity which was negative for DVT.  - I reviewed the MRI pelvis.  - I reviewed the x-ray bilateral hips.  - I reviewed the EMG right lower extremity.  - I reviewed the flexion-extension lumbar spine x-rays which show 6 mm grade 1 spondylolisthesis L5-S1 without change in flexion or extension  - I reviewed the CT lumbar spine which shows 6.6 mm spondylolisthesis L5 on S1 with no pars defects.    2.  PHYSICAL THERAPY: Patient has had 8 sessions of physical therapy.  She is also working with PT and OT after her stroke.  No additional therapy was ordered.    3.  MEDICATIONS: No changes are made to the patient's medications.  - Patient takes Tylenol as needed.  - She is on aspirin.  - Patient takes gabapentin 300 g 3 times daily.  - I would recommend avoiding additional pain relieving medications that could increase her risk of fall.  - Patient cannot take NSAIDs since she is on Plavix.       4.  INTERVENTIONS: No additional interventions were ordered.  I do not think I have another injection that will be more beneficial than the ones we have tried.      5.  REFERRALS:  - I entered a referral to neurology regarding family's concern for Parkinson's.  - I gave the patient the phone number to schedule a follow-up visit with Dr. Pena.    6.  PATIENT EDUCATION:  - Patient's family requested a prescription for a power lift chair.  Recommended she discuss this with her primary care provider.  She sees her primary care provider later today.    7.  FOLLOW-UP: Patient will follow-up with me as needed.  We will await recommendations from Dr. Pena.  If she has questions or concerns in the meantime, she should not hesitate to call.      Subjective:     Ramila Liao is a 87 year old female who presents today for follow-up regarding chronic right low back pain with radiation into the right lower extremity.  Since I last  saw the patient she had a CVA which affected her right side so she has had increased leg weakness.  3 weeks ago she had a fall in her bathroom in which she landed on her right side.  She has had increased pain since her fall.  She has had extensive bruising right buttock and thigh.    Patient complains of right low back pain.  Pain radiates into the right buttock and on the right lateral thigh to the knee.  Denies pain distal to the knee.  Denies numbness or tingling.  She feels weakness, right greater than left leg.  Family is concerned because she has a shuffling gait and difficulty initiating movements.  She also has difficulty making turns with her walker.  She states she feels like her legs are made over the lead.  She rates her pain today as a 3 out of 10.  At its worst it is a 10 out of 10.  As best it is a 1 out of 10.  Pain is aggravated with sleeping and alleviated with moving around.    Treatment to date:  -Patient previously went to physical therapy at [place].  - 8 sessions physical therapy through [facility] 20 22-20 23.  - right L5-S1 transforaminal epidural steroid injection November 10, 2022 which provided 60 to 70% relief of his pain but relief was already waning by 4 weeks after the procedure.  -right L4-5 and L5-S1 transforaminal epidural steroid injections December 13, 2022 did not not provide any relief of her pain.  - right piriformis muscle trigger point injection January 2, 2023 which did not provide any relief of her pain.  - She takes gabapentin 300 mg 3 times daily  - Tylenol as needed  - Aspirin    Review of Systems:  Positive for weakness, loss of bowel/bladder control, pain much worse at night, trip/stumble/falls, unintentional weight loss.  Negative for numbness/tingling, inability to urinate, headache, difficulty swallowing, difficulty with hand skills, fevers.     Objective:   CONSTITUTIONAL:  Vital signs as above.  No acute distress.  The patient is well nourished and well  groomed.    PSYCHIATRIC:  The patient is awake, alert, oriented to person, place and time.  The patient is answering questions appropriately with clear speech.  Normal affect.  HEENT: Normocephalic, atraumatic.  Sclera clear.    SKIN:  Skin over the face, posterior torso, bilateral upper and lower extremities is clean, dry, intact without rashes.  VASCULAR: Bilateral lower extremity edema, worse on the right than the left.  MUSCULOSKELETAL: Patient ambulates with a shuffling gait.  She has difficulty initiating her walk.  She walks with a 4 wheeled walker.  The patient has 5/5 strength bilateral hip flexors, knee flexors/extensors, ankle dorsi/plantar flexors, EHL.    NEUROLOGICAL:  No ankle clonus.  Sensation light touch intact bilateral lower extremities throughout.     RESULTS: I reviewed the MRI lumbar spine from [facility] dated March 27, 2023.  This shows anterolisthesis L5 on S1 with moderate to severe right and mild to moderate left foraminal stenosis.  There is also mild spinal canal stenosis L3-4, L4-5.    EMG right lower extremity February 13, 2023:  Interpretation: Abnormal study: There is electrodiagnostic evidence of:     1.   Right L5 radiculopathy, active.       2.  There is electrodiagnostic evidence consistent with a length-dependent sensorimotor polyneuropathy, predominantly axonal.       3.  There is no convincing electrodiagnostic evidence of  focal neuropathy in the right lower extremity.        EXAM: XR HIP BILATERAL 2 VIEWS EACH  LOCATION: [facility]  DATE/TIME: 1/4/2022 10:59 AM     INDICATION: Right low back/hip pain.  COMPARISON: None.                                                                      IMPRESSION:   Degenerative change of both hip joints. No evidence for fracture or dislocation. Mild degenerative change of both SI joints.     EXAM: MR PELVIS MUSCULAR TISSUE W/O CONTRAST  LOCATION: [facility]  DATE/TIME: 1/11/2023 8:08  PM     INDICATION: right buttock and leg pain with swelling  COMPARISON: 1/4/2022 radiographs.  TECHNIQUE: Unenhanced.     FINDINGS:      HIPS:   -No high-grade chondromalacia, joint effusions or para labral cysts.     MUSCLES AND TENDONS:  -Gluteal: No tendon tear or tendinopathy. No trochanteric bursitis.    -Proximal hamstring: No tendon tear or tendinopathy.   -Iliopsoas: No tendon tear or tendinopathy. No bursitis.     BONES:   -No fracture or contusion.   -No osseous lesion. No evidence for avascular necrosis.     SOFT TISSUES:   -Normal muscle bulk. No acute muscular injury.     INTRAPELVIC CONTENTS:   -Visualized portions are normal.                                                                      IMPRESSION:  1.  Unremarkable MRI of the pelvis.    EXAM: CT LUMBAR SPINE W/O CONTRAST  LOCATION: [facility]  DATE/TIME: 1/18/2023 1:01 PM     INDICATION: Look for pars defect L5.  Right leg pain  COMPARISON: Lumbar spine MRI 11/07/2022.  TECHNIQUE: Routine CT Lumbar Spine without IV contrast. Multiplanar reformats. Dose reduction techniques were used.      FINDINGS:  VERTEBRA: 5 lumbar type vertebrae. 14 degrees of levocurvature from L3 to L5. 1 mm retrolisthesis from L2 to L3 to L4-L5. 6.6 mm degenerative spondylolisthesis of L5 on S1. Vertebral body heights are maintained. No pars defects. Mild degenerative changes   of sacroiliac joints.     CANAL/FORAMINA: Broad-based disc bulges from L2-L3 to L5-S1. Mild spinal canal narrowing at L4-L5. Mild left neuroforaminal narrowing at L3-L4. Mild right neuroforaminal narrowing at L4-L5.     PARASPINAL: The posterior paraspinal soft tissues are grossly within normal limits.                                                                      IMPRESSION:  1.  6.6 mm degenerative spondylolisthesis of L5 on S1.   2.  No pars defects.  3.  Mild spinal canal narrowing at L4-L5.  4.  Mild left neuroforaminal narrowing at L3-L4. Mild right  neuroforaminal narrowing at L4-L5.  5.  14 degrees of levocurvature from L3 to L5.     EXAM: XR LUMBAR SPINE W BENDING COMP G/E 6 VIEWS  LOCATION: [facility]  DATE/TIME: 1/18/2023 1:22 PM     INDICATION: Right leg pain  COMPARISON: 11/04/2022.  TECHNIQUE: CR Lumbar Spine.                                                                      IMPRESSION: Five lumbar-type vertebral bodies. Allowing for differences in technique, similar mild S-shaped rotatory dextroconvex thoracolumbar and levoconvex lower lumbar curvatures. No acute fracture or compression deformity. Similar 6 mm grade 1   anterolisthesis at L5-S1 without significant change on the flexion or extension views. No pars interarticularis defect. No interval spondylolisthesis. Redemonstration of Schmorl's node at the L3 superior endplate and lower thoracic levels. Similar   multilevel interbody degenerative change, worst and moderate at L5-S1 and, to a lesser extent, at L4-L5. Similar moderate L5-S1 facet arthrosis with suggestion of moderate bilateral foraminal narrowing, although oblique views are limited due to   suboptimal patient positioning. Moderate degenerative change at the bilateral sacroiliac and hip joints. Surgical clips within the upper abdomen.     Please refer to separately dictated CT of the lumbar spine for further details.

## 2023-05-18 NOTE — PATIENT INSTRUCTIONS
Plan:     Symptom management  - Continue physical and occupational therapy    Secondary prevention  -Continue with aspirin 325 mg tablet daily indefinitely  - Plavix for 90 days. Stop 06/29/23     Lifestyle  - mediterranean diet  - exercise    Risk Factors   Elevated blood Pressure  BP: 112/63  - Goal <130/80  - Continue preventive therapy: Hydrochlorothiazide as prescribed     Elevated cholesterol levels   LDL: 51  - Goal < 70 mg/dl  - Continue preventive therapy: Atorvastatin as prescribed     Obstructive sleep apnea  - Follow with the Sleep study clinic for evaluation    --- Plan to follow-up with your primary care provider to manage your vascular risk factors  --- Plan on follow up in the Neurology Clinic in 5 months.  --- Please feel free to reach out if you have any further questions or concerns.  --- Seek immediate medical attention if an emergency arises or if your health becomes progressively worse.     For any acute neurologic deficits she was advised to  go directly to the hospital rather than call the clinic.    It was a pleasure to meet you today!

## 2023-05-18 NOTE — NURSING NOTE
Chief Complaint   Patient presents with     Hospital F/U     Hosp follow up for CVA 3/26/23. Pt states she is doing well. Had a fall a week ago, has been moving slower since the fall.     Tigist Patel LPN on 5/18/2023 at 1:07 PM

## 2023-05-19 ENCOUNTER — TELEPHONE (OUTPATIENT)
Dept: NEUROLOGY | Facility: CLINIC | Age: 88
End: 2023-05-19
Payer: COMMERCIAL

## 2023-05-19 DIAGNOSIS — I63.512 CEREBROVASCULAR ACCIDENT (CVA) DUE TO STENOSIS OF LEFT MIDDLE CEREBRAL ARTERY (H): Primary | ICD-10-CM

## 2023-05-19 NOTE — TELEPHONE ENCOUNTER
[facility] Call Center    Phone Message    May a detailed message be left on voicemail: yes     Reason for Call: Other: Pt  needs referral for home health to continue with [facility].      Please call Sai phillips @ [phone] to discuss further.    Action Taken: Message routed to:  Other: [facility]    Travel Screening: Not Applicable

## 2023-05-22 NOTE — TELEPHONE ENCOUNTER
Returned call to patients elizabeth Gibbs, pt moving into halfway next month     [facility] is company who is providing PT/OT therapy to patient through home care services. They would like to continue this until her move into assisted living.     Pended below are orders for home care if you are agreeable to continue services. I have PCP listed as primary contact.     Team will fax orders to [facility] home care, family planning to call back with contact information.     Henry Kenney RN, BSN  [facility]

## 2023-05-23 ENCOUNTER — ANCILLARY PROCEDURE (OUTPATIENT)
Dept: GENERAL RADIOLOGY | Facility: CLINIC | Age: 88
End: 2023-05-23
Attending: NURSE PRACTITIONER
Payer: COMMERCIAL

## 2023-05-23 ENCOUNTER — OFFICE VISIT (OUTPATIENT)
Dept: FAMILY MEDICINE | Facility: CLINIC | Age: 88
End: 2023-05-23
Payer: COMMERCIAL

## 2023-05-23 ENCOUNTER — OFFICE VISIT (OUTPATIENT)
Dept: PHYSICAL MEDICINE AND REHAB | Facility: CLINIC | Age: 88
End: 2023-05-23
Payer: COMMERCIAL

## 2023-05-23 VITALS
DIASTOLIC BLOOD PRESSURE: 78 MMHG | WEIGHT: 130 LBS | OXYGEN SATURATION: 99 % | HEART RATE: 78 BPM | TEMPERATURE: 97.2 F | SYSTOLIC BLOOD PRESSURE: 124 MMHG | BODY MASS INDEX: 23.78 KG/M2

## 2023-05-23 VITALS — HEART RATE: 95 BPM | OXYGEN SATURATION: 98 % | DIASTOLIC BLOOD PRESSURE: 76 MMHG | SYSTOLIC BLOOD PRESSURE: 157 MMHG

## 2023-05-23 DIAGNOSIS — R29.898 RIGHT LEG WEAKNESS: ICD-10-CM

## 2023-05-23 DIAGNOSIS — M43.16 SPONDYLOLISTHESIS OF LUMBAR REGION: ICD-10-CM

## 2023-05-23 DIAGNOSIS — M25.551 HIP PAIN, RIGHT: ICD-10-CM

## 2023-05-23 DIAGNOSIS — M48.00 SPINAL STENOSIS, UNSPECIFIED SPINAL REGION: ICD-10-CM

## 2023-05-23 DIAGNOSIS — Z91.81 RISK FOR FALLS: ICD-10-CM

## 2023-05-23 DIAGNOSIS — R26.89 SHUFFLING GAIT: ICD-10-CM

## 2023-05-23 DIAGNOSIS — M54.16 LUMBAR RADICULOPATHY: ICD-10-CM

## 2023-05-23 DIAGNOSIS — H61.23 BILATERAL IMPACTED CERUMEN: ICD-10-CM

## 2023-05-23 DIAGNOSIS — R26.9 ALTERED GAIT: Primary | ICD-10-CM

## 2023-05-23 DIAGNOSIS — M25.551 HIP PAIN, RIGHT: Primary | ICD-10-CM

## 2023-05-23 DIAGNOSIS — I63.512 CEREBROVASCULAR ACCIDENT (CVA) DUE TO STENOSIS OF LEFT MIDDLE CEREBRAL ARTERY (H): ICD-10-CM

## 2023-05-23 PROBLEM — R29.6 REPEATED FALLS: Status: ACTIVE | Noted: 2023-03-28

## 2023-05-23 PROBLEM — R60.0 LOCALIZED EDEMA: Status: ACTIVE | Noted: 2023-03-28

## 2023-05-23 PROBLEM — R53.1 WEAKNESS: Status: ACTIVE | Noted: 2023-03-28

## 2023-05-23 PROCEDURE — 73502 X-RAY EXAM HIP UNI 2-3 VIEWS: CPT | Mod: TC | Performed by: RADIOLOGY

## 2023-05-23 PROCEDURE — 99213 OFFICE O/P EST LOW 20 MIN: CPT | Mod: 25 | Performed by: NURSE PRACTITIONER

## 2023-05-23 PROCEDURE — 99214 OFFICE O/P EST MOD 30 MIN: CPT | Performed by: PHYSICIAN ASSISTANT

## 2023-05-23 PROCEDURE — 69209 REMOVE IMPACTED EAR WAX UNI: CPT | Mod: 50 | Performed by: NURSE PRACTITIONER

## 2023-05-23 RX ORDER — GABAPENTIN 300 MG/1
300 CAPSULE ORAL 3 TIMES DAILY
Qty: 90 CAPSULE | Refills: 1 | Status: SHIPPED | OUTPATIENT
Start: 2023-05-23

## 2023-05-23 ASSESSMENT — PAIN SCALES - GENERAL: PAINLEVEL: MILD PAIN (3)

## 2023-05-23 NOTE — TELEPHONE ENCOUNTER
I spoke with Sai (patient's son) and he stated that [facility] will schedule patient's PT/OT moving forward.  No need for referral orders.    Mayda Baker 5/23/2023 12:31 PM

## 2023-05-23 NOTE — PATIENT INSTRUCTIONS
Please call [phone] to schedule an appointment with Dr. Pena (neurosurgery).     I entered a referral to neurology to address your concerns regarding gait and small handwriting.  Someone will call you to schedule.    Follow-up with me will be as needed.  If Dr. Pena wants you to try anything else from a nonsurgical standpoint I am happy to help.

## 2023-05-23 NOTE — LETTER
5/23/2023         RE: Ramila Liao  [address]  [facility] [address]        Dear Colleague,    Thank you for referring your patient, Ramila Liao, to the [facility]. Please see a copy of my visit note below.      Assessment:   Ramila Liao is a 87 year old y.o. female with past medical history significant for recent CVA (on Plavix) hyperlipidemia who presents today for follow-up regarding pain which begins in the right buttock and radiates down the right lower extremity.  Pain is chronic but worse after a fall in her bathroom 3 weeks ago in which she landed on her right side.  My review of an MRI lumbar spine shows grade 1 spondylolisthesis at L5-S1 where there is moderate bilateral foraminal stenosis.    MRI pelvis was unremarkable.  EMG right lower extremity shows active right L5 radiculopathy.  There is also peripheral polyneuropathy.  Her right lower extremity symptoms have been refractory to extensive conservative treatment including physical therapy and multiple injections as well as medical management. Patient previously saw Dr. Pena who recommended a minimally invasive right L5-S1 foraminotomy for treatment of the right L5 radiculopathy.  When she saw him she was also having left-sided pain and he recommended a left SI joint injection for the left-sided pain has resolved completely.  - Since I saw the patient she had a CVA which affected her right side.  She has had increased right lower extremity weakness related to stroke.  It is difficult to tell how much of her symptoms are related to her stroke versus a radiculopathy.  However, she has also had other changes including a shuffling gait with both lower extremities, difficulty initiating movements, and small handwriting.  Family is concerned that she may have Parkinson's.  I will have her see neurology.  - She continues to have bilateral lower extremity edema.  She is going to follow-up with her  primary care provider later today about this.             Plan:     A shared decision making plan was used.  The patient's values and choices were respected.  The following represents what was discussed and decided upon by the physician assistant and the patient.      1.  DIAGNOSTIC TESTS:    -I reviewed the MRI lumbar spine.  - I reviewed the ultrasound right lower extremity which was negative for DVT.  - I reviewed the MRI pelvis.  - I reviewed the x-ray bilateral hips.  - I reviewed the EMG right lower extremity.  - I reviewed the flexion-extension lumbar spine x-rays which show 6 mm grade 1 spondylolisthesis L5-S1 without change in flexion or extension  - I reviewed the CT lumbar spine which shows 6.6 mm spondylolisthesis L5 on S1 with no pars defects.    2.  PHYSICAL THERAPY: Patient has had 8 sessions of physical therapy.  She is also working with PT and OT after her stroke.  No additional therapy was ordered.    3.  MEDICATIONS: No changes are made to the patient's medications.  - Patient takes Tylenol as needed.  - She is on aspirin.  - Patient takes gabapentin 300 g 3 times daily.  - I would recommend avoiding additional pain relieving medications that could increase her risk of fall.  - Patient cannot take NSAIDs since she is on Plavix.       4.  INTERVENTIONS: No additional interventions were ordered.  I do not think I have another injection that will be more beneficial than the ones we have tried.      5.  REFERRALS:  - I entered a referral to neurology regarding family's concern for Parkinson's.  - I gave the patient the phone number to schedule a follow-up visit with Dr. Pena.    6.  PATIENT EDUCATION:  - Patient's family requested a prescription for a power lift chair.  Recommended she discuss this with her primary care provider.  She sees her primary care provider later today.    7.  FOLLOW-UP: Patient will follow-up with me as needed.  We will await recommendations from Dr. Pena.  If she has  questions or concerns in the meantime, she should not hesitate to call.      Subjective:     Ramila Liao is a 87 year old female who presents today for follow-up regarding chronic right low back pain with radiation into the right lower extremity.  Since I last saw the patient she had a CVA which affected her right side so she has had increased leg weakness.  3 weeks ago she had a fall in her bathroom in which she landed on her right side.  She has had increased pain since her fall.  She has had extensive bruising right buttock and thigh.    Patient complains of right low back pain.  Pain radiates into the right buttock and on the right lateral thigh to the knee.  Denies pain distal to the knee.  Denies numbness or tingling.  She feels weakness, right greater than left leg.  Family is concerned because she has a shuffling gait and difficulty initiating movements.  She also has difficulty making turns with her walker.  She states she feels like her legs are made over the lead.  She rates her pain today as a 3 out of 10.  At its worst it is a 10 out of 10.  As best it is a 1 out of 10.  Pain is aggravated with sleeping and alleviated with moving around.    Treatment to date:  -Patient previously went to physical therapy at [place].  - 8 sessions physical therapy through [facility] 20 22-20 23.  - right L5-S1 transforaminal epidural steroid injection November 10, 2022 which provided 60 to 70% relief of his pain but relief was already waning by 4 weeks after the procedure.  -right L4-5 and L5-S1 transforaminal epidural steroid injections December 13, 2022 did not not provide any relief of her pain.  - right piriformis muscle trigger point injection January 2, 2023 which did not provide any relief of her pain.  - She takes gabapentin 300 mg 3 times daily  - Tylenol as needed  - Aspirin    Review of Systems:  Positive for weakness, loss of bowel/bladder control, pain much worse at night, trip/stumble/falls,  unintentional weight loss.  Negative for numbness/tingling, inability to urinate, headache, difficulty swallowing, difficulty with hand skills, fevers.     Objective:   CONSTITUTIONAL:  Vital signs as above.  No acute distress.  The patient is well nourished and well groomed.    PSYCHIATRIC:  The patient is awake, alert, oriented to person, place and time.  The patient is answering questions appropriately with clear speech.  Normal affect.  HEENT: Normocephalic, atraumatic.  Sclera clear.    SKIN:  Skin over the face, posterior torso, bilateral upper and lower extremities is clean, dry, intact without rashes.  VASCULAR: Bilateral lower extremity edema, worse on the right than the left.  MUSCULOSKELETAL: Patient ambulates with a shuffling gait.  She has difficulty initiating her walk.  She walks with a 4 wheeled walker.  The patient has 5/5 strength bilateral hip flexors, knee flexors/extensors, ankle dorsi/plantar flexors, EHL.    NEUROLOGICAL:  No ankle clonus.  Sensation light touch intact bilateral lower extremities throughout.     RESULTS: I reviewed the MRI lumbar spine from [facility] dated March 27, 2023.  This shows anterolisthesis L5 on S1 with moderate to severe right and mild to moderate left foraminal stenosis.  There is also mild spinal canal stenosis L3-4, L4-5.    EMG right lower extremity February 13, 2023:  Interpretation: Abnormal study: There is electrodiagnostic evidence of:     1.   Right L5 radiculopathy, active.       2.  There is electrodiagnostic evidence consistent with a length-dependent sensorimotor polyneuropathy, predominantly axonal.       3.  There is no convincing electrodiagnostic evidence of  focal neuropathy in the right lower extremity.        EXAM: XR HIP BILATERAL 2 VIEWS EACH  LOCATION: [facility]  DATE/TIME: 1/4/2022 10:59 AM     INDICATION: Right low back/hip pain.  COMPARISON: None.                                                                       IMPRESSION:   Degenerative change of both hip joints. No evidence for fracture or dislocation. Mild degenerative change of both SI joints.     EXAM: MR PELVIS MUSCULAR TISSUE W/O CONTRAST  LOCATION: [facility]  DATE/TIME: 1/11/2023 8:08 PM     INDICATION: right buttock and leg pain with swelling  COMPARISON: 1/4/2022 radiographs.  TECHNIQUE: Unenhanced.     FINDINGS:      HIPS:   -No high-grade chondromalacia, joint effusions or para labral cysts.     MUSCLES AND TENDONS:  -Gluteal: No tendon tear or tendinopathy. No trochanteric bursitis.    -Proximal hamstring: No tendon tear or tendinopathy.   -Iliopsoas: No tendon tear or tendinopathy. No bursitis.     BONES:   -No fracture or contusion.   -No osseous lesion. No evidence for avascular necrosis.     SOFT TISSUES:   -Normal muscle bulk. No acute muscular injury.     INTRAPELVIC CONTENTS:   -Visualized portions are normal.                                                                      IMPRESSION:  1.  Unremarkable MRI of the pelvis.    EXAM: CT LUMBAR SPINE W/O CONTRAST  LOCATION: [facility]  DATE/TIME: 1/18/2023 1:01 PM     INDICATION: Look for pars defect L5.  Right leg pain  COMPARISON: Lumbar spine MRI 11/07/2022.  TECHNIQUE: Routine CT Lumbar Spine without IV contrast. Multiplanar reformats. Dose reduction techniques were used.      FINDINGS:  VERTEBRA: 5 lumbar type vertebrae. 14 degrees of levocurvature from L3 to L5. 1 mm retrolisthesis from L2 to L3 to L4-L5. 6.6 mm degenerative spondylolisthesis of L5 on S1. Vertebral body heights are maintained. No pars defects. Mild degenerative changes   of sacroiliac joints.     CANAL/FORAMINA: Broad-based disc bulges from L2-L3 to L5-S1. Mild spinal canal narrowing at L4-L5. Mild left neuroforaminal narrowing at L3-L4. Mild right neuroforaminal narrowing at L4-L5.     PARASPINAL: The posterior paraspinal soft tissues are grossly within normal limits.                                                                       IMPRESSION:  1.  6.6 mm degenerative spondylolisthesis of L5 on S1.   2.  No pars defects.  3.  Mild spinal canal narrowing at L4-L5.  4.  Mild left neuroforaminal narrowing at L3-L4. Mild right neuroforaminal narrowing at L4-L5.  5.  14 degrees of levocurvature from L3 to L5.     EXAM: XR LUMBAR SPINE W BENDING COMP G/E 6 VIEWS  LOCATION: [facility]  DATE/TIME: 1/18/2023 1:22 PM     INDICATION: Right leg pain  COMPARISON: 11/04/2022.  TECHNIQUE: CR Lumbar Spine.                                                                      IMPRESSION: Five lumbar-type vertebral bodies. Allowing for differences in technique, similar mild S-shaped rotatory dextroconvex thoracolumbar and levoconvex lower lumbar curvatures. No acute fracture or compression deformity. Similar 6 mm grade 1   anterolisthesis at L5-S1 without significant change on the flexion or extension views. No pars interarticularis defect. No interval spondylolisthesis. Redemonstration of Schmorl's node at the L3 superior endplate and lower thoracic levels. Similar   multilevel interbody degenerative change, worst and moderate at L5-S1 and, to a lesser extent, at L4-L5. Similar moderate L5-S1 facet arthrosis with suggestion of moderate bilateral foraminal narrowing, although oblique views are limited due to   suboptimal patient positioning. Moderate degenerative change at the bilateral sacroiliac and hip joints. Surgical clips within the upper abdomen.     Please refer to separately dictated CT of the lumbar spine for further details.        Again, thank you for allowing me to participate in the care of your patient.        Sincerely,        Tiffanie Quan PA-C

## 2023-05-23 NOTE — PROGRESS NOTES
Assessment & Plan     Hip pain, right  S/P fall.  Xray completed today and personally reviewed as negative for acute fracture.  Final radiology report pending.   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   - Lift Chair W Seat Lift Mechanism Order for DME - ONLY FOR DME    Spinal stenosis, unspecified spinal region  - gabapentin (NEURONTIN) 300 MG capsule  Dispense: 90 capsule; Refill: 1    Right leg weakness  Possibly due to recent CVA versus spinal stenosis.  Continue neurosurgery follow up.   - Lift Chair W Seat Lift Mechanism Order for DME - ONLY FOR DME    Shuffling gait  Agree with moving to nursing facility.  Family given fax number for any paperwork that needs to be completed.  No other symptoms of Parkinson's other than shuffling gait.  Will defer to neurology, which she was referred to.  Orders placed for lift chair and copy given to family.  I think a wheelchair would be appropriate in the future for appointments if needed.   - Lift Chair W Seat Lift Mechanism Order for DME - ONLY FOR DME    Risk for falls  - Lift Chair W Seat Lift Mechanism Order for DME - ONLY FOR DME    Bilateral impacted cerumen  Bilateral ear canals irrigated per provider assistant.  Patient tolerated well and cerumen was completely cleared.   - [place] REMOVAL IMPACTED CERUMEN IRRIGATION/LVG ZULLY Sutton NP  [facility] SHELBIE Schofield is a 87 year old who presents for follow-up.  Her son is in attendance today as well as another son over the phone.  Patient fell at home about 2 weeks ago.  She fell in the bathroom.  Fell on her right hip area.  She has been able to ambulate.  Has a lot of bruising on that right hip area.  She did not seek care after the fall.  The pain in her hip feels worse at night.  She has been placing a pillow between her legs.    Patient has had a harder time  ambulating.  She is doing more shuffling and has a hard time initiating steps.  Also has a difficult time turning.  This seems to be a little bit worse since her fall.  She is also experiencing more right leg weakness, but may also be caused by her spinal stenosis.  Patient will be moving to a nursing facility soon.  She will be going to [facility].  Family is requesting a prescription for a lift chair.  She is having physical therapy come to her house to help with ambulation.    Continues to have bilateral feet and ankle swelling.  Equal on both sides.  She is wearing compression every day.  Swelling seems to b be worse towards the end of the day.  She does take hydrochlorothiazide.    Has some bilateral ear fullness, worse in the right ear.  No pain or drainage.    Follow Up (Fell may 10, walking, hip bruise, swollen leg, who signs long term care, wheelchair, [facility] for [facility], wax removal ; hard to get feet moving especially when turning )      History of Present Illness       Reason for visit:  Follow up leg pain and walking difficulties    She eats 2-3 servings of fruits and vegetables daily.She consumes 0 sweetened beverage(s) daily.She exercises with enough effort to increase her heart rate 9 or less minutes per day.  She exercises with enough effort to increase her heart rate 3 or less days per week.   She is taking medications regularly.         Review of Systems   Pertinent items in HPI        Objective    /78 (BP Location: Left arm, Patient Position: Sitting, Cuff Size: Adult Regular)   Pulse 78   Temp 97.2  F (36.2  C) (Temporal)   Wt 59 kg (130 lb)   LMP  (LMP Unknown)   SpO2 99%   BMI 23.78 kg/m    Body mass index is 23.78 kg/m .  Physical Exam   GENERAL: healthy, alert and no distress  EYES: Eyes grossly normal to inspection, PERRL and conjunctivae and sclerae normal  HENT: ear canals and TM's normal, nose and mouth without ulcers or lesions  NECK: no adenopathy, no  asymmetry, masses, or scars and thyroid normal to palpation  RESP: lungs clear to auscultation - no rales, rhonchi or wheezes  CV: regular rate and rhythm, normal S1 S2, no S3 or S4, no murmur, click or rub, 1+ nonpitting bilateral lower extremity swelling  MS: bruising to the right hip and buttocks area.  NEURO: mentation intact and gait abnormal: difficulty initiating steps with initial shuffling and then more normal steps. No tremor.   PSYCH: mentation appears normal, affect normal/bright

## 2023-05-24 ENCOUNTER — TELEPHONE (OUTPATIENT)
Dept: FAMILY MEDICINE | Facility: CLINIC | Age: 88
End: 2023-05-24
Payer: COMMERCIAL

## 2023-05-24 NOTE — TELEPHONE ENCOUNTER
----- Message from Susan Sutton NP sent at 5/24/2023 12:46 PM CDT -----  Please call patient.    Her hip xray was negative for any fracture.    Susan Sutton NP

## 2023-05-24 NOTE — TELEPHONE ENCOUNTER
"5-24-23  Pt is returning call, I relayed:  \"hip xray was negative for any fracture.\"  Pt understood  kendra   "

## 2023-05-25 ENCOUNTER — TELEPHONE (OUTPATIENT)
Dept: FAMILY MEDICINE | Facility: CLINIC | Age: 88
End: 2023-05-25

## 2023-05-25 NOTE — TELEPHONE ENCOUNTER
LMTCB. Did not leave detailed message because callers note does not say it's a secure line and ok to leave a detailed message, and when I called it was not Claudia's VM box. If home care calls back, please relay the ok'd verbal orders per Susan Sutton.

## 2023-05-25 NOTE — TELEPHONE ENCOUNTER
The Home Care/Assisted Living/Nursing Facility is calling regarding an established patient.  Has the patient seen Home Care in the past or is currently residing in Assisted Living or Nursing Facility? Yes.     Claudia calling from [facility] requesting the following orders that are within the Home Care, Assisted Living or Nursing Home Eval and Treatment standing order and can be signed as standing order signature required by RN.    Preferred Call Back Number: [phone]    PT/OT/Speech Therapy   Delay treatment to 5/30/23 (patient preference).    Any additional Orders:  Are there any orders requested, not stated above, that are outside of the standing order and must be routed to a licensed practitioner for approval?    No    Writer has verified Requestor will send fax to have orders signed.

## 2023-05-30 ENCOUNTER — TELEPHONE (OUTPATIENT)
Dept: FAMILY MEDICINE | Facility: CLINIC | Age: 88
End: 2023-05-30
Payer: COMMERCIAL

## 2023-05-30 NOTE — TELEPHONE ENCOUNTER
Assisted living form brought in by elizabeth Sai, would like call once completed and faxed. Placed in CMT form and routed to [facility]

## 2023-05-30 NOTE — TELEPHONE ENCOUNTER
General Call/FYI ONLY    Contacts       Type Contact Phone/Fax    05/30/2023 01:58 PM CDT Phone (Incoming) Beth--[phone]     [facility]        Reason for Call:  PT referral sent to [facility]    What are your questions or concerns:  Beth, Physical Therapist at [facility] received referral for PT for patient. Turns out, patient has a current Physical Therapist who is doing PT on patient from [facility]. They have a referral on file with start of care of 4.17.23 and is currently active. They will continue to do the Physical Therapy.    Date of last appointment with provider: 5.23.23-OV/Romaine Sutton

## 2023-06-02 ENCOUNTER — TELEPHONE (OUTPATIENT)
Dept: FAMILY MEDICINE | Facility: CLINIC | Age: 88
End: 2023-06-02
Payer: COMMERCIAL

## 2023-06-02 NOTE — TELEPHONE ENCOUNTER
[facility] [phone]      Home Care is calling regarding an established patient with [facility].       Requesting orders from: Susan Sutton  Provider is following patient: Yes  Is this a 60-day recertification request?  No    Orders Requested    Physical Therapy  Request for delay in care, service is not able to be provided within same scheduled day.   Patients appointment was yesterday June 1 patient has several other medical appointments and son wants a delay until June 12th.    none    Information was gathered and will be sent to provider for review.  RN will contact Home Care with information after provider review.  Confirmed ok to leave a detailed message with call back.  Contact information confirmed and updated as needed.    Davina Lechuga RN  .

## 2023-06-02 NOTE — TELEPHONE ENCOUNTER
Called [facility] [phone] and advised of approval for Verbal Orders per Susan Sutton NP

## 2023-06-03 ENCOUNTER — MYC REFILL (OUTPATIENT)
Dept: FAMILY MEDICINE | Facility: CLINIC | Age: 88
End: 2023-06-03
Payer: COMMERCIAL

## 2023-06-03 DIAGNOSIS — M79.89 PAIN AND SWELLING OF LOWER EXTREMITY, UNSPECIFIED LATERALITY: ICD-10-CM

## 2023-06-03 DIAGNOSIS — M79.606 PAIN AND SWELLING OF LOWER EXTREMITY, UNSPECIFIED LATERALITY: ICD-10-CM

## 2023-06-04 ENCOUNTER — HEALTH MAINTENANCE LETTER (OUTPATIENT)
Age: 88
End: 2023-06-04

## 2023-06-04 RX ORDER — HYDROCHLOROTHIAZIDE 12.5 MG/1
12.5 TABLET ORAL DAILY
Qty: 90 TABLET | Refills: 0 | Status: SHIPPED | OUTPATIENT
Start: 2023-06-04

## 2023-06-04 NOTE — TELEPHONE ENCOUNTER
"Routing refill request to provider for review/approval because:  Labs out of range:  CR    Last Written Prescription Date:  3-21-23  Last Fill Quantity: 90  # refills: 0   Last office visit provider:  5-23-23     Requested Prescriptions   Pending Prescriptions Disp Refills     hydrochlorothiazide (HYDRODIURIL) 12.5 MG tablet 90 tablet 0     Sig: Take 1 tablet (12.5 mg) by mouth daily       Diuretics (Including Combos) Protocol Failed - 6/3/2023 10:46 AM        Failed - Normal serum creatinine on file in past 12 months     Recent Labs   Lab Test 04/18/23  1054   CR 0.96*              Passed - Blood pressure under 140/90 in past 12 months     BP Readings from Last 3 Encounters:   05/23/23 124/78   05/23/23 (!) 157/76   05/18/23 112/63                 Passed - Recent (12 mo) or future (30 days) visit within the authorizing provider's specialty     Patient has had an office visit with the authorizing provider or a provider within the authorizing providers department within the previous 12 mos or has a future within next 30 days. See \"Patient Info\" tab in inbasket, or \"Choose Columns\" in Meds & Orders section of the refill encounter.              Passed - Medication is active on med list        Passed - Patient is age 18 or older        Passed - No active pregancy on record        Passed - Normal serum potassium on file in past 12 months     Recent Labs   Lab Test 04/18/23  1054   POTASSIUM 4.5                    Passed - Normal serum sodium on file in past 12 months     Recent Labs   Lab Test 04/18/23  1054                 Passed - No positive pregnancy test in past 12 months             Ce Ball RN 06/03/23 11:22 PM  "

## 2023-06-05 ENCOUNTER — MEDICAL CORRESPONDENCE (OUTPATIENT)
Dept: EMERGENCY MEDICINE | Facility: CLINIC | Age: 88
End: 2023-06-05

## 2023-06-05 ENCOUNTER — OFFICE VISIT (OUTPATIENT)
Dept: NEUROSURGERY | Facility: CLINIC | Age: 88
End: 2023-06-05
Payer: COMMERCIAL

## 2023-06-05 VITALS
SYSTOLIC BLOOD PRESSURE: 132 MMHG | HEIGHT: 62 IN | HEART RATE: 79 BPM | WEIGHT: 130 LBS | OXYGEN SATURATION: 99 % | BODY MASS INDEX: 23.92 KG/M2 | DIASTOLIC BLOOD PRESSURE: 74 MMHG

## 2023-06-05 DIAGNOSIS — M48.061 LUMBAR FORAMINAL STENOSIS: Primary | ICD-10-CM

## 2023-06-05 PROCEDURE — 99213 OFFICE O/P EST LOW 20 MIN: CPT | Performed by: NEUROLOGICAL SURGERY

## 2023-06-05 RX ORDER — MELOXICAM 15 MG/1
15 TABLET ORAL DAILY
Qty: 14 TABLET | Refills: 0 | Status: SHIPPED | OUTPATIENT
Start: 2023-06-05 | End: 2024-04-11

## 2023-06-05 NOTE — LETTER
6/5/2023         RE: Ramila Liao  [address]  [facility] [address]        Dear Colleague,    Thank you for referring your patient, Ramila Liao, to the [facility]. Please see a copy of my visit note below.    NEUROSURGERY FOLLOWUP  NOTE    Ramila Liao comes today in f/u 3 months 03/01/2023 for evaluation of low back pain and right leg pain.    Patient developed right-sided stroke at the end of March and was admitted in the hospital for 2 days followed by transition care unit for 3 weeks.  She was diagnosed to have left MCA stenosis and was managed conservatively.  She is currently on aspirin and Plavix for the same.  She has improved significantly with physical therapy and almost back to the baseline.    She is at her home for last 6 weeks and had a fall while turning suddenly.  She reports pain in her low back radiating to the right lower extremity along the lateral aspect of her thigh up to the knees.  She describes her pain as deep aching in nature 7-8 on 10 aggravated on sleeping and relieved on moving.  She reports that her pain is bothering her primarily at night and she has to wake up during the sleep.  She denies any weakness associated with the pain in her lower extremities.  She denies any bladder or bowel symptoms.    She also reports swelling in bilateral lower extremities and is currently on diuretic for the same.    Also she is going to be transition to a group home in the next week or so.  She will be doing physical therapy there.    She is currently ambulating with a walker and needs assistance in terms of her activities of daily living.      PHYSICAL EXAM:   Constitutional: LMP  (LMP Unknown)      Mental Status: A & O in no acute distress.  Affect is appropriate.  Speech is fluent.  Recent and remote memory are intact.  Attention span and concentration are normal.     Motor: No pronator drift of upper extremity.   Normal bulk and tone all  muscle groups of upper and lower extremities.          Delt Bi Tri Hand Flex/  Ext Iliopsoas Quadriceps Tibialis Anterior EHL Gastroc     C5 C6 C7 C8/T1 L2 L3 L4 L5 S1   R 5 5 5 5 5 4+ 4+ 4+ 4+   L 5 5 5 5 5 5 5 5 5            Sensory: Sensation intact bilaterally to light touch.     Coordination; finger to nose, heel to shin, rapid alternating movements smooth and rhythmic.   Romberg impaired  Heel/toe/tandem gait impared   Wide and shuffling gait     Reflexes;                       Right              Left  Brachioradialis (C5,6)      1+                 1+  Biceps   (C5,6)                 1+                 1+  Triceps  (C7,8)                 1+                 1+  Knee (L3,4)                      1+                 1+  Ankle jerk (S1,2)              1+                 1+     No hoffmans/babinski/ clonus.     No hoffmans/babinski/ clonus.       Right paraspinal tenderness along the right SI joint     IMAGING:  I personally reviewed all radiographic images and MRI lumbosacral spine showed mild lateral recess stenosis L3-4, 4-5, 5-S1 with no significant compression.                MRI 11/07/2022:   IMPRESSION:  1.  Moderate spondylosis with interval development of degenerative anterior spondylolisthesis at L5-S1 with type I endplate change on the right at L5-S1.  2.  Mild right lateral recess stenosis at L5-S1 with moderate bilateral foraminal stenoses.  3.  Mild right lateral recess stenosis at L4-L5 with mild to moderate right foraminal stenosis.  4.  Mild lateral recess stenoses at L3-L4 with shallow disc protrusion contacting the right L4 nerve root within the lateral recess.      CONSULTATION ASSESSMENT AND PLAN:    Ramila DARIUS Yanni comes today in f/u 3 months 03/01/2023 for evaluation of low back pain and right leg pain.  When I evaluated her 3 months ago she has significant reduction in her right radicular pain and therefore was recommended to continue conservative treatment.  Unfortunately patient developed  left MCA stenosis with right-sided stroke at the end of March and was in the hospital followed by transition care unit.  She has a history of another fall at her home.  She reports similar kind of pain with no change in character or nature of the pain.  She reports that her pain is primarily at night and is 7-8/10.  She was also worked up for any fracture at that point.    She has no new imaging today.  Her last MRI was done in November 2022.     Based on her clinical history examination and the last imaging findings I explained to the patient and her son that I would like to continue with conservative management at this point and given her history of recent stroke I would not recommend any surgical intervention at this point.  I would also prescribe her meloxicam 15 mg 1 OD and Valium 5 mg 1 at bedtime.  I also mentioned to them to stop Tizanidine with these medications.    I would follow her in another 6 weeks to reassess her clinical progress.  I also mentioned to the patient and her son that we may need to do another MRI to reassess in case her pain does not resolve with medications and physical therapy.  I also mentioned to the patient to avoid falls given that she is on dual antiplatelets to avoid head injury and hemorrhage.     Patient and her son agreed to the plan.  All questions were answered and patient sounded understanding.  They can contact us if there are any further questions or concerns or worsening neurological deficits.    I spent more than 20 minutes in this apt, examining the pt, reviewing the scans, reviewing notes from chart, discussing treatment options with risks and benefits and coordinating care.     Zoltan Pena MD      CC:     Susan Sutton  [facility] [place] [address]      Phoned in Valium 5 mg BID # 28.  Charity Minaya, RN, CNRN          Again, thank you for allowing me to participate in the care of your patient.        Sincerely,        Zoltan Pena MD

## 2023-06-05 NOTE — PROGRESS NOTES
NEUROSURGERY FOLLOWUP  NOTE    Ramila Liao comes today in f/u 3 months 03/01/2023 for evaluation of low back pain and right leg pain.    Patient developed right-sided stroke at the end of March and was admitted in the hospital for 2 days followed by transition care unit for 3 weeks.  She was diagnosed to have left MCA stenosis and was managed conservatively.  She is currently on aspirin and Plavix for the same.  She has improved significantly with physical therapy and almost back to the baseline.    She is at her home for last 6 weeks and had a fall while turning suddenly.  She reports pain in her low back radiating to the right lower extremity along the lateral aspect of her thigh up to the knees.  She describes her pain as deep aching in nature 7-8 on 10 aggravated on sleeping and relieved on moving.  She reports that her pain is bothering her primarily at night and she has to wake up during the sleep.  She denies any weakness associated with the pain in her lower extremities.  She denies any bladder or bowel symptoms.    She also reports swelling in bilateral lower extremities and is currently on diuretic for the same.    Also she is going to be transition to a group home in the next week or so.  She will be doing physical therapy there.    She is currently ambulating with a walker and needs assistance in terms of her activities of daily living.      PHYSICAL EXAM:   Constitutional: LMP  (LMP Unknown)      Mental Status: A & O in no acute distress.  Affect is appropriate.  Speech is fluent.  Recent and remote memory are intact.  Attention span and concentration are normal.     Motor: No pronator drift of upper extremity.   Normal bulk and tone all muscle groups of upper and lower extremities.          Delt Bi Tri Hand Flex/  Ext Iliopsoas Quadriceps Tibialis Anterior EHL Gastroc     C5 C6 C7 C8/T1 L2 L3 L4 L5 S1   R 5 5 5 5 5 4+ 4+ 4+ 4+   L 5 5 5 5 5 5 5 5 5            Sensory: Sensation intact  bilaterally to light touch.     Coordination; finger to nose, heel to shin, rapid alternating movements smooth and rhythmic.   Romberg impaired  Heel/toe/tandem gait impared   Wide and shuffling gait     Reflexes;                       Right              Left  Brachioradialis (C5,6)      1+                 1+  Biceps   (C5,6)                 1+                 1+  Triceps  (C7,8)                 1+                 1+  Knee (L3,4)                      1+                 1+  Ankle jerk (S1,2)              1+                 1+     No hoffmans/babinski/ clonus.     No hoffmans/babinski/ clonus.       Right paraspinal tenderness along the right SI joint     IMAGING:  I personally reviewed all radiographic images and MRI lumbosacral spine showed mild lateral recess stenosis L3-4, 4-5, 5-S1 with no significant compression.                MRI 11/07/2022:   IMPRESSION:  1.  Moderate spondylosis with interval development of degenerative anterior spondylolisthesis at L5-S1 with type I endplate change on the right at L5-S1.  2.  Mild right lateral recess stenosis at L5-S1 with moderate bilateral foraminal stenoses.  3.  Mild right lateral recess stenosis at L4-L5 with mild to moderate right foraminal stenosis.  4.  Mild lateral recess stenoses at L3-L4 with shallow disc protrusion contacting the right L4 nerve root within the lateral recess.      CONSULTATION ASSESSMENT AND PLAN:    Ramila Liao comes today in f/u 3 months 03/01/2023 for evaluation of low back pain and right leg pain.  When I evaluated her 3 months ago she has significant reduction in her right radicular pain and therefore was recommended to continue conservative treatment.  Unfortunately patient developed left MCA stenosis with right-sided stroke at the end of March and was in the hospital followed by transition care unit.  She has a history of another fall at her home.  She reports similar kind of pain with no change in character or nature of the pain.   She reports that her pain is primarily at night and is 7-8/10.  She was also worked up for any fracture at that point.    She has no new imaging today.  Her last MRI was done in November 2022.     Based on her clinical history examination and the last imaging findings I explained to the patient and her son that I would like to continue with conservative management at this point and given her history of recent stroke I would not recommend any surgical intervention at this point.  I would also prescribe her meloxicam 15 mg 1 OD and Valium 5 mg 1 at bedtime.  I also mentioned to them to stop Tizanidine with these medications.    I would follow her in another 6 weeks to reassess her clinical progress.  I also mentioned to the patient and her son that we may need to do another MRI to reassess in case her pain does not resolve with medications and physical therapy.  I also mentioned to the patient to avoid falls given that she is on dual antiplatelets to avoid head injury and hemorrhage.     Patient and her son agreed to the plan.  All questions were answered and patient sounded understanding.  They can contact us if there are any further questions or concerns or worsening neurological deficits.    I spent more than 20 minutes in this apt, examining the pt, reviewing the scans, reviewing notes from chart, discussing treatment options with risks and benefits and coordinating care.     Zoltan Pena MD      CC:     Susan Sutton  [address] [facility] [address]

## 2023-06-14 ENCOUNTER — TELEPHONE (OUTPATIENT)
Dept: FAMILY MEDICINE | Facility: CLINIC | Age: 88
End: 2023-06-14
Payer: COMMERCIAL

## 2023-06-14 ENCOUNTER — MEDICAL CORRESPONDENCE (OUTPATIENT)
Dept: HEALTH INFORMATION MANAGEMENT | Facility: CLINIC | Age: 88
End: 2023-06-14

## 2023-06-14 NOTE — TELEPHONE ENCOUNTER
Davida García is here today for Follow-up (er f/u SOB)    Denies Latex allergy or sensitivity  Tobacco history: verified  Medications: medications verified and updated  Refills needed today?  No        Preferred pharmacy added     Health Maintenance Due   Topic Date Due   • Pneumococcal Vaccine 0-64 (1 of 4 - PCV13) Never done   • COVID-19 Vaccine (1) Never done   • Hepatitis B Vaccine (1 of 3 - Risk 3-dose series) Never done   • Shingles Vaccine (1 of 2) Never done   • Breast Cancer Screening  01/16/2016   • Medicare Wellness Visit  Never done        [facility] is calling regarding an established patient with [facility].     Requesting orders from: Susan Sutton  Provider is following patient: Yes  Is this a 60-day recertification request?  No    Orders Requested    Physical Therapy  Request for continuation of care with no increase or decrease in frequency  Frequency: 1x/wk for 4 wks      None    Verbal orders given.  Home Care will send orders for provider to sign.  Confirmed ok to leave a detailed message with call back.  Contact information confirmed and updated as needed.     Patient is also moving to [place], [place]      Anurag Mendez RN

## 2023-06-15 ENCOUNTER — MEDICAL CORRESPONDENCE (OUTPATIENT)
Dept: HEALTH INFORMATION MANAGEMENT | Facility: CLINIC | Age: 88
End: 2023-06-15

## 2023-06-15 ENCOUNTER — TELEPHONE (OUTPATIENT)
Dept: FAMILY MEDICINE | Facility: CLINIC | Age: 88
End: 2023-06-15

## 2023-06-15 NOTE — TELEPHONE ENCOUNTER
S-(situation): [facility] Physical therapy. [phone]  Calling to report possible interaction with medicaiton.    B-(background): patient was started on:  meloxicam for pain by neurosurgeon on 6/5/23    A-(assessment): Pily states a Warning label as possible interaction with(clopidogrel).appears on med list.      R-(recommendations): Please advise if appropriate to continue medication.

## 2023-08-31 ENCOUNTER — LAB REQUISITION (OUTPATIENT)
Dept: LAB | Facility: CLINIC | Age: 88
End: 2023-08-31
Payer: COMMERCIAL

## 2023-08-31 DIAGNOSIS — D64.9 ANEMIA, UNSPECIFIED: ICD-10-CM

## 2023-08-31 LAB
ANION GAP SERPL CALCULATED.3IONS-SCNC: 13 MMOL/L (ref 7–15)
BUN SERPL-MCNC: 20 MG/DL (ref 8–23)
CALCIUM SERPL-MCNC: 9.3 MG/DL (ref 8.8–10.2)
CHLORIDE SERPL-SCNC: 104 MMOL/L (ref 98–107)
CREAT SERPL-MCNC: 0.87 MG/DL (ref 0.51–0.95)
DEPRECATED HCO3 PLAS-SCNC: 25 MMOL/L (ref 22–29)
GFR SERPL CREATININE-BSD FRML MDRD: 65 ML/MIN/1.73M2
GLUCOSE SERPL-MCNC: 131 MG/DL (ref 70–99)
POTASSIUM SERPL-SCNC: 4.2 MMOL/L (ref 3.4–5.3)
SODIUM SERPL-SCNC: 142 MMOL/L (ref 136–145)

## 2023-08-31 PROCEDURE — 36415 COLL VENOUS BLD VENIPUNCTURE: CPT | Mod: ORL | Performed by: FAMILY MEDICINE

## 2023-08-31 PROCEDURE — P9603 ONE-WAY ALLOW PRORATED MILES: HCPCS | Mod: ORL | Performed by: FAMILY MEDICINE

## 2023-08-31 PROCEDURE — 80048 BASIC METABOLIC PNL TOTAL CA: CPT | Mod: ORL | Performed by: FAMILY MEDICINE

## 2023-10-17 ENCOUNTER — TRANSFERRED RECORDS (OUTPATIENT)
Dept: HEALTH INFORMATION MANAGEMENT | Facility: CLINIC | Age: 88
End: 2023-10-17
Payer: COMMERCIAL

## 2023-10-19 ENCOUNTER — LAB REQUISITION (OUTPATIENT)
Dept: LAB | Facility: CLINIC | Age: 88
End: 2023-10-19
Payer: COMMERCIAL

## 2023-10-19 DIAGNOSIS — R10.9 UNSPECIFIED ABDOMINAL PAIN: ICD-10-CM

## 2023-10-19 LAB
ALBUMIN SERPL BCG-MCNC: 4.5 G/DL (ref 3.5–5.2)
ALP SERPL-CCNC: 76 U/L (ref 35–104)
ALT SERPL W P-5'-P-CCNC: 17 U/L (ref 0–50)
ANION GAP SERPL CALCULATED.3IONS-SCNC: 12 MMOL/L (ref 7–15)
AST SERPL W P-5'-P-CCNC: 29 U/L (ref 0–45)
BASO+EOS+MONOS # BLD AUTO: NORMAL 10*3/UL
BASO+EOS+MONOS NFR BLD AUTO: NORMAL %
BASOPHILS # BLD AUTO: 0.1 10E3/UL (ref 0–0.2)
BASOPHILS NFR BLD AUTO: 1 %
BILIRUB DIRECT SERPL-MCNC: <0.2 MG/DL (ref 0–0.3)
BILIRUB SERPL-MCNC: 0.3 MG/DL
BUN SERPL-MCNC: 12.1 MG/DL (ref 8–23)
CALCIUM SERPL-MCNC: 9.3 MG/DL (ref 8.8–10.2)
CHLORIDE SERPL-SCNC: 104 MMOL/L (ref 98–107)
CREAT SERPL-MCNC: 0.87 MG/DL (ref 0.51–0.95)
DEPRECATED HCO3 PLAS-SCNC: 25 MMOL/L (ref 22–29)
EGFRCR SERPLBLD CKD-EPI 2021: 65 ML/MIN/1.73M2
EOSINOPHIL # BLD AUTO: 0.1 10E3/UL (ref 0–0.7)
EOSINOPHIL NFR BLD AUTO: 2 %
ERYTHROCYTE [DISTWIDTH] IN BLOOD BY AUTOMATED COUNT: 13.9 % (ref 10–15)
GLUCOSE SERPL-MCNC: 92 MG/DL (ref 70–99)
HCT VFR BLD AUTO: 40.1 % (ref 35–47)
HGB BLD-MCNC: 13.2 G/DL (ref 11.7–15.7)
IMM GRANULOCYTES # BLD: 0 10E3/UL
IMM GRANULOCYTES NFR BLD: 0 %
LIPASE SERPL-CCNC: 35 U/L (ref 13–60)
LYMPHOCYTES # BLD AUTO: 2.1 10E3/UL (ref 0.8–5.3)
LYMPHOCYTES NFR BLD AUTO: 35 %
MCH RBC QN AUTO: 30.2 PG (ref 26.5–33)
MCHC RBC AUTO-ENTMCNC: 32.9 G/DL (ref 31.5–36.5)
MCV RBC AUTO: 92 FL (ref 78–100)
MONOCYTES # BLD AUTO: 0.8 10E3/UL (ref 0–1.3)
MONOCYTES NFR BLD AUTO: 13 %
NEUTROPHILS # BLD AUTO: 2.9 10E3/UL (ref 1.6–8.3)
NEUTROPHILS NFR BLD AUTO: 49 %
NRBC # BLD AUTO: 0 10E3/UL
NRBC BLD AUTO-RTO: 0 /100
PLATELET # BLD AUTO: 169 10E3/UL (ref 150–450)
POTASSIUM SERPL-SCNC: 4.3 MMOL/L (ref 3.4–5.3)
PROT SERPL-MCNC: 7.1 G/DL (ref 6.4–8.3)
RBC # BLD AUTO: 4.37 10E6/UL (ref 3.8–5.2)
SODIUM SERPL-SCNC: 141 MMOL/L (ref 135–145)
WBC # BLD AUTO: 6 10E3/UL (ref 4–11)

## 2023-10-19 PROCEDURE — 80053 COMPREHEN METABOLIC PANEL: CPT | Mod: ORL | Performed by: PHYSICIAN ASSISTANT

## 2023-10-19 PROCEDURE — 83690 ASSAY OF LIPASE: CPT | Mod: ORL | Performed by: PHYSICIAN ASSISTANT

## 2023-10-19 PROCEDURE — 36415 COLL VENOUS BLD VENIPUNCTURE: CPT | Mod: ORL | Performed by: PHYSICIAN ASSISTANT

## 2023-10-19 PROCEDURE — P9603 ONE-WAY ALLOW PRORATED MILES: HCPCS | Mod: ORL | Performed by: PHYSICIAN ASSISTANT

## 2023-10-19 PROCEDURE — 82248 BILIRUBIN DIRECT: CPT | Mod: ORL | Performed by: PHYSICIAN ASSISTANT

## 2023-10-19 PROCEDURE — 85025 COMPLETE CBC W/AUTO DIFF WBC: CPT | Mod: ORL | Performed by: PHYSICIAN ASSISTANT

## 2023-10-24 ENCOUNTER — APPOINTMENT (OUTPATIENT)
Dept: CT IMAGING | Facility: CLINIC | Age: 88
End: 2023-10-24
Attending: EMERGENCY MEDICINE
Payer: COMMERCIAL

## 2023-10-24 ENCOUNTER — HOSPITAL ENCOUNTER (EMERGENCY)
Facility: CLINIC | Age: 88
Discharge: HOME | End: 2023-10-24
Attending: EMERGENCY MEDICINE | Admitting: EMERGENCY MEDICINE
Payer: COMMERCIAL

## 2023-10-24 VITALS
HEIGHT: 62 IN | DIASTOLIC BLOOD PRESSURE: 96 MMHG | HEART RATE: 101 BPM | BODY MASS INDEX: 25.4 KG/M2 | RESPIRATION RATE: 17 BRPM | OXYGEN SATURATION: 97 % | TEMPERATURE: 97.9 F | WEIGHT: 138 LBS | SYSTOLIC BLOOD PRESSURE: 210 MMHG

## 2023-10-24 DIAGNOSIS — R10.13 EPIGASTRIC PAIN: ICD-10-CM

## 2023-10-24 DIAGNOSIS — K29.00 ACUTE GASTRITIS WITHOUT HEMORRHAGE, UNSPECIFIED GASTRITIS TYPE: ICD-10-CM

## 2023-10-24 DIAGNOSIS — R07.9 CHEST PAIN, UNSPECIFIED TYPE: ICD-10-CM

## 2023-10-24 DIAGNOSIS — K59.00 CONSTIPATION, UNSPECIFIED CONSTIPATION TYPE: ICD-10-CM

## 2023-10-24 LAB
ALBUMIN SERPL BCG-MCNC: 4.6 G/DL (ref 3.5–5.2)
ALBUMIN SERPL BCG-MCNC: 4.6 G/DL (ref 3.5–5.2)
ALP SERPL-CCNC: 76 U/L (ref 35–104)
ALP SERPL-CCNC: 77 U/L (ref 35–104)
ALT SERPL W P-5'-P-CCNC: 12 U/L (ref 0–50)
ALT SERPL W P-5'-P-CCNC: 13 U/L (ref 0–50)
ANION GAP SERPL CALCULATED.3IONS-SCNC: 9 MMOL/L (ref 7–15)
AST SERPL W P-5'-P-CCNC: 25 U/L (ref 0–45)
AST SERPL W P-5'-P-CCNC: 26 U/L (ref 0–45)
ATRIAL RATE - MUSE: 93 BPM
BASOPHILS # BLD AUTO: 0 10E3/UL (ref 0–0.2)
BASOPHILS NFR BLD AUTO: 0 %
BILIRUB DIRECT SERPL-MCNC: <0.2 MG/DL (ref 0–0.3)
BILIRUB SERPL-MCNC: 0.4 MG/DL
BILIRUB SERPL-MCNC: 0.4 MG/DL
BUN SERPL-MCNC: 11.8 MG/DL (ref 8–23)
CALCIUM SERPL-MCNC: 9.7 MG/DL (ref 8.8–10.2)
CHLORIDE SERPL-SCNC: 105 MMOL/L (ref 98–107)
CREAT SERPL-MCNC: 0.82 MG/DL (ref 0.51–0.95)
DEPRECATED HCO3 PLAS-SCNC: 27 MMOL/L (ref 22–29)
DIASTOLIC BLOOD PRESSURE - MUSE: 86 MMHG
EGFRCR SERPLBLD CKD-EPI 2021: 69 ML/MIN/1.73M2
EOSINOPHIL # BLD AUTO: 0.1 10E3/UL (ref 0–0.7)
EOSINOPHIL NFR BLD AUTO: 2 %
ERYTHROCYTE [DISTWIDTH] IN BLOOD BY AUTOMATED COUNT: 13.6 % (ref 10–15)
GLUCOSE SERPL-MCNC: 120 MG/DL (ref 70–99)
HCT VFR BLD AUTO: 43.2 % (ref 35–47)
HGB BLD-MCNC: 14 G/DL (ref 11.7–15.7)
IMM GRANULOCYTES # BLD: 0 10E3/UL
IMM GRANULOCYTES NFR BLD: 0 %
INTERPRETATION ECG - MUSE: NORMAL
LIPASE SERPL-CCNC: 41 U/L (ref 13–60)
LYMPHOCYTES # BLD AUTO: 2.1 10E3/UL (ref 0.8–5.3)
LYMPHOCYTES NFR BLD AUTO: 29 %
MCH RBC QN AUTO: 30.2 PG (ref 26.5–33)
MCHC RBC AUTO-ENTMCNC: 32.4 G/DL (ref 31.5–36.5)
MCV RBC AUTO: 93 FL (ref 78–100)
MONOCYTES # BLD AUTO: 0.5 10E3/UL (ref 0–1.3)
MONOCYTES NFR BLD AUTO: 7 %
NEUTROPHILS # BLD AUTO: 4.4 10E3/UL (ref 1.6–8.3)
NEUTROPHILS NFR BLD AUTO: 62 %
NRBC # BLD AUTO: 0 10E3/UL
NRBC BLD AUTO-RTO: 0 /100
P AXIS - MUSE: 75 DEGREES
PLATELET # BLD AUTO: 180 10E3/UL (ref 150–450)
POTASSIUM SERPL-SCNC: 4.3 MMOL/L (ref 3.4–5.3)
PR INTERVAL - MUSE: 134 MS
PROT SERPL-MCNC: 7.5 G/DL (ref 6.4–8.3)
PROT SERPL-MCNC: 7.7 G/DL (ref 6.4–8.3)
QRS DURATION - MUSE: 86 MS
QT - MUSE: 370 MS
QTC - MUSE: 460 MS
R AXIS - MUSE: 60 DEGREES
RADIOLOGIST FLAGS: NORMAL
RADIOLOGIST FLAGS: NORMAL
RBC # BLD AUTO: 4.63 10E6/UL (ref 3.8–5.2)
SODIUM SERPL-SCNC: 141 MMOL/L (ref 135–145)
SYSTOLIC BLOOD PRESSURE - MUSE: 202 MMHG
T AXIS - MUSE: 69 DEGREES
TROPONIN T SERPL HS-MCNC: 19 NG/L
VENTRICULAR RATE- MUSE: 93 BPM
WBC # BLD AUTO: 7.2 10E3/UL (ref 4–11)

## 2023-10-24 PROCEDURE — 96375 TX/PRO/DX INJ NEW DRUG ADDON: CPT

## 2023-10-24 PROCEDURE — 71275 CT ANGIOGRAPHY CHEST: CPT

## 2023-10-24 PROCEDURE — 96374 THER/PROPH/DIAG INJ IV PUSH: CPT | Mod: 59

## 2023-10-24 PROCEDURE — 250N000013 HC RX MED GY IP 250 OP 250 PS 637: Performed by: EMERGENCY MEDICINE

## 2023-10-24 PROCEDURE — 74177 CT ABD & PELVIS W/CONTRAST: CPT

## 2023-10-24 PROCEDURE — 85025 COMPLETE CBC W/AUTO DIFF WBC: CPT | Performed by: EMERGENCY MEDICINE

## 2023-10-24 PROCEDURE — 36415 COLL VENOUS BLD VENIPUNCTURE: CPT | Performed by: EMERGENCY MEDICINE

## 2023-10-24 PROCEDURE — 83690 ASSAY OF LIPASE: CPT | Performed by: EMERGENCY MEDICINE

## 2023-10-24 PROCEDURE — 80053 COMPREHEN METABOLIC PANEL: CPT | Performed by: EMERGENCY MEDICINE

## 2023-10-24 PROCEDURE — 82248 BILIRUBIN DIRECT: CPT | Performed by: EMERGENCY MEDICINE

## 2023-10-24 PROCEDURE — 99285 EMERGENCY DEPT VISIT HI MDM: CPT | Mod: 25

## 2023-10-24 PROCEDURE — 93005 ELECTROCARDIOGRAM TRACING: CPT | Performed by: EMERGENCY MEDICINE

## 2023-10-24 PROCEDURE — 84484 ASSAY OF TROPONIN QUANT: CPT | Performed by: EMERGENCY MEDICINE

## 2023-10-24 PROCEDURE — 250N000011 HC RX IP 250 OP 636: Mod: JZ | Performed by: EMERGENCY MEDICINE

## 2023-10-24 RX ORDER — ACETAMINOPHEN 325 MG/1
650 TABLET ORAL ONCE
Status: COMPLETED | OUTPATIENT
Start: 2023-10-24 | End: 2023-10-24

## 2023-10-24 RX ORDER — FAMOTIDINE 20 MG/1
20 TABLET, FILM COATED ORAL 2 TIMES DAILY
Qty: 20 TABLET | Refills: 0 | Status: SHIPPED | OUTPATIENT
Start: 2023-10-24 | End: 2023-11-03

## 2023-10-24 RX ORDER — MAGNESIUM HYDROXIDE/ALUMINUM HYDROXICE/SIMETHICONE 120; 1200; 1200 MG/30ML; MG/30ML; MG/30ML
15 SUSPENSION ORAL ONCE
Status: COMPLETED | OUTPATIENT
Start: 2023-10-24 | End: 2023-10-24

## 2023-10-24 RX ORDER — ALUMINA, MAGNESIA, AND SIMETHICONE 2400; 2400; 240 MG/30ML; MG/30ML; MG/30ML
30 SUSPENSION ORAL EVERY 4 HOURS PRN
Qty: 355 ML | Refills: 0 | Status: SHIPPED | OUTPATIENT
Start: 2023-10-24

## 2023-10-24 RX ORDER — TRAMADOL HYDROCHLORIDE 50 MG/1
50 TABLET ORAL ONCE
Status: COMPLETED | OUTPATIENT
Start: 2023-10-24 | End: 2023-10-24

## 2023-10-24 RX ORDER — IOPAMIDOL 755 MG/ML
90 INJECTION, SOLUTION INTRAVASCULAR ONCE
Status: COMPLETED | OUTPATIENT
Start: 2023-10-24 | End: 2023-10-24

## 2023-10-24 RX ORDER — MORPHINE SULFATE 2 MG/ML
2 INJECTION, SOLUTION INTRAMUSCULAR; INTRAVENOUS ONCE
Status: COMPLETED | OUTPATIENT
Start: 2023-10-24 | End: 2023-10-24

## 2023-10-24 RX ADMIN — MORPHINE SULFATE 2 MG: 2 INJECTION, SOLUTION INTRAMUSCULAR; INTRAVENOUS at 18:34

## 2023-10-24 RX ADMIN — FAMOTIDINE 20 MG: 10 INJECTION INTRAVENOUS at 18:34

## 2023-10-24 RX ADMIN — ACETAMINOPHEN 650 MG: 325 TABLET ORAL at 15:03

## 2023-10-24 RX ADMIN — ALUMINUM HYDROXIDE, MAGNESIUM HYDROXIDE, AND DIMETHICONE 15 ML: 200; 20; 200 SUSPENSION ORAL at 18:33

## 2023-10-24 RX ADMIN — TRAMADOL HYDROCHLORIDE 50 MG: 50 TABLET, COATED ORAL at 15:37

## 2023-10-24 RX ADMIN — IOPAMIDOL 90 ML: 755 INJECTION, SOLUTION INTRAVENOUS at 16:52

## 2023-10-24 ASSESSMENT — ACTIVITIES OF DAILY LIVING (ADL)
ADLS_ACUITY_SCORE: 35
ADLS_ACUITY_SCORE: 35

## 2023-10-24 NOTE — ED NOTES
"EMERGENCY DEPARTMENT SIGN OUT NOTE        ED COURSE AND MEDICAL DECISION MAKING  Patient was signed out to me by Dr Faustino Lan at 4:29 PM    In brief, Ramila Liao is a 88 year old female who initially presented abdominal pain.     The patient presents with 1 month of left chest/breast pain. She was seen last week by her PCP and told that it was a \"problem with my bowels\". She was put on a liquid diet for 2 days and her symptoms slightly improved. However, last night, it got much worse and radiated to her back. Staff at her assisted living facility called EMS today and she was given Fentanyl 50 mcg en route. The pain does not radiate to her back currently. Palpation makes the pain worse but deep breaths do not change it. She has a history of UTIs but does not think this is related.      She denies nausea, vomiting, or any other complaints at this time.      At time of sign out, disposition was pending CT abdomen. Reviewed initial lab results with first ED provider, no plan to repeat minimal elevated troponin. Non ischemic EKG.    Informed after signout the patient was having some additional concerns about abdominal pain.  Had apparently been diagnosed with ileus by x-ray at her facility and has been taking stool softeners.  Additional CT abdomen ordered.      CT imaging was completed after signout.  Chest CT negative for PE, pneumothorax, pneumonia, or other thoracic process to account for symptoms.  Abdominal CT did suggest some constipation but no signs of acute intra-abdominal process like perforated viscus or obstruction.  There was some biliary dilatation noted on her CT which has been seen previous.  Liver function test and lipase added on and stable.  Patient received some analgesics here.  We discussed the results of her work-up.  Initially there was some thought that the patient's pain may be musculoskeletal related to her chest wall.  On further review, I also think there could be a component of " gastritis or constipation playing a role in these concerns.  We discussed continuing a bowel regimen at her facility with addition of some Tylenol, famotidine, and Maalox.      I, Bhartirohan Alcala, am serving as a scribe to document services personally performed by Bo Jeffery MD, based on my observations and the provider's statements to me.  I, Bo Jeffery MD, attest that Bharti Alcala is acting in a scribe capacity, has observed my performance of the services and has documented them in accordance with my direction.       FINAL IMPRESSION    1. Epigastric pain    2. Chest pain, unspecified type    3. Constipation, unspecified constipation type    4. Acute gastritis without hemorrhage, unspecified gastritis type        ED MEDS  Medications   acetaminophen (TYLENOL) tablet 650 mg (650 mg Oral $Given 10/24/23 1503)   traMADol (ULTRAM) tablet 50 mg (50 mg Oral $Given 10/24/23 1537)   iopamidol (ISOVUE-370) solution 90 mL (90 mLs Intravenous $Given 10/24/23 1652)   morphine (PF) injection 2 mg (2 mg Intravenous $Given 10/24/23 1834)   alum & mag hydroxide-simethicone (MAALOX) suspension 15 mL (15 mLs Oral $Given 10/24/23 1833)   famotidine (PEPCID) injection 20 mg (20 mg Intravenous $Given 10/24/23 1834)       LAB  Labs Ordered and Resulted from Time of ED Arrival to Time of ED Departure   COMPREHENSIVE METABOLIC PANEL - Abnormal       Result Value    Sodium 141      Potassium 4.3      Carbon Dioxide (CO2) 27      Anion Gap 9      Urea Nitrogen 11.8      Creatinine 0.82      GFR Estimate 69      Calcium 9.7      Chloride 105      Glucose 120 (*)     Alkaline Phosphatase 77      AST 25      ALT 12      Protein Total 7.5      Albumin 4.6      Bilirubin Total 0.4     TROPONIN T, HIGH SENSITIVITY - Abnormal    Troponin T, High Sensitivity 19 (*)    HEPATIC FUNCTION PANEL - Normal    Protein Total 7.7      Albumin 4.6      Bilirubin Total 0.4      Alkaline Phosphatase 76      AST 26      ALT 13      Bilirubin Direct  <0.20     LIPASE - Normal    Lipase 41     CBC WITH PLATELETS AND DIFFERENTIAL    WBC Count 7.2      RBC Count 4.63      Hemoglobin 14.0      Hematocrit 43.2      MCV 93      MCH 30.2      MCHC 32.4      RDW 13.6      Platelet Count 180      % Neutrophils 62      % Lymphocytes 29      % Monocytes 7      % Eosinophils 2      % Basophils 0      % Immature Granulocytes 0      NRBCs per 100 WBC 0      Absolute Neutrophils 4.4      Absolute Lymphocytes 2.1      Absolute Monocytes 0.5      Absolute Eosinophils 0.1      Absolute Basophils 0.0      Absolute Immature Granulocytes 0.0      Absolute NRBCs 0.0         RADIOLOGY    CT Abdomen Pelvis w Contrast   Final Result   IMPRESSION:   1.  No acute pulmonary embolism.   2.  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   3.  No definitive acute CT abnormality of the abdomen/pelvis. Possible constipation.   4.  Unchanged moderate to severe intrahepatic and extrahepatic biliary ductal dilatation, some of which may be attributable to the postcholecystectomy state. However, correlation with LFTs and MRCP follow-up is recommended.   5.  Hepatic steatosis.   6.  Unchanged, eccentrically calcified 6 mm nodule within the left upper lobe. A follow-up chest CT is recommended in 6-12 months to document continued stability of this finding.         [Recommend Follow Up: Lung nodule, Biliary ductal dilatation]      This report will be copied to the [facility] to ensure a provider acknowledges the finding.             CT Chest Pulmonary Embolism w Contrast   Final Result   IMPRESSION:   1.  No acute pulmonary embolism.   2.  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   3.  No definitive acute CT abnormality of the abdomen/pelvis. Possible constipation.   4.  Unchanged moderate to severe intrahepatic and extrahepatic biliary ductal dilatation, some of which may be attributable to the  postcholecystectomy state. However, correlation with LFTs and MRCP follow-up is recommended.   5.  Hepatic steatosis.   6.  Unchanged, eccentrically calcified 6 mm nodule within the left upper lobe. A follow-up chest CT is recommended in 6-12 months to document continued stability of this finding.         [Recommend Follow Up: Lung nodule, Biliary ductal dilatation]      This report will be copied to the [facility] to ensure a provider acknowledges the finding.                 DISCHARGE MEDS  Discharge Medication List as of 10/24/2023  6:41 PM        START taking these medications    Details   alum & mag hydroxide-simethicone (MAALOX MAX) 400-400-40 MG/5ML SUSP suspension Take 30 mLs by mouth every 4 hours as needed for indigestion or heartburn, Disp-355 mL, R-0, Local Print      famotidine (PEPCID) 20 MG tablet Take 1 tablet (20 mg) by mouth 2 times daily for 10 days, Disp-20 tablet, R-0, Local Print             Bo Jeffery MD  [facility] EMERGENCY ROOM  [address] [address] [phone]       Bo Jeffery MD  10/24/23 0226

## 2023-10-24 NOTE — ED PROVIDER NOTES
"EMERGENCY DEPARTMENT ENCOUNTER            IMPRESSION:  Left-sided chest pain      MEDICAL DECISION MAKING:  It was my pleasure to provide care for Ramila Liao who presented for evaluation of abdominal pain however her pain is mainly localized to the left chest wall under the breast.  She has had similar pain in the past.     On my exam patient is pleasant and cooperative.   Vital signs show hypertension.  Physical exam notable for no abdominal tenderness however she does have tenderness to palpation over the left chest wall around the area of her nipple..      Pain medication administered for symptom relief.  Patient's symptoms improved.      EKG independently interpreted does not show arrhythmia or ischemia     Laboratory investigation and results are pending     CT imaging of the chest ordered and results are pending.       Addendum entered and signed in error, please refer to revision history for initial signed note by Dr. Lan. - ORNI    =================================================================  CHIEF COMPLAINT:  Chief Complaint   Patient presents with    Abdominal Pain         HPI  Ramila Liao is a 88 year old female with a history of hyperlipidemia and cerebral infarction who presents to the ED by EMS for evaluation of abdominal pain.    The patient presents with 1 month of left chest/breast pain. She was seen last week by her PCP and told that it was a \"problem with my bowels\". She was put on a liquid diet for 2 days and her symptoms slightly improved. However, last night, it got much worse and radiated to her back. Staff at her assisted living facility called EMS today and she was given Fentanyl 50 mcg en route. The pain does not radiate to her back currently. Palpation makes the pain worse but deep breaths do not change it. She has a history of UTIs but does not think this is related.     She denies nausea, vomiting, or any other complaints at this time.       REVIEW OF " SYSTEMS  Constitutional: Does not report chills, unintentional weight loss or fatigue   Eyes: Does not report visual changes or discharge    HENT: Does not report sore throat, ear pain or neck pain  Respiratory: Does not report cough or shortness of breath    Cardiovascular: Does not report palpitations or leg swelling. Positive for left chest pain.  GI: Does not report abdominal pain, nausea, vomiting, or dark, bloody stools.    : Does not report hematuria, dysuria, or flank pain  Musculoskeletal: Does not report any new musculoskeletal pain or new muscle/joint pains  Skin: Does not report rash or wound  Neurologic: Does not report current headache, new weakness, focal weakness, or sensory changes        Remainder of systems reviewed, unless noted in HPI all others negative.      PAST MEDICAL HISTORY:  Past Medical History:   Diagnosis Date    Allergic rhinitis 08/02/2005    Contact dermatitis and other eczema, due to unspecified cause 07/30/2001    Edema, lower extremity     Low back pain 08/02/2016    Mixed hyperlipidemia 08/09/2006       PAST SURGICAL HISTORY:  Past Surgical History:   Procedure Laterality Date    CATARACT EXTRACTION, BILATERAL      CHOLECYSTECTOMY      HYSTERECTOMY           CURRENT MEDICATIONS:    acetaminophen (TYLENOL) 500 MG tablet  aspirin (ASA) 325 MG tablet  atorvastatin (LIPITOR) 40 MG tablet  clopidogrel (PLAVIX) 75 MG tablet  gabapentin (NEURONTIN) 300 MG capsule  hydrochlorothiazide (HYDRODIURIL) 12.5 MG tablet  magnesium citrate 100 mg Tab  meloxicam (MOBIC) 15 MG tablet  multivitamin with minerals (THERA-M) 9 mg iron-400 mcg Tab tablet  tiZANidine (ZANAFLEX) 2 MG tablet        ALLERGIES:  Allergies   Allergen Reactions    Naproxen Hives     Tolerates ibuprofen       FAMILY HISTORY:  Family History   Problem Relation Age of Onset    Coronary Artery Disease Mother     Coronary Artery Disease Father        SOCIAL HISTORY:   Social History     Socioeconomic History    Marital status:  "   Tobacco Use    Smoking status: Never    Smokeless tobacco: Never   Substance and Sexual Activity    Alcohol use: Yes    Drug use: Never    Sexual activity: Not Currently     Partners: Male       PHYSICAL EXAM:    BP (!) 190/89   Pulse 97   Temp 97.9  F (36.6  C) (Oral)   Resp 18   Ht 1.575 m (5' 2\")   Wt 62.6 kg (138 lb)   LMP  (LMP Unknown)   SpO2 97%   BMI 25.24 kg/m        Constitutional: Awake, alert, no  Chest: Left chest wall tenderness around the nipple area  Respiratory: Respirations even, unlabored. Lungs clear to ascultation bilaterally, in no acute respiratory distress.  Cardiovascular: Regular rate and rhythm.  Good overall perfusion.  Upper and lower extremity pulses are equal.  GI: Abdomen soft, non-tender to palpation.  No guarding or rebound. Bowel sounds present throughout.   Back: No CVA tenderness.    Musculoskeletal: Moves all 4 extremities equally, full function and capacity no peripheral edema.   Integument: Warm, dry. No rash. No bruising or petechiae.  Neurologic: Alert & oriented x 3. Normal speech. Grossly normal motor and sensory function.   Psychiatric: Normal mood and affect.  Appropriate judgement.    ED COURSE:  12:55 PM I met with the patient, obtained history, performed an initial exam, and discussed options and plan for diagnostics and treatment here in the ED.         Medical Decision Making    History:  Supplemental history from: Son  External Record(s) reviewed: External medical records including care everywhere reviewed    Work Up:  EKG, laboratory and imaging studies as ordered were independently interpreted by myself.   Broad differential diagnosis considered for chest wall  The patient's presentation was of moderate complexity.     Complicating factors:  Patient has a complicated past medical history including hyperlipidemia.  Care affected by social determinants of health: Access to primary care    Disposition involved shared decision-making with the " patient.        LAB:  Laboratory results were independently reviewed and interpreted         RADIOLOGY:  Radiology reports were independently reviewed and interpreted  CT Chest Pulmonary Embolism w Contrast    (Results Pending)        EKG:    ECG results from 03/26/23   ECG 12-LEAD WITH MUSE (LHE)     Value    Systolic Blood Pressure 138    Diastolic Blood Pressure 64    Ventricular Rate 76    Atrial Rate 76    DC Interval 126    QRS Duration 84        QTc 481    P Axis 78    R AXIS 62    T Axis 69    Interpretation ECG      Sinus rhythm  Possible Left atrial enlargement  Borderline ECG  When compared with ECG of 28-FEB-2023 20:38,  No significant change was found  Confirmed by SEE ED PROVIDER NOTE FOR, ECG INTERPRETATION ([id number]),  GM DE JESUS ([id number]) on 3/26/2023 1:44:40 PM         I have independently reviewed and interpreted the EKG(s) documented above.        MEDICATIONS GIVEN IN THE EMERGENCY:  Medications - No data to display        NEW PRESCRIPTIONS STARTED AT TODAY'S ER VISIT:  New Prescriptions    No medications on file                FINAL DIAGNOSIS:  No diagnosis found.           NAME: Ramila Liao  AGE: 88 year old female  YOB: 1935  MRN: [id number]  EVALUATION DATE & TIME: No admission date for patient encounter.    PCP: Susan Sutton    ED PROVIDER: Faustino Lan M.D.      MJ, Benitez Nichols, am serving as a scribe to document services personally performed by Dr. Faustino Lan based on my observation and the provider's statements to me. IFaustino MD attest that Benitez Nichols is acting in a scribe capacity, has observed my performance of the services and has documented them in accordance with my direction.    Faustino Lan M.D.  Emergency Medicine  [facility] EMERGENCY ROOM  [address] [facility] [address]  [phone]  Dept: [phone]  10/24/2023         Bo Jeffery MD  10/24/23 9418        Bo Jeffery MD  10/24/23 1173

## 2023-10-24 NOTE — ED TRIAGE NOTES
"One month history of left side abd pain. Saw primary last week and was told it was \"some kind of bowel problem\" and put her on a liquid diet for 2 day.  Began having severe abd pain this am and called 911. EMS did give fentanyl 50 mics prior to arrival. Denies nausea or vomiting. States \"you have to get to the bottom of this\".  States has a history of UTI but does not think that is what is going on today     Triage Assessment (Adult)       Row Name 10/24/23 1110          Triage Assessment    Airway WDL WDL        Respiratory WDL    Respiratory WDL WDL        Skin Circulation/Temperature WDL    Skin Circulation/Temperature WDL WDL        Cardiac WDL    Cardiac WDL WDL        Peripheral/Neurovascular WDL    Peripheral Neurovascular WDL WDL        Cognitive/Neuro/Behavioral WDL    Cognitive/Neuro/Behavioral WDL WDL                     "

## 2023-10-24 NOTE — DISCHARGE INSTRUCTIONS
You were seen in the emergency department for chest pain and upper abdominal pain.  Your evaluation included a CT scan of your chest and abdomen.  This was negative for any acute life-threatening problem like blood clot, collapsed lung, pneumonia, or bowel obstruction.  We think that your pain could be related to some constipation which was persistent on your CT today.  We would like you to continue your bowel regimen at home, likely MiraLAX once or twice a day.  Try to stick to a very bland diet avoiding spicy foods and fatty foods is much as possible.  Trying to increase fiber will also help.  You can use Tylenol 650 mg every 6 hours for general pain relief.  We are also going to prescribe you some famotidine and Maalox you can try if you find it helpful for upper abdominal discomfort related to acid and stomach irritation.

## 2023-10-24 NOTE — ED NOTES
Yulissa, nurse from [facility] center wanted to give report on events around LUQ abdomen pain. Pt with xray 10/18 showed ileus, so then patient was put on clear liquids which seemed to have helped. Patient also was stopped from taking ibuprofen since she has allergy to naproxen. Yulissa nurse, can be reached at [phone].

## 2023-11-01 ENCOUNTER — LAB REQUISITION (OUTPATIENT)
Dept: LAB | Facility: CLINIC | Age: 88
End: 2023-11-01
Payer: COMMERCIAL

## 2023-11-01 ENCOUNTER — DOCUMENTATION ONLY (OUTPATIENT)
Dept: OTHER | Facility: CLINIC | Age: 88
End: 2023-11-01
Payer: COMMERCIAL

## 2023-11-01 DIAGNOSIS — R10.9 UNSPECIFIED ABDOMINAL PAIN: ICD-10-CM

## 2023-11-02 LAB
ALBUMIN SERPL BCG-MCNC: 4.1 G/DL (ref 3.5–5.2)
ALP SERPL-CCNC: 74 U/L (ref 35–104)
ALT SERPL W P-5'-P-CCNC: 21 U/L (ref 0–50)
ANION GAP SERPL CALCULATED.3IONS-SCNC: 11 MMOL/L (ref 7–15)
AST SERPL W P-5'-P-CCNC: 22 U/L (ref 0–45)
BASOPHILS # BLD AUTO: 0 10E3/UL (ref 0–0.2)
BASOPHILS NFR BLD AUTO: 0 %
BILIRUB DIRECT SERPL-MCNC: <0.2 MG/DL (ref 0–0.3)
BILIRUB SERPL-MCNC: 0.3 MG/DL
BUN SERPL-MCNC: 13.4 MG/DL (ref 8–23)
CALCIUM SERPL-MCNC: 9.3 MG/DL (ref 8.8–10.2)
CHLORIDE SERPL-SCNC: 107 MMOL/L (ref 98–107)
CREAT SERPL-MCNC: 0.9 MG/DL (ref 0.51–0.95)
DEPRECATED HCO3 PLAS-SCNC: 26 MMOL/L (ref 22–29)
EGFRCR SERPLBLD CKD-EPI 2021: 62 ML/MIN/1.73M2
EOSINOPHIL # BLD AUTO: 0.2 10E3/UL (ref 0–0.7)
EOSINOPHIL NFR BLD AUTO: 2 %
ERYTHROCYTE [DISTWIDTH] IN BLOOD BY AUTOMATED COUNT: 13.7 % (ref 10–15)
GLUCOSE SERPL-MCNC: 100 MG/DL (ref 70–99)
HCT VFR BLD AUTO: 40.8 % (ref 35–47)
HGB BLD-MCNC: 13.2 G/DL (ref 11.7–15.7)
IMM GRANULOCYTES # BLD: 0 10E3/UL
IMM GRANULOCYTES NFR BLD: 0 %
LIPASE SERPL-CCNC: 41 U/L (ref 13–60)
LYMPHOCYTES # BLD AUTO: 2.2 10E3/UL (ref 0.8–5.3)
LYMPHOCYTES NFR BLD AUTO: 30 %
MCH RBC QN AUTO: 31.2 PG (ref 26.5–33)
MCHC RBC AUTO-ENTMCNC: 32.4 G/DL (ref 31.5–36.5)
MCV RBC AUTO: 97 FL (ref 78–100)
MONOCYTES # BLD AUTO: 0.6 10E3/UL (ref 0–1.3)
MONOCYTES NFR BLD AUTO: 9 %
NEUTROPHILS # BLD AUTO: 4.1 10E3/UL (ref 1.6–8.3)
NEUTROPHILS NFR BLD AUTO: 59 %
NRBC # BLD AUTO: 0 10E3/UL
NRBC BLD AUTO-RTO: 0 /100
PLATELET # BLD AUTO: 182 10E3/UL (ref 150–450)
POTASSIUM SERPL-SCNC: 4.4 MMOL/L (ref 3.4–5.3)
PROT SERPL-MCNC: 6.7 G/DL (ref 6.4–8.3)
RBC # BLD AUTO: 4.23 10E6/UL (ref 3.8–5.2)
SODIUM SERPL-SCNC: 144 MMOL/L (ref 135–145)
WBC # BLD AUTO: 7.1 10E3/UL (ref 4–11)

## 2023-11-02 PROCEDURE — 83690 ASSAY OF LIPASE: CPT | Mod: ORL | Performed by: FAMILY MEDICINE

## 2023-11-02 PROCEDURE — 85025 COMPLETE CBC W/AUTO DIFF WBC: CPT | Mod: ORL | Performed by: FAMILY MEDICINE

## 2023-11-02 PROCEDURE — P9603 ONE-WAY ALLOW PRORATED MILES: HCPCS | Mod: ORL | Performed by: FAMILY MEDICINE

## 2023-11-02 PROCEDURE — 82248 BILIRUBIN DIRECT: CPT | Mod: ORL | Performed by: FAMILY MEDICINE

## 2023-11-02 PROCEDURE — 36415 COLL VENOUS BLD VENIPUNCTURE: CPT | Mod: ORL | Performed by: FAMILY MEDICINE

## 2023-11-09 NOTE — PROGRESS NOTES
Assessment:   Ramila Liao is a 88 year old y.o. female with past medical history significant for  CVA, hyperlipidemia who presents today for follow-up regarding pain involving the left upper abdominal region with radiation around to the left lateral ribs to the left greater than right low back.  Unclear etiology of pain.  Unclear if this is spine related.  Pain began 2 months ago with no trauma.  A CT abdomen pelvis did not show any definitive acute CT abnormality other than possible constipation.  She did treat the constipation which helped the pain somewhat, but the pain has not resolved.  Question if she may have left lower thoracic foraminal stenosis causing radicular pain around the ribs.  Pain has been severe requiring an emergency department visit.  Some nights she can only sleep 1 to 2 hours because of the pain.  - Patient has already had a visit with a GI specialist but etiology of pain remains unclear.           Plan:     A shared decision making plan was used.  The patient's values and choices were respected.  The following represents what was discussed and decided upon by the physician assistant and the patient.      1.  DIAGNOSTIC TESTS:    -I reviewed the CT abdomen pelvis.  - I ordered an MRI thoracic spine to look for possible neural compromise causing her pain.    2.  PHYSICAL THERAPY:  - Patient went to physical therapy for low back pain May through June 2022 and again in November 2022 through April 2023.  No additional physical therapy was ordered.    3.  MEDICATIONS: No changes are made to the patient's medications.  - Patient takes Tylenol as needed which is somewhat helpful.  - I would not recommend NSAIDs which could worsen epigastric pain and due to comorbidities including stroke      4.  INTERVENTIONS: No interventions were ordered.  We will await the results of the MRI thoracic spine.  If there is left-sided foraminal stenosis correlating with her pain I recommend an epidural  steroid injection.  - We could also consider intercostal nerve blocks if pain persist.      5.    PATIENT EDUCATION: Patient is in agreement the above plan.  All questions were answered.    6.  FOLLOW-UP: I will send patient [employer] message with her MRI results.  If she has questions or concerns at any time, she should not hesitate to call.      Subjective:     Ramila Liao is a 88 year old female who presents today for follow-up regarding a 2-month history of left upper abdominal pain with radiation around the ribs to the left greater than right back.  Patient denies any injury or event to cause the pain.  Pain has been severe.  She went to the emergency department for the pain October 24, 2023.  She states no clear etiology for the pain was found.  She has also seen a GI specialist with no answers for where her pain is coming from.  She states that she had been constipated and she treated her constipation which helped alleviate the pain somewhat but it never went away completely.    Patient complains of left-sided pain in the upper abdominal region/under the left breast.  She states the pain wraps around to the left lower ribs into the left lower thoracic region.  Sometimes it spreads over to the right lower thoracic region as well.  She denies any pain in the legs.  Denies any weakness down the legs.  Denies loss of bowel or bladder control.  She describes the pain as a sharp pain.  She rates her pain today as a 5 out of 10.  Pain is aggravated with sitting upright, bending forward, and lying flat.  She is most comfortable in a semireclined position.  There is no correlation with eating.    Treatment to date:  -Patient previously went to physical therapy at [place].  - 8 sessions physical therapy through [facility] [phone].  - right L5-S1 transforaminal epidural steroid injection November 10, 2022 which provided 60 to 70% relief of his pain but relief was already waning by 4 weeks after the  procedure.  -right L4-5 and L5-S1 transforaminal epidural steroid injections December 13, 2022 did not not provide any relief of her pain.  - right piriformis muscle trigger point injection January 2, 2023 which did not provide any relief of her pain.  - Gabapentin  - Tylenol as needed  - Aspirin  - Tramadol    Review of Systems:  Positive for numbness/tingling, pain much worse in neck, unintentional weight loss.  Negative for weakness, loss of bowel/bladder control, inability to urinate, headache, trip/stumble/falls, difficulty swallowing, difficulty with hand skills, fevers.     Objective:   CONSTITUTIONAL:  Vital signs as above.  No acute distress.  The patient is well nourished and well groomed.    PSYCHIATRIC:  The patient is awake, alert, oriented to person, place and time.  The patient is answering questions appropriately with clear speech.  Normal affect.  HEENT: Normocephalic, atraumatic.  Sclera clear.    SKIN: Small area of superficial abrasion left lower thoracic region.  No vesicular rash.  There is also a pustular rash left lateral hip which patient states is chronic.  MUSCULOSKELETAL: Patient ambulates with a shuffling gait.   She walks with a 4 wheeled walker.  Tender to palpation left lower thoracic region.  Tender to palpation left lower anterior ribs.  4/5 strength right knee flexors/extensors otherwise 5/5 strength bilateral hip flexors, left knee flexors/extensors, bilateral ankle dorsi/plantar flexors.  NEUROLOGICAL:  No ankle clonus.  Sensation light touch intact bilateral lower extremities throughout.     RESULTS: I reviewed the MRI lumbar spine from [facility] dated March 27, 2023.  This shows anterolisthesis L5 on S1 with moderate to severe right and mild to moderate left foraminal stenosis.  There is also mild spinal canal stenosis L3-4, L4-5.    CT abdomen pelvis October 24, 2023:  IMPRESSION:  1.  No acute pulmonary embolism.  2.  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  3.  No definitive acute CT abnormality of the abdomen/pelvis. Possible constipation.  4.  Unchanged moderate to severe intrahepatic and extrahepatic biliary ductal dilatation, some of which may be attributable to the postcholecystectomy state. However, correlation with LFTs and MRCP follow-up is recommended.  5.  Hepatic steatosis.  6.  Unchanged, eccentrically calcified 6 mm nodule within the left upper lobe. A follow-up chest CT is recommended in 6-12 months to document continued stability of this finding.

## 2023-11-13 ENCOUNTER — OFFICE VISIT (OUTPATIENT)
Dept: PHYSICAL MEDICINE AND REHAB | Facility: CLINIC | Age: 88
End: 2023-11-13
Payer: COMMERCIAL

## 2023-11-13 ENCOUNTER — HOSPITAL ENCOUNTER (OUTPATIENT)
Dept: MRI IMAGING | Facility: HOSPITAL | Age: 88
Discharge: HOME | End: 2023-11-13
Attending: PHYSICIAN ASSISTANT | Admitting: PHYSICIAN ASSISTANT
Payer: COMMERCIAL

## 2023-11-13 VITALS
OXYGEN SATURATION: 95 % | WEIGHT: 137.5 LBS | HEIGHT: 62 IN | DIASTOLIC BLOOD PRESSURE: 64 MMHG | HEART RATE: 102 BPM | BODY MASS INDEX: 25.3 KG/M2 | SYSTOLIC BLOOD PRESSURE: 130 MMHG

## 2023-11-13 DIAGNOSIS — M54.6 PAIN IN THORACIC SPINE: ICD-10-CM

## 2023-11-13 DIAGNOSIS — M54.6 PAIN IN THORACIC SPINE: Primary | ICD-10-CM

## 2023-11-13 PROCEDURE — 72146 MRI CHEST SPINE W/O DYE: CPT

## 2023-11-13 PROCEDURE — 99214 OFFICE O/P EST MOD 30 MIN: CPT | Performed by: PHYSICIAN ASSISTANT

## 2023-11-13 RX ORDER — TRAMADOL HYDROCHLORIDE 50 MG/1
50 TABLET ORAL 4 TIMES DAILY
COMMUNITY
Start: 2023-11-07

## 2023-11-13 ASSESSMENT — PAIN SCALES - GENERAL: PAINLEVEL: MODERATE PAIN (5)

## 2023-11-13 NOTE — PATIENT INSTRUCTIONS
[facility] Scheduling    Please call [phone] to schedule your image(s) (select option#1).

## 2023-11-13 NOTE — LETTER
11/13/2023         RE: Ramila Liao  [address]  [place] [address]        Dear Colleague,    Thank you for referring your patient, Ramila Liao, to the [facility]. Please see a copy of my visit note below.      Assessment:   Ramila Liao is a 88 year old y.o. female with past medical history significant for  CVA, hyperlipidemia who presents today for follow-up regarding pain involving the left upper abdominal region with radiation around to the left lateral ribs to the left greater than right low back.  Unclear etiology of pain.  Unclear if this is spine related.  Pain began 2 months ago with no trauma.  A CT abdomen pelvis did not show any definitive acute CT abnormality other than possible constipation.  She did treat the constipation which helped the pain somewhat, but the pain has not resolved.  Question if she may have left lower thoracic foraminal stenosis causing radicular pain around the ribs.  Pain has been severe requiring an emergency department visit.  Some nights she can only sleep 1 to 2 hours because of the pain.  - Patient has already had a visit with a GI specialist but etiology of pain remains unclear.           Plan:     A shared decision making plan was used.  The patient's values and choices were respected.  The following represents what was discussed and decided upon by the physician assistant and the patient.      1.  DIAGNOSTIC TESTS:    -I reviewed the CT abdomen pelvis.  - I ordered an MRI thoracic spine to look for possible neural compromise causing her pain.    2.  PHYSICAL THERAPY:  - Patient went to physical therapy for low back pain May through June 2022 and again in November 2022 through April 2023.  No additional physical therapy was ordered.    3.  MEDICATIONS: No changes are made to the patient's medications.  - Patient takes Tylenol as needed which is somewhat helpful.  - I would not recommend NSAIDs which could worsen  epigastric pain and due to comorbidities including stroke      4.  INTERVENTIONS: No interventions were ordered.  We will await the results of the MRI thoracic spine.  If there is left-sided foraminal stenosis correlating with her pain I recommend an epidural steroid injection.  - We could also consider intercostal nerve blocks if pain persist.      5.    PATIENT EDUCATION: Patient is in agreement the above plan.  All questions were answered.    6.  FOLLOW-UP: I will send patient indidebt message with her MRI results.  If she has questions or concerns at any time, she should not hesitate to call.      Subjective:     Ramila Liao is a 88 year old female who presents today for follow-up regarding a 2-month history of left upper abdominal pain with radiation around the ribs to the left greater than right back.  Patient denies any injury or event to cause the pain.  Pain has been severe.  She went to the emergency department for the pain October 24, 2023.  She states no clear etiology for the pain was found.  She has also seen a GI specialist with no answers for where her pain is coming from.  She states that she had been constipated and she treated her constipation which helped alleviate the pain somewhat but it never went away completely.    Patient complains of left-sided pain in the upper abdominal region/under the left breast.  She states the pain wraps around to the left lower ribs into the left lower thoracic region.  Sometimes it spreads over to the right lower thoracic region as well.  She denies any pain in the legs.  Denies any weakness down the legs.  Denies loss of bowel or bladder control.  She describes the pain as a sharp pain.  She rates her pain today as a 5 out of 10.  Pain is aggravated with sitting upright, bending forward, and lying flat.  She is most comfortable in a semireclined position.  There is no correlation with eating.    Treatment to date:  -Patient previously went to physical  therapy at [place].  - 8 sessions physical therapy through [facility] [id number].  - right L5-S1 transforaminal epidural steroid injection November 10, 2022 which provided 60 to 70% relief of his pain but relief was already waning by 4 weeks after the procedure.  -right L4-5 and L5-S1 transforaminal epidural steroid injections December 13, 2022 did not not provide any relief of her pain.  - right piriformis muscle trigger point injection January 2, 2023 which did not provide any relief of her pain.  - Gabapentin  - Tylenol as needed  - Aspirin  - Tramadol    Review of Systems:  Positive for numbness/tingling, pain much worse in neck, unintentional weight loss.  Negative for weakness, loss of bowel/bladder control, inability to urinate, headache, trip/stumble/falls, difficulty swallowing, difficulty with hand skills, fevers.     Objective:   CONSTITUTIONAL:  Vital signs as above.  No acute distress.  The patient is well nourished and well groomed.    PSYCHIATRIC:  The patient is awake, alert, oriented to person, place and time.  The patient is answering questions appropriately with clear speech.  Normal affect.  HEENT: Normocephalic, atraumatic.  Sclera clear.    SKIN: Small area of superficial abrasion left lower thoracic region.  No vesicular rash.  There is also a pustular rash left lateral hip which patient states is chronic.  MUSCULOSKELETAL: Patient ambulates with a shuffling gait.   She walks with a 4 wheeled walker.  Tender to palpation left lower thoracic region.  Tender to palpation left lower anterior ribs.  4/5 strength right knee flexors/extensors otherwise 5/5 strength bilateral hip flexors, left knee flexors/extensors, bilateral ankle dorsi/plantar flexors.  NEUROLOGICAL:  No ankle clonus.  Sensation light touch intact bilateral lower extremities throughout.     RESULTS: I reviewed the MRI lumbar spine from [facility] dated March 27, 2023.  This shows anterolisthesis L5 on S1 with moderate to  severe right and mild to moderate left foraminal stenosis.  There is also mild spinal canal stenosis L3-4, L4-5.    CT abdomen pelvis October 24, 2023:  IMPRESSION:  1.  No acute pulmonary embolism.  2.  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  3.  No definitive acute CT abnormality of the abdomen/pelvis. Possible constipation.  4.  Unchanged moderate to severe intrahepatic and extrahepatic biliary ductal dilatation, some of which may be attributable to the postcholecystectomy state. However, correlation with LFTs and MRCP follow-up is recommended.  5.  Hepatic steatosis.  6.  Unchanged, eccentrically calcified 6 mm nodule within the left upper lobe. A follow-up chest CT is recommended in 6-12 months to document continued stability of this finding.      Again, thank you for allowing me to participate in the care of your patient.        Sincerely,        Tiffanie Quan PA-C

## 2023-11-14 ENCOUNTER — TELEPHONE (OUTPATIENT)
Dept: PHYSICAL MEDICINE AND REHAB | Facility: CLINIC | Age: 88
End: 2023-11-14
Payer: COMMERCIAL

## 2023-11-14 DIAGNOSIS — M47.25 OSTEOARTHRITIS OF SPINE WITH RADICULOPATHY, THORACOLUMBAR REGION: ICD-10-CM

## 2023-11-14 DIAGNOSIS — M47.814 SPONDYLOSIS OF THORACIC REGION WITHOUT MYELOPATHY OR RADICULOPATHY: Primary | ICD-10-CM

## 2023-11-14 NOTE — TELEPHONE ENCOUNTER
Spoke to pt regarding results and recommendation. Pt verbalized understanding and would like to move forward with T10-11 ILESI and PT. Order placed and  will call to schedule tomorrow morning.

## 2023-11-14 NOTE — TELEPHONE ENCOUNTER
----- Message from Tiffanie Quan PA-C sent at 11/14/2023  2:33 PM CST -----  Can you please call this patient and relayed the results below.  Please offer a T10-T11 interlaminar epidural steroid injection.  Please assist with scheduling if she would like to pursue this.

## 2023-12-03 ENCOUNTER — HOSPITAL ENCOUNTER (OUTPATIENT)
Dept: MRI IMAGING | Facility: CLINIC | Age: 88
Discharge: HOME | End: 2023-12-03
Attending: INTERNAL MEDICINE | Admitting: INTERNAL MEDICINE
Payer: COMMERCIAL

## 2023-12-03 DIAGNOSIS — R93.2 ABNORMAL COMPUTERIZED TOMOGRAPHY OF BILIARY TRACT: ICD-10-CM

## 2023-12-03 PROCEDURE — 74181 MRI ABDOMEN W/O CONTRAST: CPT

## 2023-12-06 ENCOUNTER — RADIOLOGY INJECTION OFFICE VISIT (OUTPATIENT)
Dept: PHYSICAL MEDICINE AND REHAB | Facility: CLINIC | Age: 88
End: 2023-12-06
Attending: PHYSICIAN ASSISTANT
Payer: COMMERCIAL

## 2023-12-06 VITALS
OXYGEN SATURATION: 97 % | HEART RATE: 89 BPM | TEMPERATURE: 97.2 F | SYSTOLIC BLOOD PRESSURE: 136 MMHG | DIASTOLIC BLOOD PRESSURE: 82 MMHG | RESPIRATION RATE: 18 BRPM

## 2023-12-06 DIAGNOSIS — M47.25 OSTEOARTHRITIS OF SPINE WITH RADICULOPATHY, THORACOLUMBAR REGION: ICD-10-CM

## 2023-12-06 PROCEDURE — 62321 NJX INTERLAMINAR CRV/THRC: CPT | Performed by: PAIN MEDICINE

## 2023-12-06 RX ORDER — LIDOCAINE HYDROCHLORIDE 10 MG/ML
INJECTION, SOLUTION EPIDURAL; INFILTRATION; INTRACAUDAL; PERINEURAL
Status: COMPLETED | OUTPATIENT
Start: 2023-12-06 | End: 2023-12-06

## 2023-12-06 RX ORDER — DEXAMETHASONE SODIUM PHOSPHATE 10 MG/ML
INJECTION, SOLUTION INTRAMUSCULAR; INTRAVENOUS
Status: COMPLETED | OUTPATIENT
Start: 2023-12-06 | End: 2023-12-06

## 2023-12-06 RX ADMIN — DEXAMETHASONE SODIUM PHOSPHATE 10 MG: 10 INJECTION, SOLUTION INTRAMUSCULAR; INTRAVENOUS at 15:28

## 2023-12-06 RX ADMIN — LIDOCAINE HYDROCHLORIDE 1 ML: 10 INJECTION, SOLUTION EPIDURAL; INFILTRATION; INTRACAUDAL; PERINEURAL at 15:28

## 2023-12-06 ASSESSMENT — PAIN SCALES - GENERAL
PAINLEVEL: MODERATE PAIN (4)
PAINLEVEL: MILD PAIN (2)

## 2023-12-06 NOTE — PATIENT INSTRUCTIONS
DISCHARGE INSTRUCTIONS    During office hours (8:00 a.m.- 4:00 p.m.) questions or concerns may be answered  by calling [facility] Navigation Nurses at  [phone].  Messages received after hours will be returned the following business day.      In the case of an emergency, please dial 911 or seek assistance at the nearest Emergency Room/Urgent Care facility.     All Patients:    You may experience an increase in your symptoms for the first 2 days (It may take anywhere between 2 days- 2 weeks for the steroid to have maximum effect).    You may use ice on the injection site, as frequently as 20 minutes each hour if needed.    You may take your pain medicine.    You may continue taking your regular medication after your injection. If you have had a Medial Branch Block you may resume pain medication once your pain diary is completed.    You may shower. No swimming, tub bath or hot tub for 48 hours.  You may remove your bandaid/bandage as soon as you are home.    You may resume light activities, as tolerated.    Resume your usual diet as tolerated.    It is strongly advised that you do not drive for 1-3 hours post injection.    If you have had oral sedation:  Do not drive for 8 hours post injection.      If you have had IV sedation:  Do not drive for 24 hours post injection.  Do not operate hazardous machinery or make important personal/business decisions for 24 hours.      POSSIBLE STEROID SIDE EFFECTS (If steroid/cortisone was used for your procedure)    -If you experience these symptoms, it should only last for a short period    Swelling of the legs              Skin redness (flushing)     Mouth (oral) irritation   Blood sugar (glucose) levels            Sweats                    Mood changes  Headache  Sleeplessness  Weakened immune system for up to 14 days, which could increase the risk of jeffry the COVID-19 virus and/or experiencing more severe symptoms of the disease, if exposed.  Decreased  effectiveness of the flu vaccine if given within 2 weeks of the steroid.         POSSIBLE PROCEDURE SIDE EFFECTS  -Call the Spine Center if you are concerned  Increased Pain           Increased numbness/tingling      Nausea/Vomiting          Bruising/bleeding at site      Redness or swelling                                              Difficulty walking      Weakness           Fever greater than 100.5    *In the event of a severe headache after an epidural steroid injection that is relieved by lying down, please call the [facility] to speak with a clinical staff member*

## 2023-12-19 NOTE — PROGRESS NOTES
Assessment:   Ramila Liao is a 88 year old y.o. female with past medical history significant for  CVA, hyperlipidemia who presents today for follow-up regarding pain involving the left upper abdominal region with radiation around to the left lateral ribs to the left greater than right low back.  Unclear etiology of pain.  My review of an MRI thoracic spine showed a calcified central/left paracentral chronic disc herniation at T9-T10 contacting and flattening the anterior/left aspect of the thoracic cord.  Patient is following up after a T10-T11 interlaminar epidural steroid injection December 6, 2023 which provided 80 to 90% relief of pain.  She complains of nighttime along the left greater than right iliac spine but on exam she has tenderness over the bilateral greater trochanters.  Question if she may have trochanteric bursitis as well.             Plan:     A shared decision making plan was used.  The patient's values and choices were respected.  The following represents what was discussed and decided upon by the physician assistant and the patient.      1.  DIAGNOSTIC TESTS:    -I reviewed the MRI thoracic spine.  No additional diagnostic tests were ordered.    2.  PHYSICAL THERAPY:  - Patient went to physical therapy for low back pain May through June 2022 and again in November 2022 through April 2023.  No additional physical therapy was ordered.    3.  MEDICATIONS:  - Patient has been taking gabapentin 300 mg twice daily.  I recommended that she increase her nighttime dose to 600 mg to help with her nighttime pain.  She states that her medications all need to be prescribed by her doctor at her nursing facility so I did not write a prescription for this today.  I did print out my recommendation so that she can turn this into the nurses station to hopefully make an adjustment to her medication.  - Patient takes Tylenol as needed which is somewhat helpful.  - I would not recommend NSAIDs which could  worsen epigastric pain and due to comorbidities including stroke      4.  INTERVENTIONS:  - If thoracic radicular pain returns we could repeat the T10-T11 interlaminar epidural steroid injection.  - I asked the patient to pay close attention to her nighttime pain.  If this is more localized to the greater trochanters we could consider trochanteric bursa injections but patient was somewhat confused on the exact location of her pain today.      5.    PATIENT EDUCATION: Patient is in agreement the above plan.  All questions were answered.    6.  FOLLOW-UP: Patient is going to follow-up in 2 months.  If she has questions or concerns in the meantime, she should not hesitate to call.      Subjective:     Ramila Liao is a 88 year old female who presents today for follow-up regarding left upper abdominal pain with radiation around the ribs to the left greater than right back.  Patient is following up after a T10-T11 interlaminar epidural steroid injection December 6, 2023.  Patient reports 80 to 90% relief of this pain.  She is more concerned now about nighttime pain.  She initially states that this is along the iliac spine radiating from the anterior to posterior aspects of the iliac spine but later in the visit when I palpate her greater trochanter she had tenderness there and then questioned if that was the location of her nighttime pain.  She states this pain is worse on the side that she is lying on at night.  It wakes her after about 2-1/2 hours of sleeping.  She feels better if she gets up and walks.  She denies pain down the legs.  Denies numbness, tingling, weakness down the legs.  She rates her pain today as a 1-2 out of 10.  At its worst it is a 4 out of 10.  At its best it is a 2 out of 10.  She has minimal residual left upper quadrant abdominal pain but she states this seems to be related to eating spicy foods.  She is seeing GI next month.        Treatment to date:  -Patient previously went to physical  therapy at [place].  - 8 sessions physical therapy through [facility] [id number].  - T10-T11 interlaminar epidural steroid injection December 6, 2023 with 80 to 90% relief of pain  - right L5-S1 transforaminal epidural steroid injection November 10, 2022 which provided 60 to 70% relief of his pain but relief was already waning by 4 weeks after the procedure.  -right L4-5 and L5-S1 transforaminal epidural steroid injections December 13, 2022 did not not provide any relief of her pain.  - right piriformis muscle trigger point injection January 2, 2023 which did not provide any relief of her pain.  - Gabapentin  - Tylenol as needed  - Aspirin  - Tramadol    Review of Systems:  Positive for wetting pants, pain much worse at night, weight loss.  Negative for numbness/tingling, weakness, loss of bowel/bladder control, inability to urinate, headache, trip/simple/falls medical D swallowing, difficulty with hand skills, fevers.     Objective:   CONSTITUTIONAL:  Vital signs as above.  No acute distress.  The patient is well nourished and well groomed.    PSYCHIATRIC:  The patient is awake, alert, oriented to person, place and time.  The patient is answering questions appropriately with clear speech.  Normal affect.  HEENT: Normocephalic, atraumatic.  Sclera clear.    SKIN: Small area of superficial abrasion left lower thoracic region.  No vesicular rash.  There is also a pustular rash left lateral hip which patient states is chronic.  MUSCULOSKELETAL: Patient ambulates with a shuffling gait.   She walks with a 4 wheeled walker.  No tenderness to palpation thoracolumbar spinous processes or thoracic/lumbar paraspinous muscles.  Mild tenderness palpation bilateral greater trochanters.  No tenderness palpation along the anterior/posterior iliac spine. 5/5 strength bilateral hip flexors,  knee flexors/extensors, bilateral ankle dorsi/plantar flexors.  NEUROLOGICAL:    Sensation light touch intact bilateral lower extremities  throughout.     RESULTS:   I reviewed the MRI lumbar spine from [place] dated November 13, 2023.  At T9-T10 there is a calcified central/left paracentral chronic disc herniation resulting in contact and subtle flattening of the anterior/left aspect of the thoracic cord.  At T7-T8 there is a shallow central/left paracentral ossified disc bulge with mild attenuation of the anterior subarachnoid space but no evidence of critical spinal canal stenosis.    I reviewed the MRI lumbar spine from [facility] dated March 27, 2023.  This shows anterolisthesis L5 on S1 with moderate to severe right and mild to moderate left foraminal stenosis.  There is also mild spinal canal stenosis L3-4, L4-5.    CT abdomen pelvis October 24, 2023:  IMPRESSION:  1.  No acute pulmonary embolism.  2.  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  3.  No definitive acute CT abnormality of the abdomen/pelvis. Possible constipation.  4.  Unchanged moderate to severe intrahepatic and extrahepatic biliary ductal dilatation, some of which may be attributable to the postcholecystectomy state. However, correlation with LFTs and MRCP follow-up is recommended.  5.  Hepatic steatosis.  6.  Unchanged, eccentrically calcified 6 mm nodule within the left upper lobe. A follow-up chest CT is recommended in 6-12 months to document continued stability of this finding.

## 2023-12-21 ENCOUNTER — OFFICE VISIT (OUTPATIENT)
Dept: PHYSICAL MEDICINE AND REHAB | Facility: CLINIC | Age: 88
End: 2023-12-21
Payer: COMMERCIAL

## 2023-12-21 VITALS
DIASTOLIC BLOOD PRESSURE: 75 MMHG | WEIGHT: 137 LBS | HEIGHT: 62 IN | SYSTOLIC BLOOD PRESSURE: 165 MMHG | HEART RATE: 81 BPM | BODY MASS INDEX: 25.21 KG/M2

## 2023-12-21 DIAGNOSIS — M51.24 THORACIC DISC HERNIATION: Primary | ICD-10-CM

## 2023-12-21 DIAGNOSIS — M25.552 BILATERAL HIP PAIN: ICD-10-CM

## 2023-12-21 DIAGNOSIS — M25.551 BILATERAL HIP PAIN: ICD-10-CM

## 2023-12-21 PROCEDURE — 99214 OFFICE O/P EST MOD 30 MIN: CPT | Performed by: PHYSICIAN ASSISTANT

## 2023-12-21 ASSESSMENT — PAIN SCALES - GENERAL: PAINLEVEL: MILD PAIN (2)

## 2023-12-21 NOTE — LETTER
12/21/2023         RE: Ramila Liao  [address]  [place] [address]        Dear Colleague,    Thank you for referring your patient, Ramila Liao, to the [facility]. Please see a copy of my visit note below.      Assessment:   Ramila Liao is a 88 year old y.o. female with past medical history significant for  CVA, hyperlipidemia who presents today for follow-up regarding pain involving the left upper abdominal region with radiation around to the left lateral ribs to the left greater than right low back.  Unclear etiology of pain.  My review of an MRI thoracic spine showed a calcified central/left paracentral chronic disc herniation at T9-T10 contacting and flattening the anterior/left aspect of the thoracic cord.  Patient is following up after a T10-T11 interlaminar epidural steroid injection December 6, 2023 which provided 80 to 90% relief of pain.  She complains of nighttime along the left greater than right iliac spine but on exam she has tenderness over the bilateral greater trochanters.  Question if she may have trochanteric bursitis as well.             Plan:     A shared decision making plan was used.  The patient's values and choices were respected.  The following represents what was discussed and decided upon by the physician assistant and the patient.      1.  DIAGNOSTIC TESTS:    -I reviewed the MRI thoracic spine.  No additional diagnostic tests were ordered.    2.  PHYSICAL THERAPY:  - Patient went to physical therapy for low back pain May through June 2022 and again in November 2022 through April 2023.  No additional physical therapy was ordered.    3.  MEDICATIONS:  - Patient has been taking gabapentin 300 mg twice daily.  I recommended that she increase her nighttime dose to 600 mg to help with her nighttime pain.  She states that her medications all need to be prescribed by her doctor at her nursing facility so I did not write a  prescription for this today.  I did print out my recommendation so that she can turn this into the nurses station to hopefully make an adjustment to her medication.  - Patient takes Tylenol as needed which is somewhat helpful.  - I would not recommend NSAIDs which could worsen epigastric pain and due to comorbidities including stroke      4.  INTERVENTIONS:  - If thoracic radicular pain returns we could repeat the T10-T11 interlaminar epidural steroid injection.  - I asked the patient to pay close attention to her nighttime pain.  If this is more localized to the greater trochanters we could consider trochanteric bursa injections but patient was somewhat confused on the exact location of her pain today.      5.    PATIENT EDUCATION: Patient is in agreement the above plan.  All questions were answered.    6.  FOLLOW-UP: Patient is going to follow-up in 2 months.  If she has questions or concerns in the meantime, she should not hesitate to call.      Subjective:     Ramila Liao is a 88 year old female who presents today for follow-up regarding left upper abdominal pain with radiation around the ribs to the left greater than right back.  Patient is following up after a T10-T11 interlaminar epidural steroid injection December 6, 2023.  Patient reports 80 to 90% relief of this pain.  She is more concerned now about nighttime pain.  She initially states that this is along the iliac spine radiating from the anterior to posterior aspects of the iliac spine but later in the visit when I palpate her greater trochanter she had tenderness there and then questioned if that was the location of her nighttime pain.  She states this pain is worse on the side that she is lying on at night.  It wakes her after about 2-1/2 hours of sleeping.  She feels better if she gets up and walks.  She denies pain down the legs.  Denies numbness, tingling, weakness down the legs.  She rates her pain today as a 1-2 out of 10.  At its worst it  is a 4 out of 10.  At its best it is a 2 out of 10.  She has minimal residual left upper quadrant abdominal pain but she states this seems to be related to eating spicy foods.  She is seeing GI next month.        Treatment to date:  -Patient previously went to physical therapy at [place].  - 8 sessions physical therapy through [facility] [phone].  - T10-T11 interlaminar epidural steroid injection December 6, 2023 with 80 to 90% relief of pain  - right L5-S1 transforaminal epidural steroid injection November 10, 2022 which provided 60 to 70% relief of his pain but relief was already waning by 4 weeks after the procedure.  -right L4-5 and L5-S1 transforaminal epidural steroid injections December 13, 2022 did not not provide any relief of her pain.  - right piriformis muscle trigger point injection January 2, 2023 which did not provide any relief of her pain.  - Gabapentin  - Tylenol as needed  - Aspirin  - Tramadol    Review of Systems:  Positive for wetting pants, pain much worse at night, weight loss.  Negative for numbness/tingling, weakness, loss of bowel/bladder control, inability to urinate, headache, trip/simple/falls medical D swallowing, difficulty with hand skills, fevers.     Objective:   CONSTITUTIONAL:  Vital signs as above.  No acute distress.  The patient is well nourished and well groomed.    PSYCHIATRIC:  The patient is awake, alert, oriented to person, place and time.  The patient is answering questions appropriately with clear speech.  Normal affect.  HEENT: Normocephalic, atraumatic.  Sclera clear.    SKIN: Small area of superficial abrasion left lower thoracic region.  No vesicular rash.  There is also a pustular rash left lateral hip which patient states is chronic.  MUSCULOSKELETAL: Patient ambulates with a shuffling gait.   She walks with a 4 wheeled walker.  No tenderness to palpation thoracolumbar spinous processes or thoracic/lumbar paraspinous muscles.  Mild tenderness palpation  bilateral greater trochanters.  No tenderness palpation along the anterior/posterior iliac spine. 5/5 strength bilateral hip flexors,  knee flexors/extensors, bilateral ankle dorsi/plantar flexors.  NEUROLOGICAL:    Sensation light touch intact bilateral lower extremities throughout.     RESULTS:   I reviewed the MRI lumbar spine from [place] dated November 13, 2023.  At T9-T10 there is a calcified central/left paracentral chronic disc herniation resulting in contact and subtle flattening of the anterior/left aspect of the thoracic cord.  At T7-T8 there is a shallow central/left paracentral ossified disc bulge with mild attenuation of the anterior subarachnoid space but no evidence of critical spinal canal stenosis.    I reviewed the MRI lumbar spine from [facility] dated March 27, 2023.  This shows anterolisthesis L5 on S1 with moderate to severe right and mild to moderate left foraminal stenosis.  There is also mild spinal canal stenosis L3-4, L4-5.    CT abdomen pelvis October 24, 2023:  IMPRESSION:  1.  No acute pulmonary embolism.  2.  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  3.  No definitive acute CT abnormality of the abdomen/pelvis. Possible constipation.  4.  Unchanged moderate to severe intrahepatic and extrahepatic biliary ductal dilatation, some of which may be attributable to the postcholecystectomy state. However, correlation with LFTs and MRCP follow-up is recommended.  5.  Hepatic steatosis.  6.  Unchanged, eccentrically calcified 6 mm nodule within the left upper lobe. A follow-up chest CT is recommended in 6-12 months to document continued stability of this finding.      Again, thank you for allowing me to participate in the care of your patient.        Sincerely,        Tiffanie Quan PA-C

## 2023-12-21 NOTE — PATIENT INSTRUCTIONS
I recommend increasing your gabapentin to 300 mg, 1 cap po in the AM and 2 cap po at bedtime    Please monitor your hip pain.  If the pain is located on the outer part of your hip bone we could try steroid injections (cortisone shots) for hip bursitis.

## 2023-12-22 ENCOUNTER — TELEPHONE (OUTPATIENT)
Dept: PHYSICAL MEDICINE AND REHAB | Facility: CLINIC | Age: 88
End: 2023-12-22
Payer: COMMERCIAL

## 2023-12-22 DIAGNOSIS — M51.24 THORACIC DISC HERNIATION: Primary | ICD-10-CM

## 2023-12-22 RX ORDER — GABAPENTIN 300 MG/1
CAPSULE ORAL
Qty: 90 CAPSULE | Refills: 3 | Status: SHIPPED | OUTPATIENT
Start: 2023-12-22 | End: 2024-01-11

## 2023-12-22 NOTE — TELEPHONE ENCOUNTER
Call received from Yulissa at [facility]. They state they need a signed order for the gabapentin increase recommendation from patients appointment on 12/21. States the order can be escripted to patients pharmacy - [facility] on [address] (added to pharmacy list options).

## 2023-12-28 ENCOUNTER — MEDICAL CORRESPONDENCE (OUTPATIENT)
Dept: HEALTH INFORMATION MANAGEMENT | Facility: CLINIC | Age: 88
End: 2023-12-28
Payer: COMMERCIAL

## 2024-01-08 NOTE — PROGRESS NOTES
Assessment:   Ramila Liao is a 88 year old y.o. female with past medical history significant for  CVA, hyperlipidemia who presents today for follow-up regarding acute right lateral hip pain.  Pain began with no trauma several weeks ago but she has had 2 falls since the pain began.  Pain has actually improved over the past several days.  Pain is most consistent with right trochanteric bursitis.  She does not have any low back or buttock pain.             Plan:     A shared decision making plan was used.  The patient's values and choices were respected.  The following represents what was discussed and decided upon by the physician assistant and the patient.      1.  DIAGNOSTIC TESTS:    -I reviewed the x-ray right hip from May 2023.  - I reviewed the MRI lumbar spine from March 2023.  - I ordered an x-ray lumbar spine and x-ray right hip for further evaluation.  Patient has had 2 falls since the pain began.    2.  PHYSICAL THERAPY:  - Patient is currently getting physical therapy at her living facility.  Encouraged her to continue with physical therapy.  She has had 1 treatment so far and it was helpful.    3.  MEDICATIONS: No changes made to the patient's medications.  - Patient takes gabapentin 300 mg in the morning and 600 mg at bedtime.  - Patient has been applying topical over-the-counter pain relieving patches which have been helpful.  - Patient takes Tylenol as needed which is somewhat helpful.  - Patient takes tizanidine as needed.  - Patient takes tramadol as needed.  - I would not recommend NSAIDs which could worsen epigastric pain and due to comorbidities including stroke      4.  INTERVENTIONS: We discussed a right trochanteric bursa injection under ultrasound guidance.  Since pain has been improving over the past several days we elected to hold off on any interventional pain management today.  However, if pain flares back up again or fails to improve further I would recommend a right greater  trochanteric bursa injection under ultrasound guidance as the next step.      5.    PATIENT EDUCATION: Patient is in agreement the above plan.  All questions were answered.    6.  FOLLOW-UP: We will call the patient with the results of her x-rays.  Patient will let us know if she wants to proceed with the injection.  Otherwise she will follow-up as needed.      Subjective:     Ramila Liao is a 88 year old female who presents today for follow-up regarding right lateral hip pain.  Patient reports pain began several weeks ago.  She denies any specific injury or event to cause the pain but states that she has had 2 falls since the pain began.  She had 1 fall about 2 weeks ago when she lost her balance while pulling out a piano bench.  She does not know how she landed.  She had a second fall 1 week ago in the shower where she hit her left side.  This pain has been most bothersome at night reaching 10 out of 10 level pain at nighttime.  However, over the past several days pain has been improving.    Patient complains of right lateral hip pain.  Pain radiates down the lateral thigh to the lateral knee.  Denies any pain distal to the knee.  Denies any back or buttock pain.  She thinks she might have some tingling in the same area as her pain.  She is unsure if she has weakness.  She rates her pain today as a 2 out of 10.  At its worst it is a 3 out of 10.  At its best it is a 2 out of 10.  Pain is worse at night.  She cannot sleep on her right side.  She states this is because of her pain but her son states that she has never been able to sleep on her right side.  Pain is alleviated with using over-the-counter pain relieving patches and taking medication, particularly tramadol.  Denies groin pain.        Treatment to date:  - Current physical therapy at her living facility  -Patient previously went to physical therapy at [place].  - 8 sessions physical therapy through [facility] [phone].  - T10-T11  interlaminar epidural steroid injection December 6, 2023 with 80 to 90% relief of pain  - right L5-S1 transforaminal epidural steroid injection November 10, 2022 which provided 60 to 70% relief of his pain but relief was already waning by 4 weeks after the procedure.  -right L4-5 and L5-S1 transforaminal epidural steroid injections December 13, 2022 did not not provide any relief of her pain.  - right piriformis muscle trigger point injection January 2, 2023 which did not provide any relief of her pain.  - Gabapentin  - Tylenol as needed  - Aspirin  - Tramadol  - Tizanidine    Review of Systems:  Positive for possible numbness/tingling, possible weakness, pain much worse in neck, trip/number/falls, unintentional weight loss.  Negative for loss of bowel/bladder control, inability to urinate, headache, difficulty swallowing, difficulty with hand skills, fevers.     Objective:   CONSTITUTIONAL:  Vital signs as above.  No acute distress.  The patient is well nourished and well groomed.    PSYCHIATRIC:  The patient is awake, alert, oriented to person, place and time.  The patient is answering questions appropriately with clear speech.  Normal affect.  HEENT: Normocephalic, atraumatic.  Sclera clear.    SKIN: Clean, dry, intact.  MUSCULOSKELETAL: Patient ambulates with a shuffling gait.   She walks with a 4 wheeled walker.  No tenderness to palpation lumbar spinous processes or lumbar paraspinous muscles.  No tenderness to palpation bilateral sacroiliac joints.  No tenderness palpation of the sciatic notch.  Significant tenderness to palpation right greater trochanter.  Tender to palpation along the right IT band.  5/5 strength bilateral hip flexors,  knee flexors/extensors, bilateral ankle dorsi/plantar flexors.  NEUROLOGICAL: Possible subjective altered sensation right lateral thigh.     RESULTS:   I reviewed the MRI lumbar spine from [place] dated November 13, 2023.  At T9-T10 there is a calcified central/left  paracentral chronic disc herniation resulting in contact and subtle flattening of the anterior/left aspect of the thoracic cord.  At T7-T8 there is a shallow central/left paracentral ossified disc bulge with mild attenuation of the anterior subarachnoid space but no evidence of critical spinal canal stenosis.    I reviewed the MRI lumbar spine from [facility] dated March 27, 2023.  This shows anterolisthesis L5 on S1 with moderate to severe right and mild to moderate left foraminal stenosis.  There is also mild spinal canal stenosis L3-4, L4-5.    CT abdomen pelvis October 24, 2023:  IMPRESSION:  1.  No acute pulmonary embolism.  2.  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  3.  No definitive acute CT abnormality of the abdomen/pelvis. Possible constipation.  4.  Unchanged moderate to severe intrahepatic and extrahepatic biliary ductal dilatation, some of which may be attributable to the postcholecystectomy state. However, correlation with LFTs and MRCP follow-up is recommended.  5.  Hepatic steatosis.  6.  Unchanged, eccentrically calcified 6 mm nodule within the left upper lobe. A follow-up chest CT is recommended in 6-12 months to document continued stability of this finding.

## 2024-01-11 ENCOUNTER — HOSPITAL ENCOUNTER (OUTPATIENT)
Dept: RADIOLOGY | Facility: HOSPITAL | Age: 89
Discharge: HOME | End: 2024-01-11
Attending: PHYSICIAN ASSISTANT
Payer: COMMERCIAL

## 2024-01-11 ENCOUNTER — OFFICE VISIT (OUTPATIENT)
Dept: PHYSICAL MEDICINE AND REHAB | Facility: CLINIC | Age: 89
End: 2024-01-11
Payer: COMMERCIAL

## 2024-01-11 VITALS
SYSTOLIC BLOOD PRESSURE: 136 MMHG | DIASTOLIC BLOOD PRESSURE: 72 MMHG | HEIGHT: 62 IN | BODY MASS INDEX: 25.21 KG/M2 | WEIGHT: 137 LBS | HEART RATE: 78 BPM

## 2024-01-11 DIAGNOSIS — R29.6 MULTIPLE FALLS: ICD-10-CM

## 2024-01-11 DIAGNOSIS — M79.604 PAIN OF RIGHT LOWER EXTREMITY: ICD-10-CM

## 2024-01-11 DIAGNOSIS — R29.6 MULTIPLE FALLS: Primary | ICD-10-CM

## 2024-01-11 DIAGNOSIS — M70.61 TROCHANTERIC BURSITIS OF RIGHT HIP: ICD-10-CM

## 2024-01-11 PROCEDURE — 72100 X-RAY EXAM L-S SPINE 2/3 VWS: CPT

## 2024-01-11 PROCEDURE — 99214 OFFICE O/P EST MOD 30 MIN: CPT | Performed by: PHYSICIAN ASSISTANT

## 2024-01-11 PROCEDURE — 73502 X-RAY EXAM HIP UNI 2-3 VIEWS: CPT

## 2024-01-11 ASSESSMENT — PAIN SCALES - GENERAL: PAINLEVEL: MILD PAIN (2)

## 2024-01-11 NOTE — PATIENT INSTRUCTIONS
[facility] Scheduling    Please call [phone] to schedule your image(s) (select option#1).     We talked about a right hip bursa injection if your pain fails to improve or worsens.  Please call my nurse at [phone] or send me a [employer] message if you would like to schedule the injection.

## 2024-01-11 NOTE — LETTER
1/11/2024         RE: Ramila Liao  [address]  [place] [address]        Dear Colleague,    Thank you for referring your patient, Ramila Liao, to the [facility]. Please see a copy of my visit note below.      Assessment:   Ramila Liao is a 88 year old y.o. female with past medical history significant for  CVA, hyperlipidemia who presents today for follow-up regarding acute right lateral hip pain.  Pain began with no trauma several weeks ago but she has had 2 falls since the pain began.  Pain has actually improved over the past several days.  Pain is most consistent with right trochanteric bursitis.  She does not have any low back or buttock pain.             Plan:     A shared decision making plan was used.  The patient's values and choices were respected.  The following represents what was discussed and decided upon by the physician assistant and the patient.      1.  DIAGNOSTIC TESTS:    -I reviewed the x-ray right hip from May 2023.  - I reviewed the MRI lumbar spine from March 2023.  - I ordered an x-ray lumbar spine and x-ray right hip for further evaluation.  Patient has had 2 falls since the pain began.    2.  PHYSICAL THERAPY:  - Patient is currently getting physical therapy at her living facility.  Encouraged her to continue with physical therapy.  She has had 1 treatment so far and it was helpful.    3.  MEDICATIONS: No changes made to the patient's medications.  - Patient takes gabapentin 300 mg in the morning and 600 mg at bedtime.  - Patient has been applying topical over-the-counter pain relieving patches which have been helpful.  - Patient takes Tylenol as needed which is somewhat helpful.  - Patient takes tizanidine as needed.  - Patient takes tramadol as needed.  - I would not recommend NSAIDs which could worsen epigastric pain and due to comorbidities including stroke      4.  INTERVENTIONS: We discussed a right trochanteric bursa  injection under ultrasound guidance.  Since pain has been improving over the past several days we elected to hold off on any interventional pain management today.  However, if pain flares back up again or fails to improve further I would recommend a right greater trochanteric bursa injection under ultrasound guidance as the next step.      5.    PATIENT EDUCATION: Patient is in agreement the above plan.  All questions were answered.    6.  FOLLOW-UP: We will call the patient with the results of her x-rays.  Patient will let us know if she wants to proceed with the injection.  Otherwise she will follow-up as needed.      Subjective:     Ramila Liao is a 88 year old female who presents today for follow-up regarding right lateral hip pain.  Patient reports pain began several weeks ago.  She denies any specific injury or event to cause the pain but states that she has had 2 falls since the pain began.  She had 1 fall about 2 weeks ago when she lost her balance while pulling out a piano bench.  She does not know how she landed.  She had a second fall 1 week ago in the shower where she hit her left side.  This pain has been most bothersome at night reaching 10 out of 10 level pain at nighttime.  However, over the past several days pain has been improving.    Patient complains of right lateral hip pain.  Pain radiates down the lateral thigh to the lateral knee.  Denies any pain distal to the knee.  Denies any back or buttock pain.  She thinks she might have some tingling in the same area as her pain.  She is unsure if she has weakness.  She rates her pain today as a 2 out of 10.  At its worst it is a 3 out of 10.  At its best it is a 2 out of 10.  Pain is worse at night.  She cannot sleep on her right side.  She states this is because of her pain but her son states that she has never been able to sleep on her right side.  Pain is alleviated with using over-the-counter pain relieving patches and taking medication,  particularly tramadol.  Denies groin pain.        Treatment to date:  - Current physical therapy at her living facility  -Patient previously went to physical therapy at [place].  - 8 sessions physical therapy through [facility] [phone].  - T10-T11 interlaminar epidural steroid injection December 6, 2023 with 80 to 90% relief of pain  - right L5-S1 transforaminal epidural steroid injection November 10, 2022 which provided 60 to 70% relief of his pain but relief was already waning by 4 weeks after the procedure.  -right L4-5 and L5-S1 transforaminal epidural steroid injections December 13, 2022 did not not provide any relief of her pain.  - right piriformis muscle trigger point injection January 2, 2023 which did not provide any relief of her pain.  - Gabapentin  - Tylenol as needed  - Aspirin  - Tramadol  - Tizanidine    Review of Systems:  Positive for possible numbness/tingling, possible weakness, pain much worse in neck, trip/number/falls, unintentional weight loss.  Negative for loss of bowel/bladder control, inability to urinate, headache, difficulty swallowing, difficulty with hand skills, fevers.     Objective:   CONSTITUTIONAL:  Vital signs as above.  No acute distress.  The patient is well nourished and well groomed.    PSYCHIATRIC:  The patient is awake, alert, oriented to person, place and time.  The patient is answering questions appropriately with clear speech.  Normal affect.  HEENT: Normocephalic, atraumatic.  Sclera clear.    SKIN: Clean, dry, intact.  MUSCULOSKELETAL: Patient ambulates with a shuffling gait.   She walks with a 4 wheeled walker.  No tenderness to palpation lumbar spinous processes or lumbar paraspinous muscles.  No tenderness to palpation bilateral sacroiliac joints.  No tenderness palpation of the sciatic notch.  Significant tenderness to palpation right greater trochanter.  Tender to palpation along the right IT band.  5/5 strength bilateral hip flexors,  knee  flexors/extensors, bilateral ankle dorsi/plantar flexors.  NEUROLOGICAL: Possible subjective altered sensation right lateral thigh.     RESULTS:   I reviewed the MRI lumbar spine from [place] dated November 13, 2023.  At T9-T10 there is a calcified central/left paracentral chronic disc herniation resulting in contact and subtle flattening of the anterior/left aspect of the thoracic cord.  At T7-T8 there is a shallow central/left paracentral ossified disc bulge with mild attenuation of the anterior subarachnoid space but no evidence of critical spinal canal stenosis.    I reviewed the MRI lumbar spine from [facility] dated March 27, 2023.  This shows anterolisthesis L5 on S1 with moderate to severe right and mild to moderate left foraminal stenosis.  There is also mild spinal canal stenosis L3-4, L4-5.    CT abdomen pelvis October 24, 2023:  IMPRESSION:  1.  No acute pulmonary embolism.  2.  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  3.  No definitive acute CT abnormality of the abdomen/pelvis. Possible constipation.  4.  Unchanged moderate to severe intrahepatic and extrahepatic biliary ductal dilatation, some of which may be attributable to the postcholecystectomy state. However, correlation with LFTs and MRCP follow-up is recommended.  5.  Hepatic steatosis.  6.  Unchanged, eccentrically calcified 6 mm nodule within the left upper lobe. A follow-up chest CT is recommended in 6-12 months to document continued stability of this finding.      Again, thank you for allowing me to participate in the care of your patient.        Sincerely,        Tiffanie Quan PA-C

## 2024-01-12 ENCOUNTER — TELEPHONE (OUTPATIENT)
Dept: PHYSICAL MEDICINE AND REHAB | Facility: CLINIC | Age: 89
End: 2024-01-12
Payer: COMMERCIAL

## 2024-01-12 NOTE — TELEPHONE ENCOUNTER
----- Message from Tiffanie Quan PA-C sent at 1/12/2024 12:53 PM CST -----  Please call this patient and let her know that her x-rays are negative for fracture.  If she wants to try anything else for her pain next step will be a right trochanteric bursa injection under ultrasound guidance.

## 2024-01-12 NOTE — TELEPHONE ENCOUNTER
Phone call to patient to review results and provider's recommendations. Results given and explained. Informed her that if she would like anything else done for her pain, [facility] is recommending a bursa injection to right hip.     She reports she had PT/massage to her hip today and it feels much better. She will discuss the option with her provider at facility on Tuesday as well as her PT. She will let us know if she wants to move forward with it.

## 2024-01-16 ENCOUNTER — TELEPHONE (OUTPATIENT)
Dept: PHYSICAL MEDICINE AND REHAB | Facility: CLINIC | Age: 89
End: 2024-01-16
Payer: COMMERCIAL

## 2024-01-16 DIAGNOSIS — M70.61 TROCHANTERIC BURSITIS OF RIGHT HIP: Primary | ICD-10-CM

## 2024-01-16 NOTE — TELEPHONE ENCOUNTER
[facility] Call Center    Phone Message    May a detailed message be left on voicemail: yes     Reason for Call: Other: patient's son is calling as he would like an injection ordered.       Action Taken: Other: Te     Travel Screening: Not Applicable

## 2024-01-16 NOTE — TELEPHONE ENCOUNTER
Called and discussed with pt's son, Sai. Injection order placed for US guided right bursa injection. Injection requirements reviewed. Pt did have RSV vaccine on 1/9. Informed injection to be scheduled on 1/23 or later.   Transferred to  to make appt.

## 2024-01-29 ENCOUNTER — RADIOLOGY INJECTION OFFICE VISIT (OUTPATIENT)
Dept: PHYSICAL MEDICINE AND REHAB | Facility: CLINIC | Age: 89
End: 2024-01-29
Attending: PHYSICIAN ASSISTANT
Payer: COMMERCIAL

## 2024-01-29 VITALS
DIASTOLIC BLOOD PRESSURE: 84 MMHG | SYSTOLIC BLOOD PRESSURE: 147 MMHG | TEMPERATURE: 97.9 F | HEART RATE: 88 BPM | OXYGEN SATURATION: 94 %

## 2024-01-29 DIAGNOSIS — M70.61 TROCHANTERIC BURSITIS OF RIGHT HIP: ICD-10-CM

## 2024-01-29 PROCEDURE — 20611 DRAIN/INJ JOINT/BURSA W/US: CPT | Mod: RT | Performed by: PAIN MEDICINE

## 2024-01-29 RX ORDER — LIDOCAINE HYDROCHLORIDE 10 MG/ML
INJECTION, SOLUTION EPIDURAL; INFILTRATION; INTRACAUDAL; PERINEURAL
Status: COMPLETED | OUTPATIENT
Start: 2024-01-29 | End: 2024-01-29

## 2024-01-29 RX ORDER — METHYLPREDNISOLONE ACETATE 40 MG/ML
INJECTION, SUSPENSION INTRA-ARTICULAR; INTRALESIONAL; INTRAMUSCULAR; SOFT TISSUE
Status: COMPLETED | OUTPATIENT
Start: 2024-01-29 | End: 2024-01-29

## 2024-01-29 RX ADMIN — LIDOCAINE HYDROCHLORIDE 1 ML: 10 INJECTION, SOLUTION EPIDURAL; INFILTRATION; INTRACAUDAL; PERINEURAL at 14:18

## 2024-01-29 RX ADMIN — METHYLPREDNISOLONE ACETATE 20 MG: 40 INJECTION, SUSPENSION INTRA-ARTICULAR; INTRALESIONAL; INTRAMUSCULAR; SOFT TISSUE at 14:19

## 2024-01-29 ASSESSMENT — PAIN SCALES - GENERAL: PAINLEVEL: NO PAIN (1)

## 2024-01-29 NOTE — PATIENT INSTRUCTIONS
Follow Up as Needed With Tiffanie Quan or if Symptoms fail to improve or worsen.  DISCHARGE INSTRUCTIONS  During office hours (8:00 a.m.- 4:00 p.m.) questions or concerns may be answered  by calling [facility] Navigation Nurses at  [phone].  Messages received after hours will be returned the following business day.      In the case of an emergency, please dial 911 or seek assistance at the nearest Emergency Room/Urgent Care facility.     You may experience an increase in your symptoms for the first 2 days (It may take anywhere between 2 days- 2 weeks for the steroid to have maximum effect).    You may use ice on the injection site, as frequently as 20 minutes each hour if needed.    You may take your pain medicine.    You may shower. No swimming, tub bath or hot tub for 48 hours.  You may remove your bandaid/bandage as soon as you are home.    You may resume light activities, as tolerated.    Resume your usual diet as tolerated.    POSSIBLE STEROID SIDE EFFECTS (If steroid/cortisone was used for your procedure)  -If you experience these symptoms, it should only last for a short period  Swelling of the legs              Skin redness (flushing)     Mouth (oral) irritation   Blood sugar (glucose) levels            Sweats                    Mood changes  Headache  Sleeplessness  Weakened immune system for up to 14 days, which could increase the risk of jeffry the COVID-19 virus and/or experiencing more severe symptoms of the disease, if exposed.  Decreased effectiveness of the flu vaccine if given within 2 weeks of the steroid.         POSSIBLE PROCEDURE SIDE EFFECTS  -Call the Spine Center if you are concerned  Increased Pain           Increased numbness/tingling      Nausea/Vomiting          Bruising/bleeding at site      Redness or swelling                                              Difficulty walking      Weakness           Fever greater than 100.5

## 2024-02-26 ENCOUNTER — MEDICAL CORRESPONDENCE (OUTPATIENT)
Dept: HEALTH INFORMATION MANAGEMENT | Facility: CLINIC | Age: 89
End: 2024-02-26
Payer: COMMERCIAL

## 2024-02-29 ENCOUNTER — TELEPHONE (OUTPATIENT)
Dept: PHYSICAL MEDICINE AND REHAB | Facility: CLINIC | Age: 89
End: 2024-02-29
Payer: COMMERCIAL

## 2024-02-29 NOTE — TELEPHONE ENCOUNTER
PSP:  Tiffanie LANGFORD  Last clinic visit:  1/11/24 OV; 1/29/24 Inj  Reason for call: Update  Clinical information:  pt calling in to cancel her post-injection follow-up appointment scheduled for Monday 3/4. Pt had US guided right hip peritrochanteric bursa region injection on 1/29/24. Pt reports she is doing significantly better at this time. She reports 80-90% relief of her symptoms at this time and significant improvement in her daily function.   Advice given to patient: At this time pt will follow-up as needed. Pt asked that Tiffanie be updated so she is aware pt is doing well.   Provider to address: KRISTY

## 2024-04-09 ENCOUNTER — TELEPHONE (OUTPATIENT)
Dept: PHYSICAL MEDICINE AND REHAB | Facility: CLINIC | Age: 89
End: 2024-04-09
Payer: COMMERCIAL

## 2024-04-09 NOTE — PROGRESS NOTES
Assessment:   Ramila Liao is a 88 year old y.o. female with past medical history significant for  CVA, hyperlipidemia who presents today for follow-up regarding a 6 to 8-week history of severe pain radiating into the right lower extremity with associated weakness.  Pain follows a right L3 nerve root pattern.  She has severe weakness in the right hip flexors.  She has an abnormal gait with significant hesitancy with forward movement of the right leg.  This could be due to right hip flexor weakness secondary to right L3 nerve root impingement, however, if her MRI lumbar spine does not show right L3 nerve root impingement I will recommend a referral to neurology to see if there is some other neurologic process causing the weakness/abnormal gait.             Plan:     A shared decision making plan was used.  The patient's values and choices were respected.  The following represents what was discussed and decided upon by the physician assistant and the patient.      1.  DIAGNOSTIC TESTS:    -I reviewed the x-ray right hip from May 2023.  - I reviewed the MRI lumbar spine from March 2023.  - I reviewed the MRI thoracic spine from November 2023.  - I reviewed the x-rays of the lumbar spine and pelvis/right hip January 2024.  - I ordered an updated MRI lumbar spine for further evaluation.  Specifically I am looking for right L3 nerve root impingement.  She has severe weakness in the right hip flexors on exam.    2.  PHYSICAL THERAPY:  - Patient recently completed physical therapy at her living facility.  No additional physical therapy was ordered.    3.  MEDICATIONS: No changes made to the patient's medications.  I will defer to her nursing home doctor for pain management.  I am concerned about her fall risk.  - Patient takes gabapentin 300 mg in the morning and 600 mg at bedtime.  - Patient has been applying topical over-the-counter pain relieving patches which have been helpful.  - Patient takes Tylenol as needed  which is somewhat helpful.  - Patient takes tizanidine as needed.  - Patient takes tramadol as needed this is somewhat helpful.  - I would not recommend NSAIDs which could worsen epigastric pain and due to comorbidities including stroke      4.  INTERVENTIONS: No interventions were ordered.  Will await the results of her MRI.  If this shows right L3 nerve root impingement we could consider an epidural steroid injection.      5.    PATIENT EDUCATION: Patient is in agreement the above plan.  All questions were answered.  - If the MRI lumbar spine does not show right L3 nerve root impingement I recommend a referral to neurology and also orthopedics for further evaluation.    6.  FOLLOW-UP: My nurse will call the patient with the results of her MRI lumbar spine.  She has questions or concerns in the meantime, she should not hesitate to call.      Subjective:     Ramila Liao is a 88 year old female who presents today for follow-up regarding a 6 to 8-week history of severe pain radiating into the right lower extremity with associated weakness.  Patient denies any trauma or event to cause the pain.  Pain is significantly impacting her function.  She is having difficulty walking because of the pain.  She is also having difficulty sleeping.  She states that she only sleeps for 1 to 2 hours before the pain wakes her up.  She has to sleep in a recliner chair which exacerbates the swelling in her legs.     Patient complains of pain radiating to the right lower extremity.  She states the pain wraps around from the right lateral hip into the anterior/medial thigh.  Pain does not extend past the knee.  Over the past couple of days she has had some lateral thigh pain as well but that is new and not nearly as severe as anterior/medial thigh pain.  She also has pain in the right low back.  She states her leg pain is worse than her back pain.  Patient reports some pain in the right groin when she wears tight underwear but  states that is more like irritation of the skin.  She feels a tingling sensation in the same distribution as her pain.  She states that her right leg feels very weak.  She has a limp with her hands.  She has difficulty walking because of the weakness.  She rates her pain today as a 7 out of 10.  At its worst it is a 9 out of 10.  At its best it is a 5 out of 10.  Pain is aggravated try to sleep at night.  Pain is alleviated somewhat with walking.        Treatment to date:  - Current physical therapy at her living facility  -Patient previously went to physical therapy at [place].  - 8 sessions physical therapy through [facility] [id number].  - Right trochanteric bursa injection January 29, 2024 with relief of right lateral hip pain  - T10-T11 interlaminar epidural steroid injection December 6, 2023 with 80 to 90% relief of pain  - right piriformis muscle trigger point injection January 2, 2023 which did not provide any relief of her pain.  -right L4-5 and L5-S1 transforaminal epidural steroid injections December 13, 2022 did not not provide any relief of her pain.  - right L5-S1 transforaminal epidural steroid injection November 10, 2022 which provided 60 to 70% relief of his pain but relief was already waning by 4 weeks after the procedure.  - Gabapentin  - Tylenol as needed  - Aspirin  - Tramadol  - Tizanidine    Review of Systems:  Positive for weakness, loss of bladder control, pain much worse at night.  Negative for numbness/tingling, loss of bowel control, inability to urinate, headache, trip/stumble/falls, difficulty swallowing, difficulty with hand skills, fevers, unintentional weight loss.     Objective:   CONSTITUTIONAL:  Vital signs as above.  No acute distress.  The patient is well nourished and well groomed.    PSYCHIATRIC:  The patient is awake, alert, oriented to person, place and time.  The patient is answering questions appropriately with clear speech.  Normal affect.  HEENT: Normocephalic,  atraumatic.  Sclera clear.    SKIN: Clean, dry, intact.  MUSCULOSKELETAL: Patient ambulates with a shuffling gait.  She has significant hesitancy with initiation of the gait with the right leg.  She walks with a 4 wheeled walker.  Tender to palpation right lower lumbar paraspinous muscles.  2/5 strength right hip flexors, otherwise 5/5 strength left hip flexors, bilateral knee flexors/extensors, bilateral ankle dorsi/plantar flexors.  NEUROLOGICAL: Subjective altered sensation right anteromedial thigh.  1-2+ bilateral patellar reflexes.     RESULTS:   I reviewed the x-ray lumbar spine from [facility] dated January 11, 2024.  This shows 1 cm anterolisthesis L5-S1.  No evidence for fracture.  Multilevel degenerative disc disease.    Reviewed the x-ray right hip from [facility] dated January 11, 2024.  This shows no fracture.  Very mild degenerative change of both hips.    I reviewed the MRI thoracic spine from [facility] dated November 13, 2023.  At T9-T10 there is a calcified central/left paracentral chronic disc herniation resulting in contact and subtle flattening of the anterior/left aspect of the thoracic cord.  At T7-T8 there is a shallow central/left paracentral ossified disc bulge with mild attenuation of the anterior subarachnoid space but no evidence of critical spinal canal stenosis.    I reviewed the MRI lumbar spine from [facility] dated March 27, 2023.  This shows anterolisthesis L5 on S1 with moderate to severe right and mild to moderate left foraminal stenosis.  There is also mild spinal canal stenosis L3-4, L4-5.    CT abdomen pelvis October 24, 2023:  IMPRESSION:  1.  No acute pulmonary embolism.  2.  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  3.  No definitive acute CT abnormality of the abdomen/pelvis. Possible constipation.  4.  Unchanged moderate to severe intrahepatic and extrahepatic biliary ductal dilatation, some of which may be attributable to  the postcholecystectomy state. However, correlation with LFTs and MRCP follow-up is recommended.  5.  Hepatic steatosis.  6.  Unchanged, eccentrically calcified 6 mm nodule within the left upper lobe. A follow-up chest CT is recommended in 6-12 months to document continued stability of this finding.

## 2024-04-09 NOTE — TELEPHONE ENCOUNTER
PSP:  Tiffanie LANGFORD  Last clinic visit:  1/29/24 Inj; 1/11/24 OV  Reason for call: New symptoms  Clinical information:  Call received from pt. Pt reports she has recently been experiencing new symptoms. She endorses pain on the inside of her right leg, knee, and into her right hip and right lower back. She notes some swelling in her feet as well.   Pt had US guided right trochanteric bursa injection. She feels her symptoms now are different than what they were prior to this injection.   Advice given to patient: Advised pt to follow-up with Tiffanie as she reports different symptoms. Pt agreeable to this plan. Transferred pt to scheduling to make appt.   Provider to address: KRISTY

## 2024-04-11 ENCOUNTER — OFFICE VISIT (OUTPATIENT)
Dept: PHYSICAL MEDICINE AND REHAB | Facility: CLINIC | Age: 89
End: 2024-04-11
Payer: COMMERCIAL

## 2024-04-11 VITALS
SYSTOLIC BLOOD PRESSURE: 174 MMHG | HEIGHT: 62 IN | WEIGHT: 137 LBS | DIASTOLIC BLOOD PRESSURE: 77 MMHG | BODY MASS INDEX: 25.21 KG/M2 | HEART RATE: 80 BPM

## 2024-04-11 DIAGNOSIS — R26.9 ABNORMAL GAIT: ICD-10-CM

## 2024-04-11 DIAGNOSIS — M54.16 LUMBAR RADICULAR PAIN: Primary | ICD-10-CM

## 2024-04-11 DIAGNOSIS — R29.898 RIGHT LEG WEAKNESS: ICD-10-CM

## 2024-04-11 PROCEDURE — 99214 OFFICE O/P EST MOD 30 MIN: CPT | Performed by: PHYSICIAN ASSISTANT

## 2024-04-11 ASSESSMENT — PAIN SCALES - GENERAL: PAINLEVEL: SEVERE PAIN (7)

## 2024-04-11 NOTE — PATIENT INSTRUCTIONS
[facility] Scheduling    Please call [phone] to schedule your image(s) (select option#1).

## 2024-04-19 ENCOUNTER — TELEPHONE (OUTPATIENT)
Dept: PHYSICAL MEDICINE AND REHAB | Facility: CLINIC | Age: 89
End: 2024-04-19
Payer: COMMERCIAL

## 2024-04-19 NOTE — TELEPHONE ENCOUNTER
Other: Pt wants to know if she can schedule an injection or if she needs to come back in after MRI results please call Sai patient's son regarding this    Could we send this information to you in [place] or would you prefer to receive a phone call?:   Patient would prefer a phone call   Okay to leave a detailed message?: Yes at Other phone number:  [phone]

## 2024-04-19 NOTE — TELEPHONE ENCOUNTER
Returned son's call. Explained the plan is for patient's results to be called to her. Treatment will be based on the findings of the MRI, therefore, not able to place an injection order at this time. Stated understanding and appreciation for call back.

## 2024-04-25 ENCOUNTER — HOSPITAL ENCOUNTER (OUTPATIENT)
Dept: MRI IMAGING | Facility: HOSPITAL | Age: 89
Discharge: HOME | End: 2024-04-25
Attending: PHYSICIAN ASSISTANT | Admitting: PHYSICIAN ASSISTANT
Payer: COMMERCIAL

## 2024-04-25 DIAGNOSIS — M54.16 LUMBAR RADICULAR PAIN: ICD-10-CM

## 2024-04-25 PROCEDURE — 72148 MRI LUMBAR SPINE W/O DYE: CPT

## 2024-04-26 ENCOUNTER — TELEPHONE (OUTPATIENT)
Dept: PHYSICAL MEDICINE AND REHAB | Facility: CLINIC | Age: 89
End: 2024-04-26
Payer: COMMERCIAL

## 2024-04-26 DIAGNOSIS — M54.16 LUMBAR RADICULAR PAIN: Primary | ICD-10-CM

## 2024-04-26 DIAGNOSIS — R29.898 RIGHT LEG WEAKNESS: ICD-10-CM

## 2024-04-26 NOTE — TELEPHONE ENCOUNTER
Phone call to patient to review results and provider's recommendations. Results given and explained. Discussed the recommended injection as well as referral to see neurology. Patient states her pain is less, but still there after completing oral steroids.    Order placed in Epic for right L3-4 transforaminal epidural steroid injection. Injection requirements reviewed in full with patient. Discussed steroids have immunosuppressant properties which could potentially put patient at a higher risk for a worsened course of any infection including COVID. Stated understanding. Transferred to scheduling to make appointment.      Patient will await a call from neurology schedulers after order placed.

## 2024-04-26 NOTE — TELEPHONE ENCOUNTER
----- Message from Tiffanie Quan PA-C sent at 4/26/2024 12:10 PM CDT -----  Please call this patient and let her know that I reviewed her MRI lumbar spine.  There is mild narrowing around the nerve as it exits out of the spine on the right side at L3-4 due to a disc bulge which fits with the location of her pain.  We could try a right L3-4 transforaminal epidural steroid injection.  I cannot guarantee that this will provide relief as her symptoms do seem more severe than I would expect based on her MRI findings.  I would also recommend a referral to neurology to get their opinion.

## 2024-04-29 ENCOUNTER — TELEPHONE (OUTPATIENT)
Dept: NEUROLOGY | Facility: CLINIC | Age: 89
End: 2024-04-29
Payer: COMMERCIAL

## 2024-04-29 NOTE — TELEPHONE ENCOUNTER
Preet Hameed    4/29/24  3:56 PM  Spoke with pt. Doesn't want to schedule with Neurologist. Pt mentioned she want to be put back on Sushma's schedule until further notice. Pt will be speaking with family to discuss her concerns and call back if there is any changes.        4/29/24 11:44 AM  Patient can be scheduled with another General Neurologist as appropriate.        Patient is scheduled with Sushma on 9/16.    Jamie Renteria RN, BSN  [facility] Neurology

## 2024-04-29 NOTE — TELEPHONE ENCOUNTER
[facility] Call Center    Phone Message    May a detailed message be left on voicemail: yes     Reason for Call: Appointment Intake    Referring Provider Name: Tiffanie Quan PA-C  Diagnosis and/or Symptoms: Right leg weakness    Kanwal is a patient of Sushma Bravo. She is requesting to see another provider.    Action Taken: Message routed to:  Other: [facility]    Travel Screening: Not Applicable

## 2024-05-08 ENCOUNTER — RADIOLOGY INJECTION OFFICE VISIT (OUTPATIENT)
Dept: PHYSICAL MEDICINE AND REHAB | Facility: CLINIC | Age: 89
End: 2024-05-08
Attending: PHYSICIAN ASSISTANT
Payer: COMMERCIAL

## 2024-05-08 VITALS
TEMPERATURE: 97.7 F | HEART RATE: 81 BPM | OXYGEN SATURATION: 96 % | SYSTOLIC BLOOD PRESSURE: 116 MMHG | DIASTOLIC BLOOD PRESSURE: 70 MMHG | RESPIRATION RATE: 16 BRPM

## 2024-05-08 DIAGNOSIS — M54.16 LUMBAR RADICULAR PAIN: ICD-10-CM

## 2024-05-08 PROCEDURE — 64483 NJX AA&/STRD TFRM EPI L/S 1: CPT | Mod: RT | Performed by: PAIN MEDICINE

## 2024-05-08 RX ORDER — LIDOCAINE HYDROCHLORIDE 10 MG/ML
INJECTION, SOLUTION EPIDURAL; INFILTRATION; INTRACAUDAL; PERINEURAL
Status: COMPLETED | OUTPATIENT
Start: 2024-05-08 | End: 2024-05-08

## 2024-05-08 RX ORDER — DEXAMETHASONE SODIUM PHOSPHATE 10 MG/ML
INJECTION, SOLUTION INTRAMUSCULAR; INTRAVENOUS
Status: COMPLETED | OUTPATIENT
Start: 2024-05-08 | End: 2024-05-08

## 2024-05-08 RX ADMIN — DEXAMETHASONE SODIUM PHOSPHATE 10 MG: 10 INJECTION, SOLUTION INTRAMUSCULAR; INTRAVENOUS at 13:48

## 2024-05-08 RX ADMIN — LIDOCAINE HYDROCHLORIDE 2 ML: 10 INJECTION, SOLUTION EPIDURAL; INFILTRATION; INTRACAUDAL; PERINEURAL at 13:48

## 2024-05-08 ASSESSMENT — PAIN SCALES - GENERAL
PAINLEVEL: MODERATE PAIN (4)
PAINLEVEL: MODERATE PAIN (4)

## 2024-05-08 NOTE — PATIENT INSTRUCTIONS
DISCHARGE INSTRUCTIONS    During office hours (8:00 a.m.- 4:00 p.m.) questions or concerns may be answered  by calling [facility] Navigation Nurses at  [phone].  Messages received after hours will be returned the following business day.      In the case of an emergency, please dial 911 or seek assistance at the nearest Emergency Room/Urgent Care facility.     All Patients:    You may experience an increase in your symptoms for the first 2 days (It may take anywhere between 2 days- 2 weeks for the steroid to have maximum effect).    You may use ice on the injection site, as frequently as 20 minutes each hour if needed.    You may take your pain medicine.    You may continue taking your regular medication after your injection. If you have had a Medial Branch Block you may resume pain medication once your pain diary is completed.    You may shower. No swimming, tub bath or hot tub for 48 hours.  You may remove your bandaid/bandage as soon as you are home.    You may resume light activities, as tolerated.    Resume your usual diet as tolerated.    It is strongly advised that you do not drive for 1-3 hours post injection.    If you have had oral sedation:  Do not drive for 8 hours post injection.      If you have had IV sedation:  Do not drive for 24 hours post injection.  Do not operate hazardous machinery or make important personal/business decisions for 24 hours.      POSSIBLE STEROID SIDE EFFECTS (If steroid/cortisone was used for your procedure)    -If you experience these symptoms, it should only last for a short period    Swelling of the legs              Skin redness (flushing)     Mouth (oral) irritation   Blood sugar (glucose) levels            Sweats                    Mood changes  Headache  Sleeplessness  Weakened immune system for up to 14 days, which could increase the risk of jeffry the COVID-19 virus and/or experiencing more severe symptoms of the disease, if exposed.  Decreased  effectiveness of the flu vaccine if given within 2 weeks of the steroid.         POSSIBLE PROCEDURE SIDE EFFECTS  -Call the Spine Center if you are concerned  Increased Pain           Increased numbness/tingling      Nausea/Vomiting          Bruising/bleeding at site      Redness or swelling                                              Difficulty walking      Weakness           Fever greater than 100.5    *In the event of a severe headache after an epidural steroid injection that is relieved by lying down, please call the [facility] to speak with a clinical staff member*

## 2024-05-20 NOTE — PROGRESS NOTES
Assessment:   Ramila Liao is a 88 year old y.o. female with past medical history significant for  CVA, hyperlipidemia who presents today for follow-up regarding  pain radiating into the right lower extremity with associated weakness.  My review of an MRI lumbar spine from April 2024 shows moderate to advanced lumbar spondylosis with grade 2 degenerative anterolisthesis L5-S1 and grade 1 retrolisthesis L2-3.  There is no high-grade spinal canal stenosis but there is multilevel foraminal stenosis of varying degrees.  Patient is following up after a right L3-4 transforaminal epidural steroid injection May 8, 2024 which provided greater than 50% relief of pain.  She states her pain is easier to manage now.  She continues to have an abnormal gait, especially with the right leg with hesitancy with initiating movements with the right leg.  She had 5/5 strength in bilateral lower extremities when testing individual muscle groups on exam today.  She is scheduled to see neurology in September.  - Patient had a mechanical fall yesterday in her elevator.  She did not hit her head and there was no loss of consciousness.  She did strike her right upper arm on the handrail in the elevator and she has a bruise on the right upper arm.  No worsening of back or leg symptoms after the fall.           Plan:     A shared decision making plan was used.  The patient's values and choices were respected.  The following represents what was discussed and decided upon by the physician assistant and the patient.      1.  DIAGNOSTIC TESTS:    - I reviewed the MRI lumbar spine from April 2024.  - I ordered an EMG of the right lower extremity.    2.  PHYSICAL THERAPY:  - Patient completed physical therapy at her living facility earlier in 2024.  No additional physical therapy was ordered.    3.  MEDICATIONS: No changes made to the patient's medications.  Pain medications are managed by her nursing home doctor.  - Patient takes gabapentin  300 mg in the morning and 600 mg at bedtime.  - Patient takes Tylenol as needed which is somewhat helpful.  - Patient takes tizanidine 3 times daily as needed.  - Patient takes tramadol as needed which is helpful.      4.  INTERVENTIONS: No additional interventions were ordered.      5.    PATIENT EDUCATION: Patient is in agreement the above plan.  All questions were answered.    6.  FOLLOW-UP: My nurse will call the patient with the results of her EMG.  Otherwise patient will follow-up with me as needed.  Will await recommendations from neurology.  If she has questions or concerns, she should not hesitate to call.      Subjective:     Ramila Liao is a 88 year old female who presents today for follow-up regarding severe pain radiating into the right lower extremity with associated weakness.  Patient is following up after right L3-4 transforaminal epidural steroid injection May 8, 2024.  Patient reports greater than 50% relief of pain.  Patient reports that before the injection she was having great difficulty managing her pain even with tramadol.  Now she reports that her pain is much more manageable.  She states it really does not bother her during the day.  She went 11 hours between tramadol doses yesterday and did not have significant increase in pain.  She does still have pain at night.    Patient complains of mild residual pain in the right anterior/medial thigh.  She states that she is no longer experiencing the pain in the low back or buttock.  She has some numbness and tingling in the same distribution as her pain.  She continues to feel a weak sensation in her right leg.  She had a fall in the elevator in her building yesterday but states that that was unrelated to her right leg weakness.  She states that she reached too far to press the elevator button and lost her balance.  She fell onto her bottom.  She struck her right upper arm on the handrail.  She did not hit her head, no loss of consciousness.   She denies worsening of back or leg pain since yesterday.  She rates her pain today is a 5 out of 10.  At its worst it is a 7 out of 10.  Pain is worse at night.  She only sleeps 2 to 3 hours at a time.  Pain is better during the day.        Treatment to date:  - Physical therapy at her living facility 2024  -Patient previously went to physical therapy at [place].  - 8 sessions physical therapy through [facility] [id number].  - Right L3-4 transforaminal epidural steroid injection May 8, 2024 with greater than 50% relief of pain  - Right trochanteric bursa injection January 29, 2024 with relief of right lateral hip pain  - T10-T11 interlaminar epidural steroid injection December 6, 2023 with 80 to 90% relief of pain  - right piriformis muscle trigger point injection January 2, 2023 which did not provide any relief of her pain.  -right L4-5 and L5-S1 transforaminal epidural steroid injections December 13, 2022 did not not provide any relief of her pain.  - right L5-S1 transforaminal epidural steroid injection November 10, 2022 which provided 60 to 70% relief of his pain but relief was already waning by 4 weeks after the procedure.  - Gabapentin  - Tylenol as needed  - Aspirin  - Tramadol  - Tizanidine    Review of Systems:  Positive for numbness/tingling, weakness, pain much worse at night, trip/stumble/falls.  Negative for  loss of bowel/bladder control, inability to urinate, headache, difficulty swallowing, difficulty with hand skills, fevers, unintentional weight loss.     Objective:   CONSTITUTIONAL:  Vital signs as above.  No acute distress.  The patient is well nourished and well groomed.    PSYCHIATRIC:  The patient is awake, alert, oriented to person, place and time.  The patient is answering questions appropriately with clear speech.  Normal affect.  HEENT: Normocephalic, atraumatic.  Sclera clear.    SKIN: Clean, dry, intact.  MUSCULOSKELETAL: Patient ambulates with a shuffling gait.  She has   hesitancy with initiation of the gait with the right leg.  She walks with a 4 wheeled walker.  5/5 strength bilateral hip flexors, bilateral knee flexors/extensors, bilateral ankle dorsi/plantar flexors.  NEUROLOGICAL: Sensation light touch intact bilateral lower extremities throughout.     RESULTS:   I reviewed the x-ray lumbar spine from [facility] dated January 11, 2024.  This shows 1 cm anterolisthesis L5-S1.  No evidence for fracture.  Multilevel degenerative disc disease.    Reviewed the x-ray right hip from [facility] dated January 11, 2024.  This shows no fracture.  Very mild degenerative change of both hips.    I reviewed the MRI thoracic spine from [facility] dated November 13, 2023.  At T9-T10 there is a calcified central/left paracentral chronic disc herniation resulting in contact and subtle flattening of the anterior/left aspect of the thoracic cord.  At T7-T8 there is a shallow central/left paracentral ossified disc bulge with mild attenuation of the anterior subarachnoid space but no evidence of critical spinal canal stenosis.    I reviewed the MRI lumbar spine from [facility] dated April 25, 2024.  This shows moderate to advanced lumbar spondylosis.  There is grade 2 degenerative anterolisthesis L5 on S1 and grade 1 retrolisthesis L2-3.  There is no spinal canal stenosis.  There is multilevel foraminal stenosis which is severe bilaterally L5-S1, mild to moderate right L4-5, mild bilateral L3-4 and L2-3.    CT abdomen pelvis October 24, 2023:  IMPRESSION:  1.  No acute pulmonary embolism.  2.  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  3.  No definitive acute CT abnormality of the abdomen/pelvis. Possible constipation.  4.  Unchanged moderate to severe intrahepatic and extrahepatic biliary ductal dilatation, some of which may be attributable to the postcholecystectomy state. However, correlation with LFTs and MRCP follow-up is recommended.  5.  Hepatic  steatosis.  6.  Unchanged, eccentrically calcified 6 mm nodule within the left upper lobe. A follow-up chest CT is recommended in 6-12 months to document continued stability of this finding.

## 2024-05-23 ENCOUNTER — OFFICE VISIT (OUTPATIENT)
Dept: PHYSICAL MEDICINE AND REHAB | Facility: CLINIC | Age: 89
End: 2024-05-23
Payer: COMMERCIAL

## 2024-05-23 VITALS
DIASTOLIC BLOOD PRESSURE: 65 MMHG | SYSTOLIC BLOOD PRESSURE: 140 MMHG | WEIGHT: 137 LBS | BODY MASS INDEX: 25.21 KG/M2 | HEART RATE: 104 BPM | HEIGHT: 62 IN

## 2024-05-23 DIAGNOSIS — M54.16 LUMBAR RADICULAR PAIN: Primary | ICD-10-CM

## 2024-05-23 DIAGNOSIS — R29.898 RIGHT LEG WEAKNESS: ICD-10-CM

## 2024-05-23 PROCEDURE — 99214 OFFICE O/P EST MOD 30 MIN: CPT | Performed by: PHYSICIAN ASSISTANT

## 2024-05-23 ASSESSMENT — PAIN SCALES - GENERAL: PAINLEVEL: MODERATE PAIN (5)

## 2024-05-23 NOTE — LETTER
5/23/2024         RE: Ramila Liao  [address]  [facility] [address]        Dear Colleague,    Thank you for referring your patient, Ramila Liao, to the [facility]. Please see a copy of my visit note below.      Assessment:   Ramila Liao is a 88 year old y.o. female with past medical history significant for  CVA, hyperlipidemia who presents today for follow-up regarding  pain radiating into the right lower extremity with associated weakness.  My review of an MRI lumbar spine from April 2024 shows moderate to advanced lumbar spondylosis with grade 2 degenerative anterolisthesis L5-S1 and grade 1 retrolisthesis L2-3.  There is no high-grade spinal canal stenosis but there is multilevel foraminal stenosis of varying degrees.  Patient is following up after a right L3-4 transforaminal epidural steroid injection May 8, 2024 which provided greater than 50% relief of pain.  She states her pain is easier to manage now.  She continues to have an abnormal gait, especially with the right leg with hesitancy with initiating movements with the right leg.  She had 5/5 strength in bilateral lower extremities when testing individual muscle groups on exam today.  She is scheduled to see neurology in September.  - Patient had a mechanical fall yesterday in her elevator.  She did not hit her head and there was no loss of consciousness.  She did strike her right upper arm on the handrail in the elevator and she has a bruise on the right upper arm.  No worsening of back or leg symptoms after the fall.           Plan:     A shared decision making plan was used.  The patient's values and choices were respected.  The following represents what was discussed and decided upon by the physician assistant and the patient.      1.  DIAGNOSTIC TESTS:    - I reviewed the MRI lumbar spine from April 2024.  - I ordered an EMG of the right lower extremity.    2.  PHYSICAL THERAPY:  -  Patient completed physical therapy at her living facility earlier in 2024.  No additional physical therapy was ordered.    3.  MEDICATIONS: No changes made to the patient's medications.  Pain medications are managed by her nursing home doctor.  - Patient takes gabapentin 300 mg in the morning and 600 mg at bedtime.  - Patient takes Tylenol as needed which is somewhat helpful.  - Patient takes tizanidine 3 times daily as needed.  - Patient takes tramadol as needed which is helpful.      4.  INTERVENTIONS: No additional interventions were ordered.      5.    PATIENT EDUCATION: Patient is in agreement the above plan.  All questions were answered.    6.  FOLLOW-UP: My nurse will call the patient with the results of her EMG.  Otherwise patient will follow-up with me as needed.  Will await recommendations from neurology.  If she has questions or concerns, she should not hesitate to call.      Subjective:     Ramila Liao is a 88 year old female who presents today for follow-up regarding severe pain radiating into the right lower extremity with associated weakness.  Patient is following up after right L3-4 transforaminal epidural steroid injection May 8, 2024.  Patient reports greater than 50% relief of pain.  Patient reports that before the injection she was having great difficulty managing her pain even with tramadol.  Now she reports that her pain is much more manageable.  She states it really does not bother her during the day.  She went 11 hours between tramadol doses yesterday and did not have significant increase in pain.  She does still have pain at night.    Patient complains of mild residual pain in the right anterior/medial thigh.  She states that she is no longer experiencing the pain in the low back or buttock.  She has some numbness and tingling in the same distribution as her pain.  She continues to feel a weak sensation in her right leg.  She had a fall in the elevator in her building yesterday but  states that that was unrelated to her right leg weakness.  She states that she reached too far to press the elevator button and lost her balance.  She fell onto her bottom.  She struck her right upper arm on the handrail.  She did not hit her head, no loss of consciousness.  She denies worsening of back or leg pain since yesterday.  She rates her pain today is a 5 out of 10.  At its worst it is a 7 out of 10.  Pain is worse at night.  She only sleeps 2 to 3 hours at a time.  Pain is better during the day.        Treatment to date:  - Physical therapy at her living facility 2024  -Patient previously went to physical therapy at [facility].  - 8 sessions physical therapy through [facility] [phone].  - Right L3-4 transforaminal epidural steroid injection May 8, 2024 with greater than 50% relief of pain  - Right trochanteric bursa injection January 29, 2024 with relief of right lateral hip pain  - T10-T11 interlaminar epidural steroid injection December 6, 2023 with 80 to 90% relief of pain  - right piriformis muscle trigger point injection January 2, 2023 which did not provide any relief of her pain.  -right L4-5 and L5-S1 transforaminal epidural steroid injections December 13, 2022 did not not provide any relief of her pain.  - right L5-S1 transforaminal epidural steroid injection November 10, 2022 which provided 60 to 70% relief of his pain but relief was already waning by 4 weeks after the procedure.  - Gabapentin  - Tylenol as needed  - Aspirin  - Tramadol  - Tizanidine    Review of Systems:  Positive for numbness/tingling, weakness, pain much worse at night, trip/stumble/falls.  Negative for  loss of bowel/bladder control, inability to urinate, headache, difficulty swallowing, difficulty with hand skills, fevers, unintentional weight loss.     Objective:   CONSTITUTIONAL:  Vital signs as above.  No acute distress.  The patient is well nourished and well groomed.    PSYCHIATRIC:  The patient is awake, alert,  oriented to person, place and time.  The patient is answering questions appropriately with clear speech.  Normal affect.  HEENT: Normocephalic, atraumatic.  Sclera clear.    SKIN: Clean, dry, intact.  MUSCULOSKELETAL: Patient ambulates with a shuffling gait.  She has  hesitancy with initiation of the gait with the right leg.  She walks with a 4 wheeled walker.  5/5 strength bilateral hip flexors, bilateral knee flexors/extensors, bilateral ankle dorsi/plantar flexors.  NEUROLOGICAL: Sensation light touch intact bilateral lower extremities throughout.     RESULTS:   I reviewed the x-ray lumbar spine from [facility] dated January 11, 2024.  This shows 1 cm anterolisthesis L5-S1.  No evidence for fracture.  Multilevel degenerative disc disease.    Reviewed the x-ray right hip from [facility] dated January 11, 2024.  This shows no fracture.  Very mild degenerative change of both hips.    I reviewed the MRI thoracic spine from [place] dated November 13, 2023.  At T9-T10 there is a calcified central/left paracentral chronic disc herniation resulting in contact and subtle flattening of the anterior/left aspect of the thoracic cord.  At T7-T8 there is a shallow central/left paracentral ossified disc bulge with mild attenuation of the anterior subarachnoid space but no evidence of critical spinal canal stenosis.    I reviewed the MRI lumbar spine from [facility] dated April 25, 2024.  This shows moderate to advanced lumbar spondylosis.  There is grade 2 degenerative anterolisthesis L5 on S1 and grade 1 retrolisthesis L2-3.  There is no spinal canal stenosis.  There is multilevel foraminal stenosis which is severe bilaterally L5-S1, mild to moderate right L4-5, mild bilateral L3-4 and L2-3.    CT abdomen pelvis October 24, 2023:  IMPRESSION:  1.  No acute pulmonary embolism.  2.  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  3.  No definitive acute CT abnormality of the  abdomen/pelvis. Possible constipation.  4.  Unchanged moderate to severe intrahepatic and extrahepatic biliary ductal dilatation, some of which may be attributable to the postcholecystectomy state. However, correlation with LFTs and MRCP follow-up is recommended.  5.  Hepatic steatosis.  6.  Unchanged, eccentrically calcified 6 mm nodule within the left upper lobe. A follow-up chest CT is recommended in 6-12 months to document continued stability of this finding.      Again, thank you for allowing me to participate in the care of your patient.        Sincerely,        Tiffanie Quan PA-C

## 2024-06-03 ENCOUNTER — OFFICE VISIT (OUTPATIENT)
Dept: PHYSICAL MEDICINE AND REHAB | Facility: CLINIC | Age: 89
End: 2024-06-03
Attending: PHYSICIAN ASSISTANT
Payer: COMMERCIAL

## 2024-06-03 ENCOUNTER — MEDICAL CORRESPONDENCE (OUTPATIENT)
Dept: HEALTH INFORMATION MANAGEMENT | Facility: CLINIC | Age: 89
End: 2024-06-03

## 2024-06-03 DIAGNOSIS — R29.898 RIGHT LEG WEAKNESS: ICD-10-CM

## 2024-06-03 PROCEDURE — 95886 MUSC TEST DONE W/N TEST COMP: CPT | Performed by: PHYSICAL MEDICINE & REHABILITATION

## 2024-06-03 PROCEDURE — 95909 NRV CNDJ TST 5-6 STUDIES: CPT | Performed by: PHYSICAL MEDICINE & REHABILITATION

## 2024-06-03 NOTE — LETTER
6/3/2024         RE: Ramila Liao  [address]  [facility] [address]        Dear Colleague,    Thank you for referring your patient, Ramila Liao, to the [facility]INE [facility] NEUROSURGERY. Please see a copy of my visit note below.    [facility]  [address] [place] [address] [address]  [place] [address]  Office: [phone] Fax: [phone]    Electromyography and Nerve Conduction Study Report        Indication:   Patient presents at the request of Tiffanie Quan for right lower extremity EMG.  She presents with her son today.  She has right medial thigh pain over the mid to distal medial thigh worse at night with laying supine with some numbness as well.  Has some low back pain.  On exam, normal sensation to light touch to the lower extremities.  Edema 1-2+ bilateral ankles.  1+ patellar 0 Achilles reflexes bilaterally.  Normal muscle strength throughout the major muscle groups of the bilateral lower extremities.  Significant tenderness to palpation over the right hip adductors right greater than left.      Pt Exam Discussion (Communication Barriers):  Electromyography and nerve conduction testing, including associated discomfort, risks, benefits, and alternatives was discussed with the patient prior to the procedure.  No learning/ communication barriers; patient verbalized understanding of procedure.  Informed consent was obtained.           Pt Assessment:  Testing was successfully completed; patient tolerated testing well.       Blood Thinners: ASA Skin Temperature: Warmed 34.0                   EMG/NCS  results:     Nerve Conduction Studies  Motor Sites      Segment Distal Latency Neg. Amp CV F-Latency F-Estimate Comment   Site  (ms) (mV) (m/s) (ms) (ms)    Right Fibular (EDB) Motor   Ankle Ankle-EDB 3.6 0.63       Fib Head Fib Head-Ankle 9.3 0.60 51      Knee Knee-Ankle 11.1 *0.60 49      Left Tibial (AH) Motor   Ankle Ankle-AH 4.3 2.2       Knee Knee-Ankle  11.8 *1.40 52      Right Tibial (AH) Motor   Ankle Ankle-AH 3.3 0.56       Knee Knee-Ankle 11.0 *1.11 47        Sensory Sites      Onset Lat Peak Lat Amp CV Comment   Site (ms) (ms) ( V) (m/s)    Left Sural Sensory   B-Ankle *NR *NR *NR *NR    Right Sural Sensory   B-Ankle *NR *NR *NR *NR        NCS Waveforms:    Motor           Sensory           Electromyography     Side Muscle Nerve Root Ins Act Fibs Psw Fasc Recrt Dur Amp Poly Comment   Right AntTibialis Dp Br Fibular L4-5 Nml Nml Nml Nml Nml Nml Nml 0    Right Gastroc Tibial S1-2 Nml Nml Nml Nml Nml Nml Nml 0    Right Iliopsoas Femoral L2-3 Nml Nml Nml Nml Nml Nml Nml 0    Right VastusLat Femoral L2-4 Nml Nml Nml Nml Nml Nml Nml 0    Right RectFemoris Femoral L2-4 Nml Nml Nml Nml Nml Nml Nml 0    Right AdductorLong Obturator L2-4 *Incr *1+ *1+ Nml Nml Nml Nml 0 Poor relaxation   Right L2 Parasp Rami L2 Nml Nml Nml Nml  Nml Nml     Right L3 Parasp Rami L3 Nml Nml Nml Nml  Nml Nml           Comment NCS: Abnormal study  1.   Absent bilateral sural SNAPs.  2.  Significant low amplitude bilateral tibial CMAP's and right peroneal CMAP to the EDB.    Comment EMG: Abnormal study  1.  Increased insertional activity with what appears to be positive waves and fibrillation potentials of the right abductor longus however she had difficult time with relaxation.  Remainder right lower extremity needle EMG normal.    Interpretation: Abnormal study: There is electrodiagnostic evidence of:    1.  Apparent increased insertional activity with positive waves and fibrillation potentials of the right abductor longus.  She had difficult time with relaxation during the needle EMG of this muscle.  These findings can be abnormal and indicate denervation of the right abductor longus which can be observed under the correct clinical condition in a right L2 and or L3 radiculopathy versus right obturator neuropathy.  Given the technical difficulties with this needle EMG this could also be a  normal/technical finding.      2.  Electrodiagnostic findings are consistent with but not confirmatory for a sensorimotor peripheral polyneuropathy, predominantly axonal.    Note: To evaluate for denervation atrophy to further evaluate for any abnormality, an MRI of the femur could be considered.  This could help confirm denervation along any of the L2/3 innervated muscles.    The testing was completed in its entirety by the physician.      It was our pleasure caring for your patient today, if there any questions or concerns please do not hesitate to contact us.      Again, thank you for allowing me to participate in the care of your patient.        Sincerely,        Acosta Alvarado, DO

## 2024-06-03 NOTE — PATIENT INSTRUCTIONS
Thank you for choosing the [facility] for your EMG testing.    The ordering provider will receive your final EMG results within the next few days.  Please follow up with your provider for the results and further treatment recommendations.

## 2024-06-03 NOTE — PROGRESS NOTES
[facility]  [address] [place] [address] [address]  [place] [address]  Office: [phone] Fax: [phone]    Electromyography and Nerve Conduction Study Report        Indication:   Patient presents at the request of Tiffanie Quan for right lower extremity EMG.  She presents with her son today.  She has right medial thigh pain over the mid to distal medial thigh worse at night with laying supine with some numbness as well.  Has some low back pain.  On exam, normal sensation to light touch to the lower extremities.  Edema 1-2+ bilateral ankles.  1+ patellar 0 Achilles reflexes bilaterally.  Normal muscle strength throughout the major muscle groups of the bilateral lower extremities.  Significant tenderness to palpation over the right hip adductors right greater than left.      Pt Exam Discussion (Communication Barriers):  Electromyography and nerve conduction testing, including associated discomfort, risks, benefits, and alternatives was discussed with the patient prior to the procedure.  No learning/ communication barriers; patient verbalized understanding of procedure.  Informed consent was obtained.           Pt Assessment:  Testing was successfully completed; patient tolerated testing well.       Blood Thinners: ASA Skin Temperature: Warmed 34.0                   EMG/NCS  results:     Nerve Conduction Studies  Motor Sites      Segment Distal Latency Neg. Amp CV F-Latency F-Estimate Comment   Site  (ms) (mV) (m/s) (ms) (ms)    Right Fibular (EDB) Motor   Ankle Ankle-EDB 3.6 0.63       Fib Head Fib Head-Ankle 9.3 0.60 51      Knee Knee-Ankle 11.1 *0.60 49      Left Tibial (AH) Motor   Ankle Ankle-AH 4.3 2.2       Knee Knee-Ankle 11.8 *1.40 52      Right Tibial (AH) Motor   Ankle Ankle-AH 3.3 0.56       Knee Knee-Ankle 11.0 *1.11 47        Sensory Sites      Onset Lat Peak Lat Amp CV Comment   Site (ms) (ms) ( V) (m/s)    Left Sural Sensory   B-Ankle *NR *NR *NR *NR    Right Sural Sensory    B-Ankle *NR *NR *NR *NR        NCS Waveforms:    Motor           Sensory           Electromyography     Side Muscle Nerve Root Ins Act Fibs Psw Fasc Recrt Dur Amp Poly Comment   Right AntTibialis Dp Br Fibular L4-5 Nml Nml Nml Nml Nml Nml Nml 0    Right Gastroc Tibial S1-2 Nml Nml Nml Nml Nml Nml Nml 0    Right Iliopsoas Femoral L2-3 Nml Nml Nml Nml Nml Nml Nml 0    Right VastusLat Femoral L2-4 Nml Nml Nml Nml Nml Nml Nml 0    Right RectFemoris Femoral L2-4 Nml Nml Nml Nml Nml Nml Nml 0    Right AdductorLong Obturator L2-4 *Incr *1+ *1+ Nml Nml Nml Nml 0 Poor relaxation   Right L2 Parasp Rami L2 Nml Nml Nml Nml  Nml Nml     Right L3 Parasp Rami L3 Nml Nml Nml Nml  Nml Nml           Comment NCS: Abnormal study  1.   Absent bilateral sural SNAPs.  2.  Significant low amplitude bilateral tibial CMAP's and right peroneal CMAP to the EDB.    Comment EMG: Abnormal study  1.  Increased insertional activity with what appears to be positive waves and fibrillation potentials of the right abductor longus however she had difficult time with relaxation.  Remainder right lower extremity needle EMG normal.    Interpretation: Abnormal study: There is electrodiagnostic evidence of:    1.  Apparent increased insertional activity with positive waves and fibrillation potentials of the right abductor longus.  She had difficult time with relaxation during the needle EMG of this muscle.  These findings can be abnormal and indicate denervation of the right abductor longus which can be observed under the correct clinical condition in a right L2 and or L3 radiculopathy versus right obturator neuropathy.  Given the technical difficulties with this needle EMG this could also be a normal/technical finding.      2.  Electrodiagnostic findings are consistent with but not confirmatory for a sensorimotor peripheral polyneuropathy, predominantly axonal.    Note: To evaluate for denervation atrophy to further evaluate for any abnormality, an MRI of  the femur could be considered.  This could help confirm denervation along any of the L2/3 innervated muscles.    The testing was completed in its entirety by the physician.      It was our pleasure caring for your patient today, if there any questions or concerns please do not hesitate to contact us.

## 2024-06-04 ENCOUNTER — TELEPHONE (OUTPATIENT)
Dept: PHYSICAL MEDICINE AND REHAB | Facility: CLINIC | Age: 89
End: 2024-06-04
Payer: COMMERCIAL

## 2024-06-04 DIAGNOSIS — R29.898 RIGHT LEG WEAKNESS: Primary | ICD-10-CM

## 2024-06-04 DIAGNOSIS — M54.16 LUMBAR RADICULOPATHY: ICD-10-CM

## 2024-06-04 NOTE — TELEPHONE ENCOUNTER
It's hard to know for sure.  If the stroke affected the right side it might be a combination of factors.  The MRI of the femur should help sort that out somewhat and it will also be helpful to get neurology's opinion.

## 2024-06-04 NOTE — TELEPHONE ENCOUNTER
Call placed to patient with provider's results and recommendations.  Pt stated understanding. Pt inquiring if these findings could be related to her stroke?   Pt OK with proceeding with MRI of the right femur.

## 2024-06-04 NOTE — TELEPHONE ENCOUNTER
Call placed to pt with provider's response. Pt stated understanding. Pt will call back to schedule MRI.

## 2024-06-04 NOTE — TELEPHONE ENCOUNTER
----- Message from Tiffanie Quna PA-C sent at 6/4/2024  7:35 AM CDT -----  Regarding: EMG results  Can you please  call this patient and let her know that her EMG showed some findings which could be related to nerve injury from the right L2 or L3 nerve roots in the spine.  Dr. Alvarado recommends an MRI of the right femur for further evaluation.    It also showed findings which can be seen in peripheral neuropathy which is damage to the small nerve endings in the leg. Neurology could evaluate further.    Thanks!

## 2024-06-26 ENCOUNTER — HOSPITAL ENCOUNTER (OUTPATIENT)
Dept: MRI IMAGING | Facility: CLINIC | Age: 89
Discharge: HOME | End: 2024-06-26
Attending: PHYSICIAN ASSISTANT | Admitting: PHYSICIAN ASSISTANT
Payer: COMMERCIAL

## 2024-06-26 DIAGNOSIS — M54.16 LUMBAR RADICULOPATHY: ICD-10-CM

## 2024-06-26 DIAGNOSIS — R29.898 RIGHT LEG WEAKNESS: ICD-10-CM

## 2024-06-26 PROCEDURE — 73718 MRI LOWER EXTREMITY W/O DYE: CPT | Mod: RT

## 2024-06-27 ENCOUNTER — TELEPHONE (OUTPATIENT)
Dept: PHYSICAL MEDICINE AND REHAB | Facility: CLINIC | Age: 89
End: 2024-06-27
Payer: COMMERCIAL

## 2024-06-27 NOTE — TELEPHONE ENCOUNTER
----- Message from Tiffanie Quan sent at 6/27/2024 12:46 PM CDT -----  Please call this patient and let her know that the MRI of the femur did not show any evidence of denervation or muscle atrophy which is reassuring that the weakness is not coming from the spine.  Recommend that she follow-up with neurology which I see is scheduled for September.

## 2024-08-11 ENCOUNTER — APPOINTMENT (OUTPATIENT)
Dept: CT IMAGING | Facility: CLINIC | Age: 89
End: 2024-08-11
Attending: EMERGENCY MEDICINE
Payer: COMMERCIAL

## 2024-08-11 ENCOUNTER — HOSPITAL ENCOUNTER (EMERGENCY)
Facility: CLINIC | Age: 89
Discharge: HOME | End: 2024-08-11
Attending: EMERGENCY MEDICINE | Admitting: EMERGENCY MEDICINE
Payer: COMMERCIAL

## 2024-08-11 ENCOUNTER — APPOINTMENT (OUTPATIENT)
Dept: RADIOLOGY | Facility: CLINIC | Age: 89
End: 2024-08-11
Attending: EMERGENCY MEDICINE
Payer: COMMERCIAL

## 2024-08-11 VITALS
OXYGEN SATURATION: 93 % | DIASTOLIC BLOOD PRESSURE: 72 MMHG | BODY MASS INDEX: 23.05 KG/M2 | WEIGHT: 135 LBS | HEIGHT: 64 IN | TEMPERATURE: 98 F | HEART RATE: 91 BPM | SYSTOLIC BLOOD PRESSURE: 161 MMHG | RESPIRATION RATE: 16 BRPM

## 2024-08-11 DIAGNOSIS — M46.1 ARTHRITIS OF RIGHT SACROILIAC JOINT (H): ICD-10-CM

## 2024-08-11 DIAGNOSIS — M25.551 RIGHT HIP PAIN: ICD-10-CM

## 2024-08-11 LAB
ANION GAP SERPL CALCULATED.3IONS-SCNC: 8 MMOL/L (ref 7–15)
BASOPHILS # BLD AUTO: 0 10E3/UL (ref 0–0.2)
BASOPHILS NFR BLD AUTO: 0 %
BUN SERPL-MCNC: 12.9 MG/DL (ref 8–23)
CALCIUM SERPL-MCNC: 8.9 MG/DL (ref 8.8–10.4)
CHLORIDE SERPL-SCNC: 107 MMOL/L (ref 98–107)
CREAT SERPL-MCNC: 0.9 MG/DL (ref 0.51–0.95)
EGFRCR SERPLBLD CKD-EPI 2021: 62 ML/MIN/1.73M2
EOSINOPHIL # BLD AUTO: 0.2 10E3/UL (ref 0–0.7)
EOSINOPHIL NFR BLD AUTO: 2 %
ERYTHROCYTE [DISTWIDTH] IN BLOOD BY AUTOMATED COUNT: 13 % (ref 10–15)
GLUCOSE SERPL-MCNC: 132 MG/DL (ref 70–99)
HCO3 SERPL-SCNC: 28 MMOL/L (ref 22–29)
HCT VFR BLD AUTO: 39.7 % (ref 35–47)
HGB BLD-MCNC: 13.1 G/DL (ref 11.7–15.7)
IMM GRANULOCYTES # BLD: 0 10E3/UL
IMM GRANULOCYTES NFR BLD: 0 %
LYMPHOCYTES # BLD AUTO: 1.9 10E3/UL (ref 0.8–5.3)
LYMPHOCYTES NFR BLD AUTO: 20 %
MCH RBC QN AUTO: 30 PG (ref 26.5–33)
MCHC RBC AUTO-ENTMCNC: 33 G/DL (ref 31.5–36.5)
MCV RBC AUTO: 91 FL (ref 78–100)
MONOCYTES # BLD AUTO: 0.7 10E3/UL (ref 0–1.3)
MONOCYTES NFR BLD AUTO: 7 %
NEUTROPHILS # BLD AUTO: 6.9 10E3/UL (ref 1.6–8.3)
NEUTROPHILS NFR BLD AUTO: 71 %
NRBC # BLD AUTO: 0 10E3/UL
NRBC BLD AUTO-RTO: 0 /100
PLATELET # BLD AUTO: 177 10E3/UL (ref 150–450)
POTASSIUM SERPL-SCNC: 4.3 MMOL/L (ref 3.4–5.3)
RBC # BLD AUTO: 4.36 10E6/UL (ref 3.8–5.2)
SODIUM SERPL-SCNC: 143 MMOL/L (ref 135–145)
WBC # BLD AUTO: 9.7 10E3/UL (ref 4–11)

## 2024-08-11 PROCEDURE — 73502 X-RAY EXAM HIP UNI 2-3 VIEWS: CPT

## 2024-08-11 PROCEDURE — 99285 EMERGENCY DEPT VISIT HI MDM: CPT | Mod: 25

## 2024-08-11 PROCEDURE — 96375 TX/PRO/DX INJ NEW DRUG ADDON: CPT

## 2024-08-11 PROCEDURE — 250N000011 HC RX IP 250 OP 636: Performed by: EMERGENCY MEDICINE

## 2024-08-11 PROCEDURE — 73700 CT LOWER EXTREMITY W/O DYE: CPT | Mod: RT

## 2024-08-11 PROCEDURE — 96374 THER/PROPH/DIAG INJ IV PUSH: CPT

## 2024-08-11 PROCEDURE — 36415 COLL VENOUS BLD VENIPUNCTURE: CPT | Performed by: EMERGENCY MEDICINE

## 2024-08-11 PROCEDURE — 85025 COMPLETE CBC W/AUTO DIFF WBC: CPT | Performed by: EMERGENCY MEDICINE

## 2024-08-11 PROCEDURE — 80048 BASIC METABOLIC PNL TOTAL CA: CPT | Performed by: EMERGENCY MEDICINE

## 2024-08-11 RX ORDER — HYDROMORPHONE HYDROCHLORIDE 1 MG/ML
0.5 INJECTION, SOLUTION INTRAMUSCULAR; INTRAVENOUS; SUBCUTANEOUS
Status: COMPLETED | OUTPATIENT
Start: 2024-08-11 | End: 2024-08-11

## 2024-08-11 RX ORDER — OXYCODONE HYDROCHLORIDE 5 MG/1
5 TABLET ORAL EVERY 6 HOURS PRN
Qty: 12 TABLET | Refills: 0 | Status: SHIPPED | OUTPATIENT
Start: 2024-08-11 | End: 2024-08-14

## 2024-08-11 RX ORDER — METHYLPREDNISOLONE SODIUM SUCCINATE 125 MG/2ML
125 INJECTION, POWDER, LYOPHILIZED, FOR SOLUTION INTRAMUSCULAR; INTRAVENOUS ONCE
Status: COMPLETED | OUTPATIENT
Start: 2024-08-11 | End: 2024-08-11

## 2024-08-11 RX ORDER — ONDANSETRON 2 MG/ML
4 INJECTION INTRAMUSCULAR; INTRAVENOUS EVERY 30 MIN PRN
Status: DISCONTINUED | OUTPATIENT
Start: 2024-08-11 | End: 2024-08-11 | Stop reason: HOSPADM

## 2024-08-11 RX ORDER — METHYLPREDNISOLONE 4 MG
TABLET, DOSE PACK ORAL
Qty: 21 TABLET | Refills: 0 | Status: SHIPPED | OUTPATIENT
Start: 2024-08-11

## 2024-08-11 RX ADMIN — HYDROMORPHONE HYDROCHLORIDE 0.5 MG: 1 INJECTION, SOLUTION INTRAMUSCULAR; INTRAVENOUS; SUBCUTANEOUS at 03:15

## 2024-08-11 RX ADMIN — METHYLPREDNISOLONE SODIUM SUCCINATE 125 MG: 125 INJECTION, POWDER, FOR SOLUTION INTRAMUSCULAR; INTRAVENOUS at 05:35

## 2024-08-11 RX ADMIN — ONDANSETRON 4 MG: 2 INJECTION INTRAMUSCULAR; INTRAVENOUS at 03:15

## 2024-08-11 ASSESSMENT — ACTIVITIES OF DAILY LIVING (ADL)
ADLS_ACUITY_SCORE: 38
ADLS_ACUITY_SCORE: 39

## 2024-08-11 ASSESSMENT — COLUMBIA-SUICIDE SEVERITY RATING SCALE - C-SSRS
6. HAVE YOU EVER DONE ANYTHING, STARTED TO DO ANYTHING, OR PREPARED TO DO ANYTHING TO END YOUR LIFE?: NO
2. HAVE YOU ACTUALLY HAD ANY THOUGHTS OF KILLING YOURSELF IN THE PAST MONTH?: NO
1. IN THE PAST MONTH, HAVE YOU WISHED YOU WERE DEAD OR WISHED YOU COULD GO TO SLEEP AND NOT WAKE UP?: NO

## 2024-08-11 ASSESSMENT — ENCOUNTER SYMPTOMS: ARTHRALGIAS: 1

## 2024-08-11 NOTE — ED PROVIDER NOTES
NAME: Ramila Liao  AGE: 89 year old female  YOB: 1935  MRN: [id number]  EVALUATION DATE & TIME: 8/11/2024  2:52 AM    PCP: Jamie Henry    ED PROVIDER: Boni Barnes M.D.      Chief Complaint   Patient presents with    Hip Pain         FINAL IMPRESSION:  1. Arthritis of right sacroiliac joint    2. Right hip pain        MEDICAL DECISION MAKING:    3:06 AM Patient was clinically assessed and consented to treatment.  5:19 AM I updated the patient on their results. She is still complaining of right hip pain but is able to sleep.  5:53 AM I rechecked the patient. The patient reports her solumedrol is working and is comfortable being discharged if she can get her pain controlled to walk.      After assessment, medical decision making and workup were discussed with the patient. The patient was agreeable to plan for testing, workup, and treatment.  Pertinent Labs & Imaging studies reviewed. (See chart for details)       Medical Decision Making  Obtained supplemental history:Supplemental history obtained?: No  Reviewed external records: External records reviewed?: Documented in chart  Care impacted by chronic illness:Cerebrovascular Disease  Care significantly affected by social determinants of health:Access to Medical Care  Did you consider but not order tests?: Work up considered but not performed and documented in chart, if applicable  Did you interpret images independently?: Independent interpretation of ECG and images noted in documentation, when applicable.  Consultation discussion with other provider:Did you involve another provider (consultant, MH, pharmacy, etc.)?: No  Discharge. I prescribed additional prescription strength medication(s) as charted. See documentation for any additional details.    Ramila Liao is a 89 year old female who presents with right hip pain.    Differential diagnosis includes but not limited to hairline hip fracture, femoral head dislocation, pelvic  fracture, osteoarthritis, sciatica, SI joint pain.  Patient is a 89-year-old female who did have some slightly more strenuous stretching or twisting of the hip 2 days ago.  Patient reports the pain started that evening but then progressed over the next day or 2 until tonight she was unable to get to sleep and on standing following being in bed had significant pain.  She took 2 tramadol which she takes for chronic pain at home and this did not help.  She does also report some shooting pain down to about mid posterior thigh.  This could be sciatica and patient does point to buttock pain but this is more lateral over the hip and greater trochanter posteriorly rather than the ischial notch.  I discussed workup with patient she was given IV pain medication with labs which were unremarkable for any acute inflammation.  X-ray was obtained that showed osteoarthritis and degenerative changes.  She also had some SI degenerative changes which could be contributing to the sciatic symptoms.  Patient had no weakness, numbness or acute difficulty though does have deficits in that right leg from prior stroke.  This is unchanged from prior.  After discussion with patient and continued pain following the first dose of pain medication she was given a second dose of pain medication and sent for CT scan for deeper evaluation.  I also gave patient a dose of Solu-Medrol.  After CT returned negative for any acute fracture or bony abnormality she does have degenerative changes as well as inflammation in that SI joint with degeneration.  I discussed this with her and likely this could be contributing to some of the sciatic pain from the buttock down but also that right hip does have some degenerative changes.  Patient was feeling better for Solu-Medrol on and reported the seem to help with the most.  She would like something stronger for pain to go home with as well as a course of the Solu-Medrol.  Patient will be prescribed a Medrol Dosepak  as well as oxycodone to be taken instead of her tramadol for more acute pain now.  She will be plan for discharge home with her son and on attempt to ambulate to the bathroom was able to ambulate without difficulty with her walker.  She did report some pain on standing on it but was improved and able to move it better with continued improvement while she moved it though initial stiffness was more tender with weightbearing she reported.  With continued ambulation with the walker she was feeling better and less pain which would indicate more consistent with osteoarthritis.    0 minutes of critical care time    MEDICATIONS GIVEN IN THE EMERGENCY:  Medications   ondansetron (ZOFRAN) injection 4 mg (4 mg Intravenous $Given 8/11/24 0315)   HYDROmorphone (PF) (DILAUDID) injection 0.5 mg (0.5 mg Intravenous $Given 8/11/24 0315)   methylPREDNISolone sodium succinate (solu-MEDROL) injection 125 mg (125 mg Intravenous $Given 8/11/24 0535)       NEW PRESCRIPTIONS STARTED AT TODAY'S ER VISIT:  Discharge Medication List as of 8/11/2024  6:22 AM        START taking these medications    Details   methylPREDNISolone (MEDROL DOSEPAK) 4 MG tablet therapy pack Follow Package Directions, Disp-21 tablet, R-0, Local Print      oxyCODONE (ROXICODONE) 5 MG tablet Take 1 tablet (5 mg) by mouth every 6 hours as needed for pain or breakthrough pain, Disp-12 tablet, R-0, Local Print                =================================================================    HPI    Patient information was obtained from: The patient    Use of : N/A         Ramila Liao is a 89 year old female with a past medical history of stroke, low back pain, who presents with hip pain.    The patient twisted on Thursday afternoon (2 days ago) and woke up the next morning with right hip pain. She had difficulty walking due to the pain. She took 2 Tramadol 50mg tonight with no significant relief. She had a stroke in March 2023 and is currently on  Aspirin. No recent falls. Not on blood thinners. No other associated symptoms at this time.      REVIEW OF SYSTEMS   Review of Systems   Musculoskeletal:  Positive for arthralgias (right hip).   All other systems reviewed and are negative.       PAST MEDICAL HISTORY:  Past Medical History:   Diagnosis Date    Allergic rhinitis 08/02/2005    Contact dermatitis and other eczema, due to unspecified cause 07/30/2001    Edema, lower extremity     Low back pain 08/02/2016    Mixed hyperlipidemia 08/09/2006       PAST SURGICAL HISTORY:  Past Surgical History:   Procedure Laterality Date    CATARACT EXTRACTION, BILATERAL      CHOLECYSTECTOMY      HYSTERECTOMY         CURRENT MEDICATIONS:      Current Facility-Administered Medications:     ondansetron (ZOFRAN) injection 4 mg, 4 mg, Intravenous, Q30 Min PRN, Boni Barnes MD, 4 mg at 08/11/24 0315    Current Outpatient Medications:     methylPREDNISolone (MEDROL DOSEPAK) 4 MG tablet therapy pack, Follow Package Directions, Disp: 21 tablet, Rfl: 0    oxyCODONE (ROXICODONE) 5 MG tablet, Take 1 tablet (5 mg) by mouth every 6 hours as needed for pain or breakthrough pain, Disp: 12 tablet, Rfl: 0    acetaminophen (TYLENOL) 500 MG tablet, Take 1,000 mg by mouth every 6 hours as needed for mild pain, Disp: , Rfl:     alum & mag hydroxide-simethicone (MAALOX MAX) 400-400-40 MG/5ML SUSP suspension, Take 30 mLs by mouth every 4 hours as needed for indigestion or heartburn, Disp: 355 mL, Rfl: 0    aspirin (ASA) 325 MG tablet, Take 1 tablet (325 mg) by mouth daily, Disp: 90 tablet, Rfl: 1    atorvastatin (LIPITOR) 40 MG tablet, Take 1 tablet (40 mg) by mouth every evening, Disp: 90 tablet, Rfl: 1    gabapentin (NEURONTIN) 300 MG capsule, Take 1 capsule (300 mg) by mouth 3 times daily (Patient taking differently: Take 300 mg by mouth 3 times daily 1 x 2), Disp: 90 capsule, Rfl: 1    hydrochlorothiazide (HYDRODIURIL) 12.5 MG tablet, Take 1 tablet (12.5 mg) by mouth daily,  "Disp: 90 tablet, Rfl: 0    magnesium citrate 100 mg Tab, Take 100 mg by mouth 3 times daily as needed, Disp: , Rfl:     multivitamin with minerals (THERA-M) 9 mg iron-400 mcg Tab tablet, Take 1 tablet by mouth every morning, Disp: , Rfl:     omeprazole (PRILOSEC) 20 MG DR capsule, , Disp: , Rfl:     tiZANidine (ZANAFLEX) 2 MG tablet, TAKE 1 TABLET BY MOUTH THREE TIMES DAILY AS NEEDED FOR PAIN OR SPASMS, Disp: 90 tablet, Rfl: 0    traMADol (ULTRAM) 50 MG tablet, Take 50 mg by mouth 4 times daily, Disp: , Rfl:     ALLERGIES:  Allergies   Allergen Reactions    Naproxen Hives     Tolerates ibuprofen       FAMILY HISTORY:  Family History   Problem Relation Age of Onset    Coronary Artery Disease Mother     Coronary Artery Disease Father        SOCIAL HISTORY:   Social History     Socioeconomic History    Marital status:    Tobacco Use    Smoking status: Never    Smokeless tobacco: Never   Substance and Sexual Activity    Alcohol use: Yes    Drug use: Never    Sexual activity: Not Currently     Partners: Male       PHYSICAL EXAM:    Vitals: BP (!) 161/72   Pulse 91   Temp 98  F (36.7  C) (Oral)   Resp 16   Ht 1.626 m (5' 4\")   Wt 61.2 kg (135 lb)   LMP  (LMP Unknown)   SpO2 93%   BMI 23.17 kg/m     Physical Exam  Vitals and nursing note reviewed.   Constitutional:       General: She is not in acute distress.     Appearance: Normal appearance. She is normal weight. She is not ill-appearing or toxic-appearing.   HENT:      Head: Atraumatic.   Cardiovascular:      Pulses: Normal pulses.   Pulmonary:      Effort: No respiratory distress.   Abdominal:      Tenderness: There is no abdominal tenderness.   Musculoskeletal:         General: Tenderness present. No swelling, deformity or signs of injury.      Cervical back: Normal range of motion.      Right hip: Tenderness and bony tenderness present. No deformity or crepitus. Normal strength.        Legs:    Skin:     General: Skin is warm and dry.      Capillary " Refill: Capillary refill takes less than 2 seconds.      Findings: No bruising or erythema.   Neurological:      Mental Status: She is alert. Mental status is at baseline.      Sensory: No sensory deficit.      Coordination: Coordination normal.   Psychiatric:         Behavior: Behavior normal.           LAB:  All pertinent labs reviewed and interpreted.  Labs Ordered and Resulted from Time of ED Arrival to Time of ED Departure   BASIC METABOLIC PANEL - Abnormal       Result Value    Sodium 143      Potassium 4.3      Chloride 107      Carbon Dioxide (CO2) 28      Anion Gap 8      Urea Nitrogen 12.9      Creatinine 0.90      GFR Estimate 62      Calcium 8.9      Glucose 132 (*)    CBC WITH PLATELETS AND DIFFERENTIAL    WBC Count 9.7      RBC Count 4.36      Hemoglobin 13.1      Hematocrit 39.7      MCV 91      MCH 30.0      MCHC 33.0      RDW 13.0      Platelet Count 177      % Neutrophils 71      % Lymphocytes 20      % Monocytes 7      % Eosinophils 2      % Basophils 0      % Immature Granulocytes 0      NRBCs per 100 WBC 0      Absolute Neutrophils 6.9      Absolute Lymphocytes 1.9      Absolute Monocytes 0.7      Absolute Eosinophils 0.2      Absolute Basophils 0.0      Absolute Immature Granulocytes 0.0      Absolute NRBCs 0.0         RADIOLOGY:  CT Hip Right w/o Contrast   Final Result   IMPRESSION:   1.  No acute fracture or dislocation involving the right hip joint. 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      2.  Low-grade anterolisthesis of L5 relative to S1 vertebra, partially visualized.         XR Pelvis and Hip Right 2 Views   Final Result   IMPRESSION: No fracture or dislocation. Mild degenerative changes of the hips. Degenerative changes SI joints and lower lumbar spine.            PROCEDURES:   Procedures       I, Roger Dale, am serving as a scribe to document services personally performed by Dr. Boni Barnes  based on my observation and the provider's  statements to me. I, Boni Barnes MD attest that Roger Dale is acting in a scribe capacity, has observed my performance of the services and has documented them in accordance with my direction.      Boni Barnes M.D.  Emergency Medicine  [facility] Emergency Department       Boni Barnes MD  08/11/24 0732

## 2024-08-15 ENCOUNTER — OFFICE VISIT (OUTPATIENT)
Dept: PHYSICAL MEDICINE AND REHAB | Facility: CLINIC | Age: 89
End: 2024-08-15
Payer: COMMERCIAL

## 2024-08-15 VITALS
DIASTOLIC BLOOD PRESSURE: 74 MMHG | RESPIRATION RATE: 96 BRPM | WEIGHT: 135 LBS | HEIGHT: 64 IN | SYSTOLIC BLOOD PRESSURE: 132 MMHG | BODY MASS INDEX: 23.05 KG/M2 | HEART RATE: 82 BPM

## 2024-08-15 DIAGNOSIS — M70.61 TROCHANTERIC BURSITIS OF RIGHT HIP: Primary | ICD-10-CM

## 2024-08-15 PROCEDURE — 99214 OFFICE O/P EST MOD 30 MIN: CPT | Performed by: PHYSICIAN ASSISTANT

## 2024-08-15 ASSESSMENT — PAIN SCALES - GENERAL: PAINLEVEL: MODERATE PAIN (5)

## 2024-08-15 NOTE — PATIENT INSTRUCTIONS
Recommend that you apply lidocaine patches to the right hip.    Finish your course of prednisone.    If pain worsens after you finish the prednisone please call our clinic and we can schedule a right trochanteric bursa injection.

## 2024-08-15 NOTE — PROGRESS NOTES
Assessment:   Ramila Liao is a 89 y.o. female with past medical history significant for  CVA, hyperlipidemia who presents today for follow-up regarding right lateral hip pain with radiation down the right lateral thigh.  Current pain is most consistent with trochanteric bursitis.  She had a CT of the right hip last week which was negative for fracture.  Pain is improving with a Medrol Dosepak.  She has several days remaining of her course.  - Patient previously had right L3 radicular pain.  That improved significantly following a right L3-4 transforaminal epidural steroid injection May 8, 2024.           Plan:     A shared decision making plan was used.  The patient's values and choices were respected.  The following represents what was discussed and decided upon by the physician assistant and the patient.  Patient's son and daughter-in-law are present for the visit.    1.  DIAGNOSTIC TESTS:  - Reviewed the CT of the right hip.  - I reviewed the MRI lumbar spine from April 2024.  - I reviewed the EMG of the right lower extremity.    2.  PHYSICAL THERAPY:  - Patient completed physical therapy at her living facility earlier in 2024.  No additional physical therapy was ordered.    3.  MEDICATIONS: Pain medications are managed by her nursing home doctor.  - Patient reports that she has lidocaine patches but has not been using them.  I encouraged her to utilize these for her right lateral hip pain.  - Patient is currently on a Medrol Dosepak.  - Patient is also taking oxycodone as needed.  - Patient takes gabapentin 300 mg in the morning and 600 mg at bedtime.  - Patient takes Tylenol as needed which is somewhat helpful.  - Patient takes tizanidine 3 times daily as needed.        4.  INTERVENTIONS: No interventions were ordered today.  Pain is improving with the Medrol Dosepak.  If it fails to improve further or worsens again after she completes the Medrol Dosepak I would recommend a right trochanteric bursa  injection under ultrasound guidance.  I did tell the patient that she has had 3 injections since December 2023.  She should only have 1 more injection before December 2024.  She voiced understanding.      5.    PATIENT EDUCATION: Patient is in agreement the above plan.  All questions were answered.  - Patient is scheduled to see neurology next month for her gait disturbance.    6.  FOLLOW-UP: Patient is going to follow-up as needed.  If pain fails to improve further or worsens again after she completes her Medrol Dosepak I would recommend a right greater trochanteric bursa injection under ultrasound guidance.      Subjective:     Ramlia Liao is a 89 year old female who presents today for follow-up regarding right lateral hip pain with radiation down the right lateral thigh.  Patient had a right L3-4 transforaminal epidural steroid injection the eighth, 2024.  This has been very helpful for right low back pain that radiated into the right anteromedial thigh.  She states that about a month ago she began to experience more right lateral hip pain.  Patient did have a right trochanteric bursa injection in January 2024 which provided significant relief of her pain.  She denies any injury or event to cause the pain to return 1 month ago.  She did have a mechanical fall in her bathroom earlier this week.  No loss of consciousness.  Pain was so severe that she presented to the emergency department on August 11.  A CT of the right hip was negative for fracture.  Since her emergency department visit she has been taking Medrol Dosepak and oxycodone.  Patient reports significant improvement in her symptoms with these medications.    Patient complains of right lateral hip pain.  She denies any pain in the back.  Pain radiates down the right lateral thigh.  Denies any pain distal to the knee.  Pain is aggravated with walking and lying on her right side.  Lying on her left side is more comfortable.  She denies numbness,  tingling, weakness.  She rates her pain today as a 5 out of 10.  At its worst it is a 7 out of 10.  At its best it is a 2 out of 10.        Treatment to date:  - Physical therapy at her living facility 2024  -Patient previously went to physical therapy at [place].  - 8 sessions physical therapy through [facility] [phone].  - Right L3-4 transforaminal epidural steroid injection May 8, 2024 with greater than 50% relief of pain  - Right trochanteric bursa injection January 29, 2024 with relief of right lateral hip pain  - T10-T11 interlaminar epidural steroid injection December 6, 2023 with 80 to 90% relief of pain  - right piriformis muscle trigger point injection January 2, 2023 which did not provide any relief of her pain.  -right L4-5 and L5-S1 transforaminal epidural steroid injections December 13, 2022 did not not provide any relief of her pain.  - right L5-S1 transforaminal epidural steroid injection November 10, 2022 which provided 60 to 70% relief of his pain but relief was already waning by 4 weeks after the procedure.  - Gabapentin  - Tylenol as needed  - Aspirin  - Tramadol  - Tizanidine  - Oxycodone  - Medrol Dosepak    Review of Systems:  Positive for headache, falls, unintentional weight loss.  Negative for numbness/tingling, weakness, loss of bowel/bladder control, inability to urinate, pain much worse at night, difficulty swallowing, difficulty with hand skills, fevers.     Objective:   CONSTITUTIONAL:  Vital signs as above.  No acute distress.  The patient is well nourished and well groomed.    PSYCHIATRIC:  The patient is awake, alert, oriented to person, place and time.  The patient is answering questions appropriately with clear speech.  Normal affect.  HEENT: Normocephalic, atraumatic.  Sclera clear.    SKIN: Clean, dry, intact.  MUSCULOSKELETAL: Patient ambulates with a shuffling gait.  She has  hesitancy with initiation of the gait with the right leg.   5/5 strength bilateral hip  flexors, bilateral knee flexors/extensors, bilateral ankle dorsi/plantar flexors.  Tender to palpation right greater trochanter.  NEUROLOGICAL: Sensation light touch intact bilateral lower extremities throughout.     RESULTS:   I reviewed the results of the MRI right femur from [facility] dated June 26, 2024.  This is normal.  No muscle atrophy or edema to suggest denervation.  No fracture.    I reviewed the CT right hip from [facility] dated August 11, 2024.  This is negative for fracture.  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    I reviewed the MRI lumbar spine from [facility] dated April 25, 2024.  This shows moderate to advanced lumbar spondylosis.  There is grade 2 degenerative anterolisthesis L5 on S1 and grade 1 retrolisthesis L2-3.  There is no spinal canal stenosis.  There is multilevel foraminal stenosis which is severe bilaterally L5-S1, mild to moderate right L4-5, mild bilateral L3-4 and L2-3.    EMG right lower extremity Nguyen 3, 2024:  Interpretation: Abnormal study: There is electrodiagnostic evidence of:     1.  Apparent increased insertional activity with positive waves and fibrillation potentials of the right abductor longus.  She had difficult time with relaxation during the needle EMG of this muscle.  These findings can be abnormal and indicate denervation of the right abductor longus which can be observed under the correct clinical condition in a right L2 and or L3 radiculopathy versus right obturator neuropathy.  Given the technical difficulties with this needle EMG this could also be a normal/technical finding.       2.  Electrodiagnostic findings are consistent with but not confirmatory for a sensorimotor peripheral polyneuropathy, predominantly axonal.

## 2024-08-15 NOTE — LETTER
8/15/2024      Ramila Liao  [address]  [facility] [address]      Dear Colleague,    Thank you for referring your patient, Ramila Liao, to the [facility]. Please see a copy of my visit note below.      Assessment:   Ramila Liao is a 89 y.o. female with past medical history significant for  CVA, hyperlipidemia who presents today for follow-up regarding right lateral hip pain with radiation down the right lateral thigh.  Current pain is most consistent with trochanteric bursitis.  She had a CT of the right hip last week which was negative for fracture.  Pain is improving with a Medrol Dosepak.  She has several days remaining of her course.  - Patient previously had right L3 radicular pain.  That improved significantly following a right L3-4 transforaminal epidural steroid injection May 8, 2024.           Plan:     A shared decision making plan was used.  The patient's values and choices were respected.  The following represents what was discussed and decided upon by the physician assistant and the patient.  Patient's son and daughter-in-law are present for the visit.    1.  DIAGNOSTIC TESTS:  - Reviewed the CT of the right hip.  - I reviewed the MRI lumbar spine from April 2024.  - I reviewed the EMG of the right lower extremity.    2.  PHYSICAL THERAPY:  - Patient completed physical therapy at her living facility earlier in 2024.  No additional physical therapy was ordered.    3.  MEDICATIONS: Pain medications are managed by her nursing home doctor.  - Patient reports that she has lidocaine patches but has not been using them.  I encouraged her to utilize these for her right lateral hip pain.  - Patient is currently on a Medrol Dosepak.  - Patient is also taking oxycodone as needed.  - Patient takes gabapentin 300 mg in the morning and 600 mg at bedtime.  - Patient takes Tylenol as needed which is somewhat helpful.  - Patient takes tizanidine 3 times daily  as needed.        4.  INTERVENTIONS: No interventions were ordered today.  Pain is improving with the Medrol Dosepak.  If it fails to improve further or worsens again after she completes the Medrol Dosepak I would recommend a right trochanteric bursa injection under ultrasound guidance.  I did tell the patient that she has had 3 injections since December 2023.  She should only have 1 more injection before December 2024.  She voiced understanding.      5.    PATIENT EDUCATION: Patient is in agreement the above plan.  All questions were answered.  - Patient is scheduled to see neurology next month for her gait disturbance.    6.  FOLLOW-UP: Patient is going to follow-up as needed.  If pain fails to improve further or worsens again after she completes her Medrol Dosepak I would recommend a right greater trochanteric bursa injection under ultrasound guidance.      Subjective:     Ramila Liao is a 89 year old female who presents today for follow-up regarding right lateral hip pain with radiation down the right lateral thigh.  Patient had a right L3-4 transforaminal epidural steroid injection the eighth, 2024.  This has been very helpful for right low back pain that radiated into the right anteromedial thigh.  She states that about a month ago she began to experience more right lateral hip pain.  Patient did have a right trochanteric bursa injection in January 2024 which provided significant relief of her pain.  She denies any injury or event to cause the pain to return 1 month ago.  She did have a mechanical fall in her bathroom earlier this week.  No loss of consciousness.  Pain was so severe that she presented to the emergency department on August 11.  A CT of the right hip was negative for fracture.  Since her emergency department visit she has been taking Medrol Dosepak and oxycodone.  Patient reports significant improvement in her symptoms with these medications.    Patient complains of right lateral hip  pain.  She denies any pain in the back.  Pain radiates down the right lateral thigh.  Denies any pain distal to the knee.  Pain is aggravated with walking and lying on her right side.  Lying on her left side is more comfortable.  She denies numbness, tingling, weakness.  She rates her pain today as a 5 out of 10.  At its worst it is a 7 out of 10.  At its best it is a 2 out of 10.        Treatment to date:  - Physical therapy at her living facility 2024  -Patient previously went to physical therapy at [place].  - 8 sessions physical therapy through [facility] [phone].  - Right L3-4 transforaminal epidural steroid injection May 8, 2024 with greater than 50% relief of pain  - Right trochanteric bursa injection January 29, 2024 with relief of right lateral hip pain  - T10-T11 interlaminar epidural steroid injection December 6, 2023 with 80 to 90% relief of pain  - right piriformis muscle trigger point injection January 2, 2023 which did not provide any relief of her pain.  -right L4-5 and L5-S1 transforaminal epidural steroid injections December 13, 2022 did not not provide any relief of her pain.  - right L5-S1 transforaminal epidural steroid injection November 10, 2022 which provided 60 to 70% relief of his pain but relief was already waning by 4 weeks after the procedure.  - Gabapentin  - Tylenol as needed  - Aspirin  - Tramadol  - Tizanidine  - Oxycodone  - Medrol Dosepak    Review of Systems:  Positive for headache, falls, unintentional weight loss.  Negative for numbness/tingling, weakness, loss of bowel/bladder control, inability to urinate, pain much worse at night, difficulty swallowing, difficulty with hand skills, fevers.     Objective:   CONSTITUTIONAL:  Vital signs as above.  No acute distress.  The patient is well nourished and well groomed.    PSYCHIATRIC:  The patient is awake, alert, oriented to person, place and time.  The patient is answering questions appropriately with clear speech.  Normal  affect.  HEENT: Normocephalic, atraumatic.  Sclera clear.    SKIN: Clean, dry, intact.  MUSCULOSKELETAL: Patient ambulates with a shuffling gait.  She has  hesitancy with initiation of the gait with the right leg.   5/5 strength bilateral hip flexors, bilateral knee flexors/extensors, bilateral ankle dorsi/plantar flexors.  Tender to palpation right greater trochanter.  NEUROLOGICAL: Sensation light touch intact bilateral lower extremities throughout.     RESULTS:   I reviewed the results of the MRI right femur from [facility] dated June 26, 2024.  This is normal.  No muscle atrophy or edema to suggest denervation.  No fracture.    I reviewed the CT right hip from [facility] dated August 11, 2024.  This is negative for fracture.  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    I reviewed the MRI lumbar spine from [facility] dated April 25, 2024.  This shows moderate to advanced lumbar spondylosis.  There is grade 2 degenerative anterolisthesis L5 on S1 and grade 1 retrolisthesis L2-3.  There is no spinal canal stenosis.  There is multilevel foraminal stenosis which is severe bilaterally L5-S1, mild to moderate right L4-5, mild bilateral L3-4 and L2-3.    EMG right lower extremity Nguyen 3, 2024:  Interpretation: Abnormal study: There is electrodiagnostic evidence of:     1.  Apparent increased insertional activity with positive waves and fibrillation potentials of the right abductor longus.  She had difficult time with relaxation during the needle EMG of this muscle.  These findings can be abnormal and indicate denervation of the right abductor longus which can be observed under the correct clinical condition in a right L2 and or L3 radiculopathy versus right obturator neuropathy.  Given the technical difficulties with this needle EMG this could also be a normal/technical finding.       2.  Electrodiagnostic findings are consistent with but not confirmatory for a sensorimotor  peripheral polyneuropathy, predominantly axonal.      Again, thank you for allowing me to participate in the care of your patient.        Sincerely,        Tiffanie Quan PA-C

## 2024-08-19 ENCOUNTER — TELEPHONE (OUTPATIENT)
Dept: PHYSICAL MEDICINE AND REHAB | Facility: CLINIC | Age: 89
End: 2024-08-19
Payer: COMMERCIAL

## 2024-08-19 DIAGNOSIS — M70.61 TROCHANTERIC BURSITIS OF RIGHT HIP: Primary | ICD-10-CM

## 2024-08-19 NOTE — TELEPHONE ENCOUNTER
Patient's son Sai is calling asking for an injection order to be placed for patient. He is asking for a call from Tiffanie Quan's team to discuss.    Please call [phone], any time, okay to leave detailed message.

## 2024-08-19 NOTE — TELEPHONE ENCOUNTER
"Call placed to Sai to discuss. Consent to communicate on file. He reports patients pain has worsened after Medrol dosepak. They are aware she can only have 1 more injection before December and they would like to proceed.     Per last OV note,   \"If it fails to improve further or worsens again after she completes the Medrol Dosepak I would recommend a right trochanteric bursa injection under ultrasound guidance. I did tell the patient that she has had 3 injections since December 2023. She should only have 1 more injection before December 2024.\"    Order placed for injection. Transferred to scheduling to make appt.       "

## 2024-08-20 ENCOUNTER — RADIOLOGY INJECTION OFFICE VISIT (OUTPATIENT)
Dept: PHYSICAL MEDICINE AND REHAB | Facility: CLINIC | Age: 89
End: 2024-08-20
Attending: PHYSICIAN ASSISTANT
Payer: COMMERCIAL

## 2024-08-20 VITALS
TEMPERATURE: 97.8 F | WEIGHT: 136 LBS | SYSTOLIC BLOOD PRESSURE: 134 MMHG | BODY MASS INDEX: 25.03 KG/M2 | DIASTOLIC BLOOD PRESSURE: 74 MMHG | HEART RATE: 87 BPM | HEIGHT: 62 IN | OXYGEN SATURATION: 97 %

## 2024-08-20 DIAGNOSIS — M70.61 TROCHANTERIC BURSITIS OF RIGHT HIP: ICD-10-CM

## 2024-08-20 PROCEDURE — 20611 DRAIN/INJ JOINT/BURSA W/US: CPT | Mod: RT | Performed by: STUDENT IN AN ORGANIZED HEALTH CARE EDUCATION/TRAINING PROGRAM

## 2024-08-20 RX ORDER — TRIAMCINOLONE ACETONIDE 40 MG/ML
INJECTION, SUSPENSION INTRA-ARTICULAR; INTRAMUSCULAR
Status: COMPLETED | OUTPATIENT
Start: 2024-08-20 | End: 2024-08-20

## 2024-08-20 RX ADMIN — TRIAMCINOLONE ACETONIDE 20 MG: 40 INJECTION, SUSPENSION INTRA-ARTICULAR; INTRAMUSCULAR at 15:32

## 2024-08-20 ASSESSMENT — PAIN SCALES - GENERAL: PAINLEVEL: SEVERE PAIN (7)

## 2024-08-20 NOTE — PATIENT INSTRUCTIONS
DISCHARGE INSTRUCTIONS    During office hours (8:00 a.m.- 4:00 p.m.) questions or concerns may be answered  by calling [facility] Nurses at  [phone].  Messages received after hours will be returned the following business day.      In the case of an emergency, please dial 911 or seek assistance at the nearest Emergency Room/Urgent Care facility.     All Patients:    You may experience an increase in your symptoms for the first 2 days (It may take anywhere between 2 days- 2 weeks for the steroid to have maximum effect).    You may use ice on the injection site, as frequently as 20 minutes each hour if needed.    You may take your pain medicine.    You may continue taking your regular medication after your injection. If you have had a Medial Branch Block you may resume pain medication once your pain diary is completed.    You may shower. No swimming, tub bath or hot tub for 48 hours.  You may remove your bandaid/bandage as soon as you are home.    You may resume light activities, as tolerated.    Resume your usual diet as tolerated.    If you were told to hold any blood thinning medications you may resume taking them 24 hours after your procedure as prescribed.    It is strongly advised that you do not drive for 1-3 hours post injection.    If you have had oral sedation:  Do not drive for 8 hours post injection.      If you have had IV sedation:  Do not drive for 24 hours post injection.  Do not operate hazardous machinery or make important personal/business decisions for 24 hours.      POSSIBLE STEROID SIDE EFFECTS (If steroid/cortisone was used for your procedure)    -If you experience these symptoms, it should only last for a short period    Swelling of the legs              Skin redness (flushing)     Mouth (oral) irritation   Blood sugar (glucose) levels            Sweats                    Mood changes  Headache  Sleeplessness  Weakened immune system for up to 14 days, which could increase  the risk of jeffry the COVID-19 virus and/or experiencing more severe symptoms of the disease, if exposed.  Decreased effectiveness of the flu vaccine if given within 2 weeks of the steroid.         POSSIBLE PROCEDURE SIDE EFFECTS  -Call the Spine Center if you are concerned  Increased Pain           Increased numbness/tingling      Nausea/Vomiting          Bruising/bleeding at site      Redness or swelling                                              Difficulty walking      Weakness           Fever greater than 100.5    *In the event of a severe headache after an epidural steroid injection that is relieved by lying down, please call the [facility] to speak with a clinical staff member*

## 2024-09-06 ENCOUNTER — TELEPHONE (OUTPATIENT)
Dept: PHYSICAL MEDICINE AND REHAB | Facility: CLINIC | Age: 89
End: 2024-09-06
Payer: COMMERCIAL

## 2024-09-06 NOTE — TELEPHONE ENCOUNTER
PSP:  Tiffanie Quan PA-C   Last clinic visit:  8/15/24 OV: 8/20/24 Right trochanteric bursa injection  Reason for call: Update PSP and cancel follow up  Clinical information:  Patient reports she is noting 80-90% improvement to right hip since injection. Does not feel she needs the follow up appointment on Monday. Asks for it to be cancelled.   Advice given to patient: Explained PSP would be updated. Cancelled appointment per her request.

## 2024-09-12 NOTE — PROGRESS NOTES
__________________________________________________________    ___________________________________  ESTABLISHED PATIENT NEUROLOGY NOTE    DATE OF VISIT: 5/18/2023  MRN: [id number]  PATIENT NAME: Ramila Liao  YOB: 1935      Chief Complaint: Patient presents with:  Stroke: Patient states she is unable to move right leg.  Follow up    SUBJECTIVE:                                                      History of Present Illness: Ramila Liao is a 87 year old female presenting for follow-up for multifocal L basal ganglia and corona radiata ischemic infarcts.     She was hospitalized at [facility] on 03/26/23 - 03/28/23. Prior to the hospital stay, she had a past medical history of HLD, recent diagnosis of R L5 radiculopathy on EMG but asymptomatic, on PTA ASA 81 mg daily.    She presented to the hospital due to multiple falls and intermittent slurred speech x 2 days. In ED there was noticeable RLE weakness with drift. CT/CTA with severe focal L M1 stenosis. MRI shows acute multifocal L basal ganglia/corona radiata ischemic infarcts (watershed distribution).    Stroke Evaluation summarized:    MRI/Head CT MRI: acute multifocal L basal ganglia and corona radiata ischemic infarcts  CTH: negative for acute pathology   Intracranial Vasculature CTA head: focal severe L M1 stenosis   Cervical Vasculature CTA neck: unremarkable      Echocardiogram TTE: LV norm, EF 60-65%, no wma, RV norm, borderline LA enlargement, RA norm,    EKG/Telemetry SR, possible LA enlargement   Other Testing Not Applicable      LDL  3/26/2023: 95 mg/dL   A1C  3/26/2023: 5.5 %   Troponin No lab value available in past 48 hrs      Impression  Acute multifocal L basal ganglia and corona radiata ischemic infarcts secondary to intacranial atherosclerotic disease with severe L M1 stenosis, does have borderline LA enlargement, rule out Afib     Shuffling gait x several years    Recommend increasing ASA to 325 mg/d and  adding clopidogrel (300 mg loading dose now then 75 mg/d). Keep patient on both medications for 3 months. After that stop clopidogrel and continue     05/18/23: Ramila presents to the clinic for poststroke follow-up.  She is accompanied by 2 of her sons in person and 1 on the telephone.  They have a list of questions (2 pages).  I attempted to go through and answer these questions to the best of my ability.  Ramila arrived to the hospital for stroke work-up after having a fall and right lower extremity weakness/heaviness.  She is currently working with PT.  She was signed off from occupational and speech therapy.  She denies any dysarthria or dysphagia.  She states that her right upper extremity is 90% recovered.  Her right lower extremity is 40% to 50% back to baseline.  She denies any numbness.  She states that these extremities feel heavy with some weakness.  She states that it is difficult to  her right leg while walking.     Ramila had a fall 8 days ago.  She was leaving the bathroom and reached back for her walker and tumbled down.  Since this time she feels her movement have regressed and she is slower when ambulating.  Currently she uses a walker with transitioning and ambulating.  She has some complaints of increased right leg pain, that is improving. she followed with the spine clinic in the past for this pain.  She has a follow-up appointment on 5/23/2023 with the spine clinic.  We discussed that if her pain worsens to be seen sooner.    Family had concerns of bilateral ankle and feet swelling.  They stated this was better controlled when she was in the TCU.  We discussed wearing her compression stockings and elevating her feet throughout the day.  If it continues to be a concern they can speak with her primary care provider on management of bilateral lower extremity edema.    Family has concerns that Ramila's shuffling gait and report that her writing has become smaller poststroke.   They are worried that she has a diagnosis of Parkinson's disease and would like an evaluation for this.  We discussed establishing care with a neurologist at her next appointment to discuss concerns of Parkinson's disease/symptoms.  Ramila feels that she is able to take full strides with her left leg.  Her right leg feels more shuffled post stroke due to heaviness.  Her son states that he feels she has had a shuffled gait for years.    09/16/24: Kanwal presents to the clinic for follow-up.  She is accompanied by her daughter-in-law, Maria Isabel.  Patient denies any new strokelike symptoms since her hospitalization.  She feels her right lower leg has slightly improved a little greater than 50%.  She has had a handful of falls in the last year.  She has had 2 since her last visit, both with getting out of the shower.  Family has invested in life alert and patient transitioned into assisted care.  Injections from spine clinic has helped reduce right leg pain by close to 90%.    Patient also has some increased swelling of her left foot.  She states that she twisted her foot and now it is painful and swollen.  She has an appointment with her primary care provider tomorrow for further evaluation and treatment.  Like to know if they can get resources for a PCA to stop in a few times a week and help patient.  They will follow-up with her primary care provider on additional resources/care coordination.    Kanwal continues to take aspirin as prescribed.  She states that she is no longer on any blood pressure medications and her blood pressure remains well-controlled in clinic today it is 108/62..  LDL is well-controlled.  No diabetes, smoking or DMITRI.     Current Prevention Medications:    Aspirin 325 mg  Atorvastatin 40 mg tablet  Hydrochlorothiazide 12.5 mg tablet - stopped    Perceived Side Effects: No    Medication Notes:   AED Medication Compliance:  compliant      Psycho-Social History: Ramila Liao currently lives  [place] with self IND living. Highest level of education Two years of college. Employment status:  and organist, retired.    Patient does not smoke, rare alcohol use, no recreational drug use.  Currently, patient denies feeling depressed, denies feeling anhedonia, denies suicidal  thoughts, and denies having feelings of excessive guilt/worthlessness.  We reviewed importance of mental and emotional wellbeing and impact on health.      Current Medications:   Current Outpatient Medications   Medication Sig Dispense Refill    acetaminophen (TYLENOL) 500 MG tablet Take 1,000 mg by mouth every 6 hours as needed for mild pain      alum & mag hydroxide-simethicone (MAALOX MAX) 400-400-40 MG/5ML SUSP suspension Take 30 mLs by mouth every 4 hours as needed for indigestion or heartburn 355 mL 0    aspirin (ASA) 325 MG tablet Take 1 tablet (325 mg) by mouth daily 90 tablet 1    atorvastatin (LIPITOR) 40 MG tablet Take 1 tablet (40 mg) by mouth every evening 90 tablet 1    gabapentin (NEURONTIN) 300 MG capsule Take 1 capsule (300 mg) by mouth 3 times daily (Patient taking differently: Take 300 mg by mouth 2 times daily.) 90 capsule 1    hydrochlorothiazide (HYDRODIURIL) 12.5 MG tablet Take 1 tablet (12.5 mg) by mouth daily 90 tablet 0    magnesium citrate 100 mg Tab Take 100 mg by mouth 3 times daily as needed      methylPREDNISolone (MEDROL DOSEPAK) 4 MG tablet therapy pack Follow Package Directions 21 tablet 0    multivitamin with minerals (THERA-M) 9 mg iron-400 mcg Tab tablet Take 1 tablet by mouth every morning      omeprazole (PRILOSEC) 20 MG DR capsule       tiZANidine (ZANAFLEX) 2 MG tablet TAKE 1 TABLET BY MOUTH THREE TIMES DAILY AS NEEDED FOR PAIN OR SPASMS 90 tablet 0    traMADol (ULTRAM) 50 MG tablet Take 50 mg by mouth 4 times daily       No current facility-administered medications for this visit.     Past Medical History:   Patient  has a past medical history of Allergic rhinitis (08/02/2005),  "Contact dermatitis and other eczema, due to unspecified cause (07/30/2001), Edema, lower extremity, Low back pain (08/02/2016), and Mixed hyperlipidemia (08/09/2006).  Surgical History:  She  has a past surgical history that includes Hysterectomy; Cholecystectomy; and Cataract Extraction, Bilateral.  Family and Social History:  Reviewed, and she  reports that she has never smoked. She has never used smokeless tobacco. She reports current alcohol use. She reports that she does not use drugs.  Reviewed, and family history includes Coronary Artery Disease in her father and mother.    RECENT DIAGNOSTIC STUDIES:   Labs:    Coagulation studies:  Recent Labs   Lab Test 03/26/23  0952   INR 0.98        Lipid panel:  Recent Labs   Lab Test 04/18/23  1054 03/26/23  0952   CHOL 129 184   HDL 62 73   LDL 51 95   TRIG 79 79       HbA1C:  Recent Labs   Lab Test 03/26/23  0952   A1C 5.5       Troponin:  No lab results found.  Recent Labs   Lab Test 03/26/23  0952 02/28/23  1829 02/25/23  0751   TROPONINI 0.02 0.03 0.02     No lab results found.    Imaging: See [facility]    Cardiac event monitor  Cardiac event monitoring from 3/28/2023 to 4/26/2023 (monitored duration 13d 7h 24m).  Baseline rhythm was sinus rhythm 90bpm.    Reported heart rate range 50 to 120bpm, average 89bpm.  No symptoms recorded.  Automated recordings included sinus rhythm 84-90bpm.  No nonsustained or sustained tachyarrhythmias.  No atrial fibrillation.  There were no pauses noted.  Supraventricular and ventricular ectopic beat frequency are not reported on this monitoring modality.       REVIEW OF SYSTEMS:                                                      10-point review of systems is negative except as mentioned above in HPI.    EXAM:                                                      Physical Exam:   Vitals: /62   Pulse 82   Ht 1.575 m (5' 2\")   Wt 61.7 kg (136 lb)   LMP  (LMP Unknown)   BMI 24.87 kg/m    BMI= Body mass index is 24.87 " kg/m .  GENERAL: NAD.  HEENT: NC/AT.  PULM: Non-labored breathing.   Neurologic:  MENTAL STATUS: Alert, attentive. Speech is fluent. Normal comprehension. Normal concentration. Adequate fund of knowledge.   CRANIAL NERVES: Visual fields intact to confrontation. Pupils equally, round and reactive to light. Facial sensation and movement normal. EOM full. Hearing intact to conversation. Trapezius strength intact. Palate moves symmetrically. Tongue midline.  MOTOR: 5/5 in proximal and distal muscle groups of upper and lower extremities with the exception of right lower extremity 3.5/5. Tone and bulk normal.   SENSATION: Normal light touch throughout.   COORDINATION: Normal finger nose finger. Finger tapping normal. Knee heel shin normal.  STATION AND GAIT:Dependant on walker.  Slow and intentional steps.      ASSESSMENT and PLAN:                                                      Assessment:    ICD-10-CM    1. Cerebrovascular accident (CVA) due to stenosis of left middle cerebral artery (H)  I63.512           Ramila Liao is a 87 year old female presenting for follow-up for multifocal L basal ganglia and corona radiata ischemic infarcts. She was hospitalized at [facility] on 03/26/23 - 03/28/23. Prior to the hospital stay, she had a past medical history of HLD, recent diagnosis of R L5 radiculopathy on EMG but asymptomatic, on PTA ASA 81 mg daily. She presented to the hospital due to multiple falls and intermittent slurred speech x 2 days.  Ramila continues to work with physical therapy.  She continues to have residual weakness on the right lower extremity.  Stroke symptoms since her hospitalization.  We discussed interventions outlined below and will plan to see the patient back in 6 months.  Kanwal understands and agrees with this plan    Plan:     Symptom management  - Continue physical and occupational therapy    Secondary prevention  -Continue with aspirin 325 mg tablet daily      Lifestyle  - mediterranean diet  - exercise    Risk Factors     Elevated cholesterol levels   LDL: 51  - Goal < 70 mg/dl  - Continue preventive therapy: Atorvastatin as prescribed    --- Plan to follow-up with your primary care provider to manage your vascular risk factors  --- Plan on follow up in the Neurology Clinic in 6 months with a [facility] neurologist.  --- Please feel free to reach out if you have any further questions or concerns.  --- Seek immediate medical attention if an emergency arises or if your health becomes progressively worse.     For any acute neurologic deficits she was advised to  go directly to the hospital rather than call the clinic.    It was a pleasure to meet you today!     Total Time: Total time spent for face to face visit, reviewing labs/imaging studies, counseling and coordination of care was: 30 minutesspent on the date of the encounter doing chart review, review of test results, patient visit, documentation and discussion with family     This note was dictated using voice recognition software.  Any grammatical or context distortions are unintentional and inherent to the software.    Sushma Bravo, DNP, APRN, CNP  [facility]

## 2024-09-16 ENCOUNTER — OFFICE VISIT (OUTPATIENT)
Dept: NEUROLOGY | Facility: CLINIC | Age: 89
End: 2024-09-16
Payer: COMMERCIAL

## 2024-09-16 VITALS
SYSTOLIC BLOOD PRESSURE: 108 MMHG | HEIGHT: 62 IN | DIASTOLIC BLOOD PRESSURE: 62 MMHG | HEART RATE: 82 BPM | BODY MASS INDEX: 25.03 KG/M2 | WEIGHT: 136 LBS

## 2024-09-16 DIAGNOSIS — I63.512 CEREBROVASCULAR ACCIDENT (CVA) DUE TO STENOSIS OF LEFT MIDDLE CEREBRAL ARTERY (H): Primary | ICD-10-CM

## 2024-09-16 PROCEDURE — 99214 OFFICE O/P EST MOD 30 MIN: CPT

## 2024-09-16 NOTE — LETTER
9/16/2024      Ramila Liao  [address]  [facility] [address]      Dear Colleague,    Thank you for referring your patient, Ramila Liao, to the [facility] [place]. Please see a copy of my visit note below.    __________________________________________________________    ___________________________________  ESTABLISHED PATIENT NEUROLOGY NOTE    DATE OF VISIT: 5/18/2023  MRN: [id number]  PATIENT NAME: Ramila Liao  YOB: 1935      Chief Complaint: Patient presents with:  Stroke: Patient states she is unable to move right leg.  Follow up    SUBJECTIVE:                                                      History of Present Illness: Ramila Liao is a 87 year old female presenting for follow-up for multifocal L basal ganglia and corona radiata ischemic infarcts.     She was hospitalized at [facility] on 03/26/23 - 03/28/23. Prior to the hospital stay, she had a past medical history of HLD, recent diagnosis of R L5 radiculopathy on EMG but asymptomatic, on PTA ASA 81 mg daily.    She presented to the hospital due to multiple falls and intermittent slurred speech x 2 days. In ED there was noticeable RLE weakness with drift. CT/CTA with severe focal L M1 stenosis. MRI shows acute multifocal L basal ganglia/corona radiata ischemic infarcts (watershed distribution).    Stroke Evaluation summarized:    MRI/Head CT MRI: acute multifocal L basal ganglia and corona radiata ischemic infarcts  CTH: negative for acute pathology   Intracranial Vasculature CTA head: focal severe L M1 stenosis   Cervical Vasculature CTA neck: unremarkable      Echocardiogram TTE: LV norm, EF 60-65%, no wma, RV norm, borderline LA enlargement, RA norm,    EKG/Telemetry SR, possible LA enlargement   Other Testing Not Applicable      LDL  3/26/2023: 95 mg/dL   A1C  3/26/2023: 5.5 %   Troponin No lab value available in past 48 hrs      Impression  Acute multifocal  L basal ganglia and corona radiata ischemic infarcts secondary to intacranial atherosclerotic disease with severe L M1 stenosis, does have borderline LA enlargement, rule out Afib     Shuffling gait x several years    Recommend increasing ASA to 325 mg/d and adding clopidogrel (300 mg loading dose now then 75 mg/d). Keep patient on both medications for 3 months. After that stop clopidogrel and continue     05/18/23: Ramila presents to the clinic for poststroke follow-up.  She is accompanied by 2 of her sons in person and 1 on the telephone.  They have a list of questions (2 pages).  I attempted to go through and answer these questions to the best of my ability.  Ramila arrived to the hospital for stroke work-up after having a fall and right lower extremity weakness/heaviness.  She is currently working with PT.  She was signed off from occupational and speech therapy.  She denies any dysarthria or dysphagia.  She states that her right upper extremity is 90% recovered.  Her right lower extremity is 40% to 50% back to baseline.  She denies any numbness.  She states that these extremities feel heavy with some weakness.  She states that it is difficult to  her right leg while walking.     Ramila had a fall 8 days ago.  She was leaving the bathroom and reached back for her walker and tumbled down.  Since this time she feels her movement have regressed and she is slower when ambulating.  Currently she uses a walker with transitioning and ambulating.  She has some complaints of increased right leg pain, that is improving. she followed with the spine clinic in the past for this pain.  She has a follow-up appointment on 5/23/2023 with the spine clinic.  We discussed that if her pain worsens to be seen sooner.    Family had concerns of bilateral ankle and feet swelling.  They stated this was better controlled when she was in the TCU.  We discussed wearing her compression stockings and elevating her feet  throughout the day.  If it continues to be a concern they can speak with her primary care provider on management of bilateral lower extremity edema.    Family has concerns that Ramila's shuffling gait and report that her writing has become smaller poststroke.  They are worried that she has a diagnosis of Parkinson's disease and would like an evaluation for this.  We discussed establishing care with a neurologist at her next appointment to discuss concerns of Parkinson's disease/symptoms.  Ramila feels that she is able to take full strides with her left leg.  Her right leg feels more shuffled post stroke due to heaviness.  Her son states that he feels she has had a shuffled gait for years.    09/16/24: Kanwal presents to the clinic for follow-up.  She is accompanied by her daughter-in-law, Maria Isabel.  Patient denies any new strokelike symptoms since her hospitalization.  She feels her right lower leg has slightly improved a little greater than 50%.  She has had a handful of falls in the last year.  She has had 2 since her last visit, both with getting out of the shower.  Family has invested in life alert and patient transitioned into assisted care.  Injections from spine clinic has helped reduce right leg pain by close to 90%.    Patient also has some increased swelling of her left foot.  She states that she twisted her foot and now it is painful and swollen.  She has an appointment with her primary care provider tomorrow for further evaluation and treatment.  Like to know if they can get resources for a PCA to stop in a few times a week and help patient.  They will follow-up with her primary care provider on additional resources/care coordination.    Kanwal continues to take aspirin as prescribed.  She states that she is no longer on any blood pressure medications and her blood pressure remains well-controlled in clinic today it is 108/62..  LDL is well-controlled.  No diabetes, smoking or DMITRI.     Current Prevention  Medications:    Aspirin 325 mg  Atorvastatin 40 mg tablet  Hydrochlorothiazide 12.5 mg tablet - stopped    Perceived Side Effects: No    Medication Notes:   AED Medication Compliance:  compliant      Psycho-Social History: Ramila Liao currently lives [place] with self IND living. Highest level of education Two years of college. Employment status:  and organist, retired.    Patient does not smoke, rare alcohol use, no recreational drug use.  Currently, patient denies feeling depressed, denies feeling anhedonia, denies suicidal  thoughts, and denies having feelings of excessive guilt/worthlessness.  We reviewed importance of mental and emotional wellbeing and impact on health.      Current Medications:   Current Outpatient Medications   Medication Sig Dispense Refill     acetaminophen (TYLENOL) 500 MG tablet Take 1,000 mg by mouth every 6 hours as needed for mild pain       alum & mag hydroxide-simethicone (MAALOX MAX) 400-400-40 MG/5ML SUSP suspension Take 30 mLs by mouth every 4 hours as needed for indigestion or heartburn 355 mL 0     aspirin (ASA) 325 MG tablet Take 1 tablet (325 mg) by mouth daily 90 tablet 1     atorvastatin (LIPITOR) 40 MG tablet Take 1 tablet (40 mg) by mouth every evening 90 tablet 1     gabapentin (NEURONTIN) 300 MG capsule Take 1 capsule (300 mg) by mouth 3 times daily (Patient taking differently: Take 300 mg by mouth 2 times daily.) 90 capsule 1     hydrochlorothiazide (HYDRODIURIL) 12.5 MG tablet Take 1 tablet (12.5 mg) by mouth daily 90 tablet 0     magnesium citrate 100 mg Tab Take 100 mg by mouth 3 times daily as needed       methylPREDNISolone (MEDROL DOSEPAK) 4 MG tablet therapy pack Follow Package Directions 21 tablet 0     multivitamin with minerals (THERA-M) 9 mg iron-400 mcg Tab tablet Take 1 tablet by mouth every morning       omeprazole (PRILOSEC) 20 MG DR capsule        tiZANidine (ZANAFLEX) 2 MG tablet TAKE 1 TABLET BY MOUTH THREE TIMES DAILY AS NEEDED  FOR PAIN OR SPASMS 90 tablet 0     traMADol (ULTRAM) 50 MG tablet Take 50 mg by mouth 4 times daily       No current facility-administered medications for this visit.     Past Medical History:   Patient  has a past medical history of Allergic rhinitis (08/02/2005), Contact dermatitis and other eczema, due to unspecified cause (07/30/2001), Edema, lower extremity, Low back pain (08/02/2016), and Mixed hyperlipidemia (08/09/2006).  Surgical History:  She  has a past surgical history that includes Hysterectomy; Cholecystectomy; and Cataract Extraction, Bilateral.  Family and Social History:  Reviewed, and she  reports that she has never smoked. She has never used smokeless tobacco. She reports current alcohol use. She reports that she does not use drugs.  Reviewed, and family history includes Coronary Artery Disease in her father and mother.    RECENT DIAGNOSTIC STUDIES:   Labs:    Coagulation studies:  Recent Labs   Lab Test 03/26/23  0952   INR 0.98        Lipid panel:  Recent Labs   Lab Test 04/18/23  1054 03/26/23  0952   CHOL 129 184   HDL 62 73   LDL 51 95   TRIG 79 79       HbA1C:  Recent Labs   Lab Test 03/26/23  0952   A1C 5.5       Troponin:  No lab results found.  Recent Labs   Lab Test 03/26/23  0952 02/28/23  1829 02/25/23  0751   TROPONINI 0.02 0.03 0.02     No lab results found.    Imaging: See [facility]    Cardiac event monitor  Cardiac event monitoring from 3/28/2023 to 4/26/2023 (monitored duration 13d 7h 24m).  Baseline rhythm was sinus rhythm 90bpm.    Reported heart rate range 50 to 120bpm, average 89bpm.  No symptoms recorded.  Automated recordings included sinus rhythm 84-90bpm.  No nonsustained or sustained tachyarrhythmias.  No atrial fibrillation.  There were no pauses noted.  Supraventricular and ventricular ectopic beat frequency are not reported on this monitoring modality.       REVIEW OF SYSTEMS:                                                      10-point review of systems is negative except  "as mentioned above in HPI.    EXAM:                                                      Physical Exam:   Vitals: /62   Pulse 82   Ht 1.575 m (5' 2\")   Wt 61.7 kg (136 lb)   LMP  (LMP Unknown)   BMI 24.87 kg/m    BMI= Body mass index is 24.87 kg/m .  GENERAL: NAD.  HEENT: NC/AT.  PULM: Non-labored breathing.   Neurologic:  MENTAL STATUS: Alert, attentive. Speech is fluent. Normal comprehension. Normal concentration. Adequate fund of knowledge.   CRANIAL NERVES: Visual fields intact to confrontation. Pupils equally, round and reactive to light. Facial sensation and movement normal. EOM full. Hearing intact to conversation. Trapezius strength intact. Palate moves symmetrically. Tongue midline.  MOTOR: 5/5 in proximal and distal muscle groups of upper and lower extremities with the exception of right lower extremity 3.5/5. Tone and bulk normal.   SENSATION: Normal light touch throughout.   COORDINATION: Normal finger nose finger. Finger tapping normal. Knee heel shin normal.  STATION AND GAIT:Dependant on walker.  Slow and intentional steps.      ASSESSMENT and PLAN:                                                      Assessment:    ICD-10-CM    1. Cerebrovascular accident (CVA) due to stenosis of left middle cerebral artery (H)  I63.512           Ramila Liao is a 87 year old female presenting for follow-up for multifocal L basal ganglia and corona radiata ischemic infarcts. She was hospitalized at [facility] on 03/26/23 - 03/28/23. Prior to the hospital stay, she had a past medical history of HLD, recent diagnosis of R L5 radiculopathy on EMG but asymptomatic, on PTA ASA 81 mg daily. She presented to the hospital due to multiple falls and intermittent slurred speech x 2 days.  Ramila continues to work with physical therapy.  She continues to have residual weakness on the right lower extremity.  Stroke symptoms since her hospitalization.  We discussed interventions outlined below " and will plan to see the patient back in 6 months.  Kanwal understands and agrees with this plan    Plan:     Symptom management  - Continue physical and occupational therapy    Secondary prevention  -Continue with aspirin 325 mg tablet daily     Lifestyle  - mediterranean diet  - exercise    Risk Factors     Elevated cholesterol levels   LDL: 51  - Goal < 70 mg/dl  - Continue preventive therapy: Atorvastatin as prescribed    --- Plan to follow-up with your primary care provider to manage your vascular risk factors  --- Plan on follow up in the Neurology Clinic in 6 months with a [facility] neurologist.  --- Please feel free to reach out if you have any further questions or concerns.  --- Seek immediate medical attention if an emergency arises or if your health becomes progressively worse.     For any acute neurologic deficits she was advised to  go directly to the hospital rather than call the clinic.    It was a pleasure to meet you today!     Total Time: Total time spent for face to face visit, reviewing labs/imaging studies, counseling and coordination of care was: 30 minutesspent on the date of the encounter doing chart review, review of test results, patient visit, documentation and discussion with family     This note was dictated using voice recognition software.  Any grammatical or context distortions are unintentional and inherent to the software.    Sushma Bravo, TARAN, APRN, CNP  [facility]         Again, thank you for allowing me to participate in the care of your patient.        Sincerely,        GORGE Faulkner CNP

## 2024-09-16 NOTE — PATIENT INSTRUCTIONS
Plan:     Symptom management  - Continue physical and occupational therapy    Secondary prevention  -Continue with aspirin 325 mg tablet daily     Lifestyle  - mediterranean diet  - exercise    Risk Factors     Elevated cholesterol levels   LDL: 51  - Goal < 70 mg/dl  - Continue preventive therapy: Atorvastatin as prescribed    --- Plan to follow-up with your primary care provider to manage your vascular risk factors  --- Plan on follow up in the Neurology Clinic in 6 months with a [facility] neurologist.  --- Please feel free to reach out if you have any further questions or concerns.  --- Seek immediate medical attention if an emergency arises or if your health becomes progressively worse.     For any acute neurologic deficits she was advised to  go directly to the hospital rather than call the clinic.    It was a pleasure to meet you today!

## 2024-09-16 NOTE — NURSING NOTE
Chief Complaint   Patient presents with    Stroke     Patient states she is unable to move right leg.  Follow up     Mayda Baker on 9/16/2024 at 2:26 PM

## 2024-09-18 ENCOUNTER — LAB REQUISITION (OUTPATIENT)
Dept: LAB | Facility: CLINIC | Age: 89
End: 2024-09-18
Payer: COMMERCIAL

## 2024-09-18 DIAGNOSIS — I10 ESSENTIAL (PRIMARY) HYPERTENSION: ICD-10-CM

## 2024-09-18 DIAGNOSIS — K21.9 GASTRO-ESOPHAGEAL REFLUX DISEASE WITHOUT ESOPHAGITIS: ICD-10-CM

## 2024-09-19 LAB
ALBUMIN SERPL BCG-MCNC: 4.1 G/DL (ref 3.5–5.2)
ALP SERPL-CCNC: 106 U/L (ref 40–150)
ALT SERPL W P-5'-P-CCNC: 22 U/L (ref 0–50)
ANION GAP SERPL CALCULATED.3IONS-SCNC: 13 MMOL/L (ref 7–15)
AST SERPL W P-5'-P-CCNC: 27 U/L (ref 0–45)
BILIRUB SERPL-MCNC: 0.3 MG/DL
BUN SERPL-MCNC: 17.4 MG/DL (ref 8–23)
CALCIUM SERPL-MCNC: 9 MG/DL (ref 8.8–10.4)
CHLORIDE SERPL-SCNC: 104 MMOL/L (ref 98–107)
CREAT SERPL-MCNC: 0.72 MG/DL (ref 0.51–0.95)
EGFRCR SERPLBLD CKD-EPI 2021: 80 ML/MIN/1.73M2
ERYTHROCYTE [DISTWIDTH] IN BLOOD BY AUTOMATED COUNT: 13.8 % (ref 10–15)
GLUCOSE SERPL-MCNC: 119 MG/DL (ref 70–99)
HCO3 SERPL-SCNC: 24 MMOL/L (ref 22–29)
HCT VFR BLD AUTO: 42.1 % (ref 35–47)
HGB BLD-MCNC: 13.2 G/DL (ref 11.7–15.7)
MCH RBC QN AUTO: 30.3 PG (ref 26.5–33)
MCHC RBC AUTO-ENTMCNC: 31.4 G/DL (ref 31.5–36.5)
MCV RBC AUTO: 97 FL (ref 78–100)
PLATELET # BLD AUTO: 193 10E3/UL (ref 150–450)
POTASSIUM SERPL-SCNC: 4.3 MMOL/L (ref 3.4–5.3)
PROT SERPL-MCNC: 7 G/DL (ref 6.4–8.3)
RBC # BLD AUTO: 4.35 10E6/UL (ref 3.8–5.2)
SODIUM SERPL-SCNC: 141 MMOL/L (ref 135–145)
TSH SERPL DL<=0.005 MIU/L-ACNC: 2.45 UIU/ML (ref 0.3–4.2)
WBC # BLD AUTO: 8.1 10E3/UL (ref 4–11)

## 2024-09-19 PROCEDURE — 85027 COMPLETE CBC AUTOMATED: CPT | Mod: ORL | Performed by: NURSE PRACTITIONER

## 2024-09-19 PROCEDURE — P9603 ONE-WAY ALLOW PRORATED MILES: HCPCS | Mod: ORL | Performed by: NURSE PRACTITIONER

## 2024-09-19 PROCEDURE — 84443 ASSAY THYROID STIM HORMONE: CPT | Mod: ORL | Performed by: NURSE PRACTITIONER

## 2024-09-19 PROCEDURE — 80053 COMPREHEN METABOLIC PANEL: CPT | Mod: ORL | Performed by: NURSE PRACTITIONER

## 2024-09-19 PROCEDURE — 36415 COLL VENOUS BLD VENIPUNCTURE: CPT | Mod: ORL | Performed by: NURSE PRACTITIONER

## 2024-09-23 NOTE — ADDENDUM NOTE
Detail Level: Generalized
Encounter addended by: Paulette Shelby, PT on: 3/9/2023 1:11 PM   Actions taken: Flowsheet accepted
Detail Level: Detailed
Detail Level: Zone

## 2024-09-28 ENCOUNTER — HEALTH MAINTENANCE LETTER (OUTPATIENT)
Age: 89
End: 2024-09-28

## 2024-10-22 ENCOUNTER — HOSPITAL ENCOUNTER (OUTPATIENT)
Dept: CT IMAGING | Facility: HOSPITAL | Age: 89
Discharge: HOME | End: 2024-10-22
Attending: NURSE PRACTITIONER | Admitting: NURSE PRACTITIONER
Payer: COMMERCIAL

## 2024-10-22 DIAGNOSIS — R91.8 PULMONARY NODULES: ICD-10-CM

## 2024-10-22 LAB
CREAT BLD-MCNC: 0.9 MG/DL (ref 0.6–1.1)
EGFRCR SERPLBLD CKD-EPI 2021: >60 ML/MIN/1.73M2

## 2024-10-22 PROCEDURE — 250N000009 HC RX 250

## 2024-10-22 PROCEDURE — 71260 CT THORAX DX C+: CPT

## 2024-10-22 PROCEDURE — 250N000011 HC RX IP 250 OP 636

## 2024-10-22 PROCEDURE — 82565 ASSAY OF CREATININE: CPT

## 2024-10-22 RX ORDER — IOPAMIDOL 755 MG/ML
90 INJECTION, SOLUTION INTRAVASCULAR ONCE
Status: COMPLETED | OUTPATIENT
Start: 2024-10-22 | End: 2024-10-22

## 2024-10-22 RX ADMIN — SODIUM CHLORIDE 60 ML: 9 INJECTION, SOLUTION INTRAVENOUS at 16:58

## 2024-10-22 RX ADMIN — IOPAMIDOL 90 ML: 755 INJECTION, SOLUTION INTRAVENOUS at 16:54

## 2025-04-22 ENCOUNTER — LAB REQUISITION (OUTPATIENT)
Dept: LAB | Facility: CLINIC | Age: 89
End: 2025-04-22
Payer: COMMERCIAL

## 2025-04-22 DIAGNOSIS — I10 ESSENTIAL (PRIMARY) HYPERTENSION: ICD-10-CM

## 2025-04-22 DIAGNOSIS — R63.5 ABNORMAL WEIGHT GAIN: ICD-10-CM

## 2025-04-24 LAB
ALBUMIN SERPL BCG-MCNC: 4.2 G/DL (ref 3.5–5.2)
ALP SERPL-CCNC: 102 U/L (ref 40–150)
ALT SERPL W P-5'-P-CCNC: 14 U/L (ref 0–50)
ANION GAP SERPL CALCULATED.3IONS-SCNC: 10 MMOL/L (ref 7–15)
AST SERPL W P-5'-P-CCNC: 25 U/L (ref 0–45)
BILIRUB SERPL-MCNC: 0.4 MG/DL
BUN SERPL-MCNC: 20.6 MG/DL (ref 8–23)
CALCIUM SERPL-MCNC: 9 MG/DL (ref 8.8–10.4)
CHLORIDE SERPL-SCNC: 106 MMOL/L (ref 98–107)
CREAT SERPL-MCNC: 0.94 MG/DL (ref 0.51–0.95)
EGFRCR SERPLBLD CKD-EPI 2021: 58 ML/MIN/1.73M2
ERYTHROCYTE [DISTWIDTH] IN BLOOD BY AUTOMATED COUNT: 13 % (ref 10–15)
GLUCOSE SERPL-MCNC: 76 MG/DL (ref 70–99)
HCO3 SERPL-SCNC: 27 MMOL/L (ref 22–29)
HCT VFR BLD AUTO: 42.7 % (ref 35–47)
HGB BLD-MCNC: 13.4 G/DL (ref 11.7–15.7)
MCH RBC QN AUTO: 30.2 PG (ref 26.5–33)
MCHC RBC AUTO-ENTMCNC: 31.4 G/DL (ref 31.5–36.5)
MCV RBC AUTO: 96 FL (ref 78–100)
PLATELET # BLD AUTO: 179 10E3/UL (ref 150–450)
POTASSIUM SERPL-SCNC: 4.1 MMOL/L (ref 3.4–5.3)
PROT SERPL-MCNC: 7 G/DL (ref 6.4–8.3)
RBC # BLD AUTO: 4.44 10E6/UL (ref 3.8–5.2)
SODIUM SERPL-SCNC: 143 MMOL/L (ref 135–145)
TSH SERPL DL<=0.005 MIU/L-ACNC: 2.42 UIU/ML (ref 0.3–4.2)
WBC # BLD AUTO: 7 10E3/UL (ref 4–11)

## 2025-04-24 PROCEDURE — P9604 ONE-WAY ALLOW PRORATED TRIP: HCPCS | Mod: ORL | Performed by: NURSE PRACTITIONER

## 2025-04-24 PROCEDURE — 84443 ASSAY THYROID STIM HORMONE: CPT | Mod: ORL | Performed by: NURSE PRACTITIONER

## 2025-04-24 PROCEDURE — 85027 COMPLETE CBC AUTOMATED: CPT | Mod: ORL | Performed by: NURSE PRACTITIONER

## 2025-04-24 PROCEDURE — 80053 COMPREHEN METABOLIC PANEL: CPT | Mod: ORL | Performed by: NURSE PRACTITIONER

## 2025-04-24 PROCEDURE — 36415 COLL VENOUS BLD VENIPUNCTURE: CPT | Mod: ORL | Performed by: NURSE PRACTITIONER
